# Patient Record
Sex: MALE | Race: WHITE | NOT HISPANIC OR LATINO | Employment: OTHER | URBAN - METROPOLITAN AREA
[De-identification: names, ages, dates, MRNs, and addresses within clinical notes are randomized per-mention and may not be internally consistent; named-entity substitution may affect disease eponyms.]

---

## 2017-01-30 ENCOUNTER — OFFICE VISIT (OUTPATIENT)
Dept: FAMILY MEDICINE CLINIC | Facility: CLINIC | Age: 72
End: 2017-01-30

## 2017-01-30 VITALS
HEIGHT: 72 IN | HEART RATE: 71 BPM | DIASTOLIC BLOOD PRESSURE: 84 MMHG | RESPIRATION RATE: 16 BRPM | WEIGHT: 255.8 LBS | OXYGEN SATURATION: 97 % | BODY MASS INDEX: 34.65 KG/M2 | SYSTOLIC BLOOD PRESSURE: 142 MMHG | TEMPERATURE: 98.2 F

## 2017-01-30 DIAGNOSIS — G89.29 CHRONIC PAIN OF BOTH KNEES: ICD-10-CM

## 2017-01-30 DIAGNOSIS — M25.562 CHRONIC PAIN OF BOTH KNEES: ICD-10-CM

## 2017-01-30 DIAGNOSIS — M25.561 CHRONIC PAIN OF BOTH KNEES: ICD-10-CM

## 2017-01-30 DIAGNOSIS — M15.9 GENERALIZED OSTEOARTHRITIS: Primary | ICD-10-CM

## 2017-01-30 PROCEDURE — 99213 OFFICE O/P EST LOW 20 MIN: CPT | Performed by: FAMILY MEDICINE

## 2017-01-30 RX ORDER — POTASSIUM CHLORIDE 750 MG/1
TABLET, FILM COATED, EXTENDED RELEASE ORAL
COMMUNITY
Start: 2017-01-02 | End: 2017-03-28

## 2017-01-30 RX ORDER — MULTIVITAMIN/IRON/FOLIC ACID 18MG-0.4MG
TABLET ORAL DAILY
COMMUNITY
Start: 2017-01-16 | End: 2017-10-24

## 2017-01-30 RX ORDER — TRAMADOL HYDROCHLORIDE 50 MG/1
100 TABLET ORAL EVERY 6 HOURS PRN
Qty: 100 TABLET | Refills: 1 | Status: SHIPPED | OUTPATIENT
Start: 2017-01-30 | End: 2017-08-23 | Stop reason: SDUPTHER

## 2017-01-30 RX ORDER — FUROSEMIDE 20 MG/1
20 TABLET ORAL DAILY
Qty: 30 TABLET | Refills: 2 | Status: SHIPPED | OUTPATIENT
Start: 2017-01-30 | End: 2017-04-23 | Stop reason: SDUPTHER

## 2017-01-30 RX ORDER — OMEPRAZOLE 20 MG/1
CAPSULE, DELAYED RELEASE ORAL
COMMUNITY
Start: 2017-01-23 | End: 2017-10-24

## 2017-01-30 RX ORDER — FUROSEMIDE 20 MG/1
TABLET ORAL
Qty: 30 TABLET | Refills: 0 | Status: CANCELLED | OUTPATIENT
Start: 2017-01-30

## 2017-01-30 NOTE — MR AVS SNAPSHOT
Jeremie Wynn   1/30/2017 11:00 AM   Office Visit    Provider:  Aaron Trejo MD   Department:  [facility]   Dept Phone:  [phone]                Your Full Care Plan              Today's Medication Changes          These changes are accurate as of: 1/30/17 11:41 AM.  If you have any questions, ask your nurse or doctor.               Medication(s)that have changed:     furosemide 20 MG tablet   Commonly known as:  LASIX   Take 1 tablet by mouth Daily.   What changed:  See the new instructions.   Changed by:  Aaron Trejo MD       KLOR-CON 10 MEQ CR tablet   Generic drug:  potassium chloride   TAKE ONE TABLET BY MOUTH DAILY   What changed:  Another medication with the same name was removed. Continue taking this medication, and follow the directions you see here.   Changed by:  Aaron Trejo MD       traMADol 50 MG tablet   Commonly known as:  ULTRAM   Take 2 tablets by mouth Every 6 (Six) Hours As Needed for moderate pain (4-6).   What changed:    - how much to take  - how to take this  - when to take this  - reasons to take this   Changed by:  Aaron Trejo MD         Stop taking medication(s)listed here:     azithromycin 250 MG tablet   Commonly known as:  ZITHROMAX   Stopped by:  Aaron Trejo MD           diclofenac 75 MG EC tablet   Commonly known as:  VOLTAREN   Stopped by:  Aaron Trejo MD           MethylPREDNISolone 4 MG tablet   Commonly known as:  MEDROL (GRZEGORZ)   Stopped by:  Aaron Trejo MD                Where to Get Your Medications      These medications were sent to [address] - [address] [facility] [address] [facility] - [phone]  - [phone] FX  [address] [facility] [address] [address]     Phone:  [phone]     furosemide 20 MG tablet         You can get these medications from any pharmacy     Bring a paper prescription for each of these medications     traMADol  50 MG tablet                  Your Updated Medication List          This list is accurate as of: 1/30/17 11:41 AM.  Always use your most recent med list.                albuterol 108 (90 BASE) MCG/ACT inhaler   Commonly known as:  PROVENTIL HFA;VENTOLIN HFA   Inhale 2 puffs every 4 (four) hours as needed for wheezing.       aspirin 81 MG tablet       atorvastatin 10 MG tablet   Commonly known as:  LIPITOR   TAKE ONE TABLET BY MOUTH DAILY FOR CHOLESTEROL       CENTROVITE tablet       furosemide 20 MG tablet   Commonly known as:  LASIX   Take 1 tablet by mouth Daily.       KLOR-CON 10 MEQ CR tablet   Generic drug:  potassium chloride   TAKE ONE TABLET BY MOUTH DAILY       LIALDA 1.2 G EC tablet   Generic drug:  mesalamine       omeprazole 20 MG capsule   Commonly known as:  priLOSEC       potassium chloride 10 MEQ CR tablet   Commonly known as:  K-DUR       tiZANidine 4 MG tablet   Commonly known as:  ZANAFLEX       tobramycin 0.3 % solution ophthalmic solution       traMADol 50 MG tablet   Commonly known as:  ULTRAM   Take 2 tablets by mouth Every 6 (Six) Hours As Needed for moderate pain (4-6).       valsartan 320 MG tablet   Commonly known as:  DIOVAN               We Performed the Following     Ambulatory Referral to Orthopedic Surgery       You Were Diagnosed With        Codes Comments    Generalized osteoarthritis    -  Primary ICD-10-CM: M15.9  ICD-9-CM: 715.00     Chronic pain of both knees     ICD-10-CM: M25.561, M25.562, G89.29  ICD-9-CM: 719.46, 338.29       Instructions     None    Patient Instructions History      MyChart Signup     Our records indicate that you have an active [employer] account.    You can view your After Visit Summary by going to [employer] and logging in with your [employer] username and password.  If you don't have a [employer] username and password but a parent or guardian has access to your record, the parent or guardian should login with their own [employer]  "username and password and access your record to view the After Visit Summary.    If you have questions, you can email [email or web address] or call [phone] to talk to our MyChart staff.  Remember, [employer] is NOT to be used for urgent needs.  For medical emergencies, dial 911.               Other Info from Your Visit           Your Appointments     Mar 20, 2017  9:45 AM EDT   Follow Up with Kishore Mc MD   [facility] CARDIOLOGY (--)    [address]  [facility] [id number] [phone]           Arrive 15 minutes prior to appointment.            Mar 28, 2017  9:15 AM EDT   Wellness with Aaron Trejo MD   [facility] FAMILY MEDICINE (--)    [address]  [facility] [address] [phone]              Allergies     Contrast Dye  Anaphylaxis    Morphine And Related      Penicillins      Sulfa Antibiotics        Reason for Visit     Knee Pain both knees      Vital Signs     Blood Pressure Pulse Temperature Respirations Height Weight    142/84 71 98.2 °F (36.8 °C) 16 71.5\" (181.6 cm) 255 lb 12.8 oz (116 kg)    Oxygen Saturation Body Mass Index Smoking Status             97% 35.18 kg/m2 Former Smoker         Problems and Diagnoses Noted     Generalized osteoarthritis    Chronic pain of both knees          "

## 2017-01-31 NOTE — PROGRESS NOTES
"Subjective   Jeremie Wynn is a 71 y.o. male.     Knee Pain    The incident occurred more than 1 week ago. There was no injury mechanism. The pain is present in the left knee and right knee. The pain is moderate. The pain has been constant since onset. Associated symptoms include a loss of motion. Pertinent negatives include no numbness or tingling. The symptoms are aggravated by weight bearing. He has tried acetaminophen for the symptoms. The treatment provided mild relief.      Jeremie had a recent EGD which showed superficial ulcerations in the stomach which required discontinuation of diclofenac. Since that time he has had tremendous problems with his knees with stiffness difficulty walking increased pain and swelling.  The following portions of the patient's history were reviewed and updated as appropriate: allergies, current medications, past social history and problem list.    Review of Systems   Constitutional: Negative for chills and fever.   HENT: Negative.    Respiratory: Negative for shortness of breath and wheezing.    Cardiovascular: Negative for chest pain and palpitations.   Musculoskeletal: Positive for arthralgias, joint swelling and myalgias. Negative for back pain.        Range of motion is normal  There is no joint effusion, redness or swelling   Neurological: Negative for tingling, tremors, weakness and numbness.       Objective   Visit Vitals   • /84   • Pulse 71   • Temp 98.2 °F (36.8 °C)   • Resp 16   • Ht 71.5\" (181.6 cm)   • Wt 255 lb 12.8 oz (116 kg)   • SpO2 97%   • BMI 35.18 kg/m2     Physical Exam    Assessment/Plan   Problem List Items Addressed This Visit        Musculoskeletal and Integument    Generalized osteoarthritis - Primary    Relevant Orders    Ambulatory Referral to Orthopedic Surgery      Other Visit Diagnoses     Chronic pain of both knees        Relevant Medications    traMADol (ULTRAM) 50 MG tablet          New Medications Ordered This Visit   Medications   • " omeprazole (priLOSEC) 20 MG capsule   • potassium chloride (K-DUR) 10 MEQ CR tablet   • Multiple Vitamins-Minerals (CENTROVITE) tablet     Sig: Take  by mouth Daily.   • traMADol (ULTRAM) 50 MG tablet     Sig: Take 2 tablets by mouth Every 6 (Six) Hours As Needed for moderate pain (4-6).     Dispense:  100 tablet     Refill:  1   • furosemide (LASIX) 20 MG tablet     Sig: Take 1 tablet by mouth Daily.     Dispense:  30 tablet     Refill:  2     I recommend orthopedic evaluation may need some injections in his knees to help until he can possibly go back on any NSAIDs such as Celebrex in the future with a PPI.

## 2017-02-07 RX ORDER — POTASSIUM CHLORIDE 750 MG/1
TABLET, FILM COATED, EXTENDED RELEASE ORAL
Qty: 30 TABLET | Refills: 3 | Status: SHIPPED | OUTPATIENT
Start: 2017-02-07 | End: 2017-05-28 | Stop reason: SDUPTHER

## 2017-02-17 RX ORDER — TIZANIDINE 4 MG/1
TABLET ORAL
Qty: 30 TABLET | Refills: 0 | Status: SHIPPED | OUTPATIENT
Start: 2017-02-17 | End: 2017-03-31 | Stop reason: SDUPTHER

## 2017-03-20 ENCOUNTER — TELEPHONE (OUTPATIENT)
Dept: FAMILY MEDICINE CLINIC | Facility: CLINIC | Age: 72
End: 2017-03-20

## 2017-03-20 RX ORDER — ATORVASTATIN CALCIUM 10 MG/1
TABLET, FILM COATED ORAL
Qty: 90 TABLET | Refills: 0 | Status: SHIPPED | OUTPATIENT
Start: 2017-03-20 | End: 2017-06-12 | Stop reason: SDUPTHER

## 2017-03-20 RX ORDER — VALSARTAN 320 MG/1
320 TABLET ORAL DAILY
Qty: 90 TABLET | Refills: 0 | Status: SHIPPED | OUTPATIENT
Start: 2017-03-20 | End: 2017-06-12 | Stop reason: SDUPTHER

## 2017-03-28 ENCOUNTER — OFFICE VISIT (OUTPATIENT)
Dept: FAMILY MEDICINE CLINIC | Facility: CLINIC | Age: 72
End: 2017-03-28

## 2017-03-28 VITALS
HEIGHT: 72 IN | HEART RATE: 64 BPM | DIASTOLIC BLOOD PRESSURE: 82 MMHG | RESPIRATION RATE: 16 BRPM | SYSTOLIC BLOOD PRESSURE: 128 MMHG | TEMPERATURE: 97.6 F | WEIGHT: 253 LBS | OXYGEN SATURATION: 97 % | BODY MASS INDEX: 34.27 KG/M2

## 2017-03-28 DIAGNOSIS — Z00.00 MEDICARE ANNUAL WELLNESS VISIT, SUBSEQUENT: Primary | ICD-10-CM

## 2017-03-28 DIAGNOSIS — I10 ESSENTIAL HYPERTENSION: ICD-10-CM

## 2017-03-28 DIAGNOSIS — E78.01 FAMILIAL HYPERCHOLESTEROLEMIA: ICD-10-CM

## 2017-03-28 DIAGNOSIS — M19.90 ARTHRITIS: ICD-10-CM

## 2017-03-28 DIAGNOSIS — E55.9 VITAMIN D DEFICIENCY: ICD-10-CM

## 2017-03-28 LAB
25(OH)D3+25(OH)D2 SERPL-MCNC: 13.4 NG/ML
ALBUMIN SERPL-MCNC: 4.2 G/DL (ref 3.2–4.8)
ALBUMIN/GLOB SERPL: 1.4 G/DL (ref 1.5–2.5)
ALP SERPL-CCNC: 60 U/L (ref 25–100)
ALT SERPL-CCNC: 22 U/L (ref 7–40)
AST SERPL-CCNC: 18 U/L (ref 0–33)
BASOPHILS # BLD AUTO: 0.03 10*3/MM3 (ref 0–0.2)
BASOPHILS NFR BLD AUTO: 0.4 % (ref 0–1)
BILIRUB BLD-MCNC: NEGATIVE MG/DL
BILIRUB SERPL-MCNC: 0.4 MG/DL (ref 0.3–1.2)
BUN SERPL-MCNC: 19 MG/DL (ref 9–23)
BUN/CREAT SERPL: 17.3 (ref 7–25)
CALCIUM SERPL-MCNC: 9.8 MG/DL (ref 8.7–10.4)
CHLORIDE SERPL-SCNC: 106 MMOL/L (ref 99–109)
CHOLEST SERPL-MCNC: 152 MG/DL (ref 0–200)
CLARITY, POC: CLEAR
CO2 SERPL-SCNC: 34 MMOL/L (ref 20–31)
COLOR UR: YELLOW
CREAT SERPL-MCNC: 1.1 MG/DL (ref 0.6–1.3)
EOSINOPHIL # BLD AUTO: 0.38 10*3/MM3 (ref 0.1–0.3)
EOSINOPHIL NFR BLD AUTO: 4.7 % (ref 0–3)
ERYTHROCYTE [DISTWIDTH] IN BLOOD BY AUTOMATED COUNT: 14.6 % (ref 11.3–14.5)
GLOBULIN SER CALC-MCNC: 3.1 GM/DL
GLUCOSE SERPL-MCNC: 111 MG/DL (ref 70–100)
GLUCOSE UR STRIP-MCNC: NEGATIVE MG/DL
HCT VFR BLD AUTO: 41.8 % (ref 38.9–50.9)
HDLC SERPL-MCNC: 48 MG/DL (ref 40–60)
HGB BLD-MCNC: 13.2 G/DL (ref 13.1–17.5)
IMM GRANULOCYTES # BLD: 0.03 10*3/MM3 (ref 0–0.03)
IMM GRANULOCYTES NFR BLD: 0.4 % (ref 0–0.6)
KETONES UR QL: NEGATIVE
LDLC SERPL CALC-MCNC: 72 MG/DL (ref 0–100)
LEUKOCYTE EST, POC: NEGATIVE
LYMPHOCYTES # BLD AUTO: 1.54 10*3/MM3 (ref 0.6–4.8)
LYMPHOCYTES NFR BLD AUTO: 19.1 % (ref 24–44)
MCH RBC QN AUTO: 29.4 PG (ref 27–31)
MCHC RBC AUTO-ENTMCNC: 31.6 G/DL (ref 32–36)
MCV RBC AUTO: 93.1 FL (ref 80–99)
MONOCYTES # BLD AUTO: 0.81 10*3/MM3 (ref 0–1)
MONOCYTES NFR BLD AUTO: 10 % (ref 0–12)
NEUTROPHILS # BLD AUTO: 5.27 10*3/MM3 (ref 1.5–8.3)
NEUTROPHILS NFR BLD AUTO: 65.4 % (ref 41–71)
NITRITE UR-MCNC: NEGATIVE MG/ML
PH UR: 5.5 [PH] (ref 5–8)
PLATELET # BLD AUTO: 199 10*3/MM3 (ref 150–450)
POTASSIUM SERPL-SCNC: 4.4 MMOL/L (ref 3.5–5.5)
PROT SERPL-MCNC: 7.3 G/DL (ref 5.7–8.2)
PROT UR STRIP-MCNC: NEGATIVE MG/DL
PSA SERPL-MCNC: 0.96 NG/ML (ref 0–4)
RBC # BLD AUTO: 4.49 10*6/MM3 (ref 4.2–5.76)
RBC # UR STRIP: NEGATIVE /UL
SODIUM SERPL-SCNC: 146 MMOL/L (ref 132–146)
SP GR UR: 1.02 (ref 1–1.03)
TRIGL SERPL-MCNC: 158 MG/DL (ref 0–150)
TSH SERPL DL<=0.005 MIU/L-ACNC: 1.46 MIU/ML (ref 0.35–5.35)
UROBILINOGEN UR QL: NORMAL
VLDLC SERPL CALC-MCNC: 31.6 MG/DL
WBC # BLD AUTO: 8.06 10*3/MM3 (ref 3.5–10.8)

## 2017-03-28 PROCEDURE — G0439 PPPS, SUBSEQ VISIT: HCPCS | Performed by: FAMILY MEDICINE

## 2017-03-28 PROCEDURE — 81003 URINALYSIS AUTO W/O SCOPE: CPT | Performed by: FAMILY MEDICINE

## 2017-03-28 PROCEDURE — 96160 PT-FOCUSED HLTH RISK ASSMT: CPT | Performed by: FAMILY MEDICINE

## 2017-03-28 PROCEDURE — 36415 COLL VENOUS BLD VENIPUNCTURE: CPT | Performed by: FAMILY MEDICINE

## 2017-03-28 PROCEDURE — 93000 ELECTROCARDIOGRAM COMPLETE: CPT | Performed by: FAMILY MEDICINE

## 2017-03-28 RX ORDER — POTASSIUM CHLORIDE 750 MG/1
10 TABLET, EXTENDED RELEASE ORAL DAILY
COMMUNITY
Start: 2017-03-26 | End: 2017-03-28

## 2017-03-28 NOTE — PROGRESS NOTES
QUICK REFERENCE INFORMATION:  The ABCs of the Annual Wellness Visit    Subsequent Medicare Wellness Visit    HEALTH RISK ASSESSMENT    1945    Recent Hospitalizations:  No recent hospitalization(s)..        Current Medical Providers:  Patient Care Team:  Aaron Trejo MD as PCP - General  Aaron Trejo MD as PCP - Family Medicine        Smoking Status:  History   Smoking Status   • Former Smoker   Smokeless Tobacco   • Not on file       Alcohol Consumption:  History   Alcohol Use No       Depression Screen:   No flowsheet data found.    Health Habits and Functional and Cognitive Screening:  No flowsheet data found.           Does the patient have evidence of cognitive impairment? No    Asprin use counseling:yes      Recent Lab Results:  CMP:  Lab Results   Component Value Date     (H) 01/13/2016    BUN 22 01/13/2016    CREATININE 1.29 (H) 01/13/2016    EGFRIFNONA 55 (L) 01/13/2016    EGFRIFAFRI 64 01/13/2016    BCR 17 01/13/2016     01/13/2016    K 4.5 01/13/2016    CO2 26 01/13/2016    CALCIUM 9.4 01/13/2016    PROTENTOTREF 7.0 01/13/2016    ALBUMIN 4.2 01/13/2016    LABGLOBREF 2.8 01/13/2016    LABIL2 1.5 01/13/2016    BILITOT 0.4 01/13/2016    ALKPHOS 47 01/13/2016    AST 23 08/02/2016    ALT 26 08/02/2016     Lipid Panel:  Lab Results   Component Value Date    CHLPL 187 01/13/2016    TRIG 167 (H) 01/13/2016    HDL 45 01/13/2016    VLDL 33 01/13/2016     (H) 01/13/2016     LDL:     HbA1c:     Urine Microalbumin:     Visual Acuity:  No exam data present    Age-appropriate Screening Schedule:  Refer to the list below for future screening recommendations based on patient's age, sex and/or medical conditions. Orders for these recommended tests are listed in the plan section. The patient has been provided with a written plan.    Health Maintenance   Topic Date Due   • TDAP/TD VACCINES (1 - Tdap) 11/01/1962   • PNEUMOCOCCAL VACCINES (65+ LOW/MEDIUM RISK) (1 of 2 - PCV13) 11/01/2008   •  ZOSTER VACCINE  05/03/2016   • INFLUENZA VACCINE  08/01/2016   • LIPID PANEL  01/13/2017   • COLONOSCOPY  01/23/2027        Subjective   History of Present Illness    Jeremie Wynn is a 71 y.o. male who presents for an Subsequent Wellness Visit.    The following portions of the patient's history were reviewed and updated as appropriate: allergies, current medications, past family history, past medical history, past social history, past surgical history and problem list.    Outpatient Medications Prior to Visit   Medication Sig Dispense Refill   • albuterol (PROVENTIL HFA;VENTOLIN HFA) 108 (90 BASE) MCG/ACT inhaler Inhale 2 puffs every 4 (four) hours as needed for wheezing. 1 inhaler 0   • aspirin 81 MG tablet Take  by mouth daily.     • atorvastatin (LIPITOR) 10 MG tablet TAKE ONE TABLET BY MOUTH DAILY FOR CHOLESTEROL 90 tablet 0   • furosemide (LASIX) 20 MG tablet Take 1 tablet by mouth Daily. 30 tablet 2   • LIALDA 1.2 G EC tablet      • Multiple Vitamins-Minerals (CENTROVITE) tablet Take  by mouth Daily.     • omeprazole (priLOSEC) 20 MG capsule      • potassium chloride (K-DUR) 10 MEQ CR tablet TAKE ONE TABLET BY MOUTH DAILY 30 tablet 3   • tiZANidine (ZANAFLEX) 4 MG tablet TAKE ONE TABLET BY MOUTH EVERY NIGHT AT BEDTIME AS NEEDED FOR LEG CRAMPS 30 tablet 0   • tobramycin 0.3 % solution ophthalmic solution      • traMADol (ULTRAM) 50 MG tablet Take 2 tablets by mouth Every 6 (Six) Hours As Needed for moderate pain (4-6). 100 tablet 1   • valsartan (DIOVAN) 320 MG tablet Take 1 tablet by mouth Daily. 90 tablet 0   • potassium chloride (K-DUR) 10 MEQ CR tablet        No facility-administered medications prior to visit.        Patient Active Problem List   Diagnosis   • Diverticulitis   • Anemia   • Atrial fibrillation   • Bradycardia   • Generalized osteoarthritis   • Hyperlipemia   • Hypertension   • PVC's (premature ventricular contractions)   • Arthralgia of hip   • Dyspnea on exertion   • Obesity        Advanced Care Planning:  has an advanced directive - a copy HAS NOT been provided    Identification of Risk Factors:  Risk factors include: weight , cardiovascular risk and chronic pain.    Review of Systems   Constitutional: Negative for chills, fatigue, fever and unexpected weight change.   Respiratory: Negative for cough, chest tightness and shortness of breath.    Cardiovascular: Negative for chest pain, palpitations and leg swelling.   Gastrointestinal: Negative for nausea.   Musculoskeletal: Positive for arthralgias, back pain, joint swelling and myalgias.        Severe knee pain off nsaids dur to GI issues   Skin: Negative for color change and rash.   Allergic/Immunologic: Positive for environmental allergies. Negative for food allergies.   Neurological: Negative for dizziness, tremors, syncope, weakness, numbness and headaches.   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       Compared to one year ago, the patient feels his physical health is worse.  Compared to one year ago, the patient feels his mental health is the same.    Objective     Physical Exam   Constitutional: He is oriented to person, place, and time. He appears well-developed and well-nourished. He is cooperative.   HENT:   Head: Normocephalic.   Right Ear: External ear normal.   Left Ear: External ear normal.   Nose: Nose normal.   Mouth/Throat: Oropharynx is clear and moist. No oropharyngeal exudate.   Eyes: Conjunctivae are normal. Pupils are equal, round, and reactive to light. No scleral icterus.   Neck: Neck supple. Carotid bruit is not present. No thyromegaly present.   Cardiovascular: Normal rate, regular rhythm and normal heart sounds.  Exam reveals no gallop and no friction rub.    No murmur heard.  Pulmonary/Chest: Effort normal and breath sounds normal.   Abdominal: Soft. He exhibits no mass. There is no hepatosplenomegaly. No hernia.   Genitourinary:   Genitourinary Comments:  "Her urology   Musculoskeletal: Normal range of motion.   Crepitation of both knees   Lymphadenopathy:     He has no cervical adenopathy.   Neurological: He is alert and oriented to person, place, and time.   No focal deficits no lateralizing signs   Skin: Skin is warm and dry. No rash noted.   Psychiatric: He has a normal mood and affect. Cognition and memory are normal.   Nursing note and vitals reviewed.      Vitals:    03/28/17 0919   BP: 128/82   Pulse: 64   Resp: 16   Temp: 97.6 °F (36.4 °C)   SpO2: 97%   Weight: 253 lb (115 kg)   Height: 72\" (182.9 cm)       Body mass index is 34.31 kg/(m^2).  Discussed the patient's BMI with him. The BMI is above average; no BMI management plan is appropriate..    Assessment/Plan   Patient Self-Management and Personalized Health Advice  The patient has been provided with information about: prevention of cardiac or vascular disease and fall prevention and preventive services including:   · Pneumococcal vaccine , Zostavax vaccine (Herpes Zoster).    Visit Diagnoses:    ICD-10-CM ICD-9-CM   1. Medicare annual wellness visit, subsequent Z00.00 V70.0   2. Essential hypertension I10 401.9   3. Familial hypercholesterolemia E78.01 272.0   4. Vitamin D deficiency E55.9 268.9   5. Arthritis M19.90 716.90       Orders Placed This Encounter   Procedures   • Comprehensive Metabolic Panel   • Lipid Panel   • TSH   • PSA   • Vitamin D 25 Hydroxy   • POC Urinalysis Dipstick, Automated   • ECG 12 Lead     Order Specific Question:   Reason for Exam:     Answer:          Outpatient Encounter Prescriptions as of 3/28/2017   Medication Sig Dispense Refill   • albuterol (PROVENTIL HFA;VENTOLIN HFA) 108 (90 BASE) MCG/ACT inhaler Inhale 2 puffs every 4 (four) hours as needed for wheezing. 1 inhaler 0   • aspirin 81 MG tablet Take  by mouth daily.     • atorvastatin (LIPITOR) 10 MG tablet TAKE ONE TABLET BY MOUTH DAILY FOR CHOLESTEROL 90 tablet 0   • furosemide (LASIX) 20 MG tablet Take 1 tablet " by mouth Daily. 30 tablet 2   • LIALDA 1.2 G EC tablet      • Multiple Vitamins-Minerals (CENTROVITE) tablet Take  by mouth Daily.     • omeprazole (priLOSEC) 20 MG capsule      • potassium chloride (K-DUR) 10 MEQ CR tablet TAKE ONE TABLET BY MOUTH DAILY 30 tablet 3   • tiZANidine (ZANAFLEX) 4 MG tablet TAKE ONE TABLET BY MOUTH EVERY NIGHT AT BEDTIME AS NEEDED FOR LEG CRAMPS 30 tablet 0   • tobramycin 0.3 % solution ophthalmic solution      • traMADol (ULTRAM) 50 MG tablet Take 2 tablets by mouth Every 6 (Six) Hours As Needed for moderate pain (4-6). 100 tablet 1   • valsartan (DIOVAN) 320 MG tablet Take 1 tablet by mouth Daily. 90 tablet 0   • [DISCONTINUED] KLOR-CON 10 MEQ CR tablet Take 10 mEq by mouth Daily.     • [DISCONTINUED] potassium chloride (K-DUR) 10 MEQ CR tablet        No facility-administered encounter medications on file as of 3/28/2017.        Reviewed use of high risk medication in the elderly: yes  Reviewed for potential of harmful drug interactions in the elderly: yes    Follow Up:  Return in about 3 months (around 6/28/2017).     An After Visit Summary and PPPS with all of these plans were given to the patient.     ECG 12 Lead  Date/Time: 3/28/2017 5:31 PM  Performed by: KALYAN MORE  Authorized by: KALYAN MORE   Comparison: compared with previous ECG   Similar to previous ECG  Rhythm: sinus rhythm and bundle branch block  Rate: normal  Conduction: left bundle branch block  ST Elevation: V1-V6  T Waves: T waves normal  QRS axis: left  Other: no other findings  Clinical impression: abnormal ECG  Comments: htn        see form

## 2017-03-31 RX ORDER — TIZANIDINE 4 MG/1
TABLET ORAL
Qty: 30 TABLET | Refills: 4 | Status: SHIPPED | OUTPATIENT
Start: 2017-03-31 | End: 2017-10-24

## 2017-04-03 RX ORDER — ERGOCALCIFEROL 1.25 MG/1
50000 CAPSULE ORAL WEEKLY
Qty: 12 CAPSULE | Refills: 0 | Status: SHIPPED | OUTPATIENT
Start: 2017-04-03 | End: 2017-07-02 | Stop reason: SDUPTHER

## 2017-04-08 ENCOUNTER — OFFICE VISIT (OUTPATIENT)
Dept: FAMILY MEDICINE CLINIC | Facility: CLINIC | Age: 72
End: 2017-04-08

## 2017-04-08 VITALS
DIASTOLIC BLOOD PRESSURE: 72 MMHG | RESPIRATION RATE: 18 BRPM | HEART RATE: 63 BPM | WEIGHT: 255 LBS | BODY MASS INDEX: 34.54 KG/M2 | HEIGHT: 72 IN | OXYGEN SATURATION: 96 % | SYSTOLIC BLOOD PRESSURE: 130 MMHG | TEMPERATURE: 97.9 F

## 2017-04-08 DIAGNOSIS — J40 BRONCHITIS: Primary | ICD-10-CM

## 2017-04-08 PROCEDURE — 99213 OFFICE O/P EST LOW 20 MIN: CPT | Performed by: FAMILY MEDICINE

## 2017-04-08 RX ORDER — LEVOFLOXACIN 500 MG/1
500 TABLET, FILM COATED ORAL DAILY
Qty: 10 TABLET | Refills: 0 | Status: SHIPPED | OUTPATIENT
Start: 2017-04-08 | End: 2017-04-18

## 2017-04-08 NOTE — PROGRESS NOTES
"Subjective   Jeremie Wynn is a 71 y.o. male.     History of Present Illness   The patient describes several days of runny nose and congestion.  It seems to be not improving with home care.  The patient reports associated sinus drainage and scratchy throat.  The patient is now experiencing an intermittent croupy cough.  There is no fever, chills or acute dyspnea.  He has been visiting his mother at [facility].  She has \"pneumonia.\"    Review of Systems   Constitutional: Negative for chills and fever.   HENT: Positive for congestion and postnasal drip. Negative for ear pain and facial swelling.    Eyes: Negative for discharge.   Respiratory: Positive for cough and wheezing.    Gastrointestinal: Negative for diarrhea, nausea and vomiting.   Musculoskeletal: Negative for myalgias.   Skin: Negative for rash.   Neurological: Negative for dizziness.     Objective   Physical Exam   Constitutional: He is oriented to person, place, and time. He appears well-developed and well-nourished.   HENT:   Head: Normocephalic and atraumatic.   Right Ear: Hearing, tympanic membrane, external ear and ear canal normal.   Left Ear: Hearing, tympanic membrane, external ear and ear canal normal.   Nose: Mucosal edema and rhinorrhea present.   Mouth/Throat: Uvula is midline. Posterior oropharyngeal erythema present.   Eyes: Conjunctivae are normal. Right eye exhibits no discharge. Left eye exhibits no discharge.   Neck: Neck supple.   Cardiovascular: Normal rate, regular rhythm and normal heart sounds.    Pulmonary/Chest: Effort normal. He has wheezes.   Croupy cough   Musculoskeletal: Normal range of motion.   Lymphadenopathy:     He has no cervical adenopathy.   Neurological: He is alert and oriented to person, place, and time.   Skin: Skin is warm. No rash noted.   Psychiatric: He has a normal mood and affect.   Vitals reviewed.    Assessment/Plan   Diagnoses and all orders for this visit:    Bronchitis  -     levoFLOXacin (LEVAQUIN) 500 MG " tablet; Take 1 tablet by mouth Daily for 10 days.  -     Chlorcyclizine-Pseudoephed 25-60 MG tablet; Take 1/2 to 1 tab po every 12 hours PRN congestion  - Breo 1 puff once daily  - Incruse 1 puff once daily  - Rest, fluids, avoid sick contacts and RTC if not improved.

## 2017-04-24 RX ORDER — FUROSEMIDE 20 MG/1
TABLET ORAL
Qty: 30 TABLET | Refills: 1 | Status: SHIPPED | OUTPATIENT
Start: 2017-04-24 | End: 2017-07-02 | Stop reason: SDUPTHER

## 2017-05-30 RX ORDER — POTASSIUM CHLORIDE 750 MG/1
TABLET, EXTENDED RELEASE ORAL
Qty: 30 TABLET | Refills: 3 | Status: SHIPPED | OUTPATIENT
Start: 2017-05-30 | End: 2017-09-09 | Stop reason: SDUPTHER

## 2017-06-12 RX ORDER — VALSARTAN 320 MG/1
TABLET ORAL
Qty: 90 TABLET | Refills: 0 | Status: SHIPPED | OUTPATIENT
Start: 2017-06-12 | End: 2017-09-04 | Stop reason: SDUPTHER

## 2017-06-12 RX ORDER — ATORVASTATIN CALCIUM 10 MG/1
TABLET, FILM COATED ORAL
Qty: 90 TABLET | Refills: 0 | Status: SHIPPED | OUTPATIENT
Start: 2017-06-12 | End: 2017-09-09 | Stop reason: SDUPTHER

## 2017-06-30 ENCOUNTER — OFFICE VISIT (OUTPATIENT)
Dept: FAMILY MEDICINE CLINIC | Facility: CLINIC | Age: 72
End: 2017-06-30

## 2017-06-30 VITALS
OXYGEN SATURATION: 97 % | WEIGHT: 251 LBS | HEIGHT: 72 IN | RESPIRATION RATE: 16 BRPM | HEART RATE: 54 BPM | SYSTOLIC BLOOD PRESSURE: 122 MMHG | BODY MASS INDEX: 34 KG/M2 | DIASTOLIC BLOOD PRESSURE: 78 MMHG

## 2017-06-30 DIAGNOSIS — I10 ESSENTIAL HYPERTENSION: Primary | ICD-10-CM

## 2017-06-30 DIAGNOSIS — E78.01 FAMILIAL HYPERCHOLESTEROLEMIA: ICD-10-CM

## 2017-06-30 DIAGNOSIS — E55.9 VITAMIN D DEFICIENCY: ICD-10-CM

## 2017-06-30 LAB — 25(OH)D3+25(OH)D2 SERPL-MCNC: 35.8 NG/ML

## 2017-06-30 PROCEDURE — 99213 OFFICE O/P EST LOW 20 MIN: CPT | Performed by: FAMILY MEDICINE

## 2017-06-30 PROCEDURE — 36415 COLL VENOUS BLD VENIPUNCTURE: CPT | Performed by: FAMILY MEDICINE

## 2017-06-30 RX ORDER — POTASSIUM CHLORIDE 750 MG/1
10 TABLET, FILM COATED, EXTENDED RELEASE ORAL DAILY
COMMUNITY
Start: 2017-05-30 | End: 2018-06-04 | Stop reason: SDUPTHER

## 2017-06-30 NOTE — PROGRESS NOTES
"Subjective   Jeremie Wynn is a 71 y.o. male.     Hypertension   This is a chronic problem. The problem is unchanged. The problem is controlled. Pertinent negatives include no chest pain, headaches, malaise/fatigue, palpitations, peripheral edema or shortness of breath. Risk factors for coronary artery disease include male gender and sedentary lifestyle. Past treatments include angiotensin blockers and lifestyle changes. The current treatment provides significant improvement. There is no history of angina, kidney disease or CAD/MI.   Hyperlipidemia   This is a chronic problem. The problem is controlled. Exacerbating diseases include obesity. He has no history of diabetes. There are no known factors aggravating his hyperlipidemia. Pertinent negatives include no chest pain or shortness of breath. Current antihyperlipidemic treatment includes statins and diet change. The current treatment provides significant improvement of lipids. There are no compliance problems.         The following portions of the patient's history were reviewed and updated as appropriate: allergies, current medications, past social history and problem list.    Review of Systems   Constitutional: Negative for fatigue, malaise/fatigue and unexpected weight change.   HENT: Negative for congestion, postnasal drip and sore throat.    Respiratory: Negative for cough, chest tightness and shortness of breath.    Cardiovascular: Negative for chest pain, palpitations and leg swelling.   Gastrointestinal: Negative for nausea.   Genitourinary: Negative for dysuria and hematuria.   Musculoskeletal: Positive for arthralgias, back pain and gait problem.   Skin: Negative for color change and rash.   Neurological: Negative for dizziness, syncope, weakness and headaches.       Objective   /78  Pulse 54  Resp 16  Ht 72\" (182.9 cm)  Wt 251 lb (114 kg)  SpO2 97%  BMI 34.04 kg/m2  Physical Exam   Constitutional: He is oriented to person, place, and time. " He appears well-developed and well-nourished. He is cooperative.   HENT:   Head: Normocephalic.   Right Ear: External ear normal.   Left Ear: External ear normal.   Nose: Nose normal.   Mouth/Throat: Oropharynx is clear and moist.   Eyes: Conjunctivae are normal. Pupils are equal, round, and reactive to light. No scleral icterus.   Neck: Neck supple. Carotid bruit is not present. No thyromegaly present.   Cardiovascular: Normal rate and regular rhythm.    Pulmonary/Chest: Effort normal and breath sounds normal.   Abdominal: There is no hepatosplenomegaly.   Musculoskeletal: Normal range of motion. He exhibits edema.   Trace edema at the ankle   Neurological: He is alert and oriented to person, place, and time.   No focal deficits no lateralizing signs   Skin: Skin is warm and dry. No rash noted.   Psychiatric: He has a normal mood and affect. Cognition and memory are normal.   Nursing note and vitals reviewed.      Assessment/Plan   Problem List Items Addressed This Visit        Cardiovascular and Mediastinum    Hyperlipemia    Hypertension - Primary      Other Visit Diagnoses     Vitamin D deficiency        Relevant Orders    Vitamin D 25 Hydroxy          New Medications Ordered This Visit   Medications   • potassium chloride (K-DUR) 10 MEQ CR tablet     Sig: Take 10 mEq by mouth Daily.

## 2017-07-03 RX ORDER — ERGOCALCIFEROL 1.25 MG/1
CAPSULE ORAL
Qty: 12 CAPSULE | Refills: 3 | Status: SHIPPED | OUTPATIENT
Start: 2017-07-03 | End: 2017-10-24

## 2017-07-03 RX ORDER — FUROSEMIDE 20 MG/1
TABLET ORAL
Qty: 30 TABLET | Refills: 3 | Status: SHIPPED | OUTPATIENT
Start: 2017-07-03 | End: 2017-09-09 | Stop reason: SDUPTHER

## 2017-08-23 DIAGNOSIS — M25.561 CHRONIC PAIN OF BOTH KNEES: ICD-10-CM

## 2017-08-23 DIAGNOSIS — M25.562 CHRONIC PAIN OF BOTH KNEES: ICD-10-CM

## 2017-08-23 DIAGNOSIS — G89.29 CHRONIC PAIN OF BOTH KNEES: ICD-10-CM

## 2017-08-23 RX ORDER — TRAMADOL HYDROCHLORIDE 50 MG/1
100 TABLET ORAL EVERY 6 HOURS PRN
Qty: 100 TABLET | Refills: 1 | Status: SHIPPED | OUTPATIENT
Start: 2017-08-23 | End: 2017-08-25 | Stop reason: SDUPTHER

## 2017-08-25 ENCOUNTER — DOCUMENTATION (OUTPATIENT)
Dept: FAMILY MEDICINE CLINIC | Facility: CLINIC | Age: 72
End: 2017-08-25

## 2017-08-25 DIAGNOSIS — G89.29 CHRONIC PAIN OF BOTH KNEES: ICD-10-CM

## 2017-08-25 DIAGNOSIS — M25.561 CHRONIC PAIN OF BOTH KNEES: ICD-10-CM

## 2017-08-25 DIAGNOSIS — M25.562 CHRONIC PAIN OF BOTH KNEES: ICD-10-CM

## 2017-08-25 RX ORDER — TRAMADOL HYDROCHLORIDE 50 MG/1
100 TABLET ORAL EVERY 6 HOURS PRN
Qty: 100 TABLET | Refills: 1 | Status: SHIPPED | OUTPATIENT
Start: 2017-08-25 | End: 2017-11-09 | Stop reason: HOSPADM

## 2017-09-05 RX ORDER — VALSARTAN 320 MG/1
TABLET ORAL
Qty: 90 TABLET | Refills: 0 | Status: SHIPPED | OUTPATIENT
Start: 2017-09-05 | End: 2018-06-04 | Stop reason: SDUPTHER

## 2017-09-11 RX ORDER — POTASSIUM CHLORIDE 750 MG/1
TABLET, FILM COATED, EXTENDED RELEASE ORAL
Qty: 30 TABLET | Refills: 2 | Status: SHIPPED | OUTPATIENT
Start: 2017-09-11 | End: 2017-10-24 | Stop reason: SDUPTHER

## 2017-09-11 RX ORDER — ATORVASTATIN CALCIUM 10 MG/1
TABLET, FILM COATED ORAL
Qty: 90 TABLET | Refills: 0 | Status: SHIPPED | OUTPATIENT
Start: 2017-09-11 | End: 2017-12-01 | Stop reason: SDUPTHER

## 2017-09-11 RX ORDER — FUROSEMIDE 20 MG/1
TABLET ORAL
Qty: 30 TABLET | Refills: 2 | Status: SHIPPED | OUTPATIENT
Start: 2017-09-11 | End: 2017-12-29 | Stop reason: SDUPTHER

## 2017-09-11 RX ORDER — VALSARTAN 320 MG/1
TABLET ORAL
Qty: 90 TABLET | Refills: 0 | Status: SHIPPED | OUTPATIENT
Start: 2017-09-11 | End: 2017-10-24 | Stop reason: SDUPTHER

## 2017-10-03 ENCOUNTER — OFFICE VISIT (OUTPATIENT)
Dept: FAMILY MEDICINE CLINIC | Facility: CLINIC | Age: 72
End: 2017-10-03

## 2017-10-03 VITALS
HEART RATE: 60 BPM | DIASTOLIC BLOOD PRESSURE: 74 MMHG | BODY MASS INDEX: 33.23 KG/M2 | TEMPERATURE: 98.8 F | SYSTOLIC BLOOD PRESSURE: 112 MMHG | WEIGHT: 245 LBS

## 2017-10-03 DIAGNOSIS — I10 ESSENTIAL HYPERTENSION: Primary | ICD-10-CM

## 2017-10-03 DIAGNOSIS — J01.00 ACUTE MAXILLARY SINUSITIS, RECURRENCE NOT SPECIFIED: ICD-10-CM

## 2017-10-03 PROCEDURE — 99213 OFFICE O/P EST LOW 20 MIN: CPT | Performed by: FAMILY MEDICINE

## 2017-10-03 RX ORDER — AZITHROMYCIN 250 MG/1
TABLET, FILM COATED ORAL
Qty: 6 TABLET | Refills: 0 | Status: SHIPPED | OUTPATIENT
Start: 2017-10-03 | End: 2017-10-24

## 2017-10-24 ENCOUNTER — APPOINTMENT (OUTPATIENT)
Dept: PREADMISSION TESTING | Facility: HOSPITAL | Age: 72
End: 2017-10-24

## 2017-10-24 ENCOUNTER — HOSPITAL ENCOUNTER (OUTPATIENT)
Dept: GENERAL RADIOLOGY | Facility: HOSPITAL | Age: 72
Discharge: HOME | End: 2017-10-24
Admitting: ORTHOPAEDIC SURGERY

## 2017-10-24 VITALS — WEIGHT: 248.2 LBS | BODY MASS INDEX: 35.53 KG/M2 | HEIGHT: 70 IN

## 2017-10-24 LAB
ABO GROUP BLD: NORMAL
ALBUMIN SERPL-MCNC: 4 G/DL (ref 3.2–4.8)
ALBUMIN/GLOB SERPL: 1.4 G/DL (ref 1.5–2.5)
ALP SERPL-CCNC: 63 U/L (ref 25–100)
ALT SERPL W P-5'-P-CCNC: 27 U/L (ref 7–40)
ANION GAP SERPL CALCULATED.3IONS-SCNC: 10 MMOL/L (ref 3–11)
AST SERPL-CCNC: 27 U/L (ref 0–33)
BASOPHILS # BLD AUTO: 0.03 10*3/MM3 (ref 0–0.2)
BASOPHILS NFR BLD AUTO: 0.4 % (ref 0–1)
BILIRUB SERPL-MCNC: 0.5 MG/DL (ref 0.3–1.2)
BLD GP AB SCN SERPL QL: NEGATIVE
BUN BLD-MCNC: 16 MG/DL (ref 9–23)
BUN/CREAT SERPL: 14.5 (ref 7–25)
CALCIUM SPEC-SCNC: 9.4 MG/DL (ref 8.7–10.4)
CHLORIDE SERPL-SCNC: 107 MMOL/L (ref 99–109)
CO2 SERPL-SCNC: 25 MMOL/L (ref 20–31)
CREAT BLD-MCNC: 1.1 MG/DL (ref 0.6–1.3)
DEPRECATED RDW RBC AUTO: 47.8 FL (ref 37–54)
EOSINOPHIL # BLD AUTO: 0.35 10*3/MM3 (ref 0–0.3)
EOSINOPHIL NFR BLD AUTO: 4.9 % (ref 0–3)
ERYTHROCYTE [DISTWIDTH] IN BLOOD BY AUTOMATED COUNT: 13.8 % (ref 11.3–14.5)
GFR SERPL CREATININE-BSD FRML MDRD: 66 ML/MIN/1.73
GLOBULIN UR ELPH-MCNC: 2.8 GM/DL
GLUCOSE BLD-MCNC: 121 MG/DL (ref 70–100)
HBA1C MFR BLD: 6 % (ref 4.8–5.6)
HCT VFR BLD AUTO: 40.8 % (ref 38.9–50.9)
HGB BLD-MCNC: 13.6 G/DL (ref 13.1–17.5)
IMM GRANULOCYTES # BLD: 0.02 10*3/MM3 (ref 0–0.03)
IMM GRANULOCYTES NFR BLD: 0.3 % (ref 0–0.6)
LYMPHOCYTES # BLD AUTO: 1.29 10*3/MM3 (ref 0.6–4.8)
LYMPHOCYTES NFR BLD AUTO: 18.1 % (ref 24–44)
MCH RBC QN AUTO: 31.3 PG (ref 27–31)
MCHC RBC AUTO-ENTMCNC: 33.3 G/DL (ref 32–36)
MCV RBC AUTO: 94 FL (ref 80–99)
MONOCYTES # BLD AUTO: 0.66 10*3/MM3 (ref 0–1)
MONOCYTES NFR BLD AUTO: 9.2 % (ref 0–12)
NEUTROPHILS # BLD AUTO: 4.79 10*3/MM3 (ref 1.5–8.3)
NEUTROPHILS NFR BLD AUTO: 67.1 % (ref 41–71)
PLATELET # BLD AUTO: 180 10*3/MM3 (ref 150–450)
PMV BLD AUTO: 11.3 FL (ref 6–12)
POTASSIUM BLD-SCNC: 4.2 MMOL/L (ref 3.5–5.5)
PROT SERPL-MCNC: 6.8 G/DL (ref 5.7–8.2)
RBC # BLD AUTO: 4.34 10*6/MM3 (ref 4.2–5.76)
RH BLD: POSITIVE
SODIUM BLD-SCNC: 142 MMOL/L (ref 132–146)
WBC NRBC COR # BLD: 7.14 10*3/MM3 (ref 3.5–10.8)

## 2017-10-24 PROCEDURE — 86900 BLOOD TYPING SEROLOGIC ABO: CPT | Performed by: ORTHOPAEDIC SURGERY

## 2017-10-24 PROCEDURE — 93005 ELECTROCARDIOGRAM TRACING: CPT

## 2017-10-24 PROCEDURE — 86901 BLOOD TYPING SEROLOGIC RH(D): CPT | Performed by: ORTHOPAEDIC SURGERY

## 2017-10-24 PROCEDURE — 71020 HC CHEST PA AND LATERAL: CPT

## 2017-10-24 PROCEDURE — 93010 ELECTROCARDIOGRAM REPORT: CPT | Performed by: INTERNAL MEDICINE

## 2017-10-24 PROCEDURE — 85025 COMPLETE CBC W/AUTO DIFF WBC: CPT | Performed by: ORTHOPAEDIC SURGERY

## 2017-10-24 PROCEDURE — 83036 HEMOGLOBIN GLYCOSYLATED A1C: CPT | Performed by: ORTHOPAEDIC SURGERY

## 2017-10-24 PROCEDURE — 86850 RBC ANTIBODY SCREEN: CPT | Performed by: ORTHOPAEDIC SURGERY

## 2017-10-24 PROCEDURE — 80053 COMPREHEN METABOLIC PANEL: CPT | Performed by: ORTHOPAEDIC SURGERY

## 2017-10-24 RX ORDER — MULTIVITAMIN/IRON/FOLIC ACID 18MG-0.4MG
1 TABLET ORAL DAILY
COMMUNITY
End: 2022-10-17

## 2017-10-24 RX ORDER — CETIRIZINE HYDROCHLORIDE 10 MG/1
10 TABLET ORAL DAILY
COMMUNITY

## 2017-10-24 ASSESSMENT — KOOS JR
KOOS JR SCORE: 22
KOOS JR SCORE: 31.307

## 2017-10-24 NOTE — PAT
MEASURED FOR TEDS/SCDS IN PAT    CALF MEASUREMENT 16     LENGTH MEASUREMENT 17      Patient has known history of LBBB. Previous EKG of 3/2017 confirms.  Patient last saw a cardiologist in 2016 but additional follow up needed.  Patient denies chest pain or SOA.

## 2017-10-28 ENCOUNTER — TELEPHONE (OUTPATIENT)
Dept: FAMILY MEDICINE CLINIC | Facility: CLINIC | Age: 72
End: 2017-10-28

## 2017-10-30 ENCOUNTER — TELEPHONE (OUTPATIENT)
Dept: FAMILY MEDICINE CLINIC | Facility: CLINIC | Age: 72
End: 2017-10-30

## 2017-10-30 RX ORDER — AZITHROMYCIN 250 MG/1
TABLET, FILM COATED ORAL
Qty: 6 TABLET | Refills: 0 | Status: SHIPPED | OUTPATIENT
Start: 2017-10-30 | End: 2017-10-30 | Stop reason: SDUPTHER

## 2017-10-30 RX ORDER — AZITHROMYCIN 250 MG/1
TABLET, FILM COATED ORAL
Qty: 6 TABLET | Refills: 0 | Status: ON HOLD | OUTPATIENT
Start: 2017-10-30 | End: 2017-11-06

## 2017-11-01 ENCOUNTER — TELEPHONE (OUTPATIENT)
Dept: FAMILY MEDICINE CLINIC | Facility: CLINIC | Age: 72
End: 2017-11-01

## 2017-11-02 ENCOUNTER — OFFICE VISIT (OUTPATIENT)
Dept: FAMILY MEDICINE CLINIC | Facility: CLINIC | Age: 72
End: 2017-11-02

## 2017-11-02 VITALS
SYSTOLIC BLOOD PRESSURE: 130 MMHG | OXYGEN SATURATION: 95 % | HEIGHT: 70 IN | DIASTOLIC BLOOD PRESSURE: 70 MMHG | TEMPERATURE: 98.2 F | BODY MASS INDEX: 35.36 KG/M2 | RESPIRATION RATE: 16 BRPM | HEART RATE: 79 BPM | WEIGHT: 247 LBS

## 2017-11-02 DIAGNOSIS — J40 BRONCHITIS: Primary | ICD-10-CM

## 2017-11-02 PROCEDURE — 99213 OFFICE O/P EST LOW 20 MIN: CPT | Performed by: NURSE PRACTITIONER

## 2017-11-02 RX ORDER — ALBUTEROL SULFATE 90 UG/1
2 AEROSOL, METERED RESPIRATORY (INHALATION) EVERY 4 HOURS PRN
Qty: 1 INHALER | Refills: 1 | Status: SHIPPED | OUTPATIENT
Start: 2017-11-02 | End: 2019-07-29

## 2017-11-02 RX ORDER — DOXYCYCLINE HYCLATE 100 MG/1
100 CAPSULE ORAL 2 TIMES DAILY
Qty: 20 CAPSULE | Refills: 0 | Status: SHIPPED | OUTPATIENT
Start: 2017-11-02 | End: 2017-11-09 | Stop reason: HOSPADM

## 2017-11-02 NOTE — TELEPHONE ENCOUNTER
No answer - left message for patient to call office tomorrow if he is still sick. He will need to be seen.     Patient has requested an additional z-yared.

## 2017-11-02 NOTE — PROGRESS NOTES
Subjective   Jeremie Wynn is a 72 y.o. male.     History of Present Illness 5 day history of nasal congestion and discharge, sore throat, and cough that is productive of green sputum. He is not sob or wheezing. No fever. He is feeling better but is scheduled for knee surg next week. He was given a z-pack and is still taking it. He has one pill left. Feels like he has not cleared and is worried about cancelling the surgery. His family has all made arrangements to be in town for this surgery. He denies sob and wheezing. Cough is his biggest complaint. Has already had pre-op labs and x-ray.    The following portions of the patient's history were reviewed and updated as appropriate: allergies, current medications, past family history, past medical history, past social history, past surgical history and problem list.    Review of Systems   Constitutional: Negative for fatigue, fever and unexpected weight change.   HENT: Positive for congestion, postnasal drip and rhinorrhea. Negative for hearing loss, nosebleeds, sore throat, trouble swallowing and voice change.    Eyes: Negative for pain, discharge, redness and visual disturbance.   Respiratory: Positive for cough and shortness of breath. Negative for chest tightness and wheezing.    Cardiovascular: Negative for chest pain, palpitations and leg swelling.   Gastrointestinal: Negative for abdominal distention, abdominal pain, anal bleeding, blood in stool, constipation, diarrhea, nausea and vomiting.   Endocrine: Negative for cold intolerance, heat intolerance, polydipsia, polyphagia and polyuria.   Genitourinary: Negative for dysuria, flank pain, frequency and hematuria.   Musculoskeletal: Negative for arthralgias, gait problem, joint swelling and myalgias.   Skin: Negative for color change and rash.   Neurological: Negative for dizziness, tremors, seizures, syncope, speech difficulty, weakness, numbness and headaches.   Hematological: Negative.     Psychiatric/Behavioral: Negative.        Objective   Physical Exam   Constitutional: He is oriented to person, place, and time. He appears well-developed and well-nourished. No distress.   HENT:   Head: Normocephalic and atraumatic.   Right Ear: External ear normal.   Left Ear: External ear normal.   Nose: Nose normal.   Mouth/Throat: Oropharynx is clear and moist. No oropharyngeal exudate.   Eyes: Conjunctivae are normal. Right eye exhibits no discharge. Left eye exhibits no discharge. No scleral icterus.   Neck: Normal range of motion. Neck supple. No thyromegaly present.   Cardiovascular: Normal rate, regular rhythm, normal heart sounds and intact distal pulses.  Exam reveals no gallop and no friction rub.    No murmur heard.  Pulmonary/Chest: Effort normal and breath sounds normal. No respiratory distress. He has no wheezes. He has no rales.   Abdominal: Soft. Bowel sounds are normal. He exhibits no distension and no mass. There is no tenderness. There is no rebound and no guarding.   Musculoskeletal: Normal range of motion. He exhibits no edema or deformity.   Lymphadenopathy:     He has no cervical adenopathy.   Neurological: He is alert and oriented to person, place, and time. He has normal reflexes. He displays normal reflexes. No cranial nerve deficit. Coordination normal.   Skin: Skin is warm and dry. No rash noted.   Psychiatric: He has a normal mood and affect. His behavior is normal. Judgment and thought content normal.   Vitals reviewed.      Assessment/Plan   Jeremie was seen today for cough.    Diagnoses and all orders for this visit:    Bronchitis  -     albuterol (PROVENTIL HFA;VENTOLIN HFA) 108 (90 Base) MCG/ACT inhaler; Inhale 2 puffs Every 4 (Four) Hours As Needed for Wheezing.  -     doxycycline (VIBRAMYCIN) 100 MG capsule; Take 1 capsule by mouth 2 (Two) Times a Day.        Patient to call surgeon's office in am to notify them of his diagnosis and treatment. They may want to do cbc or x-ray  prior to surg. Follow up symptoms persist or worsen.

## 2017-11-06 ENCOUNTER — APPOINTMENT (OUTPATIENT)
Dept: GENERAL RADIOLOGY | Facility: HOSPITAL | Age: 72
End: 2017-11-06

## 2017-11-06 ENCOUNTER — HOSPITAL ENCOUNTER (INPATIENT)
Facility: HOSPITAL | Age: 72
LOS: 3 days | Discharge: HOME HEALTH | End: 2017-11-09
Attending: ORTHOPAEDIC SURGERY | Admitting: ORTHOPAEDIC SURGERY

## 2017-11-06 ENCOUNTER — ANESTHESIA EVENT (OUTPATIENT)
Dept: PERIOP | Facility: HOSPITAL | Age: 72
End: 2017-11-06

## 2017-11-06 ENCOUNTER — ANESTHESIA (OUTPATIENT)
Dept: PERIOP | Facility: HOSPITAL | Age: 72
End: 2017-11-06

## 2017-11-06 DIAGNOSIS — Z78.9 IMPAIRED MOBILITY AND ADLS: Primary | ICD-10-CM

## 2017-11-06 DIAGNOSIS — Z74.09 IMPAIRED MOBILITY AND ADLS: Primary | ICD-10-CM

## 2017-11-06 DIAGNOSIS — Z96.652 S/P TOTAL KNEE ARTHROPLASTY, LEFT: ICD-10-CM

## 2017-11-06 DIAGNOSIS — M15.9 GENERALIZED OSTEOARTHRITIS: ICD-10-CM

## 2017-11-06 DIAGNOSIS — M17.12 ARTHRITIS OF LEFT KNEE: ICD-10-CM

## 2017-11-06 DIAGNOSIS — Z74.09 IMPAIRED FUNCTIONAL MOBILITY, BALANCE, GAIT, AND ENDURANCE: ICD-10-CM

## 2017-11-06 PROBLEM — E78.5 HLD (HYPERLIPIDEMIA): Status: ACTIVE | Noted: 2017-11-06

## 2017-11-06 PROBLEM — R73.03 PREDIABETES: Status: ACTIVE | Noted: 2017-11-06

## 2017-11-06 LAB
ABO GROUP BLD: NORMAL
BLD GP AB SCN SERPL QL: NEGATIVE
DEPRECATED RDW RBC AUTO: 46.7 FL (ref 37–54)
ERYTHROCYTE [DISTWIDTH] IN BLOOD BY AUTOMATED COUNT: 13.6 % (ref 11.3–14.5)
GLUCOSE BLDC GLUCOMTR-MCNC: 145 MG/DL (ref 70–130)
GLUCOSE BLDC GLUCOMTR-MCNC: 220 MG/DL (ref 70–130)
HCT VFR BLD AUTO: 38.7 % (ref 38.9–50.9)
HGB BLD-MCNC: 12.9 G/DL (ref 13.1–17.5)
MCH RBC QN AUTO: 31.2 PG (ref 27–31)
MCHC RBC AUTO-ENTMCNC: 33.3 G/DL (ref 32–36)
MCV RBC AUTO: 93.7 FL (ref 80–99)
PLATELET # BLD AUTO: 181 10*3/MM3 (ref 150–450)
PMV BLD AUTO: 11.1 FL (ref 6–12)
POTASSIUM BLDA-SCNC: 3.91 MMOL/L (ref 3.5–5.3)
RBC # BLD AUTO: 4.13 10*6/MM3 (ref 4.2–5.76)
RH BLD: POSITIVE
WBC NRBC COR # BLD: 7.64 10*3/MM3 (ref 3.5–10.8)

## 2017-11-06 PROCEDURE — 0SRD0J9 REPLACEMENT OF LEFT KNEE JOINT WITH SYNTHETIC SUBSTITUTE, CEMENTED, OPEN APPROACH: ICD-10-PCS | Performed by: ORTHOPAEDIC SURGERY

## 2017-11-06 PROCEDURE — C1755 CATHETER, INTRASPINAL: HCPCS | Performed by: ORTHOPAEDIC SURGERY

## 2017-11-06 PROCEDURE — 25010000002 ROPIVACAINE PER 1 MG: Performed by: NURSE ANESTHETIST, CERTIFIED REGISTERED

## 2017-11-06 PROCEDURE — 25010000002 ONDANSETRON PER 1 MG: Performed by: NURSE PRACTITIONER

## 2017-11-06 PROCEDURE — 25010000002 DEXAMETHASONE SODIUM PHOSPHATE 10 MG/ML SOLUTION 1 ML VIAL: Performed by: NURSE ANESTHETIST, CERTIFIED REGISTERED

## 2017-11-06 PROCEDURE — 94799 UNLISTED PULMONARY SVC/PX: CPT

## 2017-11-06 PROCEDURE — 84132 ASSAY OF SERUM POTASSIUM: CPT | Performed by: ANESTHESIOLOGY

## 2017-11-06 PROCEDURE — 25010000002 HYDROMORPHONE PER 4 MG: Performed by: ORTHOPAEDIC SURGERY

## 2017-11-06 PROCEDURE — 85027 COMPLETE CBC AUTOMATED: CPT | Performed by: NURSE PRACTITIONER

## 2017-11-06 PROCEDURE — C1713 ANCHOR/SCREW BN/BN,TIS/BN: HCPCS | Performed by: ORTHOPAEDIC SURGERY

## 2017-11-06 PROCEDURE — 86901 BLOOD TYPING SEROLOGIC RH(D): CPT | Performed by: ORTHOPAEDIC SURGERY

## 2017-11-06 PROCEDURE — 86850 RBC ANTIBODY SCREEN: CPT | Performed by: ORTHOPAEDIC SURGERY

## 2017-11-06 PROCEDURE — 86900 BLOOD TYPING SEROLOGIC ABO: CPT | Performed by: ORTHOPAEDIC SURGERY

## 2017-11-06 PROCEDURE — 71010 HC CHEST PA OR AP: CPT

## 2017-11-06 PROCEDURE — 82962 GLUCOSE BLOOD TEST: CPT

## 2017-11-06 PROCEDURE — 25010000002 BUPRENORPHINE PER 0.1 MG: Performed by: NURSE ANESTHETIST, CERTIFIED REGISTERED

## 2017-11-06 PROCEDURE — 63710000001 INSULIN LISPRO (HUMAN) PER 5 UNITS: Performed by: NURSE PRACTITIONER

## 2017-11-06 PROCEDURE — 73560 X-RAY EXAM OF KNEE 1 OR 2: CPT

## 2017-11-06 PROCEDURE — C1776 JOINT DEVICE (IMPLANTABLE): HCPCS | Performed by: ORTHOPAEDIC SURGERY

## 2017-11-06 PROCEDURE — 25010000002 VANCOMYCIN HCL IN NACL 1.75-0.9 GM/500ML-% SOLUTION: Performed by: ORTHOPAEDIC SURGERY

## 2017-11-06 PROCEDURE — 25010000002 PROPOFOL 1000 MG/ML EMULSION: Performed by: NURSE ANESTHETIST, CERTIFIED REGISTERED

## 2017-11-06 PROCEDURE — 86923 COMPATIBILITY TEST ELECTRIC: CPT

## 2017-11-06 PROCEDURE — 25010000002 VANCOMYCIN: Performed by: ORTHOPAEDIC SURGERY

## 2017-11-06 DEVICE — SMARTSET HIGH PERFORMANCE MV MEDIUM VISCOSITY BONE CEMENT 40G
Type: IMPLANTABLE DEVICE | Site: KNEE | Status: FUNCTIONAL
Brand: SMARTSET

## 2017-11-06 DEVICE — ATTUNE PATELLA MEDIALIZED ANATOMIC 38MM CEMENTED AOX
Type: IMPLANTABLE DEVICE | Site: KNEE | Status: FUNCTIONAL
Brand: ATTUNE

## 2017-11-06 DEVICE — ATTUNE KNEE SYSTEM TIBIAL BASE ROTATING PLATFORM SIZE 8 CEMENTED
Type: IMPLANTABLE DEVICE | Site: KNEE | Status: FUNCTIONAL
Brand: ATTUNE

## 2017-11-06 DEVICE — ATTUNE KNEE SYSTEM TIBIAL INSERT ROTATING PLATFORM CRUCIATE RETAINING SIZE 7 5MM AOX
Type: IMPLANTABLE DEVICE | Site: KNEE | Status: FUNCTIONAL
Brand: ATTUNE

## 2017-11-06 DEVICE — TOTL KN ATTUNE DEPUY 9527038: Type: IMPLANTABLE DEVICE | Site: KNEE | Status: FUNCTIONAL

## 2017-11-06 DEVICE — ATTUNE KNEE SYSTEM FEMORAL CRUCIATE RETAINING SIZE 7 LEFT CEMENTED
Type: IMPLANTABLE DEVICE | Site: KNEE | Status: FUNCTIONAL
Brand: ATTUNE

## 2017-11-06 RX ORDER — ACETAMINOPHEN 325 MG/1
650 TABLET ORAL EVERY 4 HOURS PRN
Status: DISCONTINUED | OUTPATIENT
Start: 2017-11-06 | End: 2017-11-09 | Stop reason: HOSPADM

## 2017-11-06 RX ORDER — MAGNESIUM HYDROXIDE 1200 MG/15ML
LIQUID ORAL AS NEEDED
Status: DISCONTINUED | OUTPATIENT
Start: 2017-11-06 | End: 2017-11-06 | Stop reason: HOSPADM

## 2017-11-06 RX ORDER — DOCUSATE SODIUM 100 MG/1
100 CAPSULE, LIQUID FILLED ORAL 2 TIMES DAILY
Status: DISCONTINUED | OUTPATIENT
Start: 2017-11-06 | End: 2017-11-09 | Stop reason: HOSPADM

## 2017-11-06 RX ORDER — ACETAMINOPHEN 160 MG/5ML
650 SOLUTION ORAL EVERY 4 HOURS PRN
Status: DISCONTINUED | OUTPATIENT
Start: 2017-11-06 | End: 2017-11-09 | Stop reason: HOSPADM

## 2017-11-06 RX ORDER — PROMETHAZINE HYDROCHLORIDE 25 MG/1
25 SUPPOSITORY RECTAL ONCE AS NEEDED
Status: DISCONTINUED | OUTPATIENT
Start: 2017-11-06 | End: 2017-11-06 | Stop reason: HOSPADM

## 2017-11-06 RX ORDER — FENTANYL CITRATE 50 UG/ML
50 INJECTION, SOLUTION INTRAMUSCULAR; INTRAVENOUS
Status: DISCONTINUED | OUTPATIENT
Start: 2017-11-06 | End: 2017-11-06 | Stop reason: HOSPADM

## 2017-11-06 RX ORDER — PROMETHAZINE HYDROCHLORIDE 25 MG/1
25 TABLET ORAL ONCE AS NEEDED
Status: DISCONTINUED | OUTPATIENT
Start: 2017-11-06 | End: 2017-11-06 | Stop reason: HOSPADM

## 2017-11-06 RX ORDER — NICOTINE POLACRILEX 4 MG
15 LOZENGE BUCCAL
Status: DISCONTINUED | OUTPATIENT
Start: 2017-11-06 | End: 2017-11-09 | Stop reason: HOSPADM

## 2017-11-06 RX ORDER — MEPERIDINE HYDROCHLORIDE 25 MG/ML
12.5 INJECTION INTRAMUSCULAR; INTRAVENOUS; SUBCUTANEOUS
Status: DISCONTINUED | OUTPATIENT
Start: 2017-11-06 | End: 2017-11-06 | Stop reason: HOSPADM

## 2017-11-06 RX ORDER — SODIUM CHLORIDE 0.9 % (FLUSH) 0.9 %
1-10 SYRINGE (ML) INJECTION AS NEEDED
Status: DISCONTINUED | OUTPATIENT
Start: 2017-11-06 | End: 2017-11-09 | Stop reason: HOSPADM

## 2017-11-06 RX ORDER — LABETALOL HYDROCHLORIDE 5 MG/ML
5 INJECTION, SOLUTION INTRAVENOUS
Status: DISCONTINUED | OUTPATIENT
Start: 2017-11-06 | End: 2017-11-06 | Stop reason: HOSPADM

## 2017-11-06 RX ORDER — NALOXONE HCL 0.4 MG/ML
0.1 VIAL (ML) INJECTION
Status: DISCONTINUED | OUTPATIENT
Start: 2017-11-06 | End: 2017-11-09 | Stop reason: HOSPADM

## 2017-11-06 RX ORDER — BUPIVACAINE HYDROCHLORIDE 2.5 MG/ML
INJECTION, SOLUTION EPIDURAL; INFILTRATION; INTRACAUDAL AS NEEDED
Status: DISCONTINUED | OUTPATIENT
Start: 2017-11-06 | End: 2017-11-06 | Stop reason: SURG

## 2017-11-06 RX ORDER — ACETAMINOPHEN 650 MG
TABLET, EXTENDED RELEASE ORAL AS NEEDED
Status: DISCONTINUED | OUTPATIENT
Start: 2017-11-06 | End: 2017-11-06 | Stop reason: HOSPADM

## 2017-11-06 RX ORDER — ONDANSETRON 2 MG/ML
4 INJECTION INTRAMUSCULAR; INTRAVENOUS EVERY 6 HOURS PRN
Status: DISCONTINUED | OUTPATIENT
Start: 2017-11-06 | End: 2017-11-09 | Stop reason: HOSPADM

## 2017-11-06 RX ORDER — OXYCODONE AND ACETAMINOPHEN 7.5; 325 MG/1; MG/1
1 TABLET ORAL ONCE AS NEEDED
Status: DISCONTINUED | OUTPATIENT
Start: 2017-11-06 | End: 2017-11-06 | Stop reason: HOSPADM

## 2017-11-06 RX ORDER — ONDANSETRON 2 MG/ML
4 INJECTION INTRAMUSCULAR; INTRAVENOUS ONCE AS NEEDED
Status: DISCONTINUED | OUTPATIENT
Start: 2017-11-06 | End: 2017-11-06 | Stop reason: HOSPADM

## 2017-11-06 RX ORDER — ROPIVACAINE HYDROCHLORIDE 2 MG/ML
12 INJECTION, SOLUTION EPIDURAL; INFILTRATION CONTINUOUS
Status: DISCONTINUED | OUTPATIENT
Start: 2017-11-06 | End: 2017-11-08

## 2017-11-06 RX ORDER — SODIUM CHLORIDE 0.9 % (FLUSH) 0.9 %
1-10 SYRINGE (ML) INJECTION AS NEEDED
Status: DISCONTINUED | OUTPATIENT
Start: 2017-11-06 | End: 2017-11-06 | Stop reason: HOSPADM

## 2017-11-06 RX ORDER — LIDOCAINE HYDROCHLORIDE 10 MG/ML
0.5 INJECTION, SOLUTION EPIDURAL; INFILTRATION; INTRACAUDAL; PERINEURAL ONCE AS NEEDED
Status: COMPLETED | OUTPATIENT
Start: 2017-11-06 | End: 2017-11-06

## 2017-11-06 RX ORDER — HYDRALAZINE HYDROCHLORIDE 20 MG/ML
10 INJECTION INTRAMUSCULAR; INTRAVENOUS EVERY 6 HOURS PRN
Status: DISCONTINUED | OUTPATIENT
Start: 2017-11-06 | End: 2017-11-09 | Stop reason: HOSPADM

## 2017-11-06 RX ORDER — HYDROCODONE BITARTRATE AND ACETAMINOPHEN 5; 325 MG/1; MG/1
1 TABLET ORAL EVERY 4 HOURS PRN
Status: DISCONTINUED | OUTPATIENT
Start: 2017-11-06 | End: 2017-11-09 | Stop reason: HOSPADM

## 2017-11-06 RX ORDER — ASPIRIN 325 MG
325 TABLET, DELAYED RELEASE (ENTERIC COATED) ORAL DAILY
Status: DISCONTINUED | OUTPATIENT
Start: 2017-11-07 | End: 2017-11-08

## 2017-11-06 RX ORDER — HYDROMORPHONE HYDROCHLORIDE 1 MG/ML
0.5 INJECTION, SOLUTION INTRAMUSCULAR; INTRAVENOUS; SUBCUTANEOUS
Status: DISCONTINUED | OUTPATIENT
Start: 2017-11-06 | End: 2017-11-06 | Stop reason: HOSPADM

## 2017-11-06 RX ORDER — KETOROLAC TROMETHAMINE 15 MG/ML
15 INJECTION, SOLUTION INTRAMUSCULAR; INTRAVENOUS EVERY 6 HOURS PRN
Status: ACTIVE | OUTPATIENT
Start: 2017-11-06 | End: 2017-11-07

## 2017-11-06 RX ORDER — PROMETHAZINE HYDROCHLORIDE 25 MG/ML
6.25 INJECTION, SOLUTION INTRAMUSCULAR; INTRAVENOUS ONCE AS NEEDED
Status: DISCONTINUED | OUTPATIENT
Start: 2017-11-06 | End: 2017-11-06 | Stop reason: HOSPADM

## 2017-11-06 RX ORDER — SODIUM CHLORIDE, SODIUM LACTATE, POTASSIUM CHLORIDE, CALCIUM CHLORIDE 600; 310; 30; 20 MG/100ML; MG/100ML; MG/100ML; MG/100ML
9 INJECTION, SOLUTION INTRAVENOUS CONTINUOUS
Status: DISCONTINUED | OUTPATIENT
Start: 2017-11-06 | End: 2017-11-07

## 2017-11-06 RX ORDER — ATORVASTATIN CALCIUM 10 MG/1
10 TABLET, FILM COATED ORAL NIGHTLY
Status: DISCONTINUED | OUTPATIENT
Start: 2017-11-06 | End: 2017-11-09 | Stop reason: HOSPADM

## 2017-11-06 RX ORDER — CETIRIZINE HYDROCHLORIDE 10 MG/1
10 TABLET ORAL DAILY
Status: DISCONTINUED | OUTPATIENT
Start: 2017-11-06 | End: 2017-11-09 | Stop reason: HOSPADM

## 2017-11-06 RX ORDER — SODIUM CHLORIDE 9 MG/ML
150 INJECTION, SOLUTION INTRAVENOUS CONTINUOUS
Status: DISCONTINUED | OUTPATIENT
Start: 2017-11-06 | End: 2017-11-07

## 2017-11-06 RX ORDER — MESALAMINE 1.2 G/1
2400 TABLET, DELAYED RELEASE ORAL DAILY
Status: DISCONTINUED | OUTPATIENT
Start: 2017-11-07 | End: 2017-11-09 | Stop reason: HOSPADM

## 2017-11-06 RX ORDER — HYDRALAZINE HYDROCHLORIDE 20 MG/ML
5 INJECTION INTRAMUSCULAR; INTRAVENOUS
Status: DISCONTINUED | OUTPATIENT
Start: 2017-11-06 | End: 2017-11-06 | Stop reason: HOSPADM

## 2017-11-06 RX ORDER — MESALAMINE 1.2 G/1
2400 TABLET, DELAYED RELEASE ORAL DAILY
Status: DISCONTINUED | OUTPATIENT
Start: 2017-11-06 | End: 2017-11-06

## 2017-11-06 RX ORDER — BUPIVACAINE HYDROCHLORIDE 5 MG/ML
INJECTION, SOLUTION EPIDURAL; INTRACAUDAL AS NEEDED
Status: DISCONTINUED | OUTPATIENT
Start: 2017-11-06 | End: 2017-11-06 | Stop reason: SURG

## 2017-11-06 RX ORDER — BISACODYL 10 MG
10 SUPPOSITORY, RECTAL RECTAL DAILY PRN
Status: DISCONTINUED | OUTPATIENT
Start: 2017-11-06 | End: 2017-11-09 | Stop reason: HOSPADM

## 2017-11-06 RX ORDER — VANCOMYCIN 1.75 GRAM/500 ML IN 0.9 % SODIUM CHLORIDE INTRAVENOUS
15 ONCE
Status: COMPLETED | OUTPATIENT
Start: 2017-11-06 | End: 2017-11-06

## 2017-11-06 RX ORDER — HYDROMORPHONE HYDROCHLORIDE 1 MG/ML
0.5 INJECTION, SOLUTION INTRAMUSCULAR; INTRAVENOUS; SUBCUTANEOUS
Status: DISCONTINUED | OUTPATIENT
Start: 2017-11-06 | End: 2017-11-09 | Stop reason: HOSPADM

## 2017-11-06 RX ORDER — FAMOTIDINE 10 MG/ML
20 INJECTION, SOLUTION INTRAVENOUS ONCE
Status: DISCONTINUED | OUTPATIENT
Start: 2017-11-06 | End: 2017-11-06

## 2017-11-06 RX ORDER — DEXTROSE MONOHYDRATE 25 G/50ML
25 INJECTION, SOLUTION INTRAVENOUS
Status: DISCONTINUED | OUTPATIENT
Start: 2017-11-06 | End: 2017-11-09 | Stop reason: HOSPADM

## 2017-11-06 RX ORDER — IPRATROPIUM BROMIDE AND ALBUTEROL SULFATE 2.5; .5 MG/3ML; MG/3ML
3 SOLUTION RESPIRATORY (INHALATION) ONCE AS NEEDED
Status: DISCONTINUED | OUTPATIENT
Start: 2017-11-06 | End: 2017-11-06 | Stop reason: HOSPADM

## 2017-11-06 RX ORDER — FAMOTIDINE 20 MG/1
20 TABLET, FILM COATED ORAL ONCE
Status: COMPLETED | OUTPATIENT
Start: 2017-11-06 | End: 2017-11-06

## 2017-11-06 RX ORDER — VALSARTAN 160 MG/1
320 TABLET ORAL
Status: DISCONTINUED | OUTPATIENT
Start: 2017-11-07 | End: 2017-11-09 | Stop reason: HOSPADM

## 2017-11-06 RX ORDER — NALOXONE HCL 0.4 MG/ML
0.4 VIAL (ML) INJECTION AS NEEDED
Status: DISCONTINUED | OUTPATIENT
Start: 2017-11-06 | End: 2017-11-06 | Stop reason: HOSPADM

## 2017-11-06 RX ORDER — OXYCODONE HYDROCHLORIDE AND ACETAMINOPHEN 5; 325 MG/1; MG/1
1 TABLET ORAL ONCE AS NEEDED
Status: DISCONTINUED | OUTPATIENT
Start: 2017-11-06 | End: 2017-11-06 | Stop reason: HOSPADM

## 2017-11-06 RX ORDER — BISACODYL 5 MG/1
10 TABLET, DELAYED RELEASE ORAL DAILY PRN
Status: DISCONTINUED | OUTPATIENT
Start: 2017-11-06 | End: 2017-11-09 | Stop reason: HOSPADM

## 2017-11-06 RX ADMIN — ROPIVACAINE HYDROCHLORIDE 12 ML/HR: 2 INJECTION, SOLUTION EPIDURAL; INFILTRATION at 12:57

## 2017-11-06 RX ADMIN — DOCUSATE SODIUM 100 MG: 100 CAPSULE, LIQUID FILLED ORAL at 17:20

## 2017-11-06 RX ADMIN — ONDANSETRON 4 MG: 2 INJECTION INTRAMUSCULAR; INTRAVENOUS at 15:13

## 2017-11-06 RX ADMIN — HYDROMORPHONE HYDROCHLORIDE 0.5 MG: 1 INJECTION, SOLUTION INTRAMUSCULAR; INTRAVENOUS; SUBCUTANEOUS at 21:28

## 2017-11-06 RX ADMIN — Medication 1750 MG: at 20:58

## 2017-11-06 RX ADMIN — LIDOCAINE HYDROCHLORIDE 0.2 ML: 10 INJECTION, SOLUTION EPIDURAL; INFILTRATION; INTRACAUDAL; PERINEURAL at 09:01

## 2017-11-06 RX ADMIN — ROPIVACAINE HYDROCHLORIDE 12 ML/HR: 2 INJECTION, SOLUTION EPIDURAL; INFILTRATION at 20:56

## 2017-11-06 RX ADMIN — VANCOMYCIN HYDROCHLORIDE 1750 MG: 1 INJECTION, POWDER, LYOPHILIZED, FOR SOLUTION INTRAVENOUS at 09:46

## 2017-11-06 RX ADMIN — CETIRIZINE HYDROCHLORIDE 10 MG: 10 TABLET, FILM COATED ORAL at 17:20

## 2017-11-06 RX ADMIN — TRANEXAMIC ACID 1000 MG: 100 INJECTION, SOLUTION INTRAVENOUS at 11:48

## 2017-11-06 RX ADMIN — SODIUM CHLORIDE 150 ML/HR: 9 INJECTION, SOLUTION INTRAVENOUS at 20:57

## 2017-11-06 RX ADMIN — DEXAMETHASONE SODIUM PHOSPHATE 30 ML: 10 INJECTION, SOLUTION INTRAMUSCULAR; INTRAVENOUS at 10:49

## 2017-11-06 RX ADMIN — FAMOTIDINE 20 MG: 20 TABLET, FILM COATED ORAL at 09:32

## 2017-11-06 RX ADMIN — SODIUM CHLORIDE, POTASSIUM CHLORIDE, SODIUM LACTATE AND CALCIUM CHLORIDE 9 ML/HR: 600; 310; 30; 20 INJECTION, SOLUTION INTRAVENOUS at 09:01

## 2017-11-06 RX ADMIN — BUPIVACAINE HYDROCHLORIDE 2.5 ML: 5 INJECTION, SOLUTION EPIDURAL; INTRACAUDAL; PERINEURAL at 10:47

## 2017-11-06 RX ADMIN — PROPOFOL 50 MCG/KG/MIN: 10 INJECTION, EMULSION INTRAVENOUS at 10:47

## 2017-11-06 RX ADMIN — SODIUM CHLORIDE 150 ML/HR: 9 INJECTION, SOLUTION INTRAVENOUS at 14:05

## 2017-11-06 RX ADMIN — ATORVASTATIN CALCIUM 10 MG: 10 TABLET, FILM COATED ORAL at 20:58

## 2017-11-06 RX ADMIN — TRANEXAMIC ACID 1000 MG: 100 INJECTION, SOLUTION INTRAVENOUS at 10:50

## 2017-11-06 RX ADMIN — BUPIVACAINE HYDROCHLORIDE 20 ML: 2.5 INJECTION, SOLUTION EPIDURAL; INFILTRATION; INTRACAUDAL; PERINEURAL at 12:44

## 2017-11-06 RX ADMIN — HYDROCODONE BITARTRATE AND ACETAMINOPHEN 1 TABLET: 5; 325 TABLET ORAL at 18:55

## 2017-11-06 NOTE — ANESTHESIA PROCEDURE NOTES
Spinal Block    Patient location during procedure: OR  Start Time: 11/6/2017 10:47 AM  Indication:at surgeon's request  Performed By  CRNA: RICKY LOW  Preanesthetic Checklist  Completed: patient identified, site marked, surgical consent, pre-op evaluation, timeout performed, IV checked, risks and benefits discussed and monitors and equipment checked  Spinal Block Prep:  Patient Position:sitting  Sterile Tech:cap, gloves, sterile barriers and mask  Prep:Chloraprep  Patient Monitoring:blood pressure monitoring, continuous pulse oximetry and EKG  Spinal Block Procedure  Approach:midline  Guidance:landmark technique and palpation technique  Location:L4-L5  Needle Type:Pencan (Introducer:   no)  Needle Gauge:25 G  Placement of Spinal needle event:cerebrospinal fluid aspirated    Fluid Appearance:clear  Post Assessment  Patient Tolerance:patient tolerated the procedure well with no apparent complications  Complications no  Additional Notes  Procedure:  Pt assisted to sitting position, with legs in position of comfort over side of bed.  Pt. instructed in optimal spine presentation, the spine was prepped/ Draped and the skin at insertion site was anesthetized with 1% Lidocaine 2 ml.  The spinal needle was then advanced until CSF flow was obtained and LA was injected:      Marcaine 0.5% PSF injected 12.5 Mg.

## 2017-11-06 NOTE — ANESTHESIA POSTPROCEDURE EVALUATION
Patient: Jeremie Wynn    Procedure Summary     Date Anesthesia Start Anesthesia Stop Room / Location    11/06/17 1035 1252 [facility] / [facility]       Procedure Diagnosis Surgeon Provider    TOTAL KNEE ARTHROPLASTY LEFT  (Left Knee) No diagnosis on file. MD Chris Sterling MD          Anesthesia Type: spinal  Last vitals  BP   98/61 (11/06/17 1230)   Temp   (P) 97.5 °F (36.4 °C) (11/06/17 1230)   Pulse   55 (11/06/17 1230)   Resp   (P) 16 (11/06/17 1230)     SpO2   97 % (11/06/17 1245)     Post Anesthesia Care and Evaluation    Patient location during evaluation: PACU  Patient participation: complete - patient participated  Level of consciousness: awake and alert  Pain score: 0  Pain management: adequate  Airway patency: patent  Anesthetic complications: No anesthetic complications  PONV Status: none  Cardiovascular status: hemodynamically stable and acceptable  Respiratory status: nonlabored ventilation, acceptable and nasal cannula  Hydration status: acceptable

## 2017-11-06 NOTE — ANESTHESIA PROCEDURE NOTES
Adductor Canal Block    Patient location during procedure: post-op  Start time: 11/6/2017 12:30 PM  Stop time: 11/6/2017 12:45 PM  Reason for block: at surgeon's request and post-op pain management  Performed by  CRNA: PRO LIMON  Assisted by: RICKY LOW  Preanesthetic Checklist  Completed: patient identified, site marked, surgical consent, pre-op evaluation, timeout performed, IV checked, risks and benefits discussed and monitors and equipment checked  Prep:  Sterile barriers:cap, gloves, mask and sterile barriers  Prep: ChloraPrep  Patient monitoring: blood pressure monitoring, continuous pulse oximetry and EKG  Procedure  Performed under: spinal  Guidance:ultrasound guided  Images:still images not obtained    Block Type:adductor canal block  Injection Technique:catheter  Needle Type:Tuohy and echogenic  Needle Gauge:18 G    Catheter Size:20 G (20g)  Cath Depth at skin: 13 cm  Medications  Local Injected:bupivacaine 0.25% Local Amount Injected:20 (ml)mL  Post Assessment  Injection Assessment: negative aspiration for heme, incremental injection and no paresthesia on injection  Patient Tolerance:comfortable throughout block  Complications:no  Additional Notes  Procedure:             The pt was placed in the Supine position.  The Insertion site was  prepped and Draped in sterile fashion.  The pt was anesthetized with  IV Sedation( see meds).  Skin and cutaneous tissue was infiltrated and anesthetized with 1% Lidocaine 3 mls via a 25g needle.  A BBraun 4 inch 18g echogenic needle was then  inserted approximately midline, mid-thigh and advanced In-plane with Ultrasound guidance.  Normal Saline PSF was utilized for hydrodissection of tissue.  The Vastus medialis and Sartorius muscle where visualized and the needle tip was placed in the adductor canal,  lateral to the femoral artery.  LA injection spread was visualized, injection was incremental 1-5ml, injection pressure was normal or little, no intraneural  injection, no vascular injection.  LA dose was injected thru the needle(see dose above).  A BBraun 20g wire stylet catheter was placed via the needle with ultrasound visualization and confirmation with NS fluid bolus. The labeled Catheter was then secured to skin at insertion site with skin afix and steristrips to curled catheter and CHG transparent dressing.  Thank you.

## 2017-11-06 NOTE — ANESTHESIA PREPROCEDURE EVALUATION
Anesthesia Evaluation     Patient summary reviewed and Nursing notes reviewed   NPO Solid Status: > 8 hours  NPO Liquid Status: > 8 hours     Airway   Mallampati: I  TM distance: >3 FB  Neck ROM: full  no difficulty expected  Dental    (+) upper dentures    Pulmonary     breath sounds clear to auscultation  Cardiovascular     Rhythm: regular  Rate: normal    (+) hypertension, dysrhythmias, hyperlipidemia      Neuro/Psych  GI/Hepatic/Renal/Endo      Musculoskeletal     Abdominal    Substance History      OB/GYN          Other                                      Anesthesia Plan    ASA 2     spinal   (IPack in OR/Adductor canal in PACU for postop pain control)  intravenous induction   Anesthetic plan and risks discussed with patient.    Plan discussed with CRNA and attending.
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
## 2017-11-06 NOTE — PLAN OF CARE
Problem: Patient Care Overview (Adult)  Goal: Plan of Care Review  Outcome: Ongoing (interventions implemented as appropriate)    11/06/17 1846   Coping/Psychosocial Response Interventions   Plan Of Care Reviewed With patient;family   Patient Care Overview   Progress progress toward functional goals as expected   Outcome Evaluation   Outcome Summary/Follow up Plan Left total knee done by Dr. Jaime today. Ropivicaine nerve cath running at 12ml/hr, no other complaints of pain from pt. C/o nausea upon arrival, PRN zofran given, along with gingerale, cool wash cloth, and O2 at 2L, releif of symptoms. Pt still hasnt voided, bladder scan shows 221ml, pt refuses catheter and says he'll try to void soon. Spinal anesthesia performed, pt SLOWLY getting sensation back in BLE. C/o Left elbow pain, probably positional from OR, no c/o chest pain etc. Pt and family very pleasant.         Problem: Knee Replacement, Total (Adult)  Goal: Signs and Symptoms of Listed Potential Problems Will be Absent or Manageable (Knee Replacement, Total)  Outcome: Ongoing (interventions implemented as appropriate)    11/06/17 1846   Knee Replacement, Total   Problems Assessed (Total Knee Replacement) all   Problems Present (Total Knee Replacement) decreased range of motion

## 2017-11-06 NOTE — OP NOTE
TOTAL KNEE ARTHROPLASTY  Progress Note    Jeremie Wynn  11/6/2017    Pre-op Diagnosis:   Left knee OA       Post-Op Diagnosis Codes:  Left knee OA    Procedure/CPT® Codes:  79106    Procedure(s):  TOTAL KNEE ARTHROPLASTY LEFT     Surgeon(s):  Han Jaime MD    Anesthesia: General    Staff:   Circulator: Irena Valenzuela RN; Jeremie Luke RN  Scrub Person: Hamlet Geller  Vendor Representative: Addison Torres  Nursing Assistant: Fei Cuellar  Assistant: Sophia Jacobs CSA    Estimated Blood Loss: 25 mL    Urine Voided: * No values recorded between 11/6/2017 10:35 AM and 11/6/2017 12:20 PM *    Specimens:                None      Drains:           Findings: Expected    Complications: None    Implants: Depuy Attune System   Cemented Femur Cruciate Retaining style size 7    Cemented Tibia size 8 with 5 mm rotating spacer   Cemented all polyethylene Patella size 38       Indications:  72-year-old man with endstage left knee osteoarthritic symptoms and corresponding x-ray findings with bilateral severe medial compartment changes. He has failed reasonable non-operative management. Risks benefits and indications and rationale for left total knee arthroplasty discussed at length with patient. He is aware of possible mechanical or infectious complications for TKA as well as possible perioperative medical complication. He signs his own consent after all questions are obtained.    Procedure:  While in the OR spinal anesthesia started with satisfactory level. Turned to the supine position and prepped and draped in standard fashion from mid-thigh to the ankle using the sliding leg dumas. Tourniquet inflated 300 mg Hg at time of cementation with total tourniquet time 64 minutes. Standard anterior incision made medial to the patella. Medial parapatellar arthrotomy performed. Patella was everted after adequate anterolateral debridement. High grade medial compartment changes with eburnation and mild matched grooving.  Proximal tibia exposed with circumferential meniscectomy. ACL was removed. PCL was preserved and later partially released for balancing in deep flexion. Proximal tibia cut made nearly perpendicular to the mechanical axis with later minor recut into minimal valgus. Standard distal femoral preparation with intramedullary guides. Femur sized to #7 and tibia to #8. Chamfer and sulcus cuts made after establishing good flexion and extension gaps. Tibia prepared in standard fashion. Patella cut with jig leaving 14 to 16 mm remnant. Patellar trial positioned and prepared accordingly. Trials at this point showed easy motion, patellar tracking and varus-valgus stability. Motion demonstrated 0/0/130 with optimal deep flexion stability with the 5 mm spacer after PCL partial release of medial band. Trials removed and bone surfaces thoroughly irrigated. Standard cement technique used for femoral and tibial  and patellar components with excess cement removed during the curing process. Final components assembled and reduced with stability and range of motion and patellar tracking similar to the trials. Wound was thoroughly irrigated and closed in layers without a drain. Standard Prineo dressing applied. Standard compression dressing applied. Final counts were correct. Patient stable to recovery having tolerated procedure well throughout.    Han Jamie MD       Date: 11/6/2017  Time: 12:26 PM
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
## 2017-11-06 NOTE — ANESTHESIA PROCEDURE NOTES
Ipack Block    Patient location during procedure: OR  Start time: 11/6/2017 10:49 AM  Reason for block: at surgeon's request and post-op pain management  Performed by  CRNA: RICKY LOW  Preanesthetic Checklist  Completed: patient identified, site marked, surgical consent, pre-op evaluation, timeout performed, IV checked, risks and benefits discussed and monitors and equipment checked  Prep:  Pt Position: supine  Sterile barriers:cap, gloves, sterile barriers and mask  Prep: ChloraPrep  Patient monitoring: blood pressure monitoring, continuous pulse oximetry and EKG  Procedure  Sedation:yes  Performed under: general  Guidance:ultrasound guided  Images:still images obtained    Laterality:Bilateral  Anesthesia block type: IPACK.  Injection Technique:single-shot  Needle Type:short-bevel and echogenic  Needle Gauge:20 G  Resistance on Injection: none  Medications  Comment:Block Injection:  LA dose divided between Right and Left block       Adjuncts:  Decadron 4mg PSF, Buprenex 0.3mg (Per total volume of LA)  Local Injected:bupivacaine 0.25% Local Amount Injected:30mL  Post Assessment  Injection Assessment: negative aspiration for heme, incremental injection and no paresthesia on injection  Patient Tolerance:comfortable throughout block  Complications:no  Additional Notes      Under Ultrasound guidance, a BBraun 4inch 360 degree needle was advanced with Normal Saline hydro dissection of tissue.  The Internal Oblique and Transversus Abdominus muscles where visualized.  At or before the aponeurosis of Internal Oblique, local anesthetic spread was visualized in the Transversus Abdominus Plane. Injection was made incrementally with aspiration every 5 mls.  There was no  intravascular injection,  injection pressure was normal, there was no neural injection, and the procedure was completed without difficulty.  Thank You.

## 2017-11-07 PROBLEM — D72.829 LEUKOCYTOSIS: Status: ACTIVE | Noted: 2017-11-07

## 2017-11-07 PROBLEM — D62 ACUTE BLOOD LOSS ANEMIA: Status: ACTIVE | Noted: 2017-11-07

## 2017-11-07 LAB
ANION GAP SERPL CALCULATED.3IONS-SCNC: 5 MMOL/L (ref 3–11)
BASOPHILS # BLD AUTO: 0.01 10*3/MM3 (ref 0–0.2)
BASOPHILS NFR BLD AUTO: 0.1 % (ref 0–1)
BUN BLD-MCNC: 16 MG/DL (ref 9–23)
BUN/CREAT SERPL: 17.8 (ref 7–25)
CALCIUM SPEC-SCNC: 8.4 MG/DL (ref 8.7–10.4)
CHLORIDE SERPL-SCNC: 107 MMOL/L (ref 99–109)
CO2 SERPL-SCNC: 24 MMOL/L (ref 20–31)
CREAT BLD-MCNC: 0.9 MG/DL (ref 0.6–1.3)
DEPRECATED RDW RBC AUTO: 47.5 FL (ref 37–54)
EOSINOPHIL # BLD AUTO: 0 10*3/MM3 (ref 0–0.3)
EOSINOPHIL NFR BLD AUTO: 0 % (ref 0–3)
ERYTHROCYTE [DISTWIDTH] IN BLOOD BY AUTOMATED COUNT: 13.6 % (ref 11.3–14.5)
GFR SERPL CREATININE-BSD FRML MDRD: 82 ML/MIN/1.73
GLUCOSE BLD-MCNC: 134 MG/DL (ref 70–100)
GLUCOSE BLDC GLUCOMTR-MCNC: 120 MG/DL (ref 70–130)
GLUCOSE BLDC GLUCOMTR-MCNC: 131 MG/DL (ref 70–130)
GLUCOSE BLDC GLUCOMTR-MCNC: 132 MG/DL (ref 70–130)
GLUCOSE BLDC GLUCOMTR-MCNC: 137 MG/DL (ref 70–130)
HCT VFR BLD AUTO: 33.8 % (ref 38.9–50.9)
HGB BLD-MCNC: 10.8 G/DL (ref 13.1–17.5)
IMM GRANULOCYTES # BLD: 0.04 10*3/MM3 (ref 0–0.03)
IMM GRANULOCYTES NFR BLD: 0.2 % (ref 0–0.6)
LYMPHOCYTES # BLD AUTO: 0.95 10*3/MM3 (ref 0.6–4.8)
LYMPHOCYTES NFR BLD AUTO: 5.8 % (ref 24–44)
MCH RBC QN AUTO: 30.3 PG (ref 27–31)
MCHC RBC AUTO-ENTMCNC: 32 G/DL (ref 32–36)
MCV RBC AUTO: 94.7 FL (ref 80–99)
MONOCYTES # BLD AUTO: 1.65 10*3/MM3 (ref 0–1)
MONOCYTES NFR BLD AUTO: 10.2 % (ref 0–12)
NEUTROPHILS # BLD AUTO: 13.59 10*3/MM3 (ref 1.5–8.3)
NEUTROPHILS NFR BLD AUTO: 83.7 % (ref 41–71)
PLATELET # BLD AUTO: 170 10*3/MM3 (ref 150–450)
PMV BLD AUTO: 11.7 FL (ref 6–12)
POTASSIUM BLD-SCNC: 4.2 MMOL/L (ref 3.5–5.5)
RBC # BLD AUTO: 3.57 10*6/MM3 (ref 4.2–5.76)
SODIUM BLD-SCNC: 136 MMOL/L (ref 132–146)
WBC NRBC COR # BLD: 16.24 10*3/MM3 (ref 3.5–10.8)

## 2017-11-07 PROCEDURE — 85025 COMPLETE CBC W/AUTO DIFF WBC: CPT | Performed by: ORTHOPAEDIC SURGERY

## 2017-11-07 PROCEDURE — 97110 THERAPEUTIC EXERCISES: CPT

## 2017-11-07 PROCEDURE — 82962 GLUCOSE BLOOD TEST: CPT

## 2017-11-07 PROCEDURE — 97530 THERAPEUTIC ACTIVITIES: CPT

## 2017-11-07 PROCEDURE — 97161 PT EVAL LOW COMPLEX 20 MIN: CPT

## 2017-11-07 PROCEDURE — 80048 BASIC METABOLIC PNL TOTAL CA: CPT | Performed by: NURSE PRACTITIONER

## 2017-11-07 PROCEDURE — 97165 OT EVAL LOW COMPLEX 30 MIN: CPT

## 2017-11-07 PROCEDURE — 97116 GAIT TRAINING THERAPY: CPT

## 2017-11-07 PROCEDURE — 25010000002 ROPIVACAINE PER 1 MG: Performed by: NURSE ANESTHETIST, CERTIFIED REGISTERED

## 2017-11-07 RX ADMIN — CETIRIZINE HYDROCHLORIDE 10 MG: 10 TABLET, FILM COATED ORAL at 09:34

## 2017-11-07 RX ADMIN — HYDROCODONE BITARTRATE AND ACETAMINOPHEN 1 TABLET: 5; 325 TABLET ORAL at 15:10

## 2017-11-07 RX ADMIN — ROPIVACAINE HYDROCHLORIDE 12 ML/HR: 2 INJECTION, SOLUTION EPIDURAL; INFILTRATION at 03:45

## 2017-11-07 RX ADMIN — ATORVASTATIN CALCIUM 10 MG: 10 TABLET, FILM COATED ORAL at 21:21

## 2017-11-07 RX ADMIN — DOCUSATE SODIUM 100 MG: 100 CAPSULE, LIQUID FILLED ORAL at 09:34

## 2017-11-07 RX ADMIN — SODIUM CHLORIDE 150 ML/HR: 9 INJECTION, SOLUTION INTRAVENOUS at 06:27

## 2017-11-07 RX ADMIN — ASPIRIN 325 MG: 325 TABLET, DELAYED RELEASE ORAL at 09:34

## 2017-11-07 RX ADMIN — HYDROCODONE BITARTRATE AND ACETAMINOPHEN 1 TABLET: 5; 325 TABLET ORAL at 06:27

## 2017-11-07 RX ADMIN — ROPIVACAINE HYDROCHLORIDE 12 ML/HR: 2 INJECTION, SOLUTION EPIDURAL; INFILTRATION at 21:18

## 2017-11-07 RX ADMIN — HYDROCODONE BITARTRATE AND ACETAMINOPHEN 1 TABLET: 5; 325 TABLET ORAL at 10:17

## 2017-11-07 RX ADMIN — DOCUSATE SODIUM 100 MG: 100 CAPSULE, LIQUID FILLED ORAL at 17:11

## 2017-11-07 RX ADMIN — MESALAMINE 2.4 G: 1.2 TABLET, DELAYED RELEASE ORAL at 10:11

## 2017-11-07 RX ADMIN — VALSARTAN 320 MG: 160 TABLET, FILM COATED ORAL at 09:34

## 2017-11-07 RX ADMIN — HYDROCODONE BITARTRATE AND ACETAMINOPHEN 1 TABLET: 5; 325 TABLET ORAL at 21:21

## 2017-11-07 RX ADMIN — HYDROCODONE BITARTRATE AND ACETAMINOPHEN 1 TABLET: 5; 325 TABLET ORAL at 02:18

## 2017-11-07 RX ADMIN — ROPIVACAINE HYDROCHLORIDE 12 ML/HR: 2 INJECTION, SOLUTION EPIDURAL; INFILTRATION at 11:43

## 2017-11-07 NOTE — CONSULTS
"Diabetes educator consult noted. Went to patient's room this morning, but patient states he has\" had prediabetes for 40 years\". Patient wanted educator to come back at another time. Will follow up.  "

## 2017-11-07 NOTE — PLAN OF CARE
Problem: Patient Care Overview (Adult)  Goal: Plan of Care Review  Outcome: Ongoing (interventions implemented as appropriate)    11/07/17 1024   Coping/Psychosocial Response Interventions   Plan Of Care Reviewed With patient;son   Outcome Evaluation   Outcome Summary/Follow up Plan OT initial eval completed. Pt demonstrates an increased need for assist 2/2 weakness and pain. Recommend pt to receive skilled OT to address ADLs, functional mobility, safety and education to return to PLOF. Recommend pt to go home with assist at time of DC.         Problem: Inpatient Occupational Therapy  Goal: Bed Mobility Goal LTG- OT  Outcome: Ongoing (interventions implemented as appropriate)    11/07/17 1024   Bed Mobility OT LTG   Bed Mobility OT LTG, Date Established 11/07/17   Bed Mobility OT LTG, Time to Achieve 2 wks   Bed Mobility OT LTG, Activity Type all bed mobility   Bed Mobility OT LTG, [place] Level contact guard assist   Bed Mobility OT LTG, Outcome goal ongoing       Goal: Transfer Training Goal 1 LTG- OT  Outcome: Ongoing (interventions implemented as appropriate)    11/07/17 1024   Transfer Training OT LTG   Transfer Training OT LTG, Date Established 11/07/17   Transfer Training OT LTG, Time to Achieve 2 wks   Transfer Training OT LTG, Activity Type sit to stand/stand to sit;toilet   Transfer Training OT LTG, [place] Level supervision required   Transfer Training OT LTG, Assist Device commode, bedside;walker, rolling   Transfer Training OT LTG, Outcome goal ongoing       Goal: Patient Education Goal LTG- OT  Outcome: Ongoing (interventions implemented as appropriate)    11/07/17 1024   Patient Education OT LTG   Patient Education OT LTG, Date Established 11/07/17   Patient Education OT LTG, Time to Achieve 2 wks   Patient Education OT LTG, Education Type precautions per surgeon;1 hand/jc technique;home safety;adaptive equipment mgmt   Patient Education OT LTG, Education Understanding verbalizes  understanding;demonstrates adequately   Patient Education OT LTG Outcome goal ongoing       Goal: LB Dressing Goal LTG- OT  Outcome: Ongoing (interventions implemented as appropriate)    11/07/17 1024   LB Dressing OT LTG   LB Dressing Goal OT LTG, Date Established 11/07/17   LB Dressing Goal OT LTG, Time to Achieve 2 wks   LB Dressing Goal OT LTG, [place] Level supervision required   LB Dressing Goal OT LTG, Adaptive Equipment reacher;sock-aid   LB Dressing Goal OT LTG, Outcome goal ongoing

## 2017-11-07 NOTE — PROGRESS NOTES
"IM progress note      Jeremie Marquez  [id number]  1945     LOS: 1 day     Attending: Han Jaime MD    Primary Care Provider: Aaron Trejo MD      Chief Complaint/Reason for visit:  Left knee pain    Subjective   Doing fairly well. Pain control is good. Denies f/c/n/v/sob/cp.    Objective     Vital Signs  Blood pressure 116/66, pulse 61, temperature 98.2 °F (36.8 °C), temperature source Oral, resp. rate 16, height 70\" (177.8 cm), weight 247 lb (112 kg), SpO2 96 %.  Temp (24hrs), Av.2 °F (36.2 °C), Min:95.1 °F (35.1 °C), Max:98.2 °F (36.8 °C)      Nutrition: PO    Physical Exam:     General Appearance:    Alert, cooperative, in no acute distress   Head:    Normocephalic, without obvious abnormality, atraumatic    Lungs:     Normal effort, symmetric chest rise, no crepitus, clear to      auscultation bilaterally             Heart:    Regular rhythm and normal rate, normal S1 and S2   Abdomen:     Normal bowel sounds, no masses, no organomegaly, soft        non-tender, non-distended, no guarding, no rebound                tenderness   Extremities:   No clubbing, cyanosis or edema.  No deformities. Left knee ACE wrap CDI. Nerve block present   Pulses:   Pulses palpable and equal bilaterally   Skin:   No bleeding, bruising or rash   Neurologic:   Moves all extremities with no obvious focal motor deficit.  Cranial nerves 2 - 12 grossly intact. Flexion and dorsiflexion intact bilateral feet.     Results Review:     I reviewed the patient's new clinical results.     Results from last 7 days  Lab Units 17  0512 17  0925   WBC 10*3/mm3 16.24* 7.64   HEMOGLOBIN g/dL 10.8* 12.9*   HEMATOCRIT % 33.8* 38.7*   PLATELETS 10*3/mm3 170 181       Results from last 7 days  Lab Units 17  0512   SODIUM mmol/L 136   POTASSIUM mmol/L 4.2   CHLORIDE mmol/L 107   CO2 mmol/L 24.0   BUN mg/dL 16   CREATININE mg/dL 0.90   CALCIUM mg/dL 8.4*   GLUCOSE mg/dL 134*     Results for JEREMIE MARQUZE (MRN [id number]) as " of 11/7/2017 09:20   Ref. Range 11/6/2017 16:29 11/6/2017 21:05 11/7/2017 05:12 11/7/2017 07:57   Glucose Latest Ref Range: 70 - 130 mg/dL 145 (H) 220 (H) 134 (H) 137 (H)     I reviewed the patient's new imaging including images and reports.    All medications reviewed.     aspirin 325 mg Oral Daily   atorvastatin 10 mg Oral Nightly   cetirizine 10 mg Oral Daily   docusate sodium 100 mg Oral BID   insulin lispro 0-7 Units Subcutaneous 4x Daily With Meals & Nightly   mesalamine 2,400 mg Oral Daily   valsartan 320 mg Oral Q24H       Assessment/Plan   Principal Problem:    S/P total knee arthroplasty, left  Active Problems:    Arthritis of left knee    Atrial fibrillation    Hypertension    HLD (hyperlipidemia)    Prediabetes    Acute blood loss anemia, mild, asymptomatic    Leukocytosis, likely reactive    Hx of ' blood clot'    Plan  1. PT/OT- WBAT LLE  2. Pain control-prns, AC nerve block   3. IS-encouraged  4. DVT proph- mechs/ASA( Vs Lovenox depending on what' blood clot' hx he has)  5. Bowel regimen  6. Monitor post-op labs  7. DC planning for home    HTN, HLD, afib  - Continue home diovan and statin( with holding parameters for antihypertensives)wy  - Hold lasix for now  - Monitor BP q4 hrs  - Holding parameters for BP meds  - PRN hydralazine for SBP >170     PreDM  - hgb A1c on 10/24/17 6.0  - Accuchecks ACHS with low dose SSI  - DM educator consult    Seen and examined by me. Agree with above. Discussed with patient.     TONI Torrez  11/07/17  9:20 AM

## 2017-11-07 NOTE — PLAN OF CARE
Problem: Patient Care Overview (Adult)  Goal: Plan of Care Review  Outcome: Ongoing (interventions implemented as appropriate)    11/07/17 0932   Coping/Psychosocial Response Interventions   Plan Of Care Reviewed With patient;son   Patient Care Overview   Progress progress towards functional goals is fair   Outcome Evaluation   Outcome Summary/Follow up Plan Patient ambulated 100 feet with RW, limited by pain and fatigue. Will measure ROM this PM. Plan is d/c home with HHPT. Recommend d/c on Thursday to allow for patient to receive 2 full days of physical therapy treatment/training prior to d/c home as his wife is unable to provide any physical assist and son will be availabe at all times over the weekend starting on Thursday afternoon. Will continue to progress mobility, strength, and ROM as able.          Problem: Knee Replacement, Total (Adult)  Goal: Signs and Symptoms of Listed Potential Problems Will be Absent or Manageable (Knee Replacement, Total)  Outcome: Ongoing (interventions implemented as appropriate)    11/07/17 0932   Knee Replacement, Total   Problems Assessed (Total Knee Replacement) pain;decreased range of motion;functional decline/self care deficit   Problems Present (Total Knee Replacement) pain;decreased range of motion;functional decline/self care deficit         Problem: Inpatient Physical Therapy  Goal: Bed Mobility Goal LTG- PT  Outcome: Ongoing (interventions implemented as appropriate)    11/07/17 0932   Bed Mobility PT LTG   Bed Mobility PT LTG, Date Established 11/07/17   Bed Mobility PT LTG, Time to Achieve 3 days   Bed Mobility PT LTG, Activity Type supine to sit/sit to supine   Bed Mobility PT LTG, [place] Level conditional independence   Bed Mobility PT LTG, Date Goal Reviewed 11/07/17   Bed Mobility PT LTG, Outcome goal ongoing       Goal: Transfer Training Goal 1 LTG- PT  Outcome: Ongoing (interventions implemented as appropriate)    11/07/17 0932   Transfer Training PT LTG    Transfer Training PT LTG, Date Established 11/07/17   Transfer Training PT LTG, Time to Achieve 3 days   Transfer Training PT LTG, Activity Type sit to stand/stand to sit   Transfer Training PT LTG, [place] Level conditional independence   Transfer Training PT LTG, Assist Device walker, rolling   Transfer Training PT LTG, Date Goal Reviewed 11/07/17   Transfer Training PT LTG, Outcome goal ongoing       Goal: Gait Training Goal LTG- PT  Outcome: Ongoing (interventions implemented as appropriate)    11/07/17 0932   Gait Training PT LTG   Gait Training Goal PT LTG, Date Established 11/07/17   Gait Training Goal PT LTG, Time to Achieve 3 days   Gait Training Goal PT LTG, [place] Level conditional independence   Gait Training Goal PT LTG, Assist Device walker, rolling   Gait Training Goal PT LTG, Distance to Achieve 500 feet   Gait Training Goal PT LTG, Date Goal Reviewed 11/07/17   Gait Training Goal PT LTG, Outcome goal ongoing       Goal: Stair Training Goal LTG- PT  Outcome: Ongoing (interventions implemented as appropriate)    11/07/17 0932   Stair Training PT LTG   Stair Training Goal PT LTG, Date Established 11/07/17   Stair Training Goal PT LTG, Time to Achieve 3 days   Stair Training Goal PT LTG, Number of Steps 5   Stair Training Goal PT LTG, [place] Level contact guard assist   Stair Training Goal PT LTG, Assist Device 2 handrails   Stair Training Goal PT LTG, Date Goal Reviewed 11/07/17   Stair Training Goal PT LTG, Outcome goal ongoing       Goal: Range of Motion Goal LTG- PT  Outcome: Ongoing (interventions implemented as appropriate)    11/07/17 0932   Range of Motion PT LTG   Range of Motion Goal PT LTG, Date Established 11/07/17   Range of Motion Goal PT LTG, Time to Achieve 3 days   Range fo Motion Goal PT LTG, Joint L knee   Range of Motion Goal PT LTG, AAROM Measure 0-90 degrees   Range of Motion Goal PT LTG, Date Goal Reviewed 11/07/17   Range of Motion Goal PT LTG, Outcome goal  ongoing

## 2017-11-07 NOTE — THERAPY TREATMENT NOTE
Acute Care - Physical Therapy Treatment Note  [facility]     Patient Name: Jeremie Wynn  : 1945  MRN: [id number]  Today's Date: 2017  Onset of Illness/Injury or Date of Surgery Date: 17  Date of Referral to PT: 17  Referring Physician: MD Addison    Admit Date: 2017    Visit Dx:    ICD-10-CM ICD-9-CM   1. Impaired mobility and ADLs Z74.09 799.89   2. Impaired functional mobility, balance, gait, and endurance Z74.09 V49.89   3. Arthritis of left knee M17.12 716.96   4. S/P total knee arthroplasty, left Z96.652 V43.65   5. Generalized osteoarthritis M15.9 715.00     Patient Active Problem List   Diagnosis   • Diverticulitis   • Anemia   • Atrial fibrillation   • Bradycardia   • Generalized osteoarthritis   • Hyperlipemia   • Hypertension   • PVC's (premature ventricular contractions)   • Arthralgia of hip   • Dyspnea on exertion   • Obesity   • Arthritis of left knee   • S/P total knee arthroplasty, left   • HLD (hyperlipidemia)   • Prediabetes   • Acute blood loss anemia, mild, asymptomatic   • Leukocytosis, likely reactive               Adult Rehabilitation Note       17 1432          Rehab Assessment/Intervention    Discipline physical therapist  -LR      Document Type therapy note (daily note)  -LR      Subjective Information agree to therapy;complains of;pain  -LR      Patient Effort, Rehab Treatment good  -LR      Symptoms Noted During/After Treatment increased pain;fatigue;shortness of breath  -LR      Symptoms Noted Comment Notified RN.   -LR      Precautions/Limitations fall precautions;other (see comments)   L adductor canal nerve catheter  -LR      Recorded by [LR] Estefania Hong, PT      Pain Assessment    Pain Assessment 0-10  -LR      Pain Score 7  -LR      Post Pain Score 8  -LR      Pain Type Acute pain  -LR      Pain Location Knee  -LR      Pain Orientation Left;Anterior;Posterior  -LR      Pain Intervention(s) Repositioned;Ambulation/increased activity  -LR       Recorded by [LR] Estefania Hong, PT      Cognitive Assessment/Intervention    Current Cognitive/Communication Assessment functional  -LR      Orientation Status oriented x 4;required verbal cueing (specifiy in comments)  -LR      Follows Commands/Answers Questions 100% of the time;able to follow single-step instructions;needs cueing;needs repetition  -LR      Personal Safety WNL/WFL  -LR      Recorded by [LR] Estefania Hong, PT      General LE Assessment    ROM Detail 11-67 degrees; lacking 11 degrees of extension AROM, 67 degrees flexion AAROM in sitting.   -LR      Recorded by [LR] Estefania Hong, PT      Mobility Assessment/Training    Extremity Weight-Bearing Status left lower extremity  -LR      Left Lower Extremity Weight-Bearing weight-bearing as tolerated  -LR      Recorded by [LR] Estefania Hong, PT      Bed Mobility, Assessment/Treatment    Bed Mobility, Assistive Device head of bed elevated;bed rails  -LR      Bed Mob, Supine to Sit, [place] verbal cues required;minimum assist (75% patient effort)  -LR      Bed Mob, Sit to Supine, [place] verbal cues required;minimum assist (75% patient effort)  -LR      Bed Mobility, Safety Issues decreased use of legs for bridging/pushing  -LR      Bed Mobility, Impairments ROM decreased;strength decreased;pain  -LR      Bed Mobility, Comment Verbal cues for correct sequencing of t/f in and out of bed. Min assist required with L LE.   -LR      Recorded by [LR] Estefania Hong, PT      Transfer Assessment/Treatment    Transfers, Sit-Stand [place] verbal cues required;contact guard assist  -LR      Transfers, Stand-Sit [place] verbal cues required;contact guard assist  -LR      Transfers, Sit-Stand-Sit, Assist Device rolling walker  -LR      Transfer, Safety Issues sequencing ability decreased;step length decreased;weight-shifting ability decreased  -LR      Transfer, Impairments ROM decreased;strength  decreased;pain  -LR      Transfer, Comment Verbal cues for correct hand placement with t/f and to step L LE out before t/f for comfort. Assisted to and from bathroom to stand and void. Required increased time to stand from EOB.   -LR      Recorded by [LR] Estefania Hong, PT      Gait Assessment/Treatment    Gait, [place] Level verbal cues required;contact guard assist  -LR      Gait, Assistive Device rolling walker  -LR      Gait, Distance (Feet) 150  -LR      Gait, Gait Pattern Analysis swing-through gait  -LR      Gait, Gait Deviations left:;antalgic;forward flexed posture;step length decreased;toe-to-floor clearance decreased;weight-shifting ability decreased  -LR      Gait, Safety Issues sequencing ability decreased;step length decreased;weight-shifting ability decreased  -LR      Gait, Impairments ROM decreased;strength decreased;pain  -LR      Gait, Comment Patient ambulated with step through gait pattern at slow pace and forward flexed posture. Cues for upright posture, shoulders back, decreased UE weight bearing, and increased L LE weight bearing. Patient with increased antalgia this PM compared to this AM. Required a few brief standing rest breaks. Patient continues to maintain L knee slightly flexed throughout gait cycle. Cues for increased L knee extension during stance phase. Gait limited by pain and fatigue.   -LR      Recorded by [LR] Estefania Hong, PT      Therapy Exercises    Exercise Protocols total knee  -LR      Total Knee Exercises left:;15 reps;completed protocol;with assist;ankle pumps/circles;quad set;glut set;heel slide stretch;SLR;SAQ;hip abduction/adduction;LAQ   cues for technique;min assist SLR,SAQ,LAQ, hip abd  -LR      Recorded by [LR] Estefania Hong, PT      Positioning and Restraints    Pre-Treatment Position in bed  -LR      Post Treatment Position bed  -LR      In Bed notified nsg;supine;call light within reach;encouraged to call for assist;exit alarm  on;with family/caregiver;side rails up x2  -LR      Recorded by [LR] Estefania Hong, PT        User Key  (r) = Recorded By, (t) = Taken By, (c) = Cosigned By    Initials Name Effective Dates    LR Estefania Hong, PT 06/19/15 -                 IP PT Goals       11/07/17 1432 11/07/17 0932       Bed Mobility PT LTG    Bed Mobility PT LTG, Date Established 11/07/17  -LR 11/07/17  -LR     Bed Mobility PT LTG, Time to Achieve 3 days  -LR 3 days  -LR     Bed Mobility PT LTG, Activity Type supine to sit/sit to supine  -LR supine to sit/sit to supine  -LR     Bed Mobility PT LTG, [place] Level conditional independence  -LR conditional independence  -LR     Bed Mobility PT LTG, Date Goal Reviewed 11/07/17  -LR 11/07/17  -LR     Bed Mobility PT LTG, Outcome goal ongoing  -LR goal ongoing  -LR     Transfer Training PT LTG    Transfer Training PT LTG, Date Established 11/07/17  -LR 11/07/17  -LR     Transfer Training PT LTG, Time to Achieve 3 days  -LR 3 days  -LR     Transfer Training PT LTG, Activity Type sit to stand/stand to sit  -LR sit to stand/stand to sit  -LR     Transfer Training PT LTG, [place] Level conditional independence  -LR conditional independence  -LR     Transfer Training PT LTG, Assist Device walker, rolling  -LR walker, rolling  -LR     Transfer Training PT  LTG, Date Goal Reviewed 11/07/17  -LR 11/07/17  -LR     Transfer Training PT LTG, Outcome goal ongoing  -LR goal ongoing  -LR     Gait Training PT LTG    Gait Training Goal PT LTG, Date Established 11/07/17  -LR 11/07/17  -LR     Gait Training Goal PT LTG, Time to Achieve 3 days  -LR 3 days  -LR     Gait Training Goal PT LTG, [place] Level conditional independence  -LR conditional independence  -LR     Gait Training Goal PT LTG, Assist Device walker, rolling  -LR walker, rolling  -LR     Gait Training Goal PT LTG, Distance to Achieve 500 feet  - feet  -LR     Gait Training Goal PT LTG, Date Goal Reviewed 11/07/17  -LR  11/07/17  -LR     Gait Training Goal PT LTG, Outcome goal ongoing  -LR goal ongoing  -LR     Stair Training PT LTG    Stair Training Goal PT LTG, Date Established 11/07/17  -LR 11/07/17  -LR     Stair Training Goal PT LTG, Time to Achieve 3 days  -LR 3 days  -LR     Stair Training Goal PT LTG, Number of Steps 5  -LR 5  -LR     Stair Training Goal PT LTG, [place] Level contact guard assist  -LR contact guard assist  -LR     Stair Training Goal PT LTG, Assist Device 2 handrails  -LR 2 handrails  -LR     Stair Training Goal PT LTG, Date Goal Reviewed 11/07/17  -LR 11/07/17  -LR     Stair Training Goal PT LTG, Outcome goal ongoing  -LR goal ongoing  -LR     Range of Motion PT LTG    Range of Motion Goal PT LTG, Date Established 11/07/17  -LR 11/07/17  -LR     Range of Motion Goal PT LTG, Time to Achieve 3 days  -LR 3 days  -LR     Range fo Motion Goal PT LTG, Joint L knee  -LR L knee  -LR     Range of Motion Goal PT LTG, AAROM Measure 0-90 degrees  -LR 0-90 degrees  -LR     Range of Motion Goal PT LTG, Date Goal Reviewed 11/07/17  -LR 11/07/17  -LR     Range of Motion Goal PT LTG, Outcome goal ongoing  -LR goal ongoing  -LR       User Key  (r) = Recorded By, (t) = Taken By, (c) = Cosigned By    Initials Name Provider Type    LR Estefania Hong, PT Physical Therapist          Physical Therapy Education     Title: PT OT SLP Therapies (Active)     Topic: Physical Therapy (Done)     Point: Mobility training (Done)    Learning Progress Summary    Learner Readiness Method Response Comment Documented by Status   Patient Acceptance E,D NR,VU Educated on HEP, correct gait mechanics. LR 11/07/17 1815 Done    Acceptance E,D VU,NR Educated on weight bearing status, precautions, HEP, and correct gait mechanics. LR 11/07/17 1020 Done   Family Acceptance E,D NR,VU Educated on HEP, correct gait mechanics. LR 11/07/17 1815 Done    Acceptance E,D VU,NR Educated on weight bearing status, precautions, HEP, and correct gait  mechanics. LR 11/07/17 1020 Done   Significant Other Acceptance E,D NR,VU Educated on HEP, correct gait mechanics. LR 11/07/17 1815 Done               Point: Home exercise program (Done)    Learning Progress Summary    Learner Readiness Method Response Comment Documented by Status   Patient Acceptance E,D NR,VU Educated on HEP, correct gait mechanics. LR 11/07/17 1815 Done    Acceptance E,D VU,NR Educated on weight bearing status, precautions, HEP, and correct gait mechanics. LR 11/07/17 1020 Done   Family Acceptance E,D NR,VU Educated on HEP, correct gait mechanics. LR 11/07/17 1815 Done    Acceptance E,D VU,NR Educated on weight bearing status, precautions, HEP, and correct gait mechanics. LR 11/07/17 1020 Done   Significant Other Acceptance E,D NR,VU Educated on HEP, correct gait mechanics. LR 11/07/17 1815 Done               Point: Body mechanics (Done)    Learning Progress Summary    Learner Readiness Method Response Comment Documented by Status   Patient Acceptance E,D NR,VU Educated on HEP, correct gait mechanics. LR 11/07/17 1815 Done    Acceptance E,D VU,NR Educated on weight bearing status, precautions, HEP, and correct gait mechanics. LR 11/07/17 1020 Done   Family Acceptance E,D NR,VU Educated on HEP, correct gait mechanics. LR 11/07/17 1815 Done    Acceptance E,D VU,NR Educated on weight bearing status, precautions, HEP, and correct gait mechanics. LR 11/07/17 1020 Done   Significant Other Acceptance E,D NR,VU Educated on HEP, correct gait mechanics. LR 11/07/17 1815 Done               Point: Precautions (Done)    Learning Progress Summary    Learner Readiness Method Response Comment Documented by Status   Patient Acceptance E,D NR,VU Educated on HEP, correct gait mechanics. LR 11/07/17 1815 Done    Acceptance E,D VU,NR Educated on weight bearing status, precautions, HEP, and correct gait mechanics. LR 11/07/17 1020 Done   Family Acceptance E,D NR,VU Educated on HEP, correct gait mechanics. LR 11/07/17  1815 Done    Acceptance E,D VU,NR Educated on weight bearing status, precautions, HEP, and correct gait mechanics. LR 11/07/17 1020 Done   Significant Other Acceptance E,D NR,REYNALDO Educated on HEP, correct gait mechanics. LR 11/07/17 1815 Done                      User Key     Initials Effective Dates Name Provider Type Discipline    LR 06/19/15 -  Estefania Hong, PT Physical Therapist PT                    PT Recommendation and Plan  Anticipated Equipment Needs At Discharge:  (none)  Anticipated Discharge Disposition: home with assist, home with home health  Planned Therapy Interventions: balance training, bed mobility training, gait training, home exercise program, patient/family education, ROM (Range of Motion), stair training, strengthening, transfer training  PT Frequency: 2 times/day  Plan of Care Review  Plan Of Care Reviewed With: patient, spouse, family  Progress: progress towards functional goals is fair  Outcome Summary/Follow up Plan: Patient increased gait distance to 150 feet, limited by increased pain compared to this AM. Increased independence with t/f. Did not progress to stairs this PM d/t patient's pain level. Initiate stair training in AM. Recommend patient stay tomorrow for 2 sessions of PT and d/c on Thursday to allow for an additional session that morning to allow for further mobility training and stair training prior to d/c home. ROM limited by pain, 11-67 degrees. Will continue to progress as able.           Outcome Measures       11/07/17 1432 11/07/17 0939 11/07/17 0932    How much help from another person do you currently need...    Turning from your back to your side while in flat bed without using bedrails? 3  -LR  3  -LR    Moving from lying on back to sitting on the side of a flat bed without bedrails? 3  -LR  3  -LR    Moving to and from a bed to a chair (including a wheelchair)? 3  -LR  3  -LR    Standing up from a chair using your arms (e.g., wheelchair, bedside chair)? 3  -LR   3  -LR    Climbing 3-5 steps with a railing? 2  -LR  2  -LR    To walk in hospital room? 3  -LR  3  -LR    AM-PAC 6 Clicks Score 17  -LR  17  -LR    How much help from another is currently needed...    Putting on and taking off regular lower body clothing?  3  -MC     Bathing (including washing, rinsing, and drying)  3  -MC     Toileting (which includes using toilet bed pan or urinal)  3  -MC     Putting on and taking off regular upper body clothing  3  -MC     Taking care of personal grooming (such as brushing teeth)  4  -MC     Eating meals  4  -MC     Score  20  -MC     Functional Assessment    Outcome Measure Options AM-PAC 6 Clicks Basic Mobility (PT)  -LR AM-PAC 6 Clicks Daily Activity (OT)  -MC AM-PAC 6 Clicks Basic Mobility (PT)  -LR      User Key  (r) = Recorded By, (t) = Taken By, (c) = Cosigned By    Initials Name Provider Type    LR Estefania Hong, PT Physical Therapist    NATALIA Park, OT Occupational Therapist           Time Calculation:         PT Charges       11/07/17 1819 11/07/17 1024       Time Calculation    Start Time 1432  -LR 0932  -LR     PT Received On 11/07/17  -LR 11/07/17  -LR     PT Goal Re-Cert Due Date 11/17/17  -LR 11/17/17  -LR     Time Calculation- PT    Total Timed Code Minutes- PT 39 minute(s)  -LR 25 minute(s)  -LR       User Key  (r) = Recorded By, (t) = Taken By, (c) = Cosigned By    Initials Name Provider Type    LR Estefania Hong, PT Physical Therapist          Therapy Charges for Today     Code Description Service Date Service Provider Modifiers Qty    86879602656 HC GAIT TRAINING EA 15 MIN 11/7/2017 Estefania Hong, PT GP 1    24909205555 HC PT THER PROC EA 15 MIN 11/7/2017 Estefania Hong, PT GP 1    10660040168 HC PT EVAL LOW COMPLEXITY 2 11/7/2017 Estefania Hong, PT GP 1    85123996957 HC GAIT TRAINING EA 15 MIN 11/7/2017 Estefania Hong, PT GP 2    25812515370 HC PT THER PROC EA 15 MIN 11/7/2017 Estefania Hong, PT GP 1           PT G-Codes  Outcome Measure Options: AM-PAC 6 Clicks Basic Mobility (PT)    Estefania Hong, PT  11/7/2017

## 2017-11-07 NOTE — PROGRESS NOTES
Derek    Acute pain service Inpatient Progress Note    Patient Name: Jeremie Wynn  :  1945  MRN:  [id number]        Acute Pain  Service Inpatient Progress Note:    Analgesia:Good  Pain Score:2/10  LOC: alert and awake  Resp Status: room air  Cardiac: VS stable  Side Effects:None  Catheter Site:clean  Cath type: peripheral nerve cath(MOOG pump)  Infusion rate: 12ml/hr  Catheter Plan:Catheter to remain Insitu and Continue catheter infusion rate unchanged  Comments: Anterior = 2  [place] = 7
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
## 2017-11-07 NOTE — PLAN OF CARE
Problem: Knee Replacement, Total (Adult)  Goal: Signs and Symptoms of Listed Potential Problems Will be Absent or Manageable (Knee Replacement, Total)    11/07/17 1432   Knee Replacement, Total   Problems Assessed (Total Knee Replacement) pain;decreased range of motion;functional decline/self care deficit   Problems Present (Total Knee Replacement) pain;decreased range of motion;functional decline/self care deficit         11/07/17 1432   Knee Replacement, Total   Problems Assessed (Total Knee Replacement) pain;decreased range of motion;functional decline/self care deficit   Problems Present (Total Knee Replacement) pain;decreased range of motion;functional decline/self care deficit

## 2017-11-07 NOTE — PLAN OF CARE
Problem: Patient Care Overview (Adult)  Goal: Plan of Care Review  Outcome: Ongoing (interventions implemented as appropriate)    11/07/17 1432   Coping/Psychosocial Response Interventions   Plan Of Care Reviewed With patient;spouse;family   Patient Care Overview   Progress progress towards functional goals is fair   Outcome Evaluation   Outcome Summary/Follow up Plan Patient increased gait distance to 150 feet, limited by increased pain compared to this AM. Increased independence with t/f. Did not progress to stairs this PM d/t patient's pain level. Initiate stair training in AM. Recommend patient stay tomorrow for 2 sessions of PT and d/c on Thursday to allow for an additional session that morning to allow for further mobility training and stair training prior to d/c home. ROM limited by pain, 11-67 degrees. Will continue to progress as able.          Problem: Knee Replacement, Total (Adult)  Goal: Signs and Symptoms of Listed Potential Problems Will be Absent or Manageable (Knee Replacement, Total)  Outcome: Ongoing (interventions implemented as appropriate)    11/07/17 1432   Knee Replacement, Total   Problems Assessed (Total Knee Replacement) pain;decreased range of motion;functional decline/self care deficit   Problems Present (Total Knee Replacement) pain;decreased range of motion;functional decline/self care deficit         Problem: Inpatient Physical Therapy  Goal: Bed Mobility Goal LTG- PT  Outcome: Ongoing (interventions implemented as appropriate)    11/07/17 1432   Bed Mobility PT LTG   Bed Mobility PT LTG, Date Established 11/07/17   Bed Mobility PT LTG, Time to Achieve 3 days   Bed Mobility PT LTG, Activity Type supine to sit/sit to supine   Bed Mobility PT LTG, [place] Level conditional independence   Bed Mobility PT LTG, Date Goal Reviewed 11/07/17   Bed Mobility PT LTG, Outcome goal ongoing       Goal: Transfer Training Goal 1 LTG- PT  Outcome: Ongoing (interventions implemented as  appropriate)    11/07/17 1432   Transfer Training PT LTG   Transfer Training PT LTG, Date Established 11/07/17   Transfer Training PT LTG, Time to Achieve 3 days   Transfer Training PT LTG, Activity Type sit to stand/stand to sit   Transfer Training PT LTG, [place] Level conditional independence   Transfer Training PT LTG, Assist Device walker, rolling   Transfer Training PT LTG, Date Goal Reviewed 11/07/17   Transfer Training PT LTG, Outcome goal ongoing       Goal: Gait Training Goal LTG- PT  Outcome: Ongoing (interventions implemented as appropriate)    11/07/17 1432   Gait Training PT LTG   Gait Training Goal PT LTG, Date Established 11/07/17   Gait Training Goal PT LTG, Time to Achieve 3 days   Gait Training Goal PT LTG, [place] Level conditional independence   Gait Training Goal PT LTG, Assist Device walker, rolling   Gait Training Goal PT LTG, Distance to Achieve 500 feet   Gait Training Goal PT LTG, Date Goal Reviewed 11/07/17   Gait Training Goal PT LTG, Outcome goal ongoing       Goal: Stair Training Goal LTG- PT  Outcome: Ongoing (interventions implemented as appropriate)    11/07/17 1432   Stair Training PT LTG   Stair Training Goal PT LTG, Date Established 11/07/17   Stair Training Goal PT LTG, Time to Achieve 3 days   Stair Training Goal PT LTG, Number of Steps 5   Stair Training Goal PT LTG, [place] Level contact guard assist   Stair Training Goal PT LTG, Assist Device 2 handrails   Stair Training Goal PT LTG, Date Goal Reviewed 11/07/17   Stair Training Goal PT LTG, Outcome goal ongoing       Goal: Range of Motion Goal LTG- PT  Outcome: Ongoing (interventions implemented as appropriate)    11/07/17 1432   Range of Motion PT LTG   Range of Motion Goal PT LTG, Date Established 11/07/17   Range of Motion Goal PT LTG, Time to Achieve 3 days   Range fo Motion Goal PT LTG, Joint L knee   Range of Motion Goal PT LTG, AAROM Measure 0-90 degrees   Range of Motion Goal PT LTG, Date Goal Reviewed  11/07/17   Range of Motion Goal PT LTG, Outcome goal ongoing
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
## 2017-11-07 NOTE — PROGRESS NOTES
"Jeremie Wynn  [id number]  1945    /69 (BP Location: Right arm, Patient Position: Sitting)  Pulse 75  Temp 98.2 °F (36.8 °C) (Oral)   Resp 16  Ht 70\" (177.8 cm)  Wt 247 lb (112 kg)  SpO2 95%  BMI 35.44 kg/m2    Lab Results (last 24 hours)     Procedure Component Value Units Date/Time    POC Glucose Fingerstick [[id number]]  (Abnormal) Collected:  11/06/17 2105    Specimen:  Blood Updated:  11/06/17 2107     Glucose 220 (H) mg/dL     Narrative:       Verify with Lab Meter: [id number] : [id number] Burton [place] Momit    Basic Metabolic Panel [[id number]]  (Abnormal) Collected:  11/07/17 0512    Specimen:  Blood Updated:  11/07/17 0627     Glucose 134 (H) mg/dL      BUN 16 mg/dL      Creatinine 0.90 mg/dL      Sodium 136 mmol/L      Potassium 4.2 mmol/L      Chloride 107 mmol/L      CO2 24.0 mmol/L      Calcium 8.4 (L) mg/dL      eGFR Non African Amer 82 mL/min/1.73      BUN/Creatinine Ratio 17.8     Anion Gap 5.0 mmol/L     Narrative:       [employer] Guidelines    Stage     Description        GFR  1         Normal or High     90+  2         Mild decrease      60-89  3         Moderate decrease  30-59  4         Severe decrease    15-29  5         Kidney failure     <15    CBC & Differential [[id number]] Collected:  11/07/17 0512    Specimen:  Blood Updated:  11/07/17 0631    Narrative:       The following orders were created for panel order CBC & Differential.  Procedure                               Abnormality         Status                     ---------                               -----------         ------                     CBC Auto Differential[[id number]]        Abnormal            Final result                 Please view results for these tests on the individual orders.    CBC Auto Differential [[id number]]  (Abnormal) Collected:  11/07/17 0512    Specimen:  Blood Updated:  11/07/17 0631     WBC 16.24 (H) 10*3/mm3      RBC 3.57 (L) 10*6/mm3      Hemoglobin 10.8 (L) g/dL      " Hematocrit 33.8 (L) %      MCV 94.7 fL      MCH 30.3 pg      MCHC 32.0 g/dL      RDW 13.6 %      RDW-SD 47.5 fl      MPV 11.7 fL      Platelets 170 10*3/mm3      Neutrophil % 83.7 (H) %      Lymphocyte % 5.8 (L) %      Monocyte % 10.2 %      Eosinophil % 0.0 %      Basophil % 0.1 %      Immature Grans % 0.2 %      Neutrophils, Absolute 13.59 (H) 10*3/mm3      Lymphocytes, Absolute 0.95 10*3/mm3      Monocytes, Absolute 1.65 (H) 10*3/mm3      Eosinophils, Absolute 0.00 10*3/mm3      Basophils, Absolute 0.01 10*3/mm3      Immature Grans, Absolute 0.04 (H) 10*3/mm3     POC Glucose Fingerstick [[id number]]  (Abnormal) Collected:  11/07/17 0757    Specimen:  Blood Updated:  11/07/17 0800     Glucose 137 (H) mg/dL     Narrative:       Meter: [id number] : [id number] Lila Browning    POC Glucose Fingerstick [[id number]]  (Abnormal) Collected:  11/07/17 1139    Specimen:  Blood Updated:  11/07/17 1141     Glucose 132 (H) mg/dL     Narrative:       Meter: [id number] : [id number] Lila Browning          Patient Care Team:  Aaron Trejo MD as PCP - General  Aaron Trejo MD as PCP - Family Medicine    SUBJECTIVE  Pain well controlled.  Good start in physical therapy.    PHYSICAL EXAM  Incision benign  Minimal thigh swelling  Neurovascularly intact to knees and toes     Principal Problem:    S/P total knee arthroplasty, left  Active Problems:    Atrial fibrillation    Hypertension    Arthritis of left knee    HLD (hyperlipidemia)    Prediabetes    Acute blood loss anemia, mild, asymptomatic    Leukocytosis, likely reactive      PLAN / DISPOSITION:  Discharge home more likely tomorrow.    Han Jaime MD  11/07/17  4:33 PM

## 2017-11-07 NOTE — PROGRESS NOTES
Discharge Planning Assessment  [facility]     Patient Name: Jeremie Wynn  MRN: [id number]  Today's Date: 11/7/2017    Admit Date: 11/6/2017          Discharge Needs Assessment       11/07/17 1226    Living Environment    Lives With spouse    Living Arrangements house    Home Accessibility bed and bath on same level;stairs to enter home    Number of Stairs to Enter Home 5    Transportation Available car;family or friend will provide    Living Environment    Quality Of Family Relationships supportive;helpful;involved    Able to Return to Prior Living Arrangements yes    Living Arrangement Comments Mr. Wynn lives with his wife in a one story home in [place] [facility].  His wife can assist but is physically limited.  Mr. Wynn's son will be available to assist for the next 4 days.    Discharge Needs Assessment    Readmission Within The Last 30 Days no previous admission in last 30 days    Outpatient/Agency/Support Group Needs --   [facility]    Equipment Currently Used at Home walker, rolling;cane, straight;cane, quad;shower chair;commode    Equipment Needed After Discharge none    Discharge Disposition home healthcare service    Discharge Contact Information if Applicable Wife Rosie,  [phone]            Discharge Plan       11/07/17 1230    Case Management/Social Work Plan    Plan Home with [facility]    Patient/Family In Agreement With Plan yes    Additional Comments Met with Mr. Wynn and his son at the bedside to discuss discharge planning.  Mr. Wynn will have sufficient assistance at home initially from his son.  Mrs. Wynn will be limited physically.  He has adequated DME in the home.  Discussed physical therapy and Mr. Wynn requested [facility] with [facility].  Orders entered for outpatient PT.  Mr. Wynn's son will be transporting him home by personal vehicle at discharge.  CM will continue to follow.        Discharge Placement     No information found        Expected  Discharge Date and Time     Expected Discharge Date Expected Discharge Time    Nov 8, 2017               Demographic Summary       11/07/17 1221    Referral Information    Admission Type inpatient    Arrived From home or self-care    Reason For Consult discharge planning    Record Reviewed clinical discipline documentation;history and physical;medical record    Contact Information    Permission Granted to Share Information With     Primary Care Physician Information    Name Philip Trejo MD            Functional Status       11/07/17 1223    Functional Status Prior    Ambulation 1-->assistive equipment    Transferring 0-->independent    Toileting 0-->independent    Bathing 0-->independent    Dressing 0-->independent    Eating 0-->independent    Communication 0-->understands/communicates without difficulty    Swallowing 0-->swallows foods/liquids without difficulty    IADL    Medications independent    Meal Preparation independent    Housekeeping independent    Laundry independent    Shopping assistive equipment    Oral Care independent    Activity Tolerance    Current Activity Limitations --   See PT notes    Usual Activity Tolerance good    Cognitive/Perceptual/Developmental    Current Mental Status/Cognitive Functioning no deficits noted    Employment/Financial    Employment/Finance Comments Mr. Wynn has prescription drug coverage with [employer].  Verified insurance with patient.  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            Psychosocial     None            Abuse/Neglect     None            Legal       11/07/17 1225    Legal    Legal Comments Copy of advance directives in the chart.            Substance Abuse       11/07/17 1225    Substance Use, Patient    Substance Use past tobacco use;current alcohol use    Tobacco Type cigarettes, tobacco            Patient Forms     None          Althea Coppola

## 2017-11-08 LAB
GLUCOSE BLDC GLUCOMTR-MCNC: 103 MG/DL (ref 70–130)
GLUCOSE BLDC GLUCOMTR-MCNC: 104 MG/DL (ref 70–130)
GLUCOSE BLDC GLUCOMTR-MCNC: 105 MG/DL (ref 70–130)
GLUCOSE BLDC GLUCOMTR-MCNC: 124 MG/DL (ref 70–130)

## 2017-11-08 PROCEDURE — 25010000002 ROPIVACAINE PER 1 MG: Performed by: NURSE ANESTHETIST, CERTIFIED REGISTERED

## 2017-11-08 PROCEDURE — 82962 GLUCOSE BLOOD TEST: CPT

## 2017-11-08 PROCEDURE — G0108 DIAB MANAGE TRN  PER INDIV: HCPCS | Performed by: REGISTERED NURSE

## 2017-11-08 PROCEDURE — 97116 GAIT TRAINING THERAPY: CPT

## 2017-11-08 PROCEDURE — 25010000002 ENOXAPARIN PER 10 MG: Performed by: NURSE PRACTITIONER

## 2017-11-08 PROCEDURE — 97110 THERAPEUTIC EXERCISES: CPT

## 2017-11-08 PROCEDURE — 25010000002 HYDROMORPHONE PER 4 MG: Performed by: ORTHOPAEDIC SURGERY

## 2017-11-08 RX ORDER — PSEUDOEPHEDRINE HCL 30 MG
1 TABLET ORAL 2 TIMES DAILY
Qty: 60 CAPSULE | Refills: 0 | Status: SHIPPED | OUTPATIENT
Start: 2017-11-08 | End: 2018-03-09 | Stop reason: HOSPADM

## 2017-11-08 RX ORDER — ROPIVACAINE IN 0.9% SOD CHL/PF 0.2% 545ML
12 ELASTOMERIC PUMP, HI VARIABLE RATE INJECTION CONTINUOUS
Status: DISCONTINUED | OUTPATIENT
Start: 2017-11-08 | End: 2017-11-09 | Stop reason: HOSPADM

## 2017-11-08 RX ORDER — HYDROCODONE BITARTRATE AND ACETAMINOPHEN 5; 325 MG/1; MG/1
1 TABLET ORAL EVERY 4 HOURS PRN
Qty: 40 TABLET | Refills: 0 | Status: SHIPPED | OUTPATIENT
Start: 2017-11-08 | End: 2017-11-16

## 2017-11-08 RX ORDER — ROPIVACAINE HYDROCHLORIDE 2 MG/ML
12 INJECTION, SOLUTION EPIDURAL; INFILTRATION CONTINUOUS
Status: DISCONTINUED | OUTPATIENT
Start: 2017-11-08 | End: 2017-11-09 | Stop reason: HOSPADM

## 2017-11-08 RX ADMIN — ROPIVACAINE HYDROCHLORIDE 12 ML/HR: 2 INJECTION, SOLUTION EPIDURAL; INFILTRATION at 16:46

## 2017-11-08 RX ADMIN — HYDROCODONE BITARTRATE AND ACETAMINOPHEN 1 TABLET: 5; 325 TABLET ORAL at 00:44

## 2017-11-08 RX ADMIN — CETIRIZINE HYDROCHLORIDE 10 MG: 10 TABLET, FILM COATED ORAL at 08:58

## 2017-11-08 RX ADMIN — ENOXAPARIN SODIUM 40 MG: 40 INJECTION SUBCUTANEOUS at 16:28

## 2017-11-08 RX ADMIN — HYDROCODONE BITARTRATE AND ACETAMINOPHEN 1 TABLET: 5; 325 TABLET ORAL at 20:07

## 2017-11-08 RX ADMIN — ATORVASTATIN CALCIUM 10 MG: 10 TABLET, FILM COATED ORAL at 20:07

## 2017-11-08 RX ADMIN — HYDROCODONE BITARTRATE AND ACETAMINOPHEN 1 TABLET: 5; 325 TABLET ORAL at 14:23

## 2017-11-08 RX ADMIN — DOCUSATE SODIUM 100 MG: 100 CAPSULE, LIQUID FILLED ORAL at 08:58

## 2017-11-08 RX ADMIN — ASPIRIN 325 MG: 325 TABLET, DELAYED RELEASE ORAL at 08:58

## 2017-11-08 RX ADMIN — MESALAMINE 2.4 G: 1.2 TABLET, DELAYED RELEASE ORAL at 08:59

## 2017-11-08 RX ADMIN — HYDROMORPHONE HYDROCHLORIDE 0.5 MG: 1 INJECTION, SOLUTION INTRAMUSCULAR; INTRAVENOUS; SUBCUTANEOUS at 11:22

## 2017-11-08 RX ADMIN — DOCUSATE SODIUM 100 MG: 100 CAPSULE, LIQUID FILLED ORAL at 18:25

## 2017-11-08 RX ADMIN — VALSARTAN 320 MG: 160 TABLET, FILM COATED ORAL at 08:58

## 2017-11-08 RX ADMIN — HYDROMORPHONE HYDROCHLORIDE 0.5 MG: 1 INJECTION, SOLUTION INTRAMUSCULAR; INTRAVENOUS; SUBCUTANEOUS at 16:47

## 2017-11-08 RX ADMIN — ROPIVACAINE HYDROCHLORIDE 12 ML/HR: 2 INJECTION, SOLUTION EPIDURAL; INFILTRATION at 06:44

## 2017-11-08 RX ADMIN — HYDROCODONE BITARTRATE AND ACETAMINOPHEN 1 TABLET: 5; 325 TABLET ORAL at 08:58

## 2017-11-08 NOTE — PROGRESS NOTES
" progress note      Jeremie Wynn  [id number]  1945     LOS: 2 days     Attending: Anibal Dominguez MD    Primary Care Provider: Aaron Trejo MD      Chief Complaint/Reason for visit:  Left knee pain    Subjective   Feels good. Pain is well controlled. Denies f/c/n/v/sob/cp.    Objective     Vital Signs  Blood pressure 134/71, pulse 73, temperature 99.3 °F (37.4 °C), temperature source Oral, resp. rate 18, height 70\" (177.8 cm), weight 247 lb (112 kg), SpO2 93 %.  Temp (24hrs), Av.4 °F (37.4 °C), Min:99.3 °F (37.4 °C), Max:99.5 °F (37.5 °C)      Nutrition: PO    Physical Therapy: Pt increased ambulation to 120 feet with CGA and RW, limited by fatigue and pain. Stair training continued x5 steps. Pt anticipating d/c home with assist and outpatient in the home tomorrow.     Physical Exam:     General Appearance:    Alert, cooperative, in no acute distress   Head:    Normocephalic, without obvious abnormality, atraumatic    Lungs:     Normal effort, symmetric chest rise, no crepitus, clear to      auscultation bilaterally             Heart:    Regular rhythm and normal rate, normal S1 and S2   Abdomen:     Normal bowel sounds, no masses, no organomegaly, soft        non-tender, non-distended, no guarding, no rebound                tenderness   Extremities:   No clubbing, cyanosis or edema.  No deformities. Left knee ACE wrap CDI. Nerve block present   Pulses:   Pulses palpable and equal bilaterally   Skin:   No bleeding, bruising or rash   Neurologic:   Moves all extremities with no obvious focal motor deficit.  Cranial nerves 2 - 12 grossly intact. Flexion and dorsiflexion intact bilateral feet.     Results Review:     I reviewed the patient's new clinical results.     Results from last 7 days  Lab Units 17  0517  0925   WBC 10*3/mm3 16.24* 7.64   HEMOGLOBIN g/dL 10.8* 12.9*   HEMATOCRIT % 33.8* 38.7*   PLATELETS 10*3/mm3 170 181       Results from last 7 days  Lab Units 17  0512 "   SODIUM mmol/L 136   POTASSIUM mmol/L 4.2   CHLORIDE mmol/L 107   CO2 mmol/L 24.0   BUN mg/dL 16   CREATININE mg/dL 0.90   CALCIUM mg/dL 8.4*   GLUCOSE mg/dL 134*     Results for ALIYA MARQUEZ (MRN [id number]) as of 11/8/2017 15:41   Ref. Range 11/7/2017 17:08 11/7/2017 21:11 11/8/2017 06:38 11/8/2017 11:09   Glucose Latest Ref Range: 70 - 130 mg/dL 120 131 (H) 103 104     I reviewed the patient's new imaging including images and reports.    All medications reviewed.     atorvastatin 10 mg Oral Nightly   cetirizine 10 mg Oral Daily   docusate sodium 100 mg Oral BID   enoxaparin 40 mg Subcutaneous Q24H   insulin lispro 0-7 Units Subcutaneous 4x Daily With Meals & Nightly   mesalamine 2,400 mg Oral Daily   valsartan 320 mg Oral Q24H       Assessment/Plan   Principal Problem:    S/P total knee arthroplasty, left  Active Problems:    Atrial fibrillation    Hypertension    Arthritis of left knee    HLD (hyperlipidemia)    Prediabetes    Acute blood loss anemia, mild, asymptomatic    Leukocytosis, likely reactive    Hx of ' blood clot'    Plan  1. PT/OT- WBAT LLE  2. Pain control-prns, AC nerve block   3. IS-encouraged  4. DVT proph- mechs/Lovenox  5. Bowel regimen  6. Monitor post-op labs  7. DC planning for home, likely tomorrow    HTN, HLD, afib  - Continue home diovan and statin  - Hold lasix for now  - Monitor BP q4 hrs  - Holding parameters for BP meds  - PRN hydralazine for SBP >170     PreDM  - hgb A1c on 10/24/17 6.0  - Accuchecks ACHS with low dose SSI  - Seen by DM educator 11/8/17    Seen and examined by me. Agree with above. Discussed with patient and with physical therapist.    Anibal Dominguez MD  11/08/17  4:33 PM

## 2017-11-08 NOTE — PROGRESS NOTES
Derek    Acute pain service Inpatient Progress Note    Patient Name: Jeremie Wynn  :  1945  MRN:  [id number]        Acute Pain  Service Inpatient Progress Note:    Analgesia:Good  Pain Score:4/10  LOC: alert and awake  Resp Status: room air  Cardiac: VS stable  Side Effects:None  Catheter Site:clean  Cath type: peripheral nerve cath(MOOG pump)  Infusion rate: 12ml/hr  Catheter Plan:Catheter to remain Insitu and Continue catheter infusion rate unchanged  Comments: Patient doing well

## 2017-11-08 NOTE — PROGRESS NOTES
"Jeremie Wynn  [id number]  1945    /71  Pulse 73  Temp 99.3 °F (37.4 °C) (Oral)   Resp 18  Ht 70\" (177.8 cm)  Wt 247 lb (112 kg)  SpO2 93%  BMI 35.44 kg/m2    Lab Results (last 24 hours)     Procedure Component Value Units Date/Time    POC Glucose Fingerstick [[id number]]  (Normal) Collected:  11/07/17 1708    Specimen:  Blood Updated:  11/07/17 1709     Glucose 120 mg/dL     Narrative:       Meter: [id number] : [id number] Lila Browning    POC Glucose Fingerstick [[id number]]  (Abnormal) Collected:  11/07/17 2111    Specimen:  Blood Updated:  11/07/17 2115     Glucose 131 (H) mg/dL     Narrative:       Meter: [id number] : [id number] Chuck Jalen    POC Glucose Fingerstick [[id number]]  (Normal) Collected:  11/08/17 0638    Specimen:  Blood Updated:  11/08/17 0639     Glucose 103 mg/dL     Narrative:       Meter: [id number] : [id number] Tavon Hart    POC Glucose Fingerstick [[id number]]  (Normal) Collected:  11/08/17 1109    Specimen:  Blood Updated:  11/08/17 1110     Glucose 104 mg/dL     Narrative:       Meter: [id number] : [id number] Tavon Hart          Patient Care Team:  Aaron Trejo MD as PCP - General  Aaron Trejo MD as PCP - Family Medicine    SUBJECTIVE  Adequate pain control    PHYSICAL EXAM  Dressing clean and dry  Neurovasc unchanged to feet     Principal Problem:    S/P total knee arthroplasty, left  Active Problems:    Atrial fibrillation    Hypertension    Arthritis of left knee    HLD (hyperlipidemia)    Prediabetes    Acute blood loss anemia, mild, asymptomatic    Leukocytosis, likely reactive      PLAN / DISPOSITION:  Home today if cleared by PT    Han Jaime MD  11/08/17  12:17 PM  "

## 2017-11-08 NOTE — PLAN OF CARE
Problem: Patient Care Overview (Adult)  Goal: Plan of Care Review  Outcome: Ongoing (interventions implemented as appropriate)    11/08/17 1200   Coping/Psychosocial Response Interventions   Plan Of Care Reviewed With patient   Patient Care Overview   Progress progress toward functional goals as expected   Outcome Evaluation   Outcome Summary/Follow up Plan Pt ambulated 100 feet with CGA and RW, limited by increased fatigue and pain during session. Stair training initiated x5 steps. L knee ROM 9-68 degrees. Pt requesting PM PT session, anticipating d/c home with assist and outpatient PT in the home today.          Problem: Inpatient Physical Therapy  Goal: Bed Mobility Goal LTG- PT  Outcome: Ongoing (interventions implemented as appropriate)    11/07/17 1432 11/08/17 1200   Bed Mobility PT LTG   Bed Mobility PT LTG, Date Established 11/07/17 --    Bed Mobility PT LTG, Time to Achieve 3 days --    Bed Mobility PT LTG, Activity Type supine to sit/sit to supine --    Bed Mobility PT LTG, [place] Level conditional independence --    Bed Mobility PT LTG, Date Goal Reviewed --  11/08/17   Bed Mobility PT LTG, Outcome --  goal ongoing       Goal: Transfer Training Goal 1 LTG- PT  Outcome: Ongoing (interventions implemented as appropriate)    11/07/17 1432 11/08/17 1200   Transfer Training PT LTG   Transfer Training PT LTG, Date Established 11/07/17 --    Transfer Training PT LTG, Time to Achieve 3 days --    Transfer Training PT LTG, Activity Type sit to stand/stand to sit --    Transfer Training PT LTG, [place] Level conditional independence --    Transfer Training PT LTG, Assist Device walker, rolling --    Transfer Training PT LTG, Date Goal Reviewed --  11/08/17   Transfer Training PT LTG, Outcome --  goal ongoing       Goal: Gait Training Goal LTG- PT  Outcome: Ongoing (interventions implemented as appropriate)    11/07/17 1432 11/08/17 1200   Gait Training PT LTG   Gait Training Goal PT LTG, Date  Established 11/07/17 --    Gait Training Goal PT LTG, Time to Achieve 3 days --    Gait Training Goal PT LTG, [place] Level conditional independence --    Gait Training Goal PT LTG, Assist Device walker, rolling --    Gait Training Goal PT LTG, Distance to Achieve 500 feet --    Gait Training Goal PT LTG, Date Goal Reviewed --  11/08/17   Gait Training Goal PT LTG, Outcome --  goal ongoing       Goal: Stair Training Goal LTG- PT  Outcome: Ongoing (interventions implemented as appropriate)    11/07/17 1432 11/08/17 1200   Stair Training PT LTG   Stair Training Goal PT LTG, Date Established 11/07/17 --    Stair Training Goal PT LTG, Time to Achieve 3 days --    Stair Training Goal PT LTG, Number of Steps 5 --    Stair Training Goal PT LTG, [place] Level contact guard assist --    Stair Training Goal PT LTG, Assist Device 2 handrails --    Stair Training Goal PT LTG, Date Goal Reviewed --  11/08/17   Stair Training Goal PT LTG, Outcome --  goal ongoing       Goal: Range of Motion Goal LTG- PT  Outcome: Ongoing (interventions implemented as appropriate)    11/07/17 1432 11/08/17 1200   Range of Motion PT LTG   Range of Motion Goal PT LTG, Date Established 11/07/17 --    Range of Motion Goal PT LTG, Time to Achieve 3 days --    Range fo Motion Goal PT LTG, Joint L knee --    Range of Motion Goal PT LTG, AAROM Measure 0-90 degrees --    Range of Motion Goal PT LTG, Date Goal Reviewed --  11/08/17   Range of Motion Goal PT LTG, Outcome --  goal ongoing

## 2017-11-08 NOTE — THERAPY TREATMENT NOTE
Acute Care - Physical Therapy Treatment Note  [facility]     Patient Name: Jeremie Wynn  : 1945  MRN: [id number]  Today's Date: 2017  Onset of Illness/Injury or Date of Surgery Date: 17  Date of Referral to PT: 17  Referring Physician: MD Addison    Admit Date: 2017    Visit Dx:    ICD-10-CM ICD-9-CM   1. Impaired mobility and ADLs Z74.09 799.89   2. Impaired functional mobility, balance, gait, and endurance Z74.09 V49.89   3. Arthritis of left knee M17.12 716.96   4. S/P total knee arthroplasty, left Z96.652 V43.65   5. Generalized osteoarthritis M15.9 715.00     Patient Active Problem List   Diagnosis   • Diverticulitis   • Anemia   • Atrial fibrillation   • Bradycardia   • Generalized osteoarthritis   • Hyperlipemia   • Hypertension   • PVC's (premature ventricular contractions)   • Arthralgia of hip   • Dyspnea on exertion   • Obesity   • Arthritis of left knee   • S/P total knee arthroplasty, left   • HLD (hyperlipidemia)   • Prediabetes   • Acute blood loss anemia, mild, asymptomatic   • Leukocytosis, likely reactive               Adult Rehabilitation Note       17 1354 17 1030 17 1432    Rehab Assessment/Intervention    Discipline physical therapist  -LM physical therapist  -LM physical therapist  -LR    Document Type therapy note (daily note)  -LM therapy note (daily note)  -LM therapy note (daily note)  -LR    Subjective Information agree to therapy;complains of;pain  -LM agree to therapy;complains of;pain  -LM agree to therapy;complains of;pain  -LR    Patient Effort, Rehab Treatment good  -LM good  -LM good  -LR    Symptoms Noted During/After Treatment fatigue;increased pain;shortness of breath  -LM fatigue;increased pain;shortness of breath  -LM increased pain;fatigue;shortness of breath  -LR    Symptoms Noted Comment  RN aware  -LM Notified RN.   -LR    Precautions/Limitations other (see comments)   L adductor nerve canal cath  -LM other (see comments)    L adductor nerve canal cath  -LM fall precautions;other (see comments)   L adductor canal nerve catheter  -LR    Recorded by [LM] Christina Larsen, PT [LM] Christina Larsen, PT [LR] Estefania Hong, PT    Pain Assessment    Pain Assessment 0-10  -LM 0-10  -LM 0-10  -LR    Pain Score 7  -LM 7  -LM 7  -LR    Post Pain Score 10  -LM 10   reported 12/10  -LM 8  -LR    Pain Type Acute pain  -LM Acute pain  -LM Acute pain  -LR    Pain Location Knee  -LM Knee  -LM Knee  -LR    Pain Orientation Left;Anterior;Posterior  -LM Left;Anterior;Posterior  -LM Left;Anterior;Posterior  -LR    Pain Intervention(s) Repositioned;Ambulation/increased activity  -LM Repositioned;Ambulation/increased activity  -LM Repositioned;Ambulation/increased activity  -LR    Recorded by [LM] Christina Larsen, PT [LM] Christina Larsen, PT [LR] Estefania Hong, PT    Cognitive Assessment/Intervention    Current Cognitive/Communication Assessment functional  -LM functional  -LM functional  -LR    Orientation Status oriented x 4  -LM oriented x 4  -LM oriented x 4;required verbal cueing (specifiy in comments)  -LR    Follows Commands/Answers Questions 100% of the time;able to follow single-step instructions;needs cueing;needs repetition  -% of the time;able to follow single-step instructions;needs cueing;needs repetition  -% of the time;able to follow single-step instructions;needs cueing;needs repetition  -LR    Personal Safety WNL/WFL  -LM WNL/WFL  -LM WNL/WFL  -LR    Personal Safety Interventions gait belt;nonskid shoes/slippers when out of bed;supervised activity  -LM gait belt;nonskid shoes/slippers when out of bed;supervised activity  -LM     Recorded by [LM] Christina Larsen, PT [LM] Christina Larsen, PT [LR] Estefania Hong, PT    General LE Assessment    ROM Detail  L knee ROM 9-68 degrees; pt lacking 9 degrees from full extension; AAROM flexion measured in sitting  -LM 11-67 degrees; lacking 11 degrees of extension  AROM, 67 degrees flexion AAROM in sitting.   -LR    Recorded by  [LM] Christina Larsen, PT [LR] Estefania Hong, PT    Mobility Assessment/Training    Extremity Weight-Bearing Status left lower extremity  -LM left lower extremity  -LM left lower extremity  -LR    Left Lower Extremity Weight-Bearing weight-bearing as tolerated  -LM weight-bearing as tolerated  -LM weight-bearing as tolerated  -LR    Recorded by [LM] Christina Larsen, PT [LM] Christina Larsen, PT [LR] Estefania Hong, PT    Bed Mobility, Assessment/Treatment    Bed Mobility, Assistive Device  bed rails;head of bed elevated;leg   -LM head of bed elevated;bed rails  -LR    Bed Mob, Supine to Sit, [place] not tested   Pt received standing in bathroom  -LM contact guard assist;verbal cues required  -LM verbal cues required;minimum assist (75% patient effort)  -LR    Bed Mob, Sit to Supine, [place] not tested   Pt left UIC  -LM not tested   Pt left UIC  -LM verbal cues required;minimum assist (75% patient effort)  -LR    Bed Mobility, Safety Issues  decreased use of legs for bridging/pushing  -LM decreased use of legs for bridging/pushing  -LR    Bed Mobility, Impairments  ROM decreased;strength decreased;pain  -LM ROM decreased;strength decreased;pain  -LR    Bed Mobility, Comment  Pt utilized leg  at LLE to move off EOB. No physical assist required. Cues for sequencing.  -LM Verbal cues for correct sequencing of t/f in and out of bed. Min assist required with L LE.   -LR    Recorded by [LM] Christina Larsen PT [LM] Christina Larsen PT [LR] Estefania Hong, PT    Transfer Assessment/Treatment    Transfers, Sit-Stand [place] contact guard assist;verbal cues required  -LM contact guard assist;verbal cues required  -LM verbal cues required;contact guard assist  -LR    Transfers, Stand-Sit [place] contact guard assist;verbal cues required  -LM contact guard assist;verbal cues required  -LM verbal cues  required;contact guard assist  -LR    Transfers, Sit-Stand-Sit, Assist Device rolling walker  -LM rolling walker;elevated surface  -LM rolling walker  -LR    Toilet Transfer, [place]  supervision required;verbal cues required  -LM     Toilet Transfer, Assistive Device  rolling walker  -LM     Transfer, Safety Issues sequencing ability decreased;step length decreased;weight-shifting ability decreased  -LM sequencing ability decreased;step length decreased;weight-shifting ability decreased  -LM sequencing ability decreased;step length decreased;weight-shifting ability decreased  -LR    Transfer, Impairments ROM decreased;strength decreased;pain  -LM ROM decreased;strength decreased;pain  -LM ROM decreased;strength decreased;pain  -LR    Transfer, Comment Verbal cues for correct hand placement, cues to step out LLE prior to t/f.  -LM Verbal cues for correct hand placement and step out with LLE prior to t/f. Assisted pt to bathroom; pt independent with standing balance.  -LM Verbal cues for correct hand placement with t/f and to step L LE out before t/f for comfort. Assisted to and from bathroom to stand and void. Required increased time to stand from EOB.   -LR    Recorded by [LM] Christina Larsen PT [LM] Christina Larsen, PT [LR] Estefania Hong, PT    Gait Assessment/Treatment    Gait, [place] Level contact guard assist;verbal cues required  -LM contact guard assist;verbal cues required  -LM verbal cues required;contact guard assist  -LR    Gait, Assistive Device rolling walker  -LM rolling walker  -LM rolling walker  -LR    Gait, Distance (Feet) 120  -  -  -LR    Gait, Gait Pattern Analysis swing-through gait  -LM swing-through gait  -LM swing-through gait  -LR    Gait, Gait Deviations left:;antalgic;[place] decreased;decreased heel strike;forward flexed posture;step length decreased;weight-shifting ability decreased  -LM left:;antalgic;[place] decreased;decreased heel strike;forward  flexed posture;step length decreased;weight-shifting ability decreased  -LM left:;antalgic;forward flexed posture;step length decreased;toe-to-floor clearance decreased;weight-shifting ability decreased  -LR    Gait, Safety Issues sequencing ability decreased;step length decreased;weight-shifting ability decreased  -LM sequencing ability decreased;step length decreased;weight-shifting ability decreased  -LM sequencing ability decreased;step length decreased;weight-shifting ability decreased  -LR    Gait, Impairments ROM decreased;strength decreased;pain  -LM ROM decreased;strength decreased;pain  -LM ROM decreased;strength decreased;pain  -LR    Gait, Comment Pt ambulated with step-through pattern at slow pace. Verbal cues for continuous rolling of RW, upright posture, increasing LLE weight-bearing. Pt required x3 standing rest breaks due to SOA. Gait distance and mechanics limited due to fatigue.  -LM Pt ambulated with step-through pattern at slow pace. Verbal cues for upright posture, and increase LLE weight bearing. Pt required x1 standing break in which pt leaned down onto RW with elbows despite cues for safety. Pt fatigued quickly and bedside chair brought behind patient.   -LM Patient ambulated with step through gait pattern at slow pace and forward flexed posture. Cues for upright posture, shoulders back, decreased UE weight bearing, and increased L LE weight bearing. Patient with increased antalgia this PM compared to this AM. Required a few brief standing rest breaks. Patient continues to maintain L knee slightly flexed throughout gait cycle. Cues for increased L knee extension during stance phase. Gait limited by pain and fatigue.   -LR    Recorded by [LM] Christina Larsen PT [LM] Christina Larsen PT [LR] Estefania Hong PT    Stairs Assessment/Treatment    Number of Stairs 5  -LM 5  -LM     Stairs, Handrail Location both sides  -LM both sides  -LM     Stairs, [place] Level contact guard  assist;verbal cues required  -LM contact guard assist;verbal cues required  -LM     Stairs, Technique Used step to step (descending);step to step (ascending)  -LM step to step (ascending);step to step (descending)  -LM     Stairs, Safety Issues sequencing ability decreased;weight-shifting ability decreased  -LM sequencing ability decreased;weight-shifting ability decreased  -LM     Stairs, Impairments ROM decreased;strength decreased;pain  -LM ROM decreased;strength decreased;pain  -LM     Stairs, Comment Cues for stair sequencing. Cues to not rest elbow on handrail when ascended stairs.   -LM Verbal cues for stair sequencing; pt ascended leading with RLE, descended leading with LLE.  -LM     Recorded by [LM] Christina Larsen, PT [LM] Christina Larsen PT     Therapy Exercises    Exercise Protocols total knee  -LM total knee  -LM total knee  -LR    Total Knee Exercises left:;15 reps;completed protocol;with assist;ankle pumps/circles;quad set;glut set;heel slide stretch;SLR;SAQ;LAQ   cues for technique; Jorge SLR, LAQ, SAQ  -LM left:;15 reps;completed protocol;with assist;ankle pumps/circles;quad set;glut set;heel slide stretch;SLR;SAQ;LAQ   Jorge SLR, LAQ, SAQ  -LM left:;15 reps;completed protocol;with assist;ankle pumps/circles;quad set;glut set;heel slide stretch;SLR;SAQ;hip abduction/adduction;LAQ   cues for technique;min assist SLR,SAQ,LAQ, hip abd  -LR    Recorded by [LM] Christina Larsen, PT [LM] Christina Larsen, PT [LR] Estefania Hong, PT    Positioning and Restraints    Pre-Treatment Position bathroom  -LM in bed  -LM in bed  -LR    Post Treatment Position chair  -LM chair  -LM bed  -LR    In Bed   notified nsg;supine;call light within reach;encouraged to call for assist;exit alarm on;with family/caregiver;side rails up x2  -LR    In Chair notified nsg;reclined;sitting;call light within reach;encouraged to call for assist;with family/caregiver;legs elevated  -LM notified nsg;reclined;sitting;call light  within reach;encouraged to call for assist;legs elevated  -LM     Recorded by [LM] Christina Larsen, PT [LM] Christina Larsen, PT [LR] Estefania Hong, PT      User Key  (r) = Recorded By, (t) = Taken By, (c) = Cosigned By    Initials Name Effective Dates    LR Estefania Hong, PT 06/19/15 -     LM Christina Larsen, PT 06/09/17 -                 IP PT Goals       11/08/17 1501 11/08/17 1200 11/07/17 1432    Bed Mobility PT LTG    Bed Mobility PT LTG, Date Established   11/07/17  -LR    Bed Mobility PT LTG, Time to Achieve   3 days  -LR    Bed Mobility PT LTG, Activity Type   supine to sit/sit to supine  -LR    Bed Mobility PT LTG, [place] Level   conditional independence  -LR    Bed Mobility PT LTG, Date Goal Reviewed 11/08/17  -LM 11/08/17  -LM 11/07/17  -LR    Bed Mobility PT LTG, Outcome goal ongoing  -LM goal ongoing  -LM goal ongoing  -LR    Transfer Training PT LTG    Transfer Training PT LTG, Date Established   11/07/17  -LR    Transfer Training PT LTG, Time to Achieve   3 days  -LR    Transfer Training PT LTG, Activity Type   sit to stand/stand to sit  -LR    Transfer Training PT LTG, [place] Level   conditional independence  -LR    Transfer Training PT LTG, Assist Device   walker, rolling  -LR    Transfer Training PT  LTG, Date Goal Reviewed 11/08/17  -LM 11/08/17  -LM 11/07/17  -LR    Transfer Training PT LTG, Outcome goal ongoing  -LM goal ongoing  -LM goal ongoing  -LR    Gait Training PT LTG    Gait Training Goal PT LTG, Date Established   11/07/17  -LR    Gait Training Goal PT LTG, Time to Achieve   3 days  -LR    Gait Training Goal PT LTG, [place] Level   conditional independence  -LR    Gait Training Goal PT LTG, Assist Device   walker, rolling  -LR    Gait Training Goal PT LTG, Distance to Achieve   500 feet  -LR    Gait Training Goal PT LTG, Date Goal Reviewed 11/08/17  -LM 11/08/17  -LM 11/07/17  -LR    Gait Training Goal PT LTG, Outcome goal ongoing  -LM goal ongoing  -LM  goal ongoing  -LR    Stair Training PT LTG    Stair Training Goal PT LTG, Date Established   11/07/17  -LR    Stair Training Goal PT LTG, Time to Achieve   3 days  -LR    Stair Training Goal PT LTG, Number of Steps   5  -LR    Stair Training Goal PT LTG, [place] Level   contact guard assist  -LR    Stair Training Goal PT LTG, Assist Device   2 handrails  -LR    Stair Training Goal PT LTG, Date Goal Reviewed 11/08/17  -LM 11/08/17  -LM 11/07/17  -LR    Stair Training Goal PT LTG, Outcome goal met  -LM goal ongoing  -LM goal ongoing  -LR    Range of Motion PT LTG    Range of Motion Goal PT LTG, Date Established   11/07/17  -LR    Range of Motion Goal PT LTG, Time to Achieve   3 days  -LR    Range fo Motion Goal PT LTG, Joint   L knee  -LR    Range of Motion Goal PT LTG, AAROM Measure   0-90 degrees  -LR    Range of Motion Goal PT LTG, Date Goal Reviewed 11/08/17  -LM 11/08/17  -LM 11/07/17  -LR    Range of Motion Goal PT LTG, Outcome goal ongoing  -LM goal ongoing  -LM goal ongoing  -LR      11/07/17 0932          Bed Mobility PT LTG    Bed Mobility PT LTG, Date Established 11/07/17  -LR      Bed Mobility PT LTG, Time to Achieve 3 days  -LR      Bed Mobility PT LTG, Activity Type supine to sit/sit to supine  -LR      Bed Mobility PT LTG, [place] Level conditional independence  -LR      Bed Mobility PT LTG, Date Goal Reviewed 11/07/17  -LR      Bed Mobility PT LTG, Outcome goal ongoing  -LR      Transfer Training PT LTG    Transfer Training PT LTG, Date Established 11/07/17  -LR      Transfer Training PT LTG, Time to Achieve 3 days  -LR      Transfer Training PT LTG, Activity Type sit to stand/stand to sit  -LR      Transfer Training PT LTG, [place] Level conditional independence  -LR      Transfer Training PT LTG, Assist Device walker, rolling  -LR      Transfer Training PT  LTG, Date Goal Reviewed 11/07/17  -LR      Transfer Training PT LTG, Outcome goal ongoing  -LR      Gait Training PT LTG    Gait  Training Goal PT LTG, Date Established 11/07/17  -LR      Gait Training Goal PT LTG, Time to Achieve 3 days  -LR      Gait Training Goal PT LTG, [place] Level conditional independence  -LR      Gait Training Goal PT LTG, Assist Device walker, rolling  -LR      Gait Training Goal PT LTG, Distance to Achieve 500 feet  -LR      Gait Training Goal PT LTG, Date Goal Reviewed 11/07/17  -LR      Gait Training Goal PT LTG, Outcome goal ongoing  -LR      Stair Training PT LTG    Stair Training Goal PT LTG, Date Established 11/07/17  -LR      Stair Training Goal PT LTG, Time to Achieve 3 days  -LR      Stair Training Goal PT LTG, Number of Steps 5  -LR      Stair Training Goal PT LTG, [place] Level contact guard assist  -LR      Stair Training Goal PT LTG, Assist Device 2 handrails  -LR      Stair Training Goal PT LTG, Date Goal Reviewed 11/07/17  -LR      Stair Training Goal PT LTG, Outcome goal ongoing  -LR      Range of Motion PT LTG    Range of Motion Goal PT LTG, Date Established 11/07/17  -LR      Range of Motion Goal PT LTG, Time to Achieve 3 days  -LR      Range fo Motion Goal PT LTG, Joint L knee  -LR      Range of Motion Goal PT LTG, AAROM Measure 0-90 degrees  -LR      Range of Motion Goal PT LTG, Date Goal Reviewed 11/07/17  -LR      Range of Motion Goal PT LTG, Outcome goal ongoing  -LR        User Key  (r) = Recorded By, (t) = Taken By, (c) = Cosigned By    Initials Name Provider Type    LR Estefania Hong, PT Physical Therapist    GEORGIE Larsen, PT Physical Therapist          Physical Therapy Education     Title: PT OT SLP Therapies (Active)     Topic: Physical Therapy (Active)     Point: Mobility training (Active)    Learning Progress Summary    Learner Readiness Method Response Comment Documented by Status   Patient Acceptance E,D NR Reviewed HEP, benefits of activity, correct gait mechanics, stair sequencing.  11/08/17 1500 Active    Acceptance E,D NR Reviewed HEP, correct gait  mechanics, stair sequencing.  11/08/17 1200 Active    Acceptance E,D NR,VU Educated on HEP, correct gait mechanics. LR 11/07/17 1815 Done    Acceptance E,D VU,NR Educated on weight bearing status, precautions, HEP, and correct gait mechanics. LR 11/07/17 1020 Done   Family Acceptance E,D NR,VU Educated on HEP, correct gait mechanics. LR 11/07/17 1815 Done    Acceptance E,D VU,NR Educated on weight bearing status, precautions, HEP, and correct gait mechanics. LR 11/07/17 1020 Done   Significant Other Acceptance E,D NR Reviewed HEP, benefits of activity, correct gait mechanics, stair sequencing.  11/08/17 1500 Active    Acceptance E,D NR,VU Educated on HEP, correct gait mechanics. LR 11/07/17 1815 Done               Point: Home exercise program (Active)    Learning Progress Summary    Learner Readiness Method Response Comment Documented by Status   Patient Acceptance E,D NR Reviewed HEP, benefits of activity, correct gait mechanics, stair sequencing.  11/08/17 1500 Active    Acceptance E,D NR Reviewed HEP, correct gait mechanics, stair sequencing.  11/08/17 1200 Active    Acceptance E,D NR,VU Educated on HEP, correct gait mechanics. LR 11/07/17 1815 Done    Acceptance E,D VU,NR Educated on weight bearing status, precautions, HEP, and correct gait mechanics. LR 11/07/17 1020 Done   Family Acceptance E,D NR,VU Educated on HEP, correct gait mechanics. LR 11/07/17 1815 Done    Acceptance E,D VU,NR Educated on weight bearing status, precautions, HEP, and correct gait mechanics. LR 11/07/17 1020 Done   Significant Other Acceptance E,D NR Reviewed HEP, benefits of activity, correct gait mechanics, stair sequencing.  11/08/17 1500 Active    Acceptance E,D NR,VU Educated on HEP, correct gait mechanics. LR 11/07/17 1815 Done               Point: Body mechanics (Active)    Learning Progress Summary    Learner Readiness Method Response Comment Documented by Status   Patient Acceptance E,D NR Reviewed HEP, benefits of  activity, correct gait mechanics, stair sequencing.  11/08/17 1500 Active    Acceptance E,D NR Reviewed HEP, correct gait mechanics, stair sequencing.  11/08/17 1200 Active    Acceptance E,D NR,VU Educated on HEP, correct gait mechanics. LR 11/07/17 1815 Done    Acceptance E,D VU,NR Educated on weight bearing status, precautions, HEP, and correct gait mechanics. LR 11/07/17 1020 Done   Family Acceptance E,D NR,VU Educated on HEP, correct gait mechanics. LR 11/07/17 1815 Done    Acceptance E,D VU,NR Educated on weight bearing status, precautions, HEP, and correct gait mechanics. LR 11/07/17 1020 Done   Significant Other Acceptance E,D NR Reviewed HEP, benefits of activity, correct gait mechanics, stair sequencing.  11/08/17 1500 Active    Acceptance E,D NR,VU Educated on HEP, correct gait mechanics. LR 11/07/17 1815 Done               Point: Precautions (Active)    Learning Progress Summary    Learner Readiness Method Response Comment Documented by Status   Patient Acceptance E,D NR Reviewed HEP, benefits of activity, correct gait mechanics, stair sequencing.  11/08/17 1500 Active    Acceptance E,D NR Reviewed HEP, correct gait mechanics, stair sequencing.  11/08/17 1200 Active    Acceptance E,D NR,VU Educated on HEP, correct gait mechanics. LR 11/07/17 1815 Done    Acceptance E,D VU,NR Educated on weight bearing status, precautions, HEP, and correct gait mechanics. LR 11/07/17 1020 Done   Family Acceptance E,D NR,VU Educated on HEP, correct gait mechanics. LR 11/07/17 1815 Done    Acceptance E,D VU,NR Educated on weight bearing status, precautions, HEP, and correct gait mechanics. LR 11/07/17 1020 Done   Significant Other Acceptance E,D NR Reviewed HEP, benefits of activity, correct gait mechanics, stair sequencing.  11/08/17 1500 Active    Acceptance E,D NR,VU Educated on HEP, correct gait mechanics. LR 11/07/17 1815 Done                      User Key     Initials Effective Dates Name Provider Type  Discipline    LR 06/19/15 -  Estefania Hong, PT Physical Therapist PT    LM 06/09/17 -  Christina Larsen PT Physical Therapist PT                    PT Recommendation and Plan  Anticipated Equipment Needs At Discharge:  (none)  Anticipated Discharge Disposition: home with assist, home with home health  Planned Therapy Interventions: balance training, bed mobility training, gait training, home exercise program, patient/family education, ROM (Range of Motion), stair training, strengthening, transfer training  PT Frequency: 2 times/day  Plan of Care Review  Plan Of Care Reviewed With: patient, spouse  Progress: progress toward functional goals as expected  Outcome Summary/Follow up Plan: Pt increased ambulation to 120 feet with CGA and RW, limited by fatigue and pain. Stair training continued x5 steps. Pt anticipating d/c home with assist and outpatient in the home tomorrow.           Outcome Measures       11/08/17 1354 11/08/17 1030 11/07/17 1432    How much help from another person do you currently need...    Turning from your back to your side while in flat bed without using bedrails? 3  -LM 3  -LM 3  -LR    Moving from lying on back to sitting on the side of a flat bed without bedrails? 3  -LM 3  -LM 3  -LR    Moving to and from a bed to a chair (including a wheelchair)? 3  -LM 3  -LM 3  -LR    Standing up from a chair using your arms (e.g., wheelchair, bedside chair)? 3  -LM 3  -LM 3  -LR    Climbing 3-5 steps with a railing? 3  -LM 3  -LM 2  -LR    To walk in hospital room? 3  -LM 3  -LM 3  -LR    AM-PAC 6 Clicks Score 18  -LM 18  -LM 17  -LR    Functional Assessment    Outcome Measure Options AM-PAC 6 Clicks Basic Mobility (PT)  -LM AM-PAC 6 Clicks Basic Mobility (PT)  -LM AM-PAC 6 Clicks Basic Mobility (PT)  -LR      11/07/17 0939 11/07/17 0932       How much help from another person do you currently need...    Turning from your back to your side while in flat bed without using bedrails?  3  -LR      Moving from lying on back to sitting on the side of a flat bed without bedrails?  3  -LR     Moving to and from a bed to a chair (including a wheelchair)?  3  -LR     Standing up from a chair using your arms (e.g., wheelchair, bedside chair)?  3  -LR     Climbing 3-5 steps with a railing?  2  -LR     To walk in hospital room?  3  -LR     AM-PAC 6 Clicks Score  17  -LR     How much help from another is currently needed...    Putting on and taking off regular lower body clothing? 3  -MC      Bathing (including washing, rinsing, and drying) 3  -MC      Toileting (which includes using toilet bed pan or urinal) 3  -MC      Putting on and taking off regular upper body clothing 3  -MC      Taking care of personal grooming (such as brushing teeth) 4  -MC      Eating meals 4  -MC      Score 20  -MC      Functional Assessment    Outcome Measure Options AM-PAC 6 Clicks Daily Activity (OT)  -MC AM-PAC 6 Clicks Basic Mobility (PT)  -LR       User Key  (r) = Recorded By, (t) = Taken By, (c) = Cosigned By    Initials Name Provider Type    LR Estefania Hong, PT Physical Therapist    MC Rosie Park, OT Occupational Therapist    GEORGIE Larsen PT Physical Therapist           Time Calculation:         PT Charges       11/08/17 1505 11/08/17 1203       Time Calculation    Start Time 1354  -LM 1030  -LM     PT Received On 11/08/17  -LM 11/08/17  -LM     PT Goal Re-Cert Due Date 11/17/17  -LM 11/17/17  -LM     Time Calculation- PT    Total Timed Code Minutes- PT 38 minute(s)  -LM 49 minute(s)  -LM       User Key  (r) = Recorded By, (t) = Taken By, (c) = Cosigned By    Initials Name Provider Type    GEORGIE Larsen PT Physical Therapist          Therapy Charges for Today     Code Description Service Date Service Provider Modifiers Qty    46680603460 HC GAIT TRAINING EA 15 MIN 11/8/2017 Christina Larsen PT GP 1    47569945931 HC PT THER PROC EA 15 MIN 11/8/2017 Christina Larsen PT GP 2    53372954923 HC PT THER SUPP EA 15  MIN 11/8/2017 Christina Larsen, PT GP 2    79262138247 HC GAIT TRAINING EA 15 MIN 11/8/2017 Christina Larsen, PT GP 1    23588470898 HC PT THER PROC EA 15 MIN 11/8/2017 Christina Larsen, PT GP 2    32597231868 HC PT THER SUPP EA 15 MIN 11/8/2017 Christina Larsen, PT GP 2          PT G-Codes  Outcome Measure Options: AM-PAC 6 Clicks Basic Mobility (PT)    Christina Larsen PT  11/8/2017

## 2017-11-08 NOTE — THERAPY TREATMENT NOTE
Acute Care - Physical Therapy Treatment Note  [facility]     Patient Name: Jeremie Wynn  : 1945  MRN: [id number]  Today's Date: 2017  Onset of Illness/Injury or Date of Surgery Date: 17  Date of Referral to PT: 17  Referring Physician: MD Addison    Admit Date: 2017    Visit Dx:    ICD-10-CM ICD-9-CM   1. Impaired mobility and ADLs Z74.09 799.89   2. Impaired functional mobility, balance, gait, and endurance Z74.09 V49.89   3. Arthritis of left knee M17.12 716.96   4. S/P total knee arthroplasty, left Z96.652 V43.65   5. Generalized osteoarthritis M15.9 715.00     Patient Active Problem List   Diagnosis   • Diverticulitis   • Anemia   • Atrial fibrillation   • Bradycardia   • Generalized osteoarthritis   • Hyperlipemia   • Hypertension   • PVC's (premature ventricular contractions)   • Arthralgia of hip   • Dyspnea on exertion   • Obesity   • Arthritis of left knee   • S/P total knee arthroplasty, left   • HLD (hyperlipidemia)   • Prediabetes   • Acute blood loss anemia, mild, asymptomatic   • Leukocytosis, likely reactive               Adult Rehabilitation Note       17 1030 17 1432       Rehab Assessment/Intervention    Discipline physical therapist  -LM physical therapist  -LR     Document Type therapy note (daily note)  -LM therapy note (daily note)  -LR     Subjective Information agree to therapy;complains of;pain  -LM agree to therapy;complains of;pain  -LR     Patient Effort, Rehab Treatment good  -LM good  -LR     Symptoms Noted During/After Treatment fatigue;increased pain;shortness of breath  -LM increased pain;fatigue;shortness of breath  -LR     Symptoms Noted Comment RN aware  -LM Notified RN.   -LR     Precautions/Limitations other (see comments)   L adductor nerve canal cath  -LM fall precautions;other (see comments)   L adductor canal nerve catheter  -LR     Recorded by [LM] Christina Larsen, PT [LR] Estefania Hong, PT     Pain Assessment    Pain  Assessment 0-10  -LM 0-10  -LR     Pain Score 7  -LM 7  -LR     Post Pain Score 10   reported 12/10  -LM 8  -LR     Pain Type Acute pain  -LM Acute pain  -LR     Pain Location Knee  -LM Knee  -LR     Pain Orientation Left;Anterior;Posterior  -LM Left;Anterior;Posterior  -LR     Pain Intervention(s) Repositioned;Ambulation/increased activity  -LM Repositioned;Ambulation/increased activity  -LR     Recorded by [LM] Christina Larsen, PT [LR] Estefania Hong, PT     Cognitive Assessment/Intervention    Current Cognitive/Communication Assessment functional  -LM functional  -LR     Orientation Status oriented x 4  -LM oriented x 4;required verbal cueing (specifiy in comments)  -LR     Follows Commands/Answers Questions 100% of the time;able to follow single-step instructions;needs cueing;needs repetition  -% of the time;able to follow single-step instructions;needs cueing;needs repetition  -LR     Personal Safety WNL/WFL  -LM WNL/WFL  -LR     Personal Safety Interventions gait belt;nonskid shoes/slippers when out of bed;supervised activity  -LM      Recorded by [LM] Christina Larsen, PT [LR] Estefania Hong, PT     General LE Assessment    ROM Detail L knee ROM 9-68 degrees; pt lacking 9 degrees from full extension; AAROM flexion measured in sitting  -LM 11-67 degrees; lacking 11 degrees of extension AROM, 67 degrees flexion AAROM in sitting.   -LR     Recorded by [LM] Christina Larsen, PT [LR] Estefania Hong, PT     Mobility Assessment/Training    Extremity Weight-Bearing Status left lower extremity  -LM left lower extremity  -LR     Left Lower Extremity Weight-Bearing weight-bearing as tolerated  -LM weight-bearing as tolerated  -LR     Recorded by [LM] Christina Larsen, PT [LR] Estefania Hong, PT     Bed Mobility, Assessment/Treatment    Bed Mobility, Assistive Device bed rails;head of bed elevated;leg   -LM head of bed elevated;bed rails  -LR     Bed Mob, Supine to Sit, Reading  contact guard assist;verbal cues required  -LM verbal cues required;minimum assist (75% patient effort)  -LR     Bed Mob, Sit to Supine, [place] not tested   Pt left UIC  -LM verbal cues required;minimum assist (75% patient effort)  -LR     Bed Mobility, Safety Issues decreased use of legs for bridging/pushing  -LM decreased use of legs for bridging/pushing  -LR     Bed Mobility, Impairments ROM decreased;strength decreased;pain  -LM ROM decreased;strength decreased;pain  -LR     Bed Mobility, Comment Pt utilized leg  at LLE to move off EOB. No physical assist required. Cues for sequencing.  -LM Verbal cues for correct sequencing of t/f in and out of bed. Min assist required with L LE.   -LR     Recorded by [LM] Christina Larsen, PT [LR] Estefania Hong, ISMAEL     Transfer Assessment/Treatment    Transfers, Sit-Stand [place] contact guard assist;verbal cues required  -LM verbal cues required;contact guard assist  -LR     Transfers, Stand-Sit [place] contact guard assist;verbal cues required  -LM verbal cues required;contact guard assist  -LR     Transfers, Sit-Stand-Sit, Assist Device rolling walker;elevated surface  -LM rolling walker  -LR     Toilet Transfer, [place] supervision required;verbal cues required  -LM      Toilet Transfer, Assistive Device rolling walker  -LM      Transfer, Safety Issues sequencing ability decreased;step length decreased;weight-shifting ability decreased  -LM sequencing ability decreased;step length decreased;weight-shifting ability decreased  -LR     Transfer, Impairments ROM decreased;strength decreased;pain  -LM ROM decreased;strength decreased;pain  -LR     Transfer, Comment Verbal cues for correct hand placement and step out with LLE prior to t/f. Assisted pt to bathroom; pt independent with standing balance.  -LM Verbal cues for correct hand placement with t/f and to step L LE out before t/f for comfort. Assisted to and from bathroom to stand and void.  Required increased time to stand from EOB.   -LR     Recorded by [LM] Christina Larsen, PT [LR] Estefania Hong, PT     Gait Assessment/Treatment    Gait, [place] Level contact guard assist;verbal cues required  -LM verbal cues required;contact guard assist  -LR     Gait, Assistive Device rolling walker  -LM rolling walker  -LR     Gait, Distance (Feet) 100  -  -LR     Gait, Gait Pattern Analysis swing-through gait  -LM swing-through gait  -LR     Gait, Gait Deviations left:;antalgic;marysol decreased;decreased heel strike;forward flexed posture;step length decreased;weight-shifting ability decreased  -LM left:;antalgic;forward flexed posture;step length decreased;toe-to-floor clearance decreased;weight-shifting ability decreased  -LR     Gait, Safety Issues sequencing ability decreased;step length decreased;weight-shifting ability decreased  -LM sequencing ability decreased;step length decreased;weight-shifting ability decreased  -LR     Gait, Impairments ROM decreased;strength decreased;pain  -LM ROM decreased;strength decreased;pain  -LR     Gait, Comment Pt ambulated with step-through pattern at slow pace. Verbal cues for upright posture, and increase LLE weight bearing. Pt required x1 standing break in which pt leaned down onto RW with elbows despite cues for safety. Pt fatigued quickly and bedside chair brought behind patient.   -LM Patient ambulated with step through gait pattern at slow pace and forward flexed posture. Cues for upright posture, shoulders back, decreased UE weight bearing, and increased L LE weight bearing. Patient with increased antalgia this PM compared to this AM. Required a few brief standing rest breaks. Patient continues to maintain L knee slightly flexed throughout gait cycle. Cues for increased L knee extension during stance phase. Gait limited by pain and fatigue.   -LR     Recorded by [LM] Christina Larsen, PT [LR] Estefania Hong, PT     Stairs  Assessment/Treatment    Number of Stairs 5  -LM      Stairs, Handrail Location both sides  -LM      Stairs, [place] Level contact guard assist;verbal cues required  -LM      Stairs, Technique Used step to step (ascending);step to step (descending)  -LM      Stairs, Safety Issues sequencing ability decreased;weight-shifting ability decreased  -LM      Stairs, Impairments ROM decreased;strength decreased;pain  -LM      Stairs, Comment Verbal cues for stair sequencing; pt ascended leading with RLE, descended leading with LLE.  -LM      Recorded by [LM] Christina Larsen, PT      Therapy Exercises    Exercise Protocols total knee  -LM total knee  -LR     Total Knee Exercises left:;15 reps;completed protocol;with assist;ankle pumps/circles;quad set;glut set;heel slide stretch;SLR;SAQ;LAQ   Jorge SLR, LAQ, SAQ  -LM left:;15 reps;completed protocol;with assist;ankle pumps/circles;quad set;glut set;heel slide stretch;SLR;SAQ;hip abduction/adduction;LAQ   cues for technique;min assist SLR,SAQ,LAQ, hip abd  -LR     Recorded by [LM] Christina Larsen, PT [LR] Estefnaia Hong, PT     Positioning and Restraints    Pre-Treatment Position in bed  -LM in bed  -LR     Post Treatment Position chair  -LM bed  -LR     In Bed  notified nsg;supine;call light within reach;encouraged to call for assist;exit alarm on;with family/caregiver;side rails up x2  -LR     In Chair notified nsg;reclined;sitting;call light within reach;encouraged to call for assist;legs elevated  -LM      Recorded by [LM] Christina Larsen, PT [LR] Estefania Hong, PT       User Key  (r) = Recorded By, (t) = Taken By, (c) = Cosigned By    Initials Name Effective Dates    LR Estefania Hong, PT 06/19/15 -     LM Christina Larsen, PT 06/09/17 -                 IP PT Goals       11/08/17 1200 11/07/17 1432 11/07/17 0932    Bed Mobility PT LTG    Bed Mobility PT LTG, Date Established  11/07/17  -LR 11/07/17  -LR    Bed Mobility PT LTG, Time to Achieve  3  days  -LR 3 days  -LR    Bed Mobility PT LTG, Activity Type  supine to sit/sit to supine  -LR supine to sit/sit to supine  -LR    Bed Mobility PT LTG, [place] Level  conditional independence  -LR conditional independence  -LR    Bed Mobility PT LTG, Date Goal Reviewed 11/08/17  -LM 11/07/17  -LR 11/07/17  -LR    Bed Mobility PT LTG, Outcome goal ongoing  -LM goal ongoing  -LR goal ongoing  -LR    Transfer Training PT LTG    Transfer Training PT LTG, Date Established  11/07/17  -LR 11/07/17  -LR    Transfer Training PT LTG, Time to Achieve  3 days  -LR 3 days  -LR    Transfer Training PT LTG, Activity Type  sit to stand/stand to sit  -LR sit to stand/stand to sit  -LR    Transfer Training PT LTG, [place] Level  conditional independence  -LR conditional independence  -LR    Transfer Training PT LTG, Assist Device  walker, rolling  -LR walker, rolling  -LR    Transfer Training PT  LTG, Date Goal Reviewed 11/08/17  -LM 11/07/17  -LR 11/07/17  -LR    Transfer Training PT LTG, Outcome goal ongoing  -LM goal ongoing  -LR goal ongoing  -LR    Gait Training PT LTG    Gait Training Goal PT LTG, Date Established  11/07/17  -LR 11/07/17  -LR    Gait Training Goal PT LTG, Time to Achieve  3 days  -LR 3 days  -LR    Gait Training Goal PT LTG, [place] Level  conditional independence  -LR conditional independence  -LR    Gait Training Goal PT LTG, Assist Device  walker, rolling  -LR walker, rolling  -LR    Gait Training Goal PT LTG, Distance to Achieve  500 feet  - feet  -LR    Gait Training Goal PT LTG, Date Goal Reviewed 11/08/17  -LM 11/07/17  -LR 11/07/17  -LR    Gait Training Goal PT LTG, Outcome goal ongoing  -LM goal ongoing  -LR goal ongoing  -LR    Stair Training PT LTG    Stair Training Goal PT LTG, Date Established  11/07/17  -LR 11/07/17  -LR    Stair Training Goal PT LTG, Time to Achieve  3 days  -LR 3 days  -LR    Stair Training Goal PT LTG, Number of Steps  5  -LR 5  -LR    Stair Training Goal PT  LTG, [place] Level  contact guard assist  -LR contact guard assist  -LR    Stair Training Goal PT LTG, Assist Device  2 handrails  -LR 2 handrails  -LR    Stair Training Goal PT LTG, Date Goal Reviewed 11/08/17  -LM 11/07/17  -LR 11/07/17  -LR    Stair Training Goal PT LTG, Outcome goal ongoing  -LM goal ongoing  -LR goal ongoing  -LR    Range of Motion PT LTG    Range of Motion Goal PT LTG, Date Established  11/07/17  -LR 11/07/17  -LR    Range of Motion Goal PT LTG, Time to Achieve  3 days  -LR 3 days  -LR    Range fo Motion Goal PT LTG, Joint  L knee  -LR L knee  -LR    Range of Motion Goal PT LTG, AAROM Measure  0-90 degrees  -LR 0-90 degrees  -LR    Range of Motion Goal PT LTG, Date Goal Reviewed 11/08/17  -LM 11/07/17  -LR 11/07/17  -LR    Range of Motion Goal PT LTG, Outcome goal ongoing  -LM goal ongoing  -LR goal ongoing  -LR      User Key  (r) = Recorded By, (t) = Taken By, (c) = Cosigned By    Initials Name Provider Type    LR Estefania Hong, PT Physical Therapist    LM Christina Larsen, PT Physical Therapist          Physical Therapy Education     Title: PT OT SLP Therapies (Active)     Topic: Physical Therapy (Active)     Point: Mobility training (Active)    Learning Progress Summary    Learner Readiness Method Response Comment Documented by Status   Patient Acceptance E,D NR Reviewed HEP, correct gait mechanics, stair sequencing.  11/08/17 1200 Active    Acceptance E,D NR,VU Educated on HEP, correct gait mechanics. LR 11/07/17 1815 Done    Acceptance E,D VU,NR Educated on weight bearing status, precautions, HEP, and correct gait mechanics. LR 11/07/17 1020 Done   Family Acceptance E,D NR,VU Educated on HEP, correct gait mechanics. LR 11/07/17 1815 Done    Acceptance E,D VU,NR Educated on weight bearing status, precautions, HEP, and correct gait mechanics. LR 11/07/17 1020 Done   Significant Other Acceptance E,D NR,VU Educated on HEP, correct gait mechanics. LR 11/07/17 1815 Done                Point: Home exercise program (Active)    Learning Progress Summary    Learner Readiness Method Response Comment Documented by Status   Patient Acceptance E,D NR Reviewed HEP, correct gait mechanics, stair sequencing. LM 11/08/17 1200 Active    Acceptance E,D NR,VU Educated on HEP, correct gait mechanics. LR 11/07/17 1815 Done    Acceptance E,D VU,NR Educated on weight bearing status, precautions, HEP, and correct gait mechanics. LR 11/07/17 1020 Done   Family Acceptance E,D NR,VU Educated on HEP, correct gait mechanics. LR 11/07/17 1815 Done    Acceptance E,D VU,NR Educated on weight bearing status, precautions, HEP, and correct gait mechanics. LR 11/07/17 1020 Done   Significant Other Acceptance E,D NR,VU Educated on HEP, correct gait mechanics. LR 11/07/17 1815 Done               Point: Body mechanics (Active)    Learning Progress Summary    Learner Readiness Method Response Comment Documented by Status   Patient Acceptance E,D NR Reviewed HEP, correct gait mechanics, stair sequencing.  11/08/17 1200 Active    Acceptance E,D NR,VU Educated on HEP, correct gait mechanics. LR 11/07/17 1815 Done    Acceptance E,D VU,NR Educated on weight bearing status, precautions, HEP, and correct gait mechanics. LR 11/07/17 1020 Done   Family Acceptance E,D NR,VU Educated on HEP, correct gait mechanics. LR 11/07/17 1815 Done    Acceptance E,D VU,NR Educated on weight bearing status, precautions, HEP, and correct gait mechanics. LR 11/07/17 1020 Done   Significant Other Acceptance E,D NR,VU Educated on HEP, correct gait mechanics. LR 11/07/17 1815 Done               Point: Precautions (Active)    Learning Progress Summary    Learner Readiness Method Response Comment Documented by Status   Patient Acceptance E,D NR Reviewed HEP, correct gait mechanics, stair sequencing. LM 11/08/17 1200 Active    Acceptance E,D NR,VU Educated on HEP, correct gait mechanics. LR 11/07/17 1815 Done    Acceptance E,D VU,NR Educated on weight  bearing status, precautions, HEP, and correct gait mechanics. LR 11/07/17 1020 Done   Family Acceptance E,D NR,VU Educated on HEP, correct gait mechanics. LR 11/07/17 1815 Done    Acceptance E,D VU,NR Educated on weight bearing status, precautions, HEP, and correct gait mechanics. LR 11/07/17 1020 Done   Significant Other Acceptance E,D NR,VU Educated on HEP, correct gait mechanics. LR 11/07/17 1815 Done                      User Key     Initials Effective Dates Name Provider Type Discipline    LR 06/19/15 -  Estefania Hong, PT Physical Therapist PT     06/09/17 -  Christina Larsen, PT Physical Therapist PT                    PT Recommendation and Plan  Anticipated Equipment Needs At Discharge:  (none)  Anticipated Discharge Disposition: home with assist, home with home health  Planned Therapy Interventions: balance training, bed mobility training, gait training, home exercise program, patient/family education, ROM (Range of Motion), stair training, strengthening, transfer training  PT Frequency: 2 times/day  Plan of Care Review  Plan Of Care Reviewed With: patient  Progress: progress toward functional goals as expected  Outcome Summary/Follow up Plan: Pt ambulated 100 feet with CGA and RW, limited by increased fatigue and pain during session. Stair training initiated x5 steps. L knee ROM 9-68 degrees. Pt requesting PM PT session, anticipating d/c home with assist and outpatient PT in the home today.           Outcome Measures       11/08/17 1030 11/07/17 1432 11/07/17 0939    How much help from another person do you currently need...    Turning from your back to your side while in flat bed without using bedrails? 3  -LM 3  -LR     Moving from lying on back to sitting on the side of a flat bed without bedrails? 3  -LM 3  -LR     Moving to and from a bed to a chair (including a wheelchair)? 3  -LM 3  -LR     Standing up from a chair using your arms (e.g., wheelchair, bedside chair)? 3  -LM 3  -LR     Climbing  3-5 steps with a railing? 3  -LM 2  -LR     To walk in hospital room? 3  -LM 3  -LR     AM-PAC 6 Clicks Score 18  -LM 17  -LR     How much help from another is currently needed...    Putting on and taking off regular lower body clothing?   3  -MC    Bathing (including washing, rinsing, and drying)   3  -MC    Toileting (which includes using toilet bed pan or urinal)   3  -MC    Putting on and taking off regular upper body clothing   3  -MC    Taking care of personal grooming (such as brushing teeth)   4  -MC    Eating meals   4  -MC    Score   20  -MC    Functional Assessment    Outcome Measure Options AM-PAC 6 Clicks Basic Mobility (PT)  -LM AM-PAC 6 Clicks Basic Mobility (PT)  -LR AM-PAC 6 Clicks Daily Activity (OT)  -      11/07/17 0932          How much help from another person do you currently need...    Turning from your back to your side while in flat bed without using bedrails? 3  -LR      Moving from lying on back to sitting on the side of a flat bed without bedrails? 3  -LR      Moving to and from a bed to a chair (including a wheelchair)? 3  -LR      Standing up from a chair using your arms (e.g., wheelchair, bedside chair)? 3  -LR      Climbing 3-5 steps with a railing? 2  -LR      To walk in hospital room? 3  -LR      AM-PAC 6 Clicks Score 17  -LR      Functional Assessment    Outcome Measure Options AM-PAC 6 Clicks Basic Mobility (PT)  -LR        User Key  (r) = Recorded By, (t) = Taken By, (c) = Cosigned By    Initials Name Provider Type    LR Estefania Hong, PT Physical Therapist    NATALIA Park, OT Occupational Therapist    GEORGIE Larsen, PT Physical Therapist           Time Calculation:         PT Charges       11/08/17 1203          Time Calculation    Start Time 1030  -LM      PT Received On 11/08/17  -LM      PT Goal Re-Cert Due Date 11/17/17  -LM      Time Calculation- PT    Total Timed Code Minutes- PT 49 minute(s)  -LM        User Key  (r) = Recorded By, (t) = Taken By, (c) =  Cosigned By    Initials Name Provider Type    LM Christina aLrsen, PT Physical Therapist          Therapy Charges for Today     Code Description Service Date Service Provider Modifiers Qty    70316563084 HC GAIT TRAINING EA 15 MIN 11/8/2017 Christina Larsen, PT GP 1    89127596795 HC PT THER PROC EA 15 MIN 11/8/2017 Christina Larsen, PT GP 2    56706096855 HC PT THER SUPP EA 15 MIN 11/8/2017 Christina Larsen, PT GP 2          PT G-Codes  Outcome Measure Options: AM-PAC 6 Clicks Basic Mobility (PT)    Christina Larsen PT  11/8/2017

## 2017-11-08 NOTE — NURSING NOTE
Acute Pain Service:  Patient waiting to work with physical therapy one more time today to determine discharge. On-Q teaching completed with patient and spouse, Rosie Ordoñez.  Video demonstration, handout and bracelet provided with [facility] on call central phone number.  Instructed to call with any questions or concerns.  Patient verbalized understanding.  Service will continue to follow until catheter DC'd.  Please contact patient at H: [phone] , C:[phone] or Rosie Ordoñez C: [phone] if needed.

## 2017-11-08 NOTE — DISCHARGE SUMMARY
Patient Name: Jeremie Wynn  MRN: [id number]  : 1945  DOS: 2017    Attending: No att. providers found    Primary Care Provider: Aaron Trejo MD    Date of Admission:.2017  7:28 AM    Date of Discharge:  2017    Discharge Diagnosis: Principal Problem:    S/P total knee arthroplasty, left  Active Problems:    Atrial fibrillation    Hypertension    Arthritis of left knee    HLD (hyperlipidemia)    Prediabetes    Acute blood loss anemia, mild, asymptomatic    Leukocytosis, likely reactive    Hx of ' blood clot' , remote, data deficit    Hospital Course  Patient is a 72 y.o. male presented for left total knee arthroplasty by Dr. Jaime under spinal anesthesia. He tolerated surgery well and was admitted for further medical management. His knee has been painful for many years. He reports he has had seven scopes in the past. He occasionally uses a cane for ambulation.      He reports a history of blood clot a long time ago. He does not recall treatment.  His cardiac history includes HTN, HLD, PVCs, and bradycardia. He is followed by cardiology, Dr. Mc.      Patient was provided pain medications as needed for pain control, along with adductor canal nerve block infusion of Ropivacaine.      He was seen by PT and OT and has progressed well over his stay.  He used an IS for atelectasis prophylaxis and aspirin along with mechanicals for DVT prophylaxis. He will be discharged with lovenox for 2 weeks.    Home medications were resumed as appropriate, and labs were monitored and remained fairly stable.   His Hgb A1C was elevated on him pre-op labs. A diabetes educator provided him with diabetic education and a glucometer.    With the progress he has made, he is ready for DC home today.    A prineo dressing is in place and is to remain for 2 weeks.  He will have an On Q pump ( instructed on it during this admit)  Discussed with patient regarding plan and he shows understanding and agreement.  "   Patient will have HHPT following discharge.      Procedures Performed  11/6/2017     Pre-op Diagnosis:   Left knee OA      Post-Op Diagnosis Codes:  Left knee OA     Procedure/CPT® Codes:  44074     Procedure(s):  TOTAL KNEE ARTHROPLASTY LEFT      Surgeon(s):  Han Jaime MD       Pertinent Test Results:    I reviewed the patient's new clinical results.     Results from last 7 days  Lab Units 11/07/17  0512 11/06/17  0925   WBC 10*3/mm3 16.24* 7.64   HEMOGLOBIN g/dL 10.8* 12.9*   HEMATOCRIT % 33.8* 38.7*   PLATELETS 10*3/mm3 170 181       Results from last 7 days  Lab Units 11/07/17  0512   SODIUM mmol/L 136   POTASSIUM mmol/L 4.2   CHLORIDE mmol/L 107   CO2 mmol/L 24.0   BUN mg/dL 16   CREATININE mg/dL 0.90   CALCIUM mg/dL 8.4*   GLUCOSE mg/dL 134*     Results for ALIYA MARQUEZ (MRN [id number]) as of 11/9/2017 09:45   Ref. Range 11/8/2017 06:38 11/8/2017 11:09 11/8/2017 16:22 11/8/2017 20:10 11/9/2017 07:47   Glucose Latest Ref Range: 70 - 130 mg/dL 103 104 105 124 130     I reviewed the patient's new imaging including images and reports.      Physical therapy: Pt increased ambulation distance to 180 feet with CGA and RW, limited by pain and fatigue. L knee ROM 10-70 degrees. Pt anticipating home with outpatient PT in the home today.     Discharge Assessment:    Vital Signs  /65  Pulse 73  Temp 99.1 °F (37.3 °C) (Oral)   Resp 16  Ht 70\" (177.8 cm)  Wt 247 lb (112 kg)  SpO2 92%  BMI 35.44 kg/m2  No data recorded.      General Appearance:    Alert, cooperative, in no acute distress   Lungs:     Clear to auscultation,respirations regular, even and                   unlabored    Heart:    Regular rhythm and normal rate, normal S1 and S2   Abdomen:     Normal bowel sounds, no masses, no organomegaly, soft        non-tender, non-distended, no guarding, no rebound                 tenderness   Extremities:   Moves all extremities well, no edema, no cyanosis, no              Redness. Left knee ACE wrap " CDI. Nerve block present   Pulses:   Pulses palpable and equal bilaterally   Skin:   No bleeding, bruising or rash   Neurologic:   Cranial nerves 2 - 12 grossly intact, sensation intact. Flexion and dorsiflexion intact bilateral feet.       Discharge Disposition: Home    Discharge Medications   Jeremie Wynn   Home Medication Instructions BEAU:[id number]    Printed on:11/10/17 1244   Medication Information                      albuterol (PROVENTIL HFA;VENTOLIN HFA) 108 (90 Base) MCG/ACT inhaler  Inhale 2 puffs Every 4 (Four) Hours As Needed for Wheezing.             aspirin 81 MG tablet  Take 1 tablet by mouth Daily. Resume in 1 month             atorvastatin (LIPITOR) 10 MG tablet  TAKE ONE TABLET BY MOUTH DAILY FOR CHOLESTEROL             cetirizine (zyrTEC) 10 MG tablet  Take 10 mg by mouth Daily.             docusate sodium 100 MG capsule  Take 1 capsule by mouth 2 (Two) Times a Day.             enoxaparin (LOVENOX) 40 MG/0.4ML solution syringe  Inject 0.4 mL under the skin Daily. For 2 weeks             furosemide (LASIX) 20 MG tablet  TAKE ONE TABLET BY MOUTH DAILY             HYDROcodone-acetaminophen (NORCO) 5-325 MG per tablet  Take 1 tablet by mouth Every 4 (Four) Hours As Needed for Moderate Pain  for up to 8 days.             LIALDA 1.2 G EC tablet  Take 2,400 mg by mouth Daily.             Multiple Vitamins-Minerals (CENTROVITE) tablet  Take 1 tablet by mouth Daily.             potassium chloride (K-DUR) 10 MEQ CR tablet  Take 10 mEq by mouth Daily.             Ropivacaine HCl-NaCl (NAROPIN) 0.2-0.9 %  24 mg/hr by Peripheral Nerve route Continuous.             valsartan (DIOVAN) 320 MG tablet  TAKE ONE TABLET BY MOUTH DAILY                 Discharge Diet: Consistent carb diet    Activity at Discharge: WBAT LLE    Follow-up Appointments  Dr. Jaime per his orders    Discharge took over 30 min.  Seen and examined by me. Agree with above. Discussed with patient.     Anibal Dominguez MD  11/10/17  1:08  PM

## 2017-11-08 NOTE — PLAN OF CARE
Problem: Patient Care Overview (Adult)  Goal: Plan of Care Review  Outcome: Ongoing (interventions implemented as appropriate)    11/08/17 1501   Coping/Psychosocial Response Interventions   Plan Of Care Reviewed With patient;spouse   Patient Care Overview   Progress progress toward functional goals as expected   Outcome Evaluation   Outcome Summary/Follow up Plan Pt increased ambulation to 120 feet with CGA and RW, limited by fatigue and pain. Stair training continued x5 steps. Pt anticipating d/c home with assist and outpatient in the home tomorrow.          Problem: Inpatient Physical Therapy  Goal: Bed Mobility Goal LTG- PT  Outcome: Ongoing (interventions implemented as appropriate)    11/07/17 1432 11/08/17 1501   Bed Mobility PT LTG   Bed Mobility PT LTG, Date Established 11/07/17 --    Bed Mobility PT LTG, Time to Achieve 3 days --    Bed Mobility PT LTG, Activity Type supine to sit/sit to supine --    Bed Mobility PT LTG, [place] Level conditional independence --    Bed Mobility PT LTG, Date Goal Reviewed --  11/08/17   Bed Mobility PT LTG, Outcome --  goal ongoing       Goal: Transfer Training Goal 1 LTG- PT  Outcome: Ongoing (interventions implemented as appropriate)    11/07/17 1432 11/08/17 1501   Transfer Training PT LTG   Transfer Training PT LTG, Date Established 11/07/17 --    Transfer Training PT LTG, Time to Achieve 3 days --    Transfer Training PT LTG, Activity Type sit to stand/stand to sit --    Transfer Training PT LTG, [place] Level conditional independence --    Transfer Training PT LTG, Assist Device walker, rolling --    Transfer Training PT LTG, Date Goal Reviewed --  11/08/17   Transfer Training PT LTG, Outcome --  goal ongoing       Goal: Gait Training Goal LTG- PT  Outcome: Ongoing (interventions implemented as appropriate)    11/07/17 1432 11/08/17 1501   Gait Training PT LTG   Gait Training Goal PT LTG, Date Established 11/07/17 --    Gait Training Goal PT LTG, Time to  Achieve 3 days --    Gait Training Goal PT LTG, [place] Level conditional independence --    Gait Training Goal PT LTG, Assist Device walker, rolling --    Gait Training Goal PT LTG, Distance to Achieve 500 feet --    Gait Training Goal PT LTG, Date Goal Reviewed --  11/08/17   Gait Training Goal PT LTG, Outcome --  goal ongoing       Goal: Stair Training Goal LTG- PT  Outcome: Outcome(s) achieved Date Met:  11/08/17 11/07/17 1432 11/08/17 1501   Stair Training PT LTG   Stair Training Goal PT LTG, Date Established 11/07/17 --    Stair Training Goal PT LTG, Time to Achieve 3 days --    Stair Training Goal PT LTG, Number of Steps 5 --    Stair Training Goal PT LTG, [place] Level contact guard assist --    Stair Training Goal PT LTG, Assist Device 2 handrails --    Stair Training Goal PT LTG, Date Goal Reviewed --  11/08/17   Stair Training Goal PT LTG, Outcome --  goal met       Goal: Range of Motion Goal LTG- PT  Outcome: Ongoing (interventions implemented as appropriate)    11/07/17 1432 11/08/17 1501   Range of Motion PT LTG   Range of Motion Goal PT LTG, Date Established 11/07/17 --    Range of Motion Goal PT LTG, Time to Achieve 3 days --    Range fo Motion Goal PT LTG, Joint L knee --    Range of Motion Goal PT LTG, AAROM Measure 0-90 degrees --    Range of Motion Goal PT LTG, Date Goal Reviewed --  11/08/17   Range of Motion Goal PT LTG, Outcome --  goal ongoing

## 2017-11-08 NOTE — CONSULTS
"Diabetes Education  Assessment/Teaching    Patient Name:  Jeremie Wynn  YOB: 1945  MRN: [id number]  Admit Date:  11/6/2017      Assessment Date:  11/8/2017       Most Recent Value    General Information      Referral From:  A1c, Blood glucose, MD order [6.0%]    Height  5' 10\" (1.778 m)    Height Method  Stated    Weight  247 lb (112 kg)    Weight Method  Stated    Pregnancy Assessment     Diabetes History     What type of diabetes do you have?  Pre-diabetes    Length of Diabetes Diagnosis  10 + years    Current DM knowledge  fair    Have you had diabetes education/teaching in the past?  no    Do you test your blood sugar at home?  no    Education Preferences     Nutrition Information     When was the last time you saw a dietician?  never    Assessment Topics     Healthy Eating - Assessment  Needs education    Being Active - Assessment  Needs education    Taking Medication - Assessment  N/A-unable to assess    Problem Solving - Assessment  Needs education    Reducing Risk - Assessment  Needs education    Healthy Coping - Assessment  Needs education    Monitoring - Assessment  Needs education    DM Goals                Most Recent Value    DM Education Needs     Meter  Other (comment) [stated he wants to think about whether he wants to have a glucose meter. I told him I would be back in several hours to see what he decides.]    Blood Glucose Target Range  -- [provided and discussed ADA diagnostic criteria]    Reducing Risks  A1C testing    Physical Activity Frequency  Discussed exercise importance    Discharge Plan  Home    Motivation  Moderate    Teaching Method  Explanation, Discussion, Handouts, Teach back    Patient Response  Verbalized understanding            Other Comments:  Patient educated on Pre-diabetes, diabetes and the disease process, types of DM, diagnosis/A1C, monitoring, signs and symptoms, activity and exercise and how to prevent disease from progressing. Changing behavior and goal " setting relative to diet, exercise and healthy lifestyle was emphasized. Patient offered a new donated glucose meter but he said he wants to think about it for awhile and decide if he wants one. I said I will give him a few hours and check back with him about a meter.  Pt advised to consult with PCP for further instructions, discuss A1C result, and POC. Education handouts provided, questions answered and pt. advised to call with any other questions or concerns.        Electronically signed by:  Deborah Holden RN  11/08/17 1:03 PM

## 2017-11-09 VITALS
DIASTOLIC BLOOD PRESSURE: 65 MMHG | RESPIRATION RATE: 16 BRPM | SYSTOLIC BLOOD PRESSURE: 136 MMHG | WEIGHT: 247 LBS | OXYGEN SATURATION: 92 % | TEMPERATURE: 99.1 F | HEART RATE: 73 BPM | HEIGHT: 70 IN | BODY MASS INDEX: 35.36 KG/M2

## 2017-11-09 LAB — GLUCOSE BLDC GLUCOMTR-MCNC: 130 MG/DL (ref 70–130)

## 2017-11-09 PROCEDURE — 25010000002 ROPIVACAINE HCL-NACL 0.2-0.9 % SOLUTION: Performed by: NURSE ANESTHETIST, CERTIFIED REGISTERED

## 2017-11-09 PROCEDURE — 97116 GAIT TRAINING THERAPY: CPT

## 2017-11-09 PROCEDURE — 97530 THERAPEUTIC ACTIVITIES: CPT

## 2017-11-09 PROCEDURE — 82962 GLUCOSE BLOOD TEST: CPT

## 2017-11-09 PROCEDURE — 97110 THERAPEUTIC EXERCISES: CPT

## 2017-11-09 PROCEDURE — 25010000002 ROPIVACAINE PER 1 MG: Performed by: NURSE ANESTHETIST, CERTIFIED REGISTERED

## 2017-11-09 PROCEDURE — G0108 DIAB MANAGE TRN  PER INDIV: HCPCS

## 2017-11-09 RX ORDER — ROPIVACAINE IN 0.9% SOD CHL/PF 0.2% 545ML
12 ELASTOMERIC PUMP, HI VARIABLE RATE INJECTION CONTINUOUS
Start: 2017-11-09 | End: 2018-03-09 | Stop reason: HOSPADM

## 2017-11-09 RX ADMIN — CETIRIZINE HYDROCHLORIDE 10 MG: 10 TABLET, FILM COATED ORAL at 09:18

## 2017-11-09 RX ADMIN — Medication 12 ML/HR: at 11:19

## 2017-11-09 RX ADMIN — HYDROCODONE BITARTRATE AND ACETAMINOPHEN 1 TABLET: 5; 325 TABLET ORAL at 02:38

## 2017-11-09 RX ADMIN — VALSARTAN 320 MG: 160 TABLET, FILM COATED ORAL at 09:18

## 2017-11-09 RX ADMIN — DOCUSATE SODIUM 100 MG: 100 CAPSULE, LIQUID FILLED ORAL at 09:18

## 2017-11-09 RX ADMIN — HYDROCODONE BITARTRATE AND ACETAMINOPHEN 1 TABLET: 5; 325 TABLET ORAL at 06:29

## 2017-11-09 RX ADMIN — HYDROCODONE BITARTRATE AND ACETAMINOPHEN 1 TABLET: 5; 325 TABLET ORAL at 13:15

## 2017-11-09 RX ADMIN — MESALAMINE 2.4 G: 1.2 TABLET, DELAYED RELEASE ORAL at 09:18

## 2017-11-09 RX ADMIN — ROPIVACAINE HYDROCHLORIDE 16 ML/HR: 2 INJECTION, SOLUTION EPIDURAL; INFILTRATION at 02:38

## 2017-11-09 NOTE — THERAPY TREATMENT NOTE
Acute Care - Physical Therapy Treatment Note  [facility]     Patient Name: Jeremie Wynn  : 1945  MRN: [id number]  Today's Date: 2017  Onset of Illness/Injury or Date of Surgery Date: 17  Date of Referral to PT: 17  Referring Physician: MD Addiosn    Admit Date: 2017    Visit Dx:    ICD-10-CM ICD-9-CM   1. Impaired mobility and ADLs Z74.09 799.89   2. Impaired functional mobility, balance, gait, and endurance Z74.09 V49.89   3. Arthritis of left knee M17.12 716.96   4. S/P total knee arthroplasty, left Z96.652 V43.65   5. Generalized osteoarthritis M15.9 715.00     Patient Active Problem List   Diagnosis   • Diverticulitis   • Anemia   • Atrial fibrillation   • Bradycardia   • Generalized osteoarthritis   • Hyperlipemia   • Hypertension   • PVC's (premature ventricular contractions)   • Arthralgia of hip   • Dyspnea on exertion   • Obesity   • Arthritis of left knee   • S/P total knee arthroplasty, left   • HLD (hyperlipidemia)   • Prediabetes   • Acute blood loss anemia, mild, asymptomatic   • Leukocytosis, likely reactive               Adult Rehabilitation Note       17 0928 17 1354 17 1030    Rehab Assessment/Intervention    Discipline physical therapist  -LM physical therapist  -LM physical therapist  -LM    Document Type therapy note (daily note)  -LM therapy note (daily note)  -LM therapy note (daily note)  -LM    Subjective Information agree to therapy;complains of;pain;fatigue  -LM agree to therapy;complains of;pain  -LM agree to therapy;complains of;pain  -LM    Patient Effort, Rehab Treatment good  -LM good  -LM good  -LM    Symptoms Noted During/After Treatment fatigue;increased pain  -LM fatigue;increased pain;shortness of breath  -LM fatigue;increased pain;shortness of breath  -LM    Symptoms Noted Comment   RN aware  -LM    Precautions/Limitations other (see comments)   L adductor nerve canal cath  -LM other (see comments)   L adductor nerve canal cath   -LM other (see comments)   L adductor nerve canal cath  -LM    Recorded by [LM] Christina Larsen, PT [LM] Christina Larsen, PT [LM] Christina Larsen PT    Pain Assessment    Pain Assessment 0-10  -LM 0-10  -LM 0-10  -LM    Pain Score 7  -LM 7  -LM 7  -LM    Post Pain Score 9  -LM 10  -LM 10   reported 12/10  -LM    Pain Type Acute pain  -LM Acute pain  -LM Acute pain  -LM    Pain Location Knee  -LM Knee  -LM Knee  -LM    Pain Orientation Left  -LM Left;Anterior;Posterior  -LM Left;Anterior;Posterior  -LM    Pain Intervention(s) Repositioned;Ambulation/increased activity  -LM Repositioned;Ambulation/increased activity  -LM Repositioned;Ambulation/increased activity  -LM    Recorded by [LM] Christina Larsen, PT [LM] Christina Larsen, PT [LM] Christina Larsen PT    Cognitive Assessment/Intervention    Current Cognitive/Communication Assessment functional  -LM functional  -LM functional  -LM    Orientation Status oriented x 4  -LM oriented x 4  -LM oriented x 4  -LM    Follows Commands/Answers Questions 100% of the time;able to follow single-step instructions;needs cueing  -% of the time;able to follow single-step instructions;needs cueing;needs repetition  -% of the time;able to follow single-step instructions;needs cueing;needs repetition  -LM    Personal Safety WNL/WFL  -LM WNL/WFL  -LM WNL/WFL  -LM    Personal Safety Interventions gait belt;nonskid shoes/slippers when out of bed;supervised activity  -LM gait belt;nonskid shoes/slippers when out of bed;supervised activity  -LM gait belt;nonskid shoes/slippers when out of bed;supervised activity  -LM    Recorded by [LM] Christina Larsen, PT [LM] Christina Larsen, PT [LM] Christina Larsen PT    General LE Assessment    ROM Detail L knee ROM 10-70 degrees; pt lacking 10 degrees from full extension; AAROM flexion measured in sitting  -LM  L knee ROM 9-68 degrees; pt lacking 9 degrees from full extension; AAROM flexion measured in sitting  -LM    Recorded by [LM]  Christina Larsen, PT  [LM] Christina Larsen PT    Mobility Assessment/Training    Extremity Weight-Bearing Status left lower extremity  -LM left lower extremity  -LM left lower extremity  -LM    Left Lower Extremity Weight-Bearing weight-bearing as tolerated  -LM weight-bearing as tolerated  -LM weight-bearing as tolerated  -LM    Recorded by [LM] Christina Larsen PT [LM] Christina Larsen PT [LM] Christina Larsen PT    Bed Mobility, Assessment/Treatment    Bed Mobility, Assistive Device bed rails;head of bed elevated  -LM  bed rails;head of bed elevated;leg   -LM    Bed Mob, Supine to Sit, [place] contact guard assist;verbal cues required  -LM not tested   Pt received standing in bathroom  -LM contact guard assist;verbal cues required  -LM    Bed Mob, Sit to Supine, [place] not tested   Pt left UIC  -LM not tested   Pt left UIC  -LM not tested   Pt left UIC  -LM    Bed Mobility, Safety Issues decreased use of legs for bridging/pushing  -LM  decreased use of legs for bridging/pushing  -LM    Bed Mobility, Impairments ROM decreased;strength decreased;pain  -LM  ROM decreased;strength decreased;pain  -LM    Bed Mobility, Comment Pt utilized leg  to move LLE off EOB. No physical assist required.  -LM  Pt utilized leg  at LLE to move off EOB. No physical assist required. Cues for sequencing.  -LM    Recorded by [LM] Christina Larsen PT [LM] Christina Larsen PT [LM] Christina Larsen PT    Transfer Assessment/Treatment    Transfers, Sit-Stand [place] contact guard assist;verbal cues required  -LM contact guard assist;verbal cues required  -LM contact guard assist;verbal cues required  -LM    Transfers, Stand-Sit [place] contact guard assist;verbal cues required  -LM contact guard assist;verbal cues required  -LM contact guard assist;verbal cues required  -LM    Transfers, Sit-Stand-Sit, Assist Device rolling walker  -LM rolling walker  -LM rolling walker;elevated surface  -LM    Toilet  Transfer, [place]   supervision required;verbal cues required  -LM    Toilet Transfer, Assistive Device   rolling walker  -LM    Transfer, Safety Issues sequencing ability decreased;weight-shifting ability decreased;step length decreased  -LM sequencing ability decreased;step length decreased;weight-shifting ability decreased  -LM sequencing ability decreased;step length decreased;weight-shifting ability decreased  -LM    Transfer, Impairments ROM decreased;strength decreased;pain  -LM ROM decreased;strength decreased;pain  -LM ROM decreased;strength decreased;pain  -LM    Transfer, Comment Verbal cues for correct hand placement. Pt experienced posterior LOB when attempting to stand, returned to sitting and self-adjusted LEs for correct placement. Cues to step out LLE prior to t/f.  -LM Verbal cues for correct hand placement, cues to step out LLE prior to t/f.  -LM Verbal cues for correct hand placement and step out with LLE prior to t/f. Assisted pt to bathroom; pt independent with standing balance.  -LM    Recorded by [LM] Christina Larsen, PT [LM] Christina Larsen, PT [LM] Christina Larsen PT    Gait Assessment/Treatment    Gait, [place] Level contact guard assist;verbal cues required  -LM contact guard assist;verbal cues required  -LM contact guard assist;verbal cues required  -LM    Gait, Assistive Device rolling walker  -LM rolling walker  -LM rolling walker  -LM    Gait, Distance (Feet) 180  -  -  -LM    Gait, Gait Pattern Analysis swing-through gait  -LM swing-through gait  -LM swing-through gait  -LM    Gait, Gait Deviations left:;antalgic;[place] decreased;forward flexed posture;step length decreased;weight-shifting ability decreased  -LM left:;antalgic;[place] decreased;decreased heel strike;forward flexed posture;step length decreased;weight-shifting ability decreased  -LM left:;antalgic;[place] decreased;decreased heel strike;forward flexed posture;step length  decreased;weight-shifting ability decreased  -LM    Gait, Safety Issues sequencing ability decreased;step length decreased;weight-shifting ability decreased  -LM sequencing ability decreased;step length decreased;weight-shifting ability decreased  -LM sequencing ability decreased;step length decreased;weight-shifting ability decreased  -LM    Gait, Impairments ROM decreased;strength decreased;pain  -LM ROM decreased;strength decreased;pain  -LM ROM decreased;strength decreased;pain  -LM    Gait, Comment Pt ambulated with step-through pattern at slow but improved pace. Pt demo continuous movement of RW and smoother gait pattern. Pt required x3 standing rest breaks due to SOA. Gait distance limited by fatigue and pain.  -LM Pt ambulated with step-through pattern at slow pace. Verbal cues for continuous rolling of RW, upright posture, increasing LLE weight-bearing. Pt required x3 standing rest breaks due to SOA. Gait distance and mechanics limited due to fatigue.  -LM Pt ambulated with step-through pattern at slow pace. Verbal cues for upright posture, and increase LLE weight bearing. Pt required x1 standing break in which pt leaned down onto RW with elbows despite cues for safety. Pt fatigued quickly and bedside chair brought behind patient.   -LM    Recorded by [LM] Christina Larsen, PT [LM] Christina Larsen, PT [LM] Christina Larsen, PT    Stairs Assessment/Treatment    Number of Stairs 5  -LM 5  -LM 5  -LM    Stairs, Handrail Location both sides  -LM both sides  -LM both sides  -LM    Stairs, [place] Level contact guard assist;verbal cues required  -LM contact guard assist;verbal cues required  -LM contact guard assist;verbal cues required  -LM    Stairs, Technique Used step to step (descending);step to step (ascending)  -LM step to step (descending);step to step (ascending)  -LM step to step (ascending);step to step (descending)  -LM    Stairs, Safety Issues balance decreased during turns;sequencing ability  decreased;weight-shifting ability decreased  -LM sequencing ability decreased;weight-shifting ability decreased  -LM sequencing ability decreased;weight-shifting ability decreased  -LM    Stairs, Impairments ROM decreased;strength decreased;pain  -LM ROM decreased;strength decreased;pain  -LM ROM decreased;strength decreased;pain  -LM    Stairs, Comment Pt verbalized understanding of stair sequencing. Demo proper technique. Verbal cues to not place elbows on rail for WBing.   -LM Cues for stair sequencing. Cues to not rest elbow on handrail when ascended stairs.   -LM Verbal cues for stair sequencing; pt ascended leading with RLE, descended leading with LLE.  -LM    Recorded by [LM] Christina Larsen PT [LM] Christina Larsen PT [LM] Christina Larsen PT    Therapy Exercises    Exercise Protocols total knee  -LM total knee  -LM total knee  -LM    Total Knee Exercises left:;15 reps;completed protocol;with assist;ankle pumps/circles;quad set;glut set;heel slide stretch;SLR;SAQ;LAQ   Jorge SLR  -LM left:;15 reps;completed protocol;with assist;ankle pumps/circles;quad set;glut set;heel slide stretch;SLR;SAQ;LAQ   cues for technique; Jorge SLR, LAQ, SAQ  -LM left:;15 reps;completed protocol;with assist;ankle pumps/circles;quad set;glut set;heel slide stretch;SLR;SAQ;LAQ   Jorge SLR, LAQ, SAQ  -LM    Recorded by [LM] Christina Larsen PT [LM] Christina Larsen, PT [LM] Christina Larsen PT    Positioning and Restraints    Pre-Treatment Position in bed  -LM bathroom  -LM in bed  -LM    Post Treatment Position chair  -LM chair  -LM chair  -LM    In Chair notified nsg;reclined;sitting;call light within reach;encouraged to call for assist;legs elevated  -LM notified nsg;reclined;sitting;call light within reach;encouraged to call for assist;with family/caregiver;legs elevated  -LM notified nsg;reclined;sitting;call light within reach;encouraged to call for assist;legs elevated  -LM    Recorded by [LM] Christina Larsen, PT [LM] Christina ELDRIDGE  Chava, PT [LM] Christina Larsen, PT      11/07/17 0902          Rehab Assessment/Intervention    Discipline physical therapist  -LR      Document Type therapy note (daily note)  -LR      Subjective Information agree to therapy;complains of;pain  -LR      Patient Effort, Rehab Treatment good  -LR      Symptoms Noted During/After Treatment increased pain;fatigue;shortness of breath  -LR      Symptoms Noted Comment Notified RN.   -LR      Precautions/Limitations fall precautions;other (see comments)   L adductor canal nerve catheter  -LR      Recorded by [LR] Estefania Hong, PT      Pain Assessment    Pain Assessment 0-10  -LR      Pain Score 7  -LR      Post Pain Score 8  -LR      Pain Type Acute pain  -LR      Pain Location Knee  -LR      Pain Orientation Left;Anterior;Posterior  -LR      Pain Intervention(s) Repositioned;Ambulation/increased activity  -LR      Recorded by [LR] Estefania Hong, PT      Cognitive Assessment/Intervention    Current Cognitive/Communication Assessment functional  -LR      Orientation Status oriented x 4;required verbal cueing (specifiy in comments)  -LR      Follows Commands/Answers Questions 100% of the time;able to follow single-step instructions;needs cueing;needs repetition  -LR      Personal Safety WNL/WFL  -LR      Recorded by [LR] Estefania Hong, PT      General LE Assessment    ROM Detail 11-67 degrees; lacking 11 degrees of extension AROM, 67 degrees flexion AAROM in sitting.   -LR      Recorded by [LR] Estefania Hong, PT      Mobility Assessment/Training    Extremity Weight-Bearing Status left lower extremity  -LR      Left Lower Extremity Weight-Bearing weight-bearing as tolerated  -LR      Recorded by [LR] Estefania Hong, PT      Bed Mobility, Assessment/Treatment    Bed Mobility, Assistive Device head of bed elevated;bed rails  -LR      Bed Mob, Supine to Sit, [place] verbal cues required;minimum assist (75% patient effort)  -LR       Bed Mob, Sit to Supine, [place] verbal cues required;minimum assist (75% patient effort)  -LR      Bed Mobility, Safety Issues decreased use of legs for bridging/pushing  -LR      Bed Mobility, Impairments ROM decreased;strength decreased;pain  -LR      Bed Mobility, Comment Verbal cues for correct sequencing of t/f in and out of bed. Min assist required with L LE.   -LR      Recorded by [LR] Estefania Hong, PT      Transfer Assessment/Treatment    Transfers, Sit-Stand [place] verbal cues required;contact guard assist  -LR      Transfers, Stand-Sit [place] verbal cues required;contact guard assist  -LR      Transfers, Sit-Stand-Sit, Assist Device rolling walker  -LR      Transfer, Safety Issues sequencing ability decreased;step length decreased;weight-shifting ability decreased  -LR      Transfer, Impairments ROM decreased;strength decreased;pain  -LR      Transfer, Comment Verbal cues for correct hand placement with t/f and to step L LE out before t/f for comfort. Assisted to and from bathroom to stand and void. Required increased time to stand from EOB.   -LR      Recorded by [LR] Estefania Hong, PT      Gait Assessment/Treatment    Gait, [place] Level verbal cues required;contact guard assist  -LR      Gait, Assistive Device rolling walker  -LR      Gait, Distance (Feet) 150  -LR      Gait, Gait Pattern Analysis swing-through gait  -LR      Gait, Gait Deviations left:;antalgic;forward flexed posture;step length decreased;toe-to-floor clearance decreased;weight-shifting ability decreased  -LR      Gait, Safety Issues sequencing ability decreased;step length decreased;weight-shifting ability decreased  -LR      Gait, Impairments ROM decreased;strength decreased;pain  -LR      Gait, Comment Patient ambulated with step through gait pattern at slow pace and forward flexed posture. Cues for upright posture, shoulders back, decreased UE weight bearing, and increased L LE weight bearing.  Patient with increased antalgia this PM compared to this AM. Required a few brief standing rest breaks. Patient continues to maintain L knee slightly flexed throughout gait cycle. Cues for increased L knee extension during stance phase. Gait limited by pain and fatigue.   -LR      Recorded by [LR] Estefania Hong, PT      Therapy Exercises    Exercise Protocols total knee  -LR      Total Knee Exercises left:;15 reps;completed protocol;with assist;ankle pumps/circles;quad set;glut set;heel slide stretch;SLR;SAQ;hip abduction/adduction;LAQ   cues for technique;min assist SLR,SAQ,LAQ, hip abd  -LR      Recorded by [LR] Estefania Hong, PT      Positioning and Restraints    Pre-Treatment Position in bed  -LR      Post Treatment Position bed  -LR      In Bed notified nsg;supine;call light within reach;encouraged to call for assist;exit alarm on;with family/caregiver;side rails up x2  -LR      Recorded by [LR] Estefania Hong, PT        User Key  (r) = Recorded By, (t) = Taken By, (c) = Cosigned By    Initials Name Effective Dates    LR Estefania Hong, PT 06/19/15 -     LM Christina Larsen, PT 06/09/17 -                 IP PT Goals       11/09/17 1004 11/08/17 1501 11/08/17 1200    Bed Mobility PT LTG    Bed Mobility PT LTG, Date Goal Reviewed 11/09/17  -LM 11/08/17  -LM 11/08/17  -LM    Bed Mobility PT LTG, Outcome goal ongoing  -LM goal ongoing  -LM goal ongoing  -LM    Transfer Training PT LTG    Transfer Training PT  LTG, Date Goal Reviewed 11/09/17  -LM 11/08/17  -LM 11/08/17  -LM    Transfer Training PT LTG, Outcome goal ongoing  -LM goal ongoing  -LM goal ongoing  -LM    Gait Training PT LTG    Gait Training Goal PT LTG, Date Goal Reviewed 11/09/17  -LM 11/08/17  -LM 11/08/17  -LM    Gait Training Goal PT LTG, Outcome goal ongoing  -LM goal ongoing  -LM goal ongoing  -LM    Stair Training PT LTG    Stair Training Goal PT LTG, Date Goal Reviewed  11/08/17  -LM 11/08/17  -LM    Stair Training  Goal PT LTG, Outcome  goal met  -LM goal ongoing  -LM    Range of Motion PT LTG    Range of Motion Goal PT LTG, Date Goal Reviewed 11/09/17  -LM 11/08/17  -LM 11/08/17  -LM    Range of Motion Goal PT LTG, Outcome goal ongoing  -LM goal ongoing  -LM goal ongoing  -LM      11/07/17 1432 11/07/17 0932       Bed Mobility PT LTG    Bed Mobility PT LTG, Date Established 11/07/17  -LR 11/07/17  -LR     Bed Mobility PT LTG, Time to Achieve 3 days  -LR 3 days  -LR     Bed Mobility PT LTG, Activity Type supine to sit/sit to supine  -LR supine to sit/sit to supine  -LR     Bed Mobility PT LTG, [place] Level conditional independence  -LR conditional independence  -LR     Bed Mobility PT LTG, Date Goal Reviewed 11/07/17  -LR 11/07/17  -LR     Bed Mobility PT LTG, Outcome goal ongoing  -LR goal ongoing  -LR     Transfer Training PT LTG    Transfer Training PT LTG, Date Established 11/07/17  -LR 11/07/17  -LR     Transfer Training PT LTG, Time to Achieve 3 days  -LR 3 days  -LR     Transfer Training PT LTG, Activity Type sit to stand/stand to sit  -LR sit to stand/stand to sit  -LR     Transfer Training PT LTG, [place] Level conditional independence  -LR conditional independence  -LR     Transfer Training PT LTG, Assist Device walker, rolling  -LR walker, rolling  -LR     Transfer Training PT  LTG, Date Goal Reviewed 11/07/17  -LR 11/07/17  -LR     Transfer Training PT LTG, Outcome goal ongoing  -LR goal ongoing  -LR     Gait Training PT LTG    Gait Training Goal PT LTG, Date Established 11/07/17  -LR 11/07/17  -LR     Gait Training Goal PT LTG, Time to Achieve 3 days  -LR 3 days  -LR     Gait Training Goal PT LTG, [place] Level conditional independence  -LR conditional independence  -LR     Gait Training Goal PT LTG, Assist Device walker, rolling  -LR walker, rolling  -LR     Gait Training Goal PT LTG, Distance to Achieve 500 feet  - feet  -LR     Gait Training Goal PT LTG, Date Goal Reviewed 11/07/17  -LR  11/07/17  -LR     Gait Training Goal PT LTG, Outcome goal ongoing  -LR goal ongoing  -LR     Stair Training PT LTG    Stair Training Goal PT LTG, Date Established 11/07/17  -LR 11/07/17  -LR     Stair Training Goal PT LTG, Time to Achieve 3 days  -LR 3 days  -LR     Stair Training Goal PT LTG, Number of Steps 5  -LR 5  -LR     Stair Training Goal PT LTG, [place] Level contact guard assist  -LR contact guard assist  -LR     Stair Training Goal PT LTG, Assist Device 2 handrails  -LR 2 handrails  -LR     Stair Training Goal PT LTG, Date Goal Reviewed 11/07/17  -LR 11/07/17  -LR     Stair Training Goal PT LTG, Outcome goal ongoing  -LR goal ongoing  -LR     Range of Motion PT LTG    Range of Motion Goal PT LTG, Date Established 11/07/17  -LR 11/07/17  -LR     Range of Motion Goal PT LTG, Time to Achieve 3 days  -LR 3 days  -LR     Range fo Motion Goal PT LTG, Joint L knee  -LR L knee  -LR     Range of Motion Goal PT LTG, AAROM Measure 0-90 degrees  -LR 0-90 degrees  -LR     Range of Motion Goal PT LTG, Date Goal Reviewed 11/07/17  -LR 11/07/17  -LR     Range of Motion Goal PT LTG, Outcome goal ongoing  -LR goal ongoing  -LR       User Key  (r) = Recorded By, (t) = Taken By, (c) = Cosigned By    Initials Name Provider Type    LR Estefania Hong, PT Physical Therapist    GEORGIE Larsen, PT Physical Therapist          Physical Therapy Education     Title: PT OT SLP Therapies (Active)     Topic: Physical Therapy (Active)     Point: Mobility training (Active)    Learning Progress Summary    Learner Readiness Method Response Comment Documented by Status   Patient Acceptance E,D NR,VU Reviewed HEP, stair sequencing, correct gait mechanics.  11/09/17 1004 Done    Acceptance E,D NR Reviewed HEP, benefits of activity, correct gait mechanics, stair sequencing.  11/08/17 1500 Active    Acceptance E,D NR Reviewed HEP, correct gait mechanics, stair sequencing.  11/08/17 1200 Active    Acceptance E,D NR,VU  Educated on HEP, correct gait mechanics. LR 11/07/17 1815 Done    Acceptance E,D VU,NR Educated on weight bearing status, precautions, HEP, and correct gait mechanics. LR 11/07/17 1020 Done   Family Acceptance E,D NR,VU Educated on HEP, correct gait mechanics. LR 11/07/17 1815 Done    Acceptance E,D VU,NR Educated on weight bearing status, precautions, HEP, and correct gait mechanics. LR 11/07/17 1020 Done   Significant Other Acceptance E,D NR Reviewed HEP, benefits of activity, correct gait mechanics, stair sequencing.  11/08/17 1500 Active    Acceptance E,D NR,VU Educated on HEP, correct gait mechanics. LR 11/07/17 1815 Done               Point: Home exercise program (Active)    Learning Progress Summary    Learner Readiness Method Response Comment Documented by Status   Patient Acceptance E,D NR,VU Reviewed HEP, stair sequencing, correct gait mechanics.  11/09/17 1004 Done    Acceptance E,D NR Reviewed HEP, benefits of activity, correct gait mechanics, stair sequencing.  11/08/17 1500 Active    Acceptance E,D NR Reviewed HEP, correct gait mechanics, stair sequencing.  11/08/17 1200 Active    Acceptance E,D NR,VU Educated on HEP, correct gait mechanics. LR 11/07/17 1815 Done    Acceptance E,D VU,NR Educated on weight bearing status, precautions, HEP, and correct gait mechanics. LR 11/07/17 1020 Done   Family Acceptance E,D NR,VU Educated on HEP, correct gait mechanics. LR 11/07/17 1815 Done    Acceptance E,D VU,NR Educated on weight bearing status, precautions, HEP, and correct gait mechanics. LR 11/07/17 1020 Done   Significant Other Acceptance E,D NR Reviewed HEP, benefits of activity, correct gait mechanics, stair sequencing.  11/08/17 1500 Active    Acceptance E,D NR,VU Educated on HEP, correct gait mechanics. LR 11/07/17 1815 Done               Point: Body mechanics (Active)    Learning Progress Summary    Learner Readiness Method Response Comment Documented by Status   Patient Acceptance E,D NR,VU  Reviewed HEP, stair sequencing, correct gait mechanics.  11/09/17 1004 Done    Acceptance E,D NR Reviewed HEP, benefits of activity, correct gait mechanics, stair sequencing.  11/08/17 1500 Active    Acceptance E,D NR Reviewed HEP, correct gait mechanics, stair sequencing.  11/08/17 1200 Active    Acceptance E,D NR,VU Educated on HEP, correct gait mechanics. LR 11/07/17 1815 Done    Acceptance E,D VU,NR Educated on weight bearing status, precautions, HEP, and correct gait mechanics. LR 11/07/17 1020 Done   Family Acceptance E,D NR,VU Educated on HEP, correct gait mechanics. LR 11/07/17 1815 Done    Acceptance E,D VU,NR Educated on weight bearing status, precautions, HEP, and correct gait mechanics. LR 11/07/17 1020 Done   Significant Other Acceptance E,D NR Reviewed HEP, benefits of activity, correct gait mechanics, stair sequencing.  11/08/17 1500 Active    Acceptance E,D NR,VU Educated on HEP, correct gait mechanics. LR 11/07/17 1815 Done               Point: Precautions (Active)    Learning Progress Summary    Learner Readiness Method Response Comment Documented by Status   Patient Acceptance E,D NR,VU Reviewed HEP, stair sequencing, correct gait mechanics.  11/09/17 1004 Done    Acceptance E,D NR Reviewed HEP, benefits of activity, correct gait mechanics, stair sequencing.  11/08/17 1500 Active    Acceptance E,D NR Reviewed HEP, correct gait mechanics, stair sequencing.  11/08/17 1200 Active    Acceptance E,D NR,VU Educated on HEP, correct gait mechanics. LR 11/07/17 1815 Done    Acceptance E,D VU,NR Educated on weight bearing status, precautions, HEP, and correct gait mechanics. LR 11/07/17 1020 Done   Family Acceptance E,D NR,VU Educated on HEP, correct gait mechanics. LR 11/07/17 1815 Done    Acceptance E,D VU,NR Educated on weight bearing status, precautions, HEP, and correct gait mechanics. LR 11/07/17 1020 Done   Significant Other Acceptance E,D NR Reviewed HEP, benefits of activity, correct  gait mechanics, stair sequencing. LM 11/08/17 1500 Active    Acceptance E,D NR,VU Educated on HEP, correct gait mechanics. LR 11/07/17 1815 Done                      User Key     Initials Effective Dates Name Provider Type Discipline    LR 06/19/15 -  Estefania Hong, PT Physical Therapist PT    LM 06/09/17 -  Christina Larsen, PT Physical Therapist PT                    PT Recommendation and Plan  Anticipated Equipment Needs At Discharge:  (none)  Anticipated Discharge Disposition: home with assist, home with home health  Planned Therapy Interventions: balance training, bed mobility training, gait training, home exercise program, patient/family education, ROM (Range of Motion), stair training, strengthening, transfer training  PT Frequency: 2 times/day  Plan of Care Review  Plan Of Care Reviewed With: patient  Progress: progress toward functional goals as expected  Outcome Summary/Follow up Plan: Pt increased ambulation distance to 180 feet with CGA and RW, limited by pain and fatigue. L knee ROM 10-70 degrees. Pt anticipating home with outpatient PT in the home today.           Outcome Measures       11/09/17 0928 11/08/17 1354 11/08/17 1030    How much help from another person do you currently need...    Turning from your back to your side while in flat bed without using bedrails? 3  -LM 3  -LM 3  -LM    Moving from lying on back to sitting on the side of a flat bed without bedrails? 3  -LM 3  -LM 3  -LM    Moving to and from a bed to a chair (including a wheelchair)? 3  -LM 3  -LM 3  -LM    Standing up from a chair using your arms (e.g., wheelchair, bedside chair)? 3  -LM 3  -LM 3  -LM    Climbing 3-5 steps with a railing? 3  -LM 3  -LM 3  -LM    To walk in hospital room? 3  -LM 3  -LM 3  -LM    AM-PAC 6 Clicks Score 18  -LM 18  -LM 18  -LM    Functional Assessment    Outcome Measure Options AM-PAC 6 Clicks Basic Mobility (PT)  -LM AM-PAC 6 Clicks Basic Mobility (PT)  -LM AM-PAC 6 Clicks Basic Mobility (PT)   -LM      11/07/17 1432 11/07/17 0939 11/07/17 0932    How much help from another person do you currently need...    Turning from your back to your side while in flat bed without using bedrails? 3  -LR  3  -LR    Moving from lying on back to sitting on the side of a flat bed without bedrails? 3  -LR  3  -LR    Moving to and from a bed to a chair (including a wheelchair)? 3  -LR  3  -LR    Standing up from a chair using your arms (e.g., wheelchair, bedside chair)? 3  -LR  3  -LR    Climbing 3-5 steps with a railing? 2  -LR  2  -LR    To walk in hospital room? 3  -LR  3  -LR    AM-PAC 6 Clicks Score 17  -LR  17  -LR    How much help from another is currently needed...    Putting on and taking off regular lower body clothing?  3  -MC     Bathing (including washing, rinsing, and drying)  3  -MC     Toileting (which includes using toilet bed pan or urinal)  3  -MC     Putting on and taking off regular upper body clothing  3  -MC     Taking care of personal grooming (such as brushing teeth)  4  -MC     Eating meals  4  -MC     Score  20  -MC     Functional Assessment    Outcome Measure Options AM-PAC 6 Clicks Basic Mobility (PT)  -LR AM-PAC 6 Clicks Daily Activity (OT)  -MC AM-PAC 6 Clicks Basic Mobility (PT)  -LR      User Key  (r) = Recorded By, (t) = Taken By, (c) = Cosigned By    Initials Name Provider Type    LR Estefania Hong, PT Physical Therapist    NATALIA Park, OT Occupational Therapist    GEORGIE Larsen, ISMAEL Physical Therapist           Time Calculation:         PT Charges       11/09/17 1006          Time Calculation    Start Time 0928  -LM      PT Received On 11/09/17  -LM      PT Goal Re-Cert Due Date 11/17/17  -LM      Time Calculation- PT    Total Timed Code Minutes- PT 28 minute(s)  -LM        User Key  (r) = Recorded By, (t) = Taken By, (c) = Cosigned By    Initials Name Provider Type    GEORGIE Larsen, ISMAEL Physical Therapist          Therapy Charges for Today     Code Description Service  Date Service Provider Modifiers Qty    75167371772 HC GAIT TRAINING EA 15 MIN 11/8/2017 Christina Larsen, PT GP 1    72015174481 HC PT THER PROC EA 15 MIN 11/8/2017 Christina Larsen, PT GP 2    06341227899 HC PT THER SUPP EA 15 MIN 11/8/2017 Christina Larsen, PT GP 2    62392298971 HC GAIT TRAINING EA 15 MIN 11/8/2017 Christina Larsen, PT GP 1    60299260806 HC PT THER PROC EA 15 MIN 11/8/2017 Christina Larsen, PT GP 2    47653297586 HC PT THER SUPP EA 15 MIN 11/8/2017 Christina Larsen, PT GP 2    24170102884 HC GAIT TRAINING EA 15 MIN 11/9/2017 Christina Larsen, PT GP 1    17989980815 HC PT THER PROC EA 15 MIN 11/9/2017 Christina Larsen, PT GP 1    38645104338 HC PT THER SUPP EA 15 MIN 11/9/2017 Christina Larsen, PT GP 2          PT G-Codes  Outcome Measure Options: AM-PAC 6 Clicks Basic Mobility (PT)    Christina Larsen, PT  11/9/2017

## 2017-11-09 NOTE — DISCHARGE INSTRUCTIONS
You have a prineo dressing in place. This dressing is waterproof and you may shower with it in place. It will remain in place for 2 weeks.     You have an On-Q ball to assist with pain control. If you have any questions, problems, or concerns please call the phone number provided on your blue bracelet.     [facility] program will provide you with physical therapy.

## 2017-11-09 NOTE — CONSULTS
F/U completed with patient and a Contour next meter provided. Meter education. Instructed to check expiration date and safe storage of glucose test strips, how to do a control test, prepare lancing device, obtain a sample, and perform test, safe disposal of lancets.  Testing frequency per MD instructions, provided general guidelines on target blood glucose, advised patient to discuss personal targets for glucose at next visit.  Record keeping discussed. Pt was able to return demonstration using teach back method. Discussed and demonstrated how to log test result.  Urged to call [phone] number on back of meter if have any difficulties, complete the warranty card and mail.  Urged patient to obtain prescriptions for test strips and lancets from provider.

## 2017-11-09 NOTE — PLAN OF CARE
Problem: Patient Care Overview (Adult)  Goal: Plan of Care Review  Outcome: Ongoing (interventions implemented as appropriate)    11/09/17 0413   Coping/Psychosocial Response Interventions   Plan Of Care Reviewed With patient   Patient Care Overview   Progress improving   Outcome Evaluation   Outcome Summary/Follow up Plan Pt advised to take pain meds to be ahead of the pain instead of letting it get too bad that it's harder to control

## 2017-11-09 NOTE — PLAN OF CARE
Problem: Patient Care Overview (Adult)  Goal: Plan of Care Review  Outcome: Ongoing (interventions implemented as appropriate)    11/09/17 1004   Coping/Psychosocial Response Interventions   Plan Of Care Reviewed With patient   Patient Care Overview   Progress progress toward functional goals as expected   Outcome Evaluation   Outcome Summary/Follow up Plan Pt increased ambulation distance to 180 feet with CGA and RW, limited by pain and fatigue. L knee ROM 10-70 degrees. Pt anticipating home with outpatient PT in the home today.          Problem: Inpatient Physical Therapy  Goal: Bed Mobility Goal LTG- PT  Outcome: Ongoing (interventions implemented as appropriate)    11/07/17 1432 11/09/17 1004   Bed Mobility PT LTG   Bed Mobility PT LTG, Date Established 11/07/17 --    Bed Mobility PT LTG, Time to Achieve 3 days --    Bed Mobility PT LTG, Activity Type supine to sit/sit to supine --    Bed Mobility PT LTG, [place] Level conditional independence --    Bed Mobility PT LTG, Date Goal Reviewed --  11/09/17   Bed Mobility PT LTG, Outcome --  goal ongoing       Goal: Transfer Training Goal 1 LTG- PT  Outcome: Ongoing (interventions implemented as appropriate)    11/07/17 1432 11/09/17 1004   Transfer Training PT LTG   Transfer Training PT LTG, Date Established 11/07/17 --    Transfer Training PT LTG, Time to Achieve 3 days --    Transfer Training PT LTG, Activity Type sit to stand/stand to sit --    Transfer Training PT LTG, [place] Level conditional independence --    Transfer Training PT LTG, Assist Device walker, rolling --    Transfer Training PT LTG, Date Goal Reviewed --  11/09/17   Transfer Training PT LTG, Outcome --  goal ongoing       Goal: Gait Training Goal LTG- PT  Outcome: Ongoing (interventions implemented as appropriate)    11/07/17 1432 11/09/17 1004   Gait Training PT LTG   Gait Training Goal PT LTG, Date Established 11/07/17 --    Gait Training Goal PT LTG, Time to Achieve 3 days --    Gait  Training Goal PT LTG, [place] Level conditional independence --    Gait Training Goal PT LTG, Assist Device walker, rolling --    Gait Training Goal PT LTG, Distance to Achieve 500 feet --    Gait Training Goal PT LTG, Date Goal Reviewed --  11/09/17   Gait Training Goal PT LTG, Outcome --  goal ongoing       Goal: Range of Motion Goal LTG- PT  Outcome: Ongoing (interventions implemented as appropriate)    11/07/17 1432 11/09/17 1004   Range of Motion PT LTG   Range of Motion Goal PT LTG, Date Established 11/07/17 --    Range of Motion Goal PT LTG, Time to Achieve 3 days --    Range fo Motion Goal PT LTG, Joint L knee --    Range of Motion Goal PT LTG, AAROM Measure 0-90 degrees --    Range of Motion Goal PT LTG, Date Goal Reviewed --  11/09/17   Range of Motion Goal PT LTG, Outcome --  goal ongoing

## 2017-11-09 NOTE — THERAPY DISCHARGE NOTE
Acute Care - Occupational Therapy Treatment Note/Discharge   [place]     Patient Name: Jeremie Wynn  : 1945  MRN: [id number]  Today's Date: 2017  Onset of Illness/Injury or Date of Surgery Date: 17  Date of Referral to OT: 17  Referring Physician: MD Addison      Admit Date: 2017    Visit Dx:     ICD-10-CM ICD-9-CM   1. Impaired mobility and ADLs Z74.09 799.89   2. Impaired functional mobility, balance, gait, and endurance Z74.09 V49.89   3. Arthritis of left knee M17.12 716.96   4. S/P total knee arthroplasty, left Z96.652 V43.65   5. Generalized osteoarthritis M15.9 715.00     Patient Active Problem List   Diagnosis   • Diverticulitis   • Anemia   • Atrial fibrillation   • Bradycardia   • Generalized osteoarthritis   • Hyperlipemia   • Hypertension   • PVC's (premature ventricular contractions)   • Arthralgia of hip   • Dyspnea on exertion   • Obesity   • Arthritis of left knee   • S/P total knee arthroplasty, left   • HLD (hyperlipidemia)   • Prediabetes   • Acute blood loss anemia, mild, asymptomatic   • Leukocytosis, likely reactive             Adult Rehabilitation Note       17 1400 17 0928 17 1354    Rehab Assessment/Intervention    Discipline occupational therapist   actual start time was 11:17am  -SUBHA physical therapist  -LM physical therapist  -LM    Document Type discharge summary;therapy note (daily note)  -SUBHA therapy note (daily note)  -LM therapy note (daily note)  -LM    Subjective Information agree to therapy;no complaints  -SUBHA agree to therapy;complains of;pain;fatigue  -LM agree to therapy;complains of;pain  -LM    Patient Effort, Rehab Treatment good  -SUBHA good  -LM good  -LM    Symptoms Noted During/After Treatment increased pain  -SUBHA fatigue;increased pain  -LM fatigue;increased pain;shortness of breath  -LM    Precautions/Limitations  other (see comments)   L adductor nerve canal cath  -LM other (see comments)   L adductor nerve canal cath  -LM     Specific Treatment Considerations L adductor canal nerve cath  -SUBHA      Recorded by [SUBHA] iRta Brewer, OT [LM] Christina Larsen, PT [LM] Christina Larsen PT    Pain Assessment    Pain Assessment 0-10  -SUBHA 0-10  -LM 0-10  -LM    Pain Score 4  -SUBHA 7  -LM 7  -LM    Post Pain Score 4  -SUBHA 9  -LM 10  -LM    Pain Type Acute pain  -SUBHA Acute pain  -LM Acute pain  -LM    Pain Location Knee  -SUBHA Knee  -LM Knee  -LM    Pain Orientation Left  -SUBHA Left  -LM Left;Anterior;Posterior  -LM    Pain Intervention(s) Repositioned   nerve cath/On cue  -SUBHA Repositioned;Ambulation/increased activity  -LM Repositioned;Ambulation/increased activity  -LM    Recorded by [SUBHA] Rita Brewer, OT [LM] Christina Larsen, PT [LM] Christina Larsen PT    Vision Assessment/Intervention    Visual Impairment WFL  -SUBHA      Recorded by [SUBHA] Rita Brewer OT      Cognitive Assessment/Intervention    Current Cognitive/Communication Assessment functional  -SUBHA functional  -LM functional  -LM    Orientation Status oriented x 4  -SUBHA oriented x 4  -LM oriented x 4  -LM    Follows Commands/Answers Questions 100% of the time;able to follow single-step instructions  -SUBHA 100% of the time;able to follow single-step instructions;needs cueing  -% of the time;able to follow single-step instructions;needs cueing;needs repetition  -LM    Personal Safety WNL/WFL  -SUBHA WNL/WFL  -LM WNL/WFL  -LM    Personal Safety Interventions gait belt;nonskid shoes/slippers when out of bed;supervised activity  -SUBHA gait belt;nonskid shoes/slippers when out of bed;supervised activity  -LM gait belt;nonskid shoes/slippers when out of bed;supervised activity  -LM    Recorded by [SUBHA] Rita Brewer, OT [LM] Christina Larsen, PT [LM] Christina Larsen PT    General LE Assessment    ROM Detail  L knee ROM 10-70 degrees; pt lacking 10 degrees from full extension; AAROM flexion measured in sitting  -LM     Recorded by  [LM] Christina Larsen PT     Mobility Assessment/Training    Extremity  Weight-Bearing Status  left lower extremity  -LM left lower extremity  -LM    Left Lower Extremity Weight-Bearing  weight-bearing as tolerated  -LM weight-bearing as tolerated  -LM    Recorded by  [LM] Christina Larsen, PT [LM] Christina Larsen, PT    Bed Mobility, Assessment/Treatment    Bed Mobility, Assistive Device  bed rails;head of bed elevated  -LM     Bed Mob, Supine to Sit, [place]  contact guard assist;verbal cues required  -LM not tested   Pt received standing in bathroom  -LM    Bed Mob, Sit to Supine, [place]  not tested   Pt left UIC  -LM not tested   Pt left UIC  -LM    Bed Mobility, Safety Issues  decreased use of legs for bridging/pushing  -LM     Bed Mobility, Impairments  ROM decreased;strength decreased;pain  -LM     Bed Mobility, Comment Pt. UIC on arrival.  Pt. reports he has been indep getting self in and out of bed with leg .  -SUBHA Pt utilized leg  to move LLE off EOB. No physical assist required.  -LM     Recorded by [SUBHA] Rita Brewer, OT [LM] Christina Larsen, PT [LM] Christina Larsen, PT    Transfer Assessment/Treatment    Transfers, Sit-Stand [place] supervision required  -SUBHA contact guard assist;verbal cues required  -LM contact guard assist;verbal cues required  -LM    Transfers, Stand-Sit [place] supervision required  -SUBHA contact guard assist;verbal cues required  -LM contact guard assist;verbal cues required  -LM    Transfers, Sit-Stand-Sit, Assist Device rolling walker  -SUBHA rolling walker  -LM rolling walker  -LM    Toilet Transfer, [place] supervision required   pt. stood at toilet and urinated.  -SUBHA      Toilet Transfer, Assistive Device rolling walker  -SUBHA      Transfer, Safety Issues  sequencing ability decreased;weight-shifting ability decreased;step length decreased  -LM sequencing ability decreased;step length decreased;weight-shifting ability decreased  -LM    Transfer, Impairments  ROM decreased;strength decreased;pain  -LM ROM  decreased;strength decreased;pain  -LM    Transfer, Comment good technique with no cues needed.  -SUBHA Verbal cues for correct hand placement. Pt experienced posterior LOB when attempting to stand, returned to sitting and self-adjusted LEs for correct placement. Cues to step out LLE prior to t/f.  -LM Verbal cues for correct hand placement, cues to step out LLE prior to t/f.  -LM    Recorded by [SUBHA] Rita Brewer OT [LM] Christina Larsen, PT [LM] Christina Larsen, PT    Gait Assessment/Treatment    Gait, [place] Level  contact guard assist;verbal cues required  -LM contact guard assist;verbal cues required  -LM    Gait, Assistive Device  rolling walker  -LM rolling walker  -LM    Gait, Distance (Feet)  180  -  -LM    Gait, Gait Pattern Analysis  swing-through gait  -LM swing-through gait  -LM    Gait, Gait Deviations  left:;antalgic;marysol decreased;forward flexed posture;step length decreased;weight-shifting ability decreased  -LM left:;antalgic;marysol decreased;decreased heel strike;forward flexed posture;step length decreased;weight-shifting ability decreased  -LM    Gait, Safety Issues  sequencing ability decreased;step length decreased;weight-shifting ability decreased  -LM sequencing ability decreased;step length decreased;weight-shifting ability decreased  -LM    Gait, Impairments  ROM decreased;strength decreased;pain  -LM ROM decreased;strength decreased;pain  -LM    Gait, Comment  Pt ambulated with step-through pattern at slow but improved pace. Pt demo continuous movement of RW and smoother gait pattern. Pt required x3 standing rest breaks due to SOA. Gait distance limited by fatigue and pain.  -LM Pt ambulated with step-through pattern at slow pace. Verbal cues for continuous rolling of RW, upright posture, increasing LLE weight-bearing. Pt required x3 standing rest breaks due to SOA. Gait distance and mechanics limited due to fatigue.  -LM    Recorded by  [LM] Christina Larsen, PT [LM] Christina ELDRIDGE  Chava PT    Stairs Assessment/Treatment    Number of Stairs  5  -LM 5  -LM    Stairs, Handrail Location  both sides  -LM both sides  -LM    Stairs, [place] Level  contact guard assist;verbal cues required  -LM contact guard assist;verbal cues required  -LM    Stairs, Technique Used  step to step (descending);step to step (ascending)  -LM step to step (descending);step to step (ascending)  -LM    Stairs, Safety Issues  balance decreased during turns;sequencing ability decreased;weight-shifting ability decreased  -LM sequencing ability decreased;weight-shifting ability decreased  -LM    Stairs, Impairments  ROM decreased;strength decreased;pain  -LM ROM decreased;strength decreased;pain  -LM    Stairs, Comment  Pt verbalized understanding of stair sequencing. Demo proper technique. Verbal cues to not place elbows on rail for WBing.   -LM Cues for stair sequencing. Cues to not rest elbow on handrail when ascended stairs.   -LM    Recorded by  [LM] Christina Larsen PT [LM] Christina Larsen PT    Functional Mobility    Functional Mobility- Ind. Level supervision required  -SUBHA      Functional Mobility- Device rolling walker  -SUBHA      Functional Mobility-Distance (Feet) 20  -SUBHA      Functional Mobility- Safety Issues step length decreased  -SUBHA      Functional Mobility- Comment to bathroom and back to chair  -SUBHA      Recorded by [SUBHA] Rita Brewer OT      Lower Body Dressing Assessment/Training    LB Dressing Assess/Train, Clothing Type doffing:;donning:;shoes  -SUBHA      LB Dressing Assess/Train, Assist Device long-handled shoe horn  -SUBHA      LB Dressing Assess/Train, Position supported sitting  -SUBHA      LB Dressing Assess/Train, [place] moderate assist (50% patient effort)  -SUBHA      LB Dressing Assess/Train, Impairments ROM decreased  -SUBHA      LB Dressing Assess/Train, Comment Pt. educated on how to use reacher and shoe horn to yadira shoes.  Pt. with velcro shoes.  Had to be totally undone for pt.'s foot to  fit in shoe.  Pt. unable to reach for straps.  Educ how to sew on loop to end and use tip of reacher to fasten.    -SUBHA      Recorded by [SUBHA] Rita Brewer OT      Toileting Assessment/Training    Toileting Assess/Train, Position standing  -SUBHA      Toileting Assess/Train, Indepen Level supervision required  -SUBHA      Toileting Assess/Train, Comment stood and urinated at toilet with indep clothing management and balance.  -SUBHA      Recorded by [SUBHA] Rita Brewer OT      Balance Skills Training    Sitting-Level of Assistance Independent  -SUBHA      Sitting-Balance Support Feet supported  -SUBHA      Standing-Level of Assistance Distant supervision  -SUBHA      Static Standing Balance Support assistive device  -SUBHA      Gait Balance-Level of Assistance Close supervision  -SUBHA      Gait Balance Support assistive device  -SUBHA      Recorded by [SUBHA] Rita Brewer OT      Therapy Exercises    Exercise Protocols  total knee  -LM total knee  -LM    Total Knee Exercises  left:;15 reps;completed protocol;with assist;ankle pumps/circles;quad set;glut set;heel slide stretch;SLR;SAQ;LAQ   Jorge SLR  -LM left:;15 reps;completed protocol;with assist;ankle pumps/circles;quad set;glut set;heel slide stretch;SLR;SAQ;LAQ   cues for technique; Jorge SLR, LAQ, SAQ  -LM    Recorded by  [LM] Christina Larsen PT [LM] Christina Larsen, PT    Positioning and Restraints    Pre-Treatment Position sitting in chair/recliner  -SUBHA in bed  -LM bathroom  -LM    Post Treatment Position chair  -SUBHA chair  -LM chair  -LM    In Chair reclined;call light within reach;encouraged to call for assist;legs elevated  -SUBHA notified nsg;reclined;sitting;call light within reach;encouraged to call for assist;legs elevated  -LM notified nsg;reclined;sitting;call light within reach;encouraged to call for assist;with family/caregiver;legs elevated  -LM    Recorded by [SUBHA] Rita Brewer OT [LM] Christina Larsen PT [LM] Christina Larsen PT      11/08/17 1030 11/07/17 9782        Rehab Assessment/Intervention    Discipline physical therapist  -LM physical therapist  -LR     Document Type therapy note (daily note)  -LM therapy note (daily note)  -LR     Subjective Information agree to therapy;complains of;pain  -LM agree to therapy;complains of;pain  -LR     Patient Effort, Rehab Treatment good  -LM good  -LR     Symptoms Noted During/After Treatment fatigue;increased pain;shortness of breath  -LM increased pain;fatigue;shortness of breath  -LR     Symptoms Noted Comment RN aware  -LM Notified RN.   -LR     Precautions/Limitations other (see comments)   L adductor nerve canal cath  -LM fall precautions;other (see comments)   L adductor canal nerve catheter  -LR     Recorded by [LM] Christina Larsen, PT [LR] Estefania Hong, PT     Pain Assessment    Pain Assessment 0-10  -LM 0-10  -LR     Pain Score 7  -LM 7  -LR     Post Pain Score 10   reported 12/10  -LM 8  -LR     Pain Type Acute pain  -LM Acute pain  -LR     Pain Location Knee  -LM Knee  -LR     Pain Orientation Left;Anterior;Posterior  -LM Left;Anterior;Posterior  -LR     Pain Intervention(s) Repositioned;Ambulation/increased activity  -LM Repositioned;Ambulation/increased activity  -LR     Recorded by [LM] Christina Larsen, PT [LR] Estefania Hong, PT     Cognitive Assessment/Intervention    Current Cognitive/Communication Assessment functional  -LM functional  -LR     Orientation Status oriented x 4  -LM oriented x 4;required verbal cueing (specifiy in comments)  -LR     Follows Commands/Answers Questions 100% of the time;able to follow single-step instructions;needs cueing;needs repetition  -% of the time;able to follow single-step instructions;needs cueing;needs repetition  -LR     Personal Safety WNL/WFL  -LM WNL/WFL  -LR     Personal Safety Interventions gait belt;nonskid shoes/slippers when out of bed;supervised activity  -LM      Recorded by [LM] Christina Larsen, PT [LR] Estefania Hong, PT     General LE  Assessment    ROM Detail L knee ROM 9-68 degrees; pt lacking 9 degrees from full extension; AAROM flexion measured in sitting  -LM 11-67 degrees; lacking 11 degrees of extension AROM, 67 degrees flexion AAROM in sitting.   -LR     Recorded by [LM] Christina Larsen PT [LR] Estefania Hong, PT     Mobility Assessment/Training    Extremity Weight-Bearing Status left lower extremity  -LM left lower extremity  -LR     Left Lower Extremity Weight-Bearing weight-bearing as tolerated  -LM weight-bearing as tolerated  -LR     Recorded by [LM] Christina Larsen, PT [LR] Estefania Hong, PT     Bed Mobility, Assessment/Treatment    Bed Mobility, Assistive Device bed rails;head of bed elevated;leg   -LM head of bed elevated;bed rails  -LR     Bed Mob, Supine to Sit, [place] contact guard assist;verbal cues required  -LM verbal cues required;minimum assist (75% patient effort)  -LR     Bed Mob, Sit to Supine, [place] not tested   Pt left UIC  -LM verbal cues required;minimum assist (75% patient effort)  -LR     Bed Mobility, Safety Issues decreased use of legs for bridging/pushing  -LM decreased use of legs for bridging/pushing  -LR     Bed Mobility, Impairments ROM decreased;strength decreased;pain  -LM ROM decreased;strength decreased;pain  -LR     Bed Mobility, Comment Pt utilized leg  at LLE to move off EOB. No physical assist required. Cues for sequencing.  -LM Verbal cues for correct sequencing of t/f in and out of bed. Min assist required with L LE.   -LR     Recorded by [LM] Christina Larsen, PT [LR] Estefania Hong, PT     Transfer Assessment/Treatment    Transfers, Sit-Stand [place] contact guard assist;verbal cues required  -LM verbal cues required;contact guard assist  -LR     Transfers, Stand-Sit [place] contact guard assist;verbal cues required  -LM verbal cues required;contact guard assist  -LR     Transfers, Sit-Stand-Sit, Assist Device rolling walker;elevated  surface  -LM rolling walker  -LR     Toilet Transfer, [place] supervision required;verbal cues required  -LM      Toilet Transfer, Assistive Device rolling walker  -LM      Transfer, Safety Issues sequencing ability decreased;step length decreased;weight-shifting ability decreased  -LM sequencing ability decreased;step length decreased;weight-shifting ability decreased  -LR     Transfer, Impairments ROM decreased;strength decreased;pain  -LM ROM decreased;strength decreased;pain  -LR     Transfer, Comment Verbal cues for correct hand placement and step out with LLE prior to t/f. Assisted pt to bathroom; pt independent with standing balance.  -LM Verbal cues for correct hand placement with t/f and to step L LE out before t/f for comfort. Assisted to and from bathroom to stand and void. Required increased time to stand from EOB.   -LR     Recorded by [LM] Christina Larsen, PT [LR] Estefania Hong, PT     Gait Assessment/Treatment    Gait, [place] Level contact guard assist;verbal cues required  -LM verbal cues required;contact guard assist  -LR     Gait, Assistive Device rolling walker  -LM rolling walker  -LR     Gait, Distance (Feet) 100  -  -LR     Gait, Gait Pattern Analysis swing-through gait  -LM swing-through gait  -LR     Gait, Gait Deviations left:;antalgic;marysol decreased;decreased heel strike;forward flexed posture;step length decreased;weight-shifting ability decreased  -LM left:;antalgic;forward flexed posture;step length decreased;toe-to-floor clearance decreased;weight-shifting ability decreased  -LR     Gait, Safety Issues sequencing ability decreased;step length decreased;weight-shifting ability decreased  -LM sequencing ability decreased;step length decreased;weight-shifting ability decreased  -LR     Gait, Impairments ROM decreased;strength decreased;pain  -LM ROM decreased;strength decreased;pain  -LR     Gait, Comment Pt ambulated with step-through pattern at slow pace.  Verbal cues for upright posture, and increase LLE weight bearing. Pt required x1 standing break in which pt leaned down onto RW with elbows despite cues for safety. Pt fatigued quickly and bedside chair brought behind patient.   -LM Patient ambulated with step through gait pattern at slow pace and forward flexed posture. Cues for upright posture, shoulders back, decreased UE weight bearing, and increased L LE weight bearing. Patient with increased antalgia this PM compared to this AM. Required a few brief standing rest breaks. Patient continues to maintain L knee slightly flexed throughout gait cycle. Cues for increased L knee extension during stance phase. Gait limited by pain and fatigue.   -LR     Recorded by [LM] Christina Larsen PT [LR] Estefania Hong, PT     Stairs Assessment/Treatment    Number of Stairs 5  -LM      Stairs, Handrail Location both sides  -      Stairs, [place] Level contact guard assist;verbal cues required  -      Stairs, Technique Used step to step (ascending);step to step (descending)  -      Stairs, Safety Issues sequencing ability decreased;weight-shifting ability decreased  -      Stairs, Impairments ROM decreased;strength decreased;pain  -      Stairs, Comment Verbal cues for stair sequencing; pt ascended leading with RLE, descended leading with LLE.  -LM      Recorded by [LM] Christina Larsen, ISMAEL      Therapy Exercises    Exercise Protocols total knee  -LM total knee  -LR     Total Knee Exercises left:;15 reps;completed protocol;with assist;ankle pumps/circles;quad set;glut set;heel slide stretch;SLR;SAQ;LAQ   Jorge SLR, LAQ, SAQ  -LM left:;15 reps;completed protocol;with assist;ankle pumps/circles;quad set;glut set;heel slide stretch;SLR;SAQ;hip abduction/adduction;LAQ   cues for technique;min assist SLR,SAQ,LAQ, hip abd  -LR     Recorded by [LM] Christina Larsen, PT [LR] Estefania Hong, PT     Positioning and Restraints    Pre-Treatment Position in bed  -  in bed  -LR     Post Treatment Position chair  -LM bed  -LR     In Bed  notified nsg;supine;call light within reach;encouraged to call for assist;exit alarm on;with family/caregiver;side rails up x2  -LR     In Chair notified nsg;reclined;sitting;call light within reach;encouraged to call for assist;legs elevated  -LM      Recorded by [LM] Christina Larsen, PT [LR] Estefania Hong, PT       User Key  (r) = Recorded By, (t) = Taken By, (c) = Cosigned By    Initials Name Effective Dates    SUBHA Rita Medina Osman, OT 06/22/15 -     LR Estefania Hong, PT 06/19/15 -     LM Christina Larsen, PT 06/09/17 -                 OT Goals       11/09/17 1419 11/07/17 1024       Bed Mobility OT LTG    Bed Mobility OT LTG, Date Established  11/07/17  -     Bed Mobility OT LTG, Time to Achieve  2 wks  -     Bed Mobility OT LTG, Activity Type  all bed mobility  -     Bed Mobility OT LTG, [place] Level  contact guard assist  -     Bed Mobility OT LTG, Outcome goal met   met per pt. report, not observed this date.  -SUBHA goal ongoing  -     Transfer Training OT LTG    Transfer Training OT LTG, Date Established  11/07/17  -     Transfer Training OT LTG, Time to Achieve  2 wks  -     Transfer Training OT LTG, Activity Type  sit to stand/stand to sit;toilet  -     Transfer Training OT LTG, [place] Level  supervision required  -     Transfer Training OT LTG, Assist Device  commode, bedside;walker, rolling  -     Transfer Training OT LTG, Outcome goal met  -SUBHA goal ongoing  -     Patient Education OT LTG    Patient Education OT LTG, Date Established  11/07/17  -     Patient Education OT LTG, Time to Achieve  2 wks  -     Patient Education OT LTG, Education Type  precautions per surgeon;1 hand/jc technique;home safety;adaptive equipment mgmt  -     Patient Education OT LTG, Education Understanding  verbalizes understanding;demonstrates adequately  -     Patient Education OT LTG Outcome goal met   -SUBHA goal ongoing  -     LB Dressing OT LTG    LB Dressing Goal OT LTG, Date Established  11/07/17  -     LB Dressing Goal OT LTG, Time to Achieve  2 wks  -     LB Dressing Goal OT LTG, [place] Level  supervision required  -     LB Dressing Goal OT LTG, Adaptive Equipment  reacher;sock-aid  -     LB Dressing Goal OT LTG, Outcome goal partially met   met socks and pants only, shoes no approp. needs adapted.  -SUBHA goal ongoing  -     LB Dressing Goal OT LTG, Reason Goal Not Met discharged from facility  -        User Key  (r) = Recorded By, (t) = Taken By, (c) = Cosigned By    Initials Name Provider Type    SUBHA Rita Brewer, OT Occupational Therapist     Rosie [place], OT Occupational Therapist          Occupational Therapy Education     Title: PT OT SLP Therapies (Active)     Topic: Occupational Therapy (Active)     Point: ADL training (Done)    Description: Instruct learner(s) on proper safety adaptation and remediation techniques during self care or transfers.   Instruct in proper use of assistive devices.    Learning Progress Summary    Learner Readiness Method Response Comment Documented by Status   Patient Acceptance EHOLLI Pt. educated on AE for ADL tasks specifically dressing, safety with wx in shower transfer, getting in and out of high bed and recliner.  To have HH therapist assist in and out of shower and very high bed first attempt. (Pt. plans to sleep in recliner).  11/09/17 1417 Done    Acceptance E,TB,D,H VU,NR   11/07/17 1024 Done   Family Acceptance E,TB,D,H VU,NR   11/07/17 1024 Done               Point: Precautions (Done)    Description: Instruct learner(s) on prescribed precautions during self-care and functional transfers.    Learning Progress Summary    Learner Readiness Method Response Comment Documented by Status   Patient Acceptance E,D VU Pt. educated on AE for ADL tasks specifically dressing, safety with wx in shower transfer, getting in and out of high bed and  recliner.  To have HH therapist assist in and out of shower and very high bed first attempt. (Pt. plans to sleep in recliner).  11/09/17 1417 Done    Acceptance E,D [facility] 11/09/17 0413 Done               Point: Body mechanics (Done)    Description: Instruct learner(s) on proper positioning and spine alignment during self-care, functional mobility activities and/or exercises.    Learning Progress Summary    Learner Readiness Method Response Comment Documented by Status   Patient Acceptance E,D VU   11/09/17 0413 Done    Acceptance E,TB,D,H VU,NR   11/07/17 1024 Done   Family Acceptance E,TB,D,H VU,NR   11/07/17 1024 Done                      User Key     Initials Effective Dates Name Provider Type Discipline     06/22/15 -  Rita Brewer, OT Occupational Therapist OT     03/14/16 -  Rosie Park, OT Occupational Therapist OT     09/16/16 -  Key Barton, RN Registered Nurse Nurse                OT Recommendation and Plan  Anticipated Equipment Needs At Discharge:  (TBD)  Anticipated Discharge Disposition: home with assist, home with home health, home with outpatient services  Planned Therapy Interventions: adaptive equipment training, ADL retraining, balance training, bed mobility training, strengthening, transfer training  Therapy Frequency: daily  Plan of Care Review  Plan Of Care Reviewed With: patient  Progress: improving  Outcome Summary/Follow up Plan: Pt. increased indep with transfers and safety.  Pt. educated on home safety techniques and how to use AE and adapt shoes to be able to yadira and doff.  Educ on bathroom safety and techniques to get into tall bed.  Plans home with OP vs HH PT today.          Outcome Measures       11/09/17 0928 11/09/17 0907 11/08/17 1354    How much help from another person do you currently need...    Turning from your back to your side while in flat bed without using bedrails? 3  -LM  3  -LM    Moving from lying on back to sitting on the side of a flat bed without  bedrails? 3  -LM  3  -LM    Moving to and from a bed to a chair (including a wheelchair)? 3  -LM  3  -LM    Standing up from a chair using your arms (e.g., wheelchair, bedside chair)? 3  -LM  3  -LM    Climbing 3-5 steps with a railing? 3  -LM  3  -LM    To walk in hospital room? 3  -LM  3  -LM    AM-PAC 6 Clicks Score 18  -LM  18  -LM    How much help from another is currently needed...    Putting on and taking off regular lower body clothing?  3  -SUBHA     Bathing (including washing, rinsing, and drying)  3  -SUBHA     Toileting (which includes using toilet bed pan or urinal)  4  -SUBHA     Putting on and taking off regular upper body clothing  4  -SUBHA     Taking care of personal grooming (such as brushing teeth)  4  -SUBHA     Eating meals  4  -SUBHA     Score  22  -SUBHA     Functional Assessment    Outcome Measure Options AM-PAC 6 Clicks Basic Mobility (PT)  -LM AM-PAC 6 Clicks Daily Activity (OT)  -SUBHA AM-PAC 6 Clicks Basic Mobility (PT)  -LM      11/08/17 1030 11/07/17 1432 11/07/17 0939    How much help from another person do you currently need...    Turning from your back to your side while in flat bed without using bedrails? 3  -LM 3  -LR     Moving from lying on back to sitting on the side of a flat bed without bedrails? 3  -LM 3  -LR     Moving to and from a bed to a chair (including a wheelchair)? 3  -LM 3  -LR     Standing up from a chair using your arms (e.g., wheelchair, bedside chair)? 3  -LM 3  -LR     Climbing 3-5 steps with a railing? 3  -LM 2  -LR     To walk in hospital room? 3  -LM 3  -LR     AM-PAC 6 Clicks Score 18  -LM 17  -LR     How much help from another is currently needed...    Putting on and taking off regular lower body clothing?   3  -MC    Bathing (including washing, rinsing, and drying)   3  -MC    Toileting (which includes using toilet bed pan or urinal)   3  -MC    Putting on and taking off regular upper body clothing   3  -MC    Taking care of personal grooming (such as brushing teeth)   4  -MC     Eating meals   4  -    Score   20  -MC    Functional Assessment    Outcome Measure Options AM-PAC 6 Clicks Basic Mobility (PT)  -LM AM-PAC 6 Clicks Basic Mobility (PT)  -LR AM-PAC 6 Clicks Daily Activity (OT)  -      11/07/17 0932          How much help from another person do you currently need...    Turning from your back to your side while in flat bed without using bedrails? 3  -LR      Moving from lying on back to sitting on the side of a flat bed without bedrails? 3  -LR      Moving to and from a bed to a chair (including a wheelchair)? 3  -LR      Standing up from a chair using your arms (e.g., wheelchair, bedside chair)? 3  -LR      Climbing 3-5 steps with a railing? 2  -LR      To walk in hospital room? 3  -LR      AM-PAC 6 Clicks Score 17  -LR      Functional Assessment    Outcome Measure Options AM-PAC 6 Clicks Basic Mobility (PT)  -LR        User Key  (r) = Recorded By, (t) = Taken By, (c) = Cosigned By    Initials Name Provider Type    SUBHA Brewer OT Occupational Therapist    LR Estefania Hong, PT Physical Therapist     Rosie Park, OT Occupational Therapist    LM Christina Larsen, PT Physical Therapist           Time Calculation:          Time Calculation- OT       11/09/17 1422          Time Calculation- OT    OT Start Time 1117  -SUBHA      Total Timed Code Minutes- OT 27 minute(s)  -SUBHA      OT Received On 11/09/17  -SUBHA      OT Goal Re-Cert Due Date 11/17/17  -SUBHA        User Key  (r) = Recorded By, (t) = Taken By, (c) = Cosigned By    Initials Name Provider Type    SUBHA Brewer OT Occupational Therapist          Therapy Charges for Today     Code Description Service Date Service Provider Modifiers Qty    [id number]  OT THERAPEUTIC ACT EA 15 MIN 11/9/2017 Rita Brewer OT GO 2               OT Discharge Summary  Anticipated Discharge Disposition: home with assist, home with home health, home with outpatient services  Reason for Discharge: Discharge from facility  Outcomes  Achieved: Patient able to partially acheive established goals  Discharge Destination: Home with assist, Home with home health, Home with outpatient services    Rita Brewer OT  11/9/2017

## 2017-11-09 NOTE — PROGRESS NOTES
"Jeremie Wynn  [id number]  1945    /65  Pulse 73  Temp 99.1 °F (37.3 °C) (Oral)   Resp 16  Ht 70\" (177.8 cm)  Wt 247 lb (112 kg)  SpO2 92%  BMI 35.44 kg/m2    Lab Results (last 24 hours)     Procedure Component Value Units Date/Time    POC Glucose Fingerstick [[id number]]  (Normal) Collected:  11/08/17 1622    Specimen:  Blood Updated:  11/08/17 1623     Glucose 105 mg/dL     Narrative:       Meter: [id number] : [id number] Tavon Hart    POC Glucose Fingerstick [[id number]]  (Normal) Collected:  11/08/17 2010    Specimen:  Blood Updated:  11/08/17 2017     Glucose 124 mg/dL     Narrative:       Meter: [id number] : [id number] Oralia Mackey    POC Glucose Fingerstick [[id number]]  (Normal) Collected:  11/09/17 0747    Specimen:  Blood Updated:  11/09/17 0748     Glucose 130 mg/dL     Narrative:       Verify with Lab Meter: [id number] : [id number] Chuyadelita Maciel          Patient Care Team:  Aaron Trejo MD as PCP - General  Aaron Trejo MD as PCP - Family Medicine    SUBJECTIVE  Pain well controlled.  Good start in physical therapy.    PHYSICAL EXAM  Incision benign  Minimal thigh swelling  Neurovascularly intact to knees and toes     Principal Problem:    S/P total knee arthroplasty, left  Active Problems:    Atrial fibrillation    Hypertension    Arthritis of left knee    HLD (hyperlipidemia)    Prediabetes    Acute blood loss anemia, mild, asymptomatic    Leukocytosis, likely reactive      PLAN / DISPOSITION:  Discharge home today    Han Jaime MD  11/09/17  12:07 PM  "

## 2017-11-09 NOTE — PROGRESS NOTES
Derek    Acute pain service Inpatient Progress Note    Patient Name: Jeremie Wynn  :  1945  MRN:  [id number]        Acute Pain  Service Inpatient Progress Note:    Analgesia:Good  Pain Score:4/10  LOC: alert and awake  Resp Status: room air  Cardiac: VS stable  Side Effects:None  Catheter Site:clean  Cath type: peripheral nerve cath(MOOG pump)  Infusion rate: 12ml/hr  Catheter Plan:Catheter to remain Insitu and Continue catheter infusion rate unchanged  Comments: Anterior = 4  Posterior = 6

## 2017-11-09 NOTE — PLAN OF CARE
Problem: Patient Care Overview (Adult)  Goal: Plan of Care Review  Outcome: Outcome(s) achieved Date Met:  11/09/17 11/09/17 1419   Coping/Psychosocial Response Interventions   Plan Of Care Reviewed With patient   Patient Care Overview   Progress improving   Outcome Evaluation   Outcome Summary/Follow up Plan Pt. increased indep with transfers and safety. Pt. educated on home safety techniques and how to use AE and adapt shoes to be able to yadira and doff. Educ on bathroom safety and techniques to get into tall bed. Plans home with OP vs HH PT today.         Problem: Inpatient Occupational Therapy  Goal: Bed Mobility Goal LTG- OT  Outcome: Outcome(s) achieved Date Met:  11/09/17 11/07/17 1024 11/09/17 1419   Bed Mobility OT LTG   Bed Mobility OT LTG, Date Established 11/07/17 --    Bed Mobility OT LTG, Time to Achieve 2 wks --    Bed Mobility OT LTG, Activity Type all bed mobility --    Bed Mobility OT LTG, [place] Level contact guard assist --    Bed Mobility OT LTG, Outcome --  goal met  (met per pt. report, not observed this date.)       Goal: Transfer Training Goal 1 LTG- OT  Outcome: Outcome(s) achieved Date Met:  11/09/17 11/07/17 1024 11/09/17 1419   Transfer Training OT LTG   Transfer Training OT LTG, Date Established 11/07/17 --    Transfer Training OT LTG, Time to Achieve 2 wks --    Transfer Training OT LTG, Activity Type sit to stand/stand to sit;toilet --    Transfer Training OT LTG, [place] Level supervision required --    Transfer Training OT LTG, Assist Device commode, bedside;walker, rolling --    Transfer Training OT LTG, Outcome --  goal met       Goal: Patient Education Goal LTG- OT  Outcome: Outcome(s) achieved Date Met:  11/09/17 11/07/17 1024 11/09/17 1419   Patient Education OT LTG   Patient Education OT LTG, Date Established 11/07/17 --    Patient Education OT LTG, Time to Achieve 2 wks --    Patient Education OT LTG, Education Type precautions per surgeon;1 hand/jc  technique;home safety;adaptive equipment mgmt --    Patient Education OT LTG, Education Understanding verbalizes understanding;demonstrates adequately --    Patient Education OT LTG Outcome --  goal met       Goal: LB Dressing Goal LTG- OT  Outcome: Unable to achieve outcome(s) by discharge Date Met:  11/09/17 11/07/17 1024 11/09/17 1419   LB Dressing OT LTG   LB Dressing Goal OT LTG, Date Established 11/07/17 --    LB Dressing Goal OT LTG, Time to Achieve 2 wks --    LB Dressing Goal OT LTG, [place] Level supervision required --    LB Dressing Goal OT LTG, Adaptive Equipment reacher;sock-aid --    LB Dressing Goal OT LTG, Outcome --  goal partially met  (met socks and pants only, shoes no a       11/07/17 1024 11/09/17 1419   LB Dressing OT LTG   LB Dressing Goal OT LTG, Date Established 11/07/17 --    LB Dressing Goal OT LTG, Time to Achieve 2 wks --    LB Dressing Goal OT LTG, [place] Level supervision required --    LB Dressing Goal OT LTG, Adaptive Equipment reacher;sock-aid --    LB Dressing Goal OT LTG, Outcome --  goal partially met  (met socks and pants only, shoes no approp. needs adapted.)   LB Dressing Goal OT LTG, Reason Goal Not Met --  discharged from facility   pprop. needs adapted.)

## 2017-11-10 ENCOUNTER — TRANSITIONAL CARE MANAGEMENT TELEPHONE ENCOUNTER (OUTPATIENT)
Dept: FAMILY MEDICINE CLINIC | Facility: CLINIC | Age: 72
End: 2017-11-10

## 2017-11-10 LAB
ABO + RH BLD: NORMAL
ABO + RH BLD: NORMAL
BH BB BLOOD EXPIRATION DATE: NORMAL
BH BB BLOOD EXPIRATION DATE: NORMAL
BH BB BLOOD TYPE BARCODE: 7300
BH BB BLOOD TYPE BARCODE: 7300
BH BB DISPENSE STATUS: NORMAL
BH BB DISPENSE STATUS: NORMAL
BH BB PRODUCT CODE: NORMAL
BH BB PRODUCT CODE: NORMAL
BH BB UNIT NUMBER: NORMAL
BH BB UNIT NUMBER: NORMAL
UNIT  ABO: NORMAL
UNIT  ABO: NORMAL
UNIT  RH: NORMAL
UNIT  RH: NORMAL

## 2017-11-10 NOTE — PROGRESS NOTES
CHUYITA Reed    Nerve Cath Post Op Call    Patient Name: Jeremie Wynn  :  1945  MRN:  [id number]  Date of Discharge: 2017    Nerve Cath Post Op Call:    Analgesia:Excellent  Pain Score:1/10  Side Effects:None  Catheter Site:clean  Patient Controlled ON Q pump infusion rate: 12ml/hr  Catheter Plan:Will continue with plan at home without changes and The patient was instructed to call ON CALL Anesthesia provider for any questions or problems

## 2017-11-11 NOTE — PROGRESS NOTES
CHUYITA Reed    Nerve Cath Post Op Call    Patient Name: Jeremie Wynn  :  1945  MRN:  [id number]  Date of Discharge: 2017    Nerve Cath Post Op Call:    Analgesia:Excellent  Pain Score:10  Side Effects:None  Catheter Site:clean  Catheter Plan:Patient/Family member report nerve catheter previously discontinued, tip intact  PT removed catheter today and patient has converted to oral medicines. Very happy with On Q use.

## 2017-12-01 RX ORDER — ERGOCALCIFEROL 1.25 MG/1
CAPSULE ORAL
Qty: 10 CAPSULE | Refills: 2 | OUTPATIENT
Start: 2017-12-01

## 2017-12-01 RX ORDER — ATORVASTATIN CALCIUM 10 MG/1
TABLET, FILM COATED ORAL
Qty: 90 TABLET | Refills: 0 | Status: SHIPPED | OUTPATIENT
Start: 2017-12-01 | End: 2017-12-29 | Stop reason: SDUPTHER

## 2017-12-15 NOTE — TELEPHONE ENCOUNTER
----- Message from Jeremie Wynn sent at 3/19/2017  8:45 AM EDT -----  Regarding: Prescription Question  Contact: [phone]  Jeremie's prescription for VALSARTAN 320 mg tab filled by [facility] has .  Could Dr. Trejo please send a new prescription for this to [facility] ([facility] - [phone]).  He has been getting a 90-day supply.    Thank you,  
normal...

## 2017-12-29 RX ORDER — FUROSEMIDE 20 MG/1
TABLET ORAL
Qty: 30 TABLET | Refills: 1 | Status: SHIPPED | OUTPATIENT
Start: 2017-12-29 | End: 2018-03-09 | Stop reason: HOSPADM

## 2017-12-29 RX ORDER — VALSARTAN 320 MG/1
TABLET ORAL
Qty: 90 TABLET | Refills: 0 | Status: SHIPPED | OUTPATIENT
Start: 2017-12-29 | End: 2018-03-09 | Stop reason: HOSPADM

## 2017-12-29 RX ORDER — ATORVASTATIN CALCIUM 10 MG/1
TABLET, FILM COATED ORAL
Qty: 90 TABLET | Refills: 0 | Status: SHIPPED | OUTPATIENT
Start: 2017-12-29 | End: 2018-05-06 | Stop reason: SDUPTHER

## 2017-12-29 RX ORDER — FUROSEMIDE 20 MG/1
TABLET ORAL
Qty: 30 TABLET | Refills: 1 | Status: SHIPPED | OUTPATIENT
Start: 2017-12-29 | End: 2018-03-19 | Stop reason: SDUPTHER

## 2017-12-29 RX ORDER — ATORVASTATIN CALCIUM 10 MG/1
TABLET, FILM COATED ORAL
Qty: 90 TABLET | Refills: 0 | Status: SHIPPED | OUTPATIENT
Start: 2017-12-29 | End: 2018-03-09 | Stop reason: HOSPADM

## 2017-12-29 RX ORDER — POTASSIUM CHLORIDE 750 MG/1
TABLET, EXTENDED RELEASE ORAL
Qty: 30 TABLET | Refills: 1 | Status: SHIPPED | OUTPATIENT
Start: 2017-12-29 | End: 2018-03-09 | Stop reason: HOSPADM

## 2017-12-29 RX ORDER — POTASSIUM CHLORIDE 750 MG/1
TABLET, EXTENDED RELEASE ORAL
Qty: 30 TABLET | Refills: 1 | Status: SHIPPED | OUTPATIENT
Start: 2017-12-29 | End: 2018-02-18 | Stop reason: SDUPTHER

## 2017-12-31 RX ORDER — FUROSEMIDE 20 MG/1
TABLET ORAL
Qty: 30 TABLET | Refills: 1 | Status: SHIPPED | OUTPATIENT
Start: 2017-12-31 | End: 2018-03-09 | Stop reason: HOSPADM

## 2017-12-31 RX ORDER — POTASSIUM CHLORIDE 750 MG/1
TABLET, EXTENDED RELEASE ORAL
Qty: 30 TABLET | Refills: 1 | Status: SHIPPED | OUTPATIENT
Start: 2017-12-31 | End: 2018-03-09 | Stop reason: HOSPADM

## 2017-12-31 RX ORDER — TIZANIDINE 4 MG/1
TABLET ORAL
Qty: 30 TABLET | Refills: 3 | Status: SHIPPED | OUTPATIENT
Start: 2017-12-31 | End: 2019-07-29

## 2018-01-14 RX ORDER — TIZANIDINE 4 MG/1
TABLET ORAL
Qty: 30 TABLET | Refills: 3 | Status: SHIPPED | OUTPATIENT
Start: 2018-01-14 | End: 2018-03-09 | Stop reason: HOSPADM

## 2018-02-19 RX ORDER — POTASSIUM CHLORIDE 750 MG/1
TABLET, EXTENDED RELEASE ORAL
Qty: 30 TABLET | Refills: 1 | Status: SHIPPED | OUTPATIENT
Start: 2018-02-19 | End: 2018-03-09 | Stop reason: HOSPADM

## 2018-03-05 ENCOUNTER — APPOINTMENT (OUTPATIENT)
Dept: GENERAL RADIOLOGY | Facility: HOSPITAL | Age: 73
End: 2018-03-05

## 2018-03-05 ENCOUNTER — HOSPITAL ENCOUNTER (INPATIENT)
Facility: HOSPITAL | Age: 73
LOS: 4 days | Discharge: HOME | End: 2018-03-09
Attending: EMERGENCY MEDICINE | Admitting: HOSPITALIST

## 2018-03-05 DIAGNOSIS — R06.09 DYSPNEA ON EXERTION: ICD-10-CM

## 2018-03-05 DIAGNOSIS — J18.9 PNEUMONIA OF BOTH LUNGS DUE TO INFECTIOUS ORGANISM, UNSPECIFIED PART OF LUNG: Primary | ICD-10-CM

## 2018-03-05 DIAGNOSIS — A41.9 SEPSIS, DUE TO UNSPECIFIED ORGANISM: ICD-10-CM

## 2018-03-05 PROBLEM — R77.8 ELEVATED TROPONIN: Status: ACTIVE | Noted: 2018-03-05

## 2018-03-05 PROBLEM — E86.0 DEHYDRATION: Status: ACTIVE | Noted: 2018-03-05

## 2018-03-05 PROBLEM — R73.9 HYPERGLYCEMIA: Status: ACTIVE | Noted: 2018-03-05

## 2018-03-05 PROBLEM — I48.0 PAF (PAROXYSMAL ATRIAL FIBRILLATION): Status: ACTIVE | Noted: 2018-03-05

## 2018-03-05 PROBLEM — R79.89 ELEVATED TROPONIN: Status: ACTIVE | Noted: 2018-03-05

## 2018-03-05 LAB
ALBUMIN SERPL-MCNC: 4.3 G/DL (ref 3.2–4.8)
ALBUMIN/GLOB SERPL: 1.3 G/DL (ref 1.5–2.5)
ALP SERPL-CCNC: 73 U/L (ref 25–100)
ALT SERPL W P-5'-P-CCNC: 14 U/L (ref 7–40)
ANION GAP SERPL CALCULATED.3IONS-SCNC: 10 MMOL/L (ref 3–11)
ANION GAP SERPL CALCULATED.3IONS-SCNC: 8 MMOL/L (ref 3–11)
AST SERPL-CCNC: 19 U/L (ref 0–33)
BACTERIA UR QL AUTO: ABNORMAL /HPF
BASOPHILS # BLD AUTO: 0.02 10*3/MM3 (ref 0–0.2)
BASOPHILS # BLD AUTO: 0.03 10*3/MM3 (ref 0–0.2)
BASOPHILS NFR BLD AUTO: 0.1 % (ref 0–1)
BASOPHILS NFR BLD AUTO: 0.2 % (ref 0–1)
BILIRUB SERPL-MCNC: 0.6 MG/DL (ref 0.3–1.2)
BILIRUB UR QL STRIP: NEGATIVE
BNP SERPL-MCNC: 45 PG/ML (ref 0–100)
BUN BLD-MCNC: 21 MG/DL (ref 9–23)
BUN BLD-MCNC: 27 MG/DL (ref 9–23)
BUN/CREAT SERPL: 19.1 (ref 7–25)
BUN/CREAT SERPL: 20.8 (ref 7–25)
CALCIUM SPEC-SCNC: 8.6 MG/DL (ref 8.7–10.4)
CALCIUM SPEC-SCNC: 9.6 MG/DL (ref 8.7–10.4)
CHLORIDE SERPL-SCNC: 107 MMOL/L (ref 99–109)
CHLORIDE SERPL-SCNC: 108 MMOL/L (ref 99–109)
CLARITY UR: ABNORMAL
CO2 SERPL-SCNC: 20 MMOL/L (ref 20–31)
CO2 SERPL-SCNC: 23 MMOL/L (ref 20–31)
COLOR UR: YELLOW
CREAT BLD-MCNC: 1.1 MG/DL (ref 0.6–1.3)
CREAT BLD-MCNC: 1.3 MG/DL (ref 0.6–1.3)
D-LACTATE SERPL-SCNC: 1.8 MMOL/L (ref 0.5–2)
DEPRECATED RDW RBC AUTO: 48.3 FL (ref 37–54)
DEPRECATED RDW RBC AUTO: 48.8 FL (ref 37–54)
DEPRECATED RDW RBC AUTO: 50.6 FL (ref 37–54)
EOSINOPHIL # BLD AUTO: 0 10*3/MM3 (ref 0–0.3)
EOSINOPHIL # BLD AUTO: 0.03 10*3/MM3 (ref 0–0.3)
EOSINOPHIL NFR BLD AUTO: 0 % (ref 0–3)
EOSINOPHIL NFR BLD AUTO: 0.2 % (ref 0–3)
ERYTHROCYTE [DISTWIDTH] IN BLOOD BY AUTOMATED COUNT: 14.4 % (ref 11.3–14.5)
ERYTHROCYTE [DISTWIDTH] IN BLOOD BY AUTOMATED COUNT: 14.5 % (ref 11.3–14.5)
ERYTHROCYTE [DISTWIDTH] IN BLOOD BY AUTOMATED COUNT: 15 % (ref 11.3–14.5)
FLUAV AG NPH QL: NEGATIVE
FLUBV AG NPH QL IA: NEGATIVE
GFR SERPL CREATININE-BSD FRML MDRD: 54 ML/MIN/1.73
GFR SERPL CREATININE-BSD FRML MDRD: 65 ML/MIN/1.73
GLOBULIN UR ELPH-MCNC: 3.2 GM/DL
GLUCOSE BLD-MCNC: 160 MG/DL (ref 70–100)
GLUCOSE BLD-MCNC: 213 MG/DL (ref 70–100)
GLUCOSE UR STRIP-MCNC: NEGATIVE MG/DL
HBA1C MFR BLD: 6.3 % (ref 4.8–5.6)
HCT VFR BLD AUTO: 34.4 % (ref 38.9–50.9)
HCT VFR BLD AUTO: 35 % (ref 38.9–50.9)
HCT VFR BLD AUTO: 40.8 % (ref 38.9–50.9)
HGB BLD-MCNC: 11.2 G/DL (ref 13.1–17.5)
HGB BLD-MCNC: 11.3 G/DL (ref 13.1–17.5)
HGB BLD-MCNC: 13.2 G/DL (ref 13.1–17.5)
HGB UR QL STRIP.AUTO: NEGATIVE
HOLD SPECIMEN: NORMAL
HOLD SPECIMEN: NORMAL
HYALINE CASTS UR QL AUTO: ABNORMAL /LPF
IMM GRANULOCYTES # BLD: 0.06 10*3/MM3 (ref 0–0.03)
IMM GRANULOCYTES # BLD: 0.09 10*3/MM3 (ref 0–0.03)
IMM GRANULOCYTES NFR BLD: 0.3 % (ref 0–0.6)
IMM GRANULOCYTES NFR BLD: 0.4 % (ref 0–0.6)
KETONES UR QL STRIP: ABNORMAL
LEUKOCYTE ESTERASE UR QL STRIP.AUTO: NEGATIVE
LYMPHOCYTES # BLD AUTO: 0.63 10*3/MM3 (ref 0.6–4.8)
LYMPHOCYTES # BLD AUTO: 1.03 10*3/MM3 (ref 0.6–4.8)
LYMPHOCYTES NFR BLD AUTO: 3.3 % (ref 24–44)
LYMPHOCYTES NFR BLD AUTO: 4.5 % (ref 24–44)
MCH RBC QN AUTO: 29.6 PG (ref 27–31)
MCH RBC QN AUTO: 29.7 PG (ref 27–31)
MCH RBC QN AUTO: 29.7 PG (ref 27–31)
MCHC RBC AUTO-ENTMCNC: 32.3 G/DL (ref 32–36)
MCHC RBC AUTO-ENTMCNC: 32.4 G/DL (ref 32–36)
MCHC RBC AUTO-ENTMCNC: 32.6 G/DL (ref 32–36)
MCV RBC AUTO: 91 FL (ref 80–99)
MCV RBC AUTO: 91.7 FL (ref 80–99)
MCV RBC AUTO: 91.9 FL (ref 80–99)
MONOCYTES # BLD AUTO: 0.87 10*3/MM3 (ref 0–1)
MONOCYTES # BLD AUTO: 1.06 10*3/MM3 (ref 0–1)
MONOCYTES NFR BLD AUTO: 4.6 % (ref 0–12)
MONOCYTES NFR BLD AUTO: 4.6 % (ref 0–12)
NEUTROPHILS # BLD AUTO: 17.46 10*3/MM3 (ref 1.5–8.3)
NEUTROPHILS # BLD AUTO: 20.89 10*3/MM3 (ref 1.5–8.3)
NEUTROPHILS NFR BLD AUTO: 90.4 % (ref 41–71)
NEUTROPHILS NFR BLD AUTO: 91.4 % (ref 41–71)
NITRITE UR QL STRIP: NEGATIVE
PH UR STRIP.AUTO: <=5 [PH] (ref 5–8)
PLATELET # BLD AUTO: 196 10*3/MM3 (ref 150–450)
PLATELET # BLD AUTO: 209 10*3/MM3 (ref 150–450)
PLATELET # BLD AUTO: 246 10*3/MM3 (ref 150–450)
PMV BLD AUTO: 10.7 FL (ref 6–12)
PMV BLD AUTO: 11.5 FL (ref 6–12)
PMV BLD AUTO: 11.5 FL (ref 6–12)
POTASSIUM BLD-SCNC: 3.8 MMOL/L (ref 3.5–5.5)
POTASSIUM BLD-SCNC: 3.9 MMOL/L (ref 3.5–5.5)
PROCALCITONIN SERPL-MCNC: 0.47 NG/ML
PROT SERPL-MCNC: 7.5 G/DL (ref 5.7–8.2)
PROT UR QL STRIP: NEGATIVE
RBC # BLD AUTO: 3.78 10*6/MM3 (ref 4.2–5.76)
RBC # BLD AUTO: 3.81 10*6/MM3 (ref 4.2–5.76)
RBC # BLD AUTO: 4.45 10*6/MM3 (ref 4.2–5.76)
RBC # UR: ABNORMAL /HPF
REF LAB TEST METHOD: ABNORMAL
SODIUM BLD-SCNC: 138 MMOL/L (ref 132–146)
SODIUM BLD-SCNC: 138 MMOL/L (ref 132–146)
SP GR UR STRIP: 1.02 (ref 1–1.03)
SQUAMOUS #/AREA URNS HPF: ABNORMAL /HPF
TROPONIN I SERPL-MCNC: 0.29 NG/ML
TROPONIN I SERPL-MCNC: 0.31 NG/ML
UROBILINOGEN UR QL STRIP: ABNORMAL
WBC NRBC COR # BLD: 19.08 10*3/MM3 (ref 3.5–10.8)
WBC NRBC COR # BLD: 19.5 10*3/MM3 (ref 3.5–10.8)
WBC NRBC COR # BLD: 23.09 10*3/MM3 (ref 3.5–10.8)
WBC UR QL AUTO: ABNORMAL /HPF
WHOLE BLOOD HOLD SPECIMEN: NORMAL
WHOLE BLOOD HOLD SPECIMEN: NORMAL

## 2018-03-05 PROCEDURE — 25010000002 ENOXAPARIN PER 10 MG: Performed by: HOSPITALIST

## 2018-03-05 PROCEDURE — 71045 X-RAY EXAM CHEST 1 VIEW: CPT

## 2018-03-05 PROCEDURE — 87899 AGENT NOS ASSAY W/OPTIC: CPT | Performed by: HOSPITALIST

## 2018-03-05 PROCEDURE — 85025 COMPLETE CBC W/AUTO DIFF WBC: CPT | Performed by: HOSPITALIST

## 2018-03-05 PROCEDURE — 83880 ASSAY OF NATRIURETIC PEPTIDE: CPT | Performed by: PHYSICIAN ASSISTANT

## 2018-03-05 PROCEDURE — 87070 CULTURE OTHR SPECIMN AEROBIC: CPT | Performed by: HOSPITALIST

## 2018-03-05 PROCEDURE — 84145 PROCALCITONIN (PCT): CPT | Performed by: PHYSICIAN ASSISTANT

## 2018-03-05 PROCEDURE — 84484 ASSAY OF TROPONIN QUANT: CPT | Performed by: HOSPITALIST

## 2018-03-05 PROCEDURE — 85027 COMPLETE CBC AUTOMATED: CPT | Performed by: INTERNAL MEDICINE

## 2018-03-05 PROCEDURE — 80053 COMPREHEN METABOLIC PANEL: CPT | Performed by: EMERGENCY MEDICINE

## 2018-03-05 PROCEDURE — 87205 SMEAR GRAM STAIN: CPT | Performed by: HOSPITALIST

## 2018-03-05 PROCEDURE — 87804 INFLUENZA ASSAY W/OPTIC: CPT | Performed by: EMERGENCY MEDICINE

## 2018-03-05 PROCEDURE — 94799 UNLISTED PULMONARY SVC/PX: CPT

## 2018-03-05 PROCEDURE — 93005 ELECTROCARDIOGRAM TRACING: CPT | Performed by: EMERGENCY MEDICINE

## 2018-03-05 PROCEDURE — 87449 NOS EACH ORGANISM AG IA: CPT | Performed by: HOSPITALIST

## 2018-03-05 PROCEDURE — 94640 AIRWAY INHALATION TREATMENT: CPT

## 2018-03-05 PROCEDURE — 83036 HEMOGLOBIN GLYCOSYLATED A1C: CPT | Performed by: PHYSICIAN ASSISTANT

## 2018-03-05 PROCEDURE — 99223 1ST HOSP IP/OBS HIGH 75: CPT | Performed by: HOSPITALIST

## 2018-03-05 PROCEDURE — 25010000002 VANCOMYCIN HCL IN NACL 2-0.9 GM/500ML-% SOLUTION: Performed by: EMERGENCY MEDICINE

## 2018-03-05 PROCEDURE — 87040 BLOOD CULTURE FOR BACTERIA: CPT | Performed by: EMERGENCY MEDICINE

## 2018-03-05 PROCEDURE — 94760 N-INVAS EAR/PLS OXIMETRY 1: CPT

## 2018-03-05 PROCEDURE — 83605 ASSAY OF LACTIC ACID: CPT | Performed by: EMERGENCY MEDICINE

## 2018-03-05 PROCEDURE — 99285 EMERGENCY DEPT VISIT HI MDM: CPT

## 2018-03-05 PROCEDURE — 85025 COMPLETE CBC W/AUTO DIFF WBC: CPT | Performed by: EMERGENCY MEDICINE

## 2018-03-05 PROCEDURE — 81001 URINALYSIS AUTO W/SCOPE: CPT | Performed by: EMERGENCY MEDICINE

## 2018-03-05 PROCEDURE — 84484 ASSAY OF TROPONIN QUANT: CPT | Performed by: EMERGENCY MEDICINE

## 2018-03-05 RX ORDER — MULTIPLE VITAMINS W/ MINERALS TAB 9MG-400MCG
1 TAB ORAL DAILY
Status: DISCONTINUED | OUTPATIENT
Start: 2018-03-05 | End: 2018-03-09 | Stop reason: HOSPADM

## 2018-03-05 RX ORDER — MESALAMINE 1.2 G/1
2400 TABLET, DELAYED RELEASE ORAL DAILY
Status: DISCONTINUED | OUTPATIENT
Start: 2018-03-05 | End: 2018-03-09 | Stop reason: HOSPADM

## 2018-03-05 RX ORDER — FUROSEMIDE 20 MG/1
20 TABLET ORAL DAILY
Status: DISCONTINUED | OUTPATIENT
Start: 2018-03-05 | End: 2018-03-06

## 2018-03-05 RX ORDER — ACETAMINOPHEN 325 MG/1
650 TABLET ORAL ONCE
Status: COMPLETED | OUTPATIENT
Start: 2018-03-05 | End: 2018-03-05

## 2018-03-05 RX ORDER — VANCOMYCIN/0.9 % SOD CHLORIDE 1.5G/250ML
15 PLASTIC BAG, INJECTION (ML) INTRAVENOUS EVERY 24 HOURS
Status: DISCONTINUED | OUTPATIENT
Start: 2018-03-06 | End: 2018-03-07

## 2018-03-05 RX ORDER — ATORVASTATIN CALCIUM 10 MG/1
10 TABLET, FILM COATED ORAL DAILY
Status: DISCONTINUED | OUTPATIENT
Start: 2018-03-05 | End: 2018-03-05 | Stop reason: SDUPTHER

## 2018-03-05 RX ORDER — ASPIRIN 81 MG/1
81 TABLET, CHEWABLE ORAL DAILY
Status: DISCONTINUED | OUTPATIENT
Start: 2018-03-05 | End: 2018-03-09 | Stop reason: HOSPADM

## 2018-03-05 RX ORDER — TRAMADOL HYDROCHLORIDE 50 MG/1
50 TABLET ORAL EVERY 6 HOURS PRN
Status: DISCONTINUED | OUTPATIENT
Start: 2018-03-05 | End: 2018-03-09 | Stop reason: HOSPADM

## 2018-03-05 RX ORDER — SODIUM CHLORIDE 9 MG/ML
75 INJECTION, SOLUTION INTRAVENOUS CONTINUOUS
Status: DISCONTINUED | OUTPATIENT
Start: 2018-03-05 | End: 2018-03-07

## 2018-03-05 RX ORDER — VALSARTAN 160 MG/1
320 TABLET ORAL
Status: DISCONTINUED | OUTPATIENT
Start: 2018-03-05 | End: 2018-03-06

## 2018-03-05 RX ORDER — ATORVASTATIN CALCIUM 10 MG/1
10 TABLET, FILM COATED ORAL DAILY
Status: DISCONTINUED | OUTPATIENT
Start: 2018-03-05 | End: 2018-03-09 | Stop reason: HOSPADM

## 2018-03-05 RX ORDER — ALBUTEROL SULFATE 2.5 MG/3ML
2.5 SOLUTION RESPIRATORY (INHALATION)
Status: DISCONTINUED | OUTPATIENT
Start: 2018-03-05 | End: 2018-03-08

## 2018-03-05 RX ORDER — ACETAMINOPHEN 325 MG/1
650 TABLET ORAL EVERY 4 HOURS PRN
Status: DISCONTINUED | OUTPATIENT
Start: 2018-03-05 | End: 2018-03-09 | Stop reason: HOSPADM

## 2018-03-05 RX ORDER — VANCOMYCIN 2 GRAM/500 ML IN 0.9 % SODIUM CHLORIDE INTRAVENOUS
20 ONCE
Status: COMPLETED | OUTPATIENT
Start: 2018-03-05 | End: 2018-03-05

## 2018-03-05 RX ORDER — POTASSIUM CHLORIDE 750 MG/1
10 CAPSULE, EXTENDED RELEASE ORAL DAILY
Status: DISCONTINUED | OUTPATIENT
Start: 2018-03-05 | End: 2018-03-09 | Stop reason: HOSPADM

## 2018-03-05 RX ORDER — DOXYCYCLINE 100 MG/1
100 CAPSULE ORAL EVERY 12 HOURS SCHEDULED
Status: DISCONTINUED | OUTPATIENT
Start: 2018-03-05 | End: 2018-03-09 | Stop reason: HOSPADM

## 2018-03-05 RX ORDER — BENZONATATE 100 MG/1
100 CAPSULE ORAL 3 TIMES DAILY PRN
Status: DISCONTINUED | OUTPATIENT
Start: 2018-03-06 | End: 2018-03-09 | Stop reason: HOSPADM

## 2018-03-05 RX ORDER — SODIUM CHLORIDE 9 MG/ML
50 INJECTION, SOLUTION INTRAVENOUS ONCE
Status: COMPLETED | OUTPATIENT
Start: 2018-03-05 | End: 2018-03-05

## 2018-03-05 RX ORDER — VANCOMYCIN 2 GRAM/500 ML IN 0.9 % SODIUM CHLORIDE INTRAVENOUS
20 ONCE
Status: DISCONTINUED | OUTPATIENT
Start: 2018-03-05 | End: 2018-03-05

## 2018-03-05 RX ORDER — CETIRIZINE HYDROCHLORIDE 10 MG/1
10 TABLET ORAL DAILY
Status: DISCONTINUED | OUTPATIENT
Start: 2018-03-05 | End: 2018-03-09 | Stop reason: HOSPADM

## 2018-03-05 RX ORDER — SODIUM CHLORIDE 0.9 % (FLUSH) 0.9 %
10 SYRINGE (ML) INJECTION AS NEEDED
Status: DISCONTINUED | OUTPATIENT
Start: 2018-03-05 | End: 2018-03-09 | Stop reason: HOSPADM

## 2018-03-05 RX ORDER — TRAMADOL HYDROCHLORIDE 50 MG/1
50 TABLET ORAL EVERY 6 HOURS PRN
COMMUNITY
End: 2021-08-25

## 2018-03-05 RX ADMIN — DOXYCYCLINE 100 MG: 100 CAPSULE ORAL at 08:17

## 2018-03-05 RX ADMIN — SODIUM CHLORIDE 1000 ML: 9 INJECTION, SOLUTION INTRAVENOUS at 02:57

## 2018-03-05 RX ADMIN — ALBUTEROL SULFATE 2.5 MG: 2.5 SOLUTION RESPIRATORY (INHALATION) at 19:56

## 2018-03-05 RX ADMIN — FUROSEMIDE 20 MG: 20 TABLET ORAL at 08:16

## 2018-03-05 RX ADMIN — CETIRIZINE HYDROCHLORIDE 10 MG: 10 TABLET, FILM COATED ORAL at 08:16

## 2018-03-05 RX ADMIN — VALSARTAN 320 MG: 160 TABLET, FILM COATED ORAL at 08:16

## 2018-03-05 RX ADMIN — MULTIPLE VITAMINS W/ MINERALS TAB 1 TABLET: TAB at 14:10

## 2018-03-05 RX ADMIN — WATER 2 G: 1 INJECTION INTRAMUSCULAR; INTRAVENOUS; SUBCUTANEOUS at 03:21

## 2018-03-05 RX ADMIN — BENZONATATE 100 MG: 100 CAPSULE, LIQUID FILLED ORAL at 23:49

## 2018-03-05 RX ADMIN — ALBUTEROL SULFATE 2.5 MG: 2.5 SOLUTION RESPIRATORY (INHALATION) at 16:14

## 2018-03-05 RX ADMIN — ATORVASTATIN CALCIUM 10 MG: 10 TABLET, FILM COATED ORAL at 08:17

## 2018-03-05 RX ADMIN — DOXYCYCLINE 100 MG: 100 CAPSULE ORAL at 20:45

## 2018-03-05 RX ADMIN — Medication 2000 MG: at 03:29

## 2018-03-05 RX ADMIN — POTASSIUM CHLORIDE 10 MEQ: 750 CAPSULE, EXTENDED RELEASE ORAL at 08:16

## 2018-03-05 RX ADMIN — SODIUM CHLORIDE 50 ML/HR: 9 INJECTION, SOLUTION INTRAVENOUS at 06:57

## 2018-03-05 RX ADMIN — ALBUTEROL SULFATE 2.5 MG: 2.5 SOLUTION RESPIRATORY (INHALATION) at 07:56

## 2018-03-05 RX ADMIN — ENOXAPARIN SODIUM 40 MG: 40 INJECTION SUBCUTANEOUS at 08:15

## 2018-03-05 RX ADMIN — WATER 2 G: 1 INJECTION INTRAMUSCULAR; INTRAVENOUS; SUBCUTANEOUS at 20:45

## 2018-03-05 RX ADMIN — ALBUTEROL SULFATE 2.5 MG: 2.5 SOLUTION RESPIRATORY (INHALATION) at 23:28

## 2018-03-05 RX ADMIN — ACETAMINOPHEN 650 MG: 325 TABLET, FILM COATED ORAL at 04:12

## 2018-03-05 RX ADMIN — WATER 2 G: 1 INJECTION INTRAMUSCULAR; INTRAVENOUS; SUBCUTANEOUS at 14:10

## 2018-03-05 RX ADMIN — ALBUTEROL SULFATE 2.5 MG: 2.5 SOLUTION RESPIRATORY (INHALATION) at 12:18

## 2018-03-05 RX ADMIN — MESALAMINE 2.4 G: 1.2 TABLET, DELAYED RELEASE ORAL at 08:18

## 2018-03-05 RX ADMIN — ASPIRIN 81 MG 81 MG: 81 TABLET ORAL at 08:16

## 2018-03-05 NOTE — PLAN OF CARE
Problem: Patient Care Overview (Adult)  Goal: Plan of Care Review  Outcome: Ongoing (interventions implemented as appropriate)   03/05/18 0611 03/05/18 0640   Patient Care Overview   Progress --  no change   Outcome Evaluation   Outcome Summary/Follow up Plan --  Patient arrived to floor from [facility] ED at approximately 0611.    Coping/Psychosocial Response Interventions   Plan Of Care Reviewed With patient;spouse --

## 2018-03-05 NOTE — PROGRESS NOTES
[facility] Medicine Services  PROGRESS NOTE    Patient Name: Jeremie Wynn  : 1945  MRN: [id number]    Date of Admission: 3/5/2018  Length of Stay: 0  Primary Care Physician: Aaron Trejo MD    Subjective   Subjective     CC:  10 days of illness, new high fever    HPI:  Was dyspneic before oxygen started.  OK now.  Coughing just a little. No GI distress or rash.  Dry mouth. No signif myalgias or HA.     Review of Systems   Knee surg in November - no new pain/swelling with knee.     Otherwise ROS is negative except as mentioned in the HPI.    Objective   Objective     Vital Signs:   Temp:  [98.4 °F (36.9 °C)-103.1 °F (39.5 °C)] 98.4 °F (36.9 °C)  Heart Rate:  [] 86  Resp:  [16-36] 20  BP: (110-149)/(62-87) 132/70        Physical Exam:  Gen:  WD/WN man, looks tired but cheeful, NAD, in bed  Neuro: alert and oriented, clear speech, follows commands, grossly nonfocal  HEENT:  NC/AT PERRL, OP benign  Neck:  Supple, no LAD  Heart RRR no murmur, rub, or gallop  Lungs enitrely nonfocal, no w r r , nonlabored  Abd:  Soft, nontender, no rebound or guarding, pos BS  Extrem:  No c/c/e.  Left knee well healed, bends well, nontender, no erythema    Results Reviewed:  I have personally reviewed current lab, radiology, and data and agree.      Results from last 7 days  Lab Units 18  0824 18  0246   WBC 10*3/mm3 23.09* 19.08*   HEMOGLOBIN g/dL 11.2* 13.2   HEMATOCRIT % 34.4* 40.8   PLATELETS 10*3/mm3 196 246       Results from last 7 days  Lab Units 18  0824 18  0246   SODIUM mmol/L 138 138   POTASSIUM mmol/L 3.8 3.9   CHLORIDE mmol/L 108 107   CO2 mmol/L 20.0 23.0   BUN mg/dL 21 27*   CREATININE mg/dL 1.10 1.30   GLUCOSE mg/dL 213* 160*   CALCIUM mg/dL 8.6* 9.6   ALT (SGPT) U/L  --  14   AST (SGOT) U/L  --  19   TROPONIN I ng/mL 0.293* 0.309*     Estimated Creatinine Clearance: 72.1 mL/min (by C-G formula based on Cr of 1.1).  BNP   Date Value Ref Range Status    03/05/2018 45.0 0.0 - 100.0 pg/mL Final     Comment:     Results may be falsely decreased if patient taking Biotin.     No results found for: PHART    Microbiology Results Abnormal     Procedure Component Value - Date/Time    Influenza Antigen, Rapid - Swab, Nasopharynx [[id number]]  (Normal) Collected:  03/05/18 0246    Lab Status:  Final result Specimen:  Swab from Nasopharynx Updated:  03/05/18 0335     Influenza A Ag, EIA Negative     Influenza B Ag, EIA Negative          Imaging Results (last 24 hours)     Procedure Component Value Units Date/Time    XR Chest 1 View [[id number]] Collected:  03/05/18 0228     Updated:  03/05/18 0413    Narrative:       EXAM:    XR Chest, 1 View    EXAM DATE/TIME:    3/5/2018 2:28 AM    CLINICAL HISTORY:    72 years old, male; Signs and symptoms; Shortness of breath; Additional info:   Simple sepsis triage protocol    TECHNIQUE:    Single upright AP portable chest at 3:24 AM.    COMPARISON:    CR - XR CHEST 1 VW 2017-11-06 09:26    FINDINGS:    Incomplete inspiratory effort when compared to prior study.    Hazy densities in the perihilar regions bilaterally.  Mild asymmetric density in the RIGHT lung base.  No pneumothorax.  No large pleural effusion.  Elevated RIGHT hemidiaphragm.  Heart appears top normal in size, stable.  Mediastinum unremarkable.  Moderate to large hiatal hernia.  Osteopenia/osteoporosis.  Old LEFT rib fractures.  No acute bone abnormality.      Impression:         Hypoventilatory changes versus early pulmonary edema or peribronchial   thickening perihilar regions.  Correlate clinically for possibility of   congestive heart failure and/or bronchitis.    Mild asymmetric density RIGHT lung base, possibly atelectasis from poor   inspiratory effort and elevated RIGHT hemidiaphragm.  However, early pneumonia   not excluded.    If clinical symptoms warrant, suggest followup imaging with full inspiratory   effort when clinically feasible.    Additional nonacute  findings as noted.    THIS DOCUMENT HAS BEEN ELECTRONICALLY SIGNED BY NASH DAVENPORT MD        Results for orders placed during the hospital encounter of 08/17/15   CONVERTED (HISTORICAL) ECHO    Narrative Patient:      ALIYA MARQUEZ    Med Rec#:     [id number]               :          1945            Date:         2015            Age:          69y                   Height:       177.8 cm / 70.0 in  Weight:       116.12 kg / 255.9 lbs  Sex:          M                     BSA:          2.32  Room#:        [facility]                      Sonographer:  Nilesh Shoemaker,CS  Referring:    YOLI  Reading:      Beni Ramírez MD  Primary:      Aaron Trejo  ______________________________________________________________________    Transthoracic Echocardiogram    Indication:  PVC's, SANTORO  BP:           147/78    Conclusions  1. The estimated ejection fraction is 55-60%.   2. Abnormal left ventricular diastolic filling is observed, consistent  with impaired relaxation.   3. There is mild to moderate aortic regurgitation.   4. There is mild mitral regurgitation.   5. There is moderate tricuspid regurgitation.  6. The right ventricular systolic pressure is calculated at 43 mmHg.     Findings       Left Ventricle:  The left ventricular chamber size is normal. Posterior wall hypertrophy  is observed. Global left ventricular wall motion and contractility are  within normal limits. There is normal left ventricular systolic  function. The estimated ejection fraction is 55-60%.  Abnormal left  ventricular diastolic filling is observed, consistent with impaired  relaxation.      Left Atrium:  The left atrial chamber size is normal.     Right Ventricle:  The right ventricle is moderately dilated.  The right ventricular global  systolic function is normal.     Right Atrium:  The right atrial cavity size is normal. No atrial septal defect is  visualized.     Aortic Valve:  The aortic valve is trileaflet. There is mild  to moderate aortic  regurgitation.  There is no evidence of aortic stenosis.Pressure  gradients were noted following PVC's throughout the exam, gradients in  the mild range. The calculated aortic regurgitation pressure half-time  is 755 msec.    Mitral Valve:  The mitral valve leaflets appear normal. There is mitral annular  calcification. Mitral valve posterior leaflet calcification is  visualized. There is no evidence of mitral valve prolapse. There is mild  mitral regurgitation.  There is no evidence of mitral stenosis.     Tricuspid Valve:  The tricuspid valve leaflets are normal.  There is moderate tricuspid  regurgitation. The right ventricular systolic pressure is calculated at  43 mmHg.      Pulmonic Valve:  The pulmonic valve appears normal. There is a trace pulmonic  regurgitation.      Pericardium:  There is no evidence of pericardial effusion.     Aorta:  There is no dilatation of the aortic root.     Pulmonary Artery:  The main pulmonary artery appears normal.     Venous:  The venous system appears normal.     Measurements   Chambers  Name                    Value           RVIDd (AP) 2D           3.12 cm         IVSd (2D)               1.01 cm         LVIDd (2D)              5.94 cm         LVIDs (2D)              3.71 cm         LVPWd (2D)              1.42 cm         EF (2D)                 66.8 %          Ao root diameter (2D)   3.3 cm          LA dimension (AP) 2D    4.2 cm          LA:Ao ratio (2D)        1.27 ratio        Volumes  Name                    Value           LV EDV SP 4CH (MOD)     154 ml          LV ESV SP 4CH (MOD)     67 ml           EF SP 4CH (MOD)         56 %              Diastolic/Systolic Function  Name                    Value           MV E-wave Vmax          0.9 m/sec       MV A-wave Vmax          1 m/sec         MV E:A ratio            0.9 ratio       LV lateral e' Vmax      0.1 m/sec       LV E:e' lateral ratio   9.4 ratio         Aortic Valve  Name                     Value           AV Vmax                 2.09 m/sec      AV VTI                  45.3 cm         AV peak gradient        17 mmHg         AV mean gradient        7 mmHg          LVOT diameter           2.2 cm          LVOT Vmax               1.58 m/sec      LVOT VTI                32.5 cm         LVOT peak gradient      10 mmHg         LVOT mean gradient      3 mmHg          SV LVOT                 124 ml          CHRIS (continuity Vmax)   2.87 cm2        CHRIS (continuity VTI)    2.73 cm2        AR PHT                  755 msec        AR peak gradient        63 mmHg           Tricuspid Valve  Name                    Value           TR Vmax                 2.77 m/sec      TR peak gradient        33 mmHg         RAP                     10 mmHg         RVSP                    43 mmHg           Electronically signed by: Beni Ramírez MD on 08/17/2015 19:16:03       I have reviewed the medications.    Assessment/Plan   Assessment / Plan     Hospital Problem List     * (Principal)Sepsis due to pneumonia    Hyperlipemia    Hypertension    Hyperglycemia    Elevated troponin    Dehydration             Brief Hospital Course to date:  Jeremie Wynn is a 72 y.o. male from home with no lung disease, who came in with temp of 105 and CXR showing likely bilateral pna.       Assessment & Plan:    Sepsis due to pneumonia:  -  vanc and azactam and doxy day 1  - additional pulmonary toilet as needed  -- note procal is reassuring  - saline 100cc/hr  - await flu PCR  -- consider chest CT juliocesar if fever/WBC stay up       2. Elevated troponin:   -- suspect due to demand ischemia  -- trending down      Hypertension:  - stable for now. Hold diovan and lasix per sepsis protcol and resume as tolerated      4. Hyperglycemia:  - history of pre-diabetes. Check A1c       DVT Prophylaxis:      CODE STATUS: Full Code    Disposition: I expect the patient to be discharged home in 2 days.      Electronically signed by Carleen Sesay MD, 03/05/18, 1:09  PM.

## 2018-03-05 NOTE — PROGRESS NOTES
Pharmacokinetic Consult - Vancomycin Dosing    Jeremie Wynn is a 72 y.o. male who has been consulted for vancomycin dosing for pneumonia .    Relevant clinical data and objective history reviewed:  Lab Results   Component Value Date/Time    CREATININE 1.30 03/05/2018 02:46 AM    CREATININE 0.90 11/07/2017 05:12 AM    CREATININE 1.10 10/24/2017 10:23 AM    BUN 27 (H) 03/05/2018 02:46 AM    BUN 16 11/07/2017 05:12 AM    BUN 16 10/24/2017 10:23 AM     Estimated Creatinine Clearance: 61 mL/min (by C-G formula based on Cr of 1.3).  I/O last 3 completed shifts:  In: 2000 [IV Piggyback:2000]  Out: -   Lab Results   Component Value Date/Time    WBC 19.08 (H) 03/05/2018 02:46 AM    HGB 13.2 03/05/2018 02:46 AM    HCT 40.8 03/05/2018 02:46 AM    MCV 91.7 03/05/2018 02:46 AM     03/05/2018 02:46 AM     Temp Readings from Last 3 Encounters:   03/05/18 98.9 °F (37.2 °C) (Oral)   11/09/17 99.1 °F (37.3 °C) (Oral)   11/02/17 98.2 °F (36.8 °C)     Weight: 99.8 kg (220 lb)  Baseline culture/source/susceptibility: Pending    Assessment/Plan  The patient will be started on vancomycin utilizing scheduled dosing.  Vancomycin 2000 mg IV x 1 (given), then Vancomycin 1500 mg IV Q24H (15 mg/kg/dose).  Plan for trough as patient approaches steady state, prior to the 3rd dose (06:00 dose on Wednesday 3/7/18).  Due to infection severity, will target a trough of 15-20 ug/mL.  Pharmacy will continue to follow the patient’s culture results and clinical progress daily.    Kishore Payne [facility]
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
## 2018-03-06 ENCOUNTER — APPOINTMENT (OUTPATIENT)
Dept: CT IMAGING | Facility: HOSPITAL | Age: 73
End: 2018-03-06

## 2018-03-06 LAB
ANION GAP SERPL CALCULATED.3IONS-SCNC: 5 MMOL/L (ref 3–11)
BUN BLD-MCNC: 20 MG/DL (ref 9–23)
BUN/CREAT SERPL: 20 (ref 7–25)
CALCIUM SPEC-SCNC: 8.1 MG/DL (ref 8.7–10.4)
CHLORIDE SERPL-SCNC: 110 MMOL/L (ref 99–109)
CO2 SERPL-SCNC: 24 MMOL/L (ref 20–31)
CREAT BLD-MCNC: 1 MG/DL (ref 0.6–1.3)
FLUAV SUBTYP SPEC NAA+PROBE: NOT DETECTED
FLUBV RNA ISLT QL NAA+PROBE: NOT DETECTED
GFR SERPL CREATININE-BSD FRML MDRD: 73 ML/MIN/1.73
GLUCOSE BLD-MCNC: 122 MG/DL (ref 70–100)
POTASSIUM BLD-SCNC: 3.9 MMOL/L (ref 3.5–5.5)
SODIUM BLD-SCNC: 139 MMOL/L (ref 132–146)

## 2018-03-06 PROCEDURE — 80048 BASIC METABOLIC PNL TOTAL CA: CPT | Performed by: INTERNAL MEDICINE

## 2018-03-06 PROCEDURE — 94799 UNLISTED PULMONARY SVC/PX: CPT

## 2018-03-06 PROCEDURE — 87502 INFLUENZA DNA AMP PROBE: CPT | Performed by: INTERNAL MEDICINE

## 2018-03-06 PROCEDURE — 25010000002 VANCOMYCIN HCL IN NACL 1.5-0.9 GM/500ML-% SOLUTION

## 2018-03-06 PROCEDURE — 71250 CT THORAX DX C-: CPT

## 2018-03-06 PROCEDURE — 94760 N-INVAS EAR/PLS OXIMETRY 1: CPT

## 2018-03-06 PROCEDURE — 94640 AIRWAY INHALATION TREATMENT: CPT

## 2018-03-06 PROCEDURE — 25010000002 ENOXAPARIN PER 10 MG: Performed by: HOSPITALIST

## 2018-03-06 PROCEDURE — 99233 SBSQ HOSP IP/OBS HIGH 50: CPT | Performed by: INTERNAL MEDICINE

## 2018-03-06 RX ORDER — DIPHENHYDRAMINE HYDROCHLORIDE 50 MG/ML
50 INJECTION INTRAMUSCULAR; INTRAVENOUS ONCE
Status: DISCONTINUED | OUTPATIENT
Start: 2018-03-06 | End: 2018-03-06

## 2018-03-06 RX ORDER — METHYLPREDNISOLONE SODIUM SUCCINATE 40 MG/ML
40 INJECTION, POWDER, LYOPHILIZED, FOR SOLUTION INTRAMUSCULAR; INTRAVENOUS EVERY 4 HOURS
Status: DISCONTINUED | OUTPATIENT
Start: 2018-03-06 | End: 2018-03-06

## 2018-03-06 RX ADMIN — DOXYCYCLINE 100 MG: 100 CAPSULE ORAL at 21:00

## 2018-03-06 RX ADMIN — ALBUTEROL SULFATE 2.5 MG: 2.5 SOLUTION RESPIRATORY (INHALATION) at 07:28

## 2018-03-06 RX ADMIN — ALBUTEROL SULFATE 2.5 MG: 2.5 SOLUTION RESPIRATORY (INHALATION) at 18:49

## 2018-03-06 RX ADMIN — ALBUTEROL SULFATE 2.5 MG: 2.5 SOLUTION RESPIRATORY (INHALATION) at 03:01

## 2018-03-06 RX ADMIN — POTASSIUM CHLORIDE 10 MEQ: 750 CAPSULE, EXTENDED RELEASE ORAL at 08:47

## 2018-03-06 RX ADMIN — BENZONATATE 100 MG: 100 CAPSULE, LIQUID FILLED ORAL at 10:43

## 2018-03-06 RX ADMIN — ENOXAPARIN SODIUM 40 MG: 40 INJECTION SUBCUTANEOUS at 08:47

## 2018-03-06 RX ADMIN — ASPIRIN 81 MG 81 MG: 81 TABLET ORAL at 08:47

## 2018-03-06 RX ADMIN — BENZONATATE 100 MG: 100 CAPSULE, LIQUID FILLED ORAL at 21:00

## 2018-03-06 RX ADMIN — ALBUTEROL SULFATE 2.5 MG: 2.5 SOLUTION RESPIRATORY (INHALATION) at 13:11

## 2018-03-06 RX ADMIN — MESALAMINE 2.4 G: 1.2 TABLET, DELAYED RELEASE ORAL at 08:46

## 2018-03-06 RX ADMIN — CETIRIZINE HYDROCHLORIDE 10 MG: 10 TABLET, FILM COATED ORAL at 08:46

## 2018-03-06 RX ADMIN — VALSARTAN 320 MG: 160 TABLET, FILM COATED ORAL at 08:47

## 2018-03-06 RX ADMIN — ATORVASTATIN CALCIUM 10 MG: 10 TABLET, FILM COATED ORAL at 08:46

## 2018-03-06 RX ADMIN — WATER 2 G: 1 INJECTION INTRAMUSCULAR; INTRAVENOUS; SUBCUTANEOUS at 23:44

## 2018-03-06 RX ADMIN — WATER 2 G: 1 INJECTION INTRAMUSCULAR; INTRAVENOUS; SUBCUTANEOUS at 12:02

## 2018-03-06 RX ADMIN — MULTIPLE VITAMINS W/ MINERALS TAB 1 TABLET: TAB at 08:46

## 2018-03-06 RX ADMIN — WATER 2 G: 1 INJECTION INTRAMUSCULAR; INTRAVENOUS; SUBCUTANEOUS at 04:16

## 2018-03-06 RX ADMIN — VANCOMYCIN HCL-SODIUM CHLORIDE IV SOLN 1.5 GM/250ML-0.9% 1500 MG: 1.5-0.9/25 SOLUTION at 05:18

## 2018-03-06 RX ADMIN — FUROSEMIDE 20 MG: 20 TABLET ORAL at 08:47

## 2018-03-06 RX ADMIN — DOXYCYCLINE 100 MG: 100 CAPSULE ORAL at 08:46

## 2018-03-06 RX ADMIN — ALBUTEROL SULFATE 2.5 MG: 2.5 SOLUTION RESPIRATORY (INHALATION) at 23:06

## 2018-03-06 RX ADMIN — ALBUTEROL SULFATE 2.5 MG: 2.5 SOLUTION RESPIRATORY (INHALATION) at 16:12

## 2018-03-06 NOTE — PROGRESS NOTES
Psychiatric Medicine Services  PROGRESS NOTE    Patient Name: Jeremie Wynn  : 1945  MRN: [id number]    Date of Admission: 3/5/2018  Length of Stay: 1  Primary Care Physician: Aaron Trejo MD    Subjective   Subjective     CC:  10 days of illness, new high fever    HPI:  Doesn't feel any better.  Still coughing - tessalon helps.  Nebs help.    Dyspneic.    Generalized chest pain.     Review of Systems   Knee surg in November - no new pain/swelling with knee.     Otherwise ROS is negative except as mentioned in the HPI.    Objective   Objective     Vital Signs:   Temp:  [98 °F (36.7 °C)-99.8 °F (37.7 °C)] 98.1 °F (36.7 °C)  Heart Rate:  [64-86] 68  Resp:  [16-20] 16  BP: ()/(59-66) 129/62        Physical Exam:  Gen:  WD/WN man, still cheerful, NAD, in bed  Neuro: alert and oriented, clear speech, follows commands, grossly nonfocal  HEENT:  NC/AT PERRL, OP benign  Neck:  Supple, no LAD  Heart RRR no murmur, rub, or gallop  Lungs enitrely nonfocal but breath sounds distant throughout, no w r r , nonlabored on NC.  Without oxygen satting 88  Abd:  Soft, nontender, no rebound or guarding, pos BS  Extrem:  No c/c/e.  Left knee well healed, bends well, nontender, no erythema    Results Reviewed:  I have personally reviewed current lab, radiology, and data and agree.      Results from last 7 days  Lab Units 18  1346 18  0824 18  0246   WBC 10*3/mm3 19.50* 23.09* 19.08*   HEMOGLOBIN g/dL 11.3* 11.2* 13.2   HEMATOCRIT % 35.0* 34.4* 40.8   PLATELETS 10*3/mm3 209 196 246       Results from last 7 days  Lab Units 18  0323 18  0824 18  0246   SODIUM mmol/L 139 138 138   POTASSIUM mmol/L 3.9 3.8 3.9   CHLORIDE mmol/L 110* 108 107   CO2 mmol/L 24.0 20.0 23.0   BUN mg/dL 20 21 27*   CREATININE mg/dL 1.00 1.10 1.30   GLUCOSE mg/dL 122* 213* 160*   CALCIUM mg/dL 8.1* 8.6* 9.6   ALT (SGPT) U/L  --   --  14   AST (SGOT) U/L  --   --  19   TROPONIN I ng/mL   --  0.293* 0.309*     Estimated Creatinine Clearance: 79.3 mL/min (by C-G formula based on Cr of 1).  BNP   Date Value Ref Range Status   2018 45.0 0.0 - 100.0 pg/mL Final     Comment:     Results may be falsely decreased if patient taking Biotin.     No results found for: PHART    Microbiology Results Abnormal     Procedure Component Value - Date/Time    Respiratory Culture - Sputum, Cough [[id number]] Collected:  18 1213    Lab Status:  Preliminary result Specimen:  Sputum from Cough Updated:  18 0846     Respiratory Culture --      Moderate growth (3+) Normal Respiratory Abbi     Gram Stain Result Moderate (3+) WBCs per low power field      Occasional Epithelial cells per low power field      Few (2+) Gram positive cocci in pairs    Narrative:       mo    Blood Culture - Blood, [[id number]]  (Normal) Collected:  18 0240    Lab Status:  Preliminary result Specimen:  Blood from Arm, Right Updated:  18 0516     Blood Culture No growth at 24 hours    Blood Culture - Blood, [[id number]]  (Normal) Collected:  18 0250    Lab Status:  Preliminary result Specimen:  Blood from Hand, Right Updated:  18 0516     Blood Culture No growth at 24 hours    Influenza Antigen, Rapid - Swab, Nasopharynx [[id number]]  (Normal) Collected:  18 0246    Lab Status:  Final result Specimen:  Swab from Nasopharynx Updated:  18 0335     Influenza A Ag, EIA Negative     Influenza B Ag, EIA Negative          Imaging Results (last 24 hours)     ** No results found for the last 24 hours. **        Results for orders placed during the hospital encounter of 08/17/15   CONVERTED (HISTORICAL) ECHO    Narrative Patient:      ALIYA MARQUEZ    Med Rec#:     [id number]               :          1945            Date:         2015            Age:          69y                   Height:       177.8 cm / 70.0 in  Weight:       116.12 kg / 255.9 lbs  Sex:          M                     BSA:           2.32  Room#:        [facility]                      Sonographer:  Nilesh Shoemaker,[facility]  Referring:    YOLI  Reading:      Beni Ramírez MD  Primary:      Aaron Gonzalezbert  ______________________________________________________________________    Transthoracic Echocardiogram    Indication:  PVC's, SANTORO  BP:           147/78    Conclusions  1. The estimated ejection fraction is 55-60%.   2. Abnormal left ventricular diastolic filling is observed, consistent  with impaired relaxation.   3. There is mild to moderate aortic regurgitation.   4. There is mild mitral regurgitation.   5. There is moderate tricuspid regurgitation.  6. The right ventricular systolic pressure is calculated at 43 mmHg.     Findings       Left Ventricle:  The left ventricular chamber size is normal. Posterior wall hypertrophy  is observed. Global left ventricular wall motion and contractility are  within normal limits. There is normal left ventricular systolic  function. The estimated ejection fraction is 55-60%.  Abnormal left  ventricular diastolic filling is observed, consistent with impaired  relaxation.      Left Atrium:  The left atrial chamber size is normal.     Right Ventricle:  The right ventricle is moderately dilated.  The right ventricular global  systolic function is normal.     Right Atrium:  The right atrial cavity size is normal. No atrial septal defect is  visualized.     Aortic Valve:  The aortic valve is trileaflet. There is mild to moderate aortic  regurgitation.  There is no evidence of aortic stenosis.Pressure  gradients were noted following PVC's throughout the exam, gradients in  the mild range. The calculated aortic regurgitation pressure half-time  is 755 msec.    Mitral Valve:  The mitral valve leaflets appear normal. There is mitral annular  calcification. Mitral valve posterior leaflet calcification is  visualized. There is no evidence of mitral valve prolapse. There is mild  mitral regurgitation.  There is no  evidence of mitral stenosis.     Tricuspid Valve:  The tricuspid valve leaflets are normal.  There is moderate tricuspid  regurgitation. The right ventricular systolic pressure is calculated at  43 mmHg.      Pulmonic Valve:  The pulmonic valve appears normal. There is a trace pulmonic  regurgitation.      Pericardium:  There is no evidence of pericardial effusion.     Aorta:  There is no dilatation of the aortic root.     Pulmonary Artery:  The main pulmonary artery appears normal.     Venous:  The venous system appears normal.     Measurements   Chambers  Name                    Value           RVIDd (AP) 2D           3.12 cm         IVSd (2D)               1.01 cm         LVIDd (2D)              5.94 cm         LVIDs (2D)              3.71 cm         LVPWd (2D)              1.42 cm         EF (2D)                 66.8 %          Ao root diameter (2D)   3.3 cm          LA dimension (AP) 2D    4.2 cm          LA:Ao ratio (2D)        1.27 ratio        Volumes  Name                    Value           LV EDV SP 4CH (MOD)     154 ml          LV ESV SP 4CH (MOD)     67 ml           EF SP 4CH (MOD)         56 %              Diastolic/Systolic Function  Name                    Value           MV E-wave Vmax          0.9 m/sec       MV A-wave Vmax          1 m/sec         MV E:A ratio            0.9 ratio       LV lateral e' Vmax      0.1 m/sec       LV E:e' lateral ratio   9.4 ratio         Aortic Valve  Name                    Value           AV Vmax                 2.09 m/sec      AV VTI                  45.3 cm         AV peak gradient        17 mmHg         AV mean gradient        7 mmHg          LVOT diameter           2.2 cm          LVOT Vmax               1.58 m/sec      LVOT VTI                32.5 cm         LVOT peak gradient      10 mmHg         LVOT mean gradient      3 mmHg          SV LVOT                 124 ml          CHRIS (continuity Vmax)   2.87 cm2        CHRIS (continuity VTI)    2.73 cm2        AR PHT                   755 msec        AR peak gradient        63 mmHg           Tricuspid Valve  Name                    Value           TR Vmax                 2.77 m/sec      TR peak gradient        33 mmHg         RAP                     10 mmHg         RVSP                    43 mmHg           Electronically signed by: Beni Ramírez MD on 08/17/2015 19:16:03       I have reviewed the medications.    Assessment/Plan   Assessment / Plan     Hospital Problem List     * (Principal)Sepsis due to pneumonia    Hyperlipemia    Hypertension    Hyperglycemia    Elevated troponin    Dehydration             Brief Hospital Course to date:  Jeremie Wnyn is a 72 y.o. male from home with no lung disease, who came in with temp of 105 and CXR showing likely bilateral pna.       Assessment & Plan:    Sepsis due to pneumonia:  -  vanc and azactam and doxy day 2  -- note procal is reassuring  - saline 100cc/hr  - await flu PCR - still not run, will re-order  --  chest CT today, pretreat w steroids for allergy       2. Elevated troponin:   -- suspect due to demand ischemia  -- trending down      Hypertension:  - stable for now. Hold diovan and lasix per sepsis protcol       4. Hyperglycemia:  - history of pre-diabetes. Check A1c       DVT Prophylaxis:      CODE STATUS: Full Code    Disposition: I expect the patient to be discharged home in 2 days.      Electronically signed by Carleen Sesay MD, 03/06/18, 11:34 AM.

## 2018-03-06 NOTE — PLAN OF CARE
Problem: Patient Care Overview (Adult)  Goal: Plan of Care Review  Outcome: Ongoing (interventions implemented as appropriate)   03/05/18 0817 03/06/18 0234   Patient Care Overview   Progress --  no change   Outcome Evaluation   Outcome Summary/Follow up Plan --  Patient rested well this shift. No Significant changes this shift, will continue to monitor.   Coping/Psychosocial Response Interventions   Plan Of Care Reviewed With patient;spouse --

## 2018-03-07 LAB
ANION GAP SERPL CALCULATED.3IONS-SCNC: 5 MMOL/L (ref 3–11)
BACTERIA FLD CULT: NORMAL
BACTERIA SPEC RESP CULT: NORMAL
BACTERIA SPEC RESP CULT: NORMAL
BASOPHILS # BLD AUTO: 0.03 10*3/MM3 (ref 0–0.2)
BASOPHILS NFR BLD AUTO: 0.4 % (ref 0–1)
BUN BLD-MCNC: 17 MG/DL (ref 9–23)
BUN/CREAT SERPL: 18.9 (ref 7–25)
CALCIUM SPEC-SCNC: 8.2 MG/DL (ref 8.7–10.4)
CHLORIDE SERPL-SCNC: 112 MMOL/L (ref 99–109)
CO2 SERPL-SCNC: 23 MMOL/L (ref 20–31)
CREAT BLD-MCNC: 0.9 MG/DL (ref 0.6–1.3)
DEPRECATED RDW RBC AUTO: 50.8 FL (ref 37–54)
EOSINOPHIL # BLD AUTO: 0.38 10*3/MM3 (ref 0–0.3)
EOSINOPHIL NFR BLD AUTO: 4.7 % (ref 0–3)
ERYTHROCYTE [DISTWIDTH] IN BLOOD BY AUTOMATED COUNT: 15 % (ref 11.3–14.5)
GFR SERPL CREATININE-BSD FRML MDRD: 82 ML/MIN/1.73
GLUCOSE BLD-MCNC: 117 MG/DL (ref 70–100)
GRAM STN SPEC: NORMAL
HCT VFR BLD AUTO: 32.2 % (ref 38.9–50.9)
HGB BLD-MCNC: 10.2 G/DL (ref 13.1–17.5)
IMM GRANULOCYTES # BLD: 0.02 10*3/MM3 (ref 0–0.03)
IMM GRANULOCYTES NFR BLD: 0.2 % (ref 0–0.6)
L PNEUMO1 AG UR QL IA: NEGATIVE
LYMPHOCYTES # BLD AUTO: 1.05 10*3/MM3 (ref 0.6–4.8)
LYMPHOCYTES NFR BLD AUTO: 12.9 % (ref 24–44)
Lab: NORMAL
MCH RBC QN AUTO: 29.3 PG (ref 27–31)
MCHC RBC AUTO-ENTMCNC: 31.7 G/DL (ref 32–36)
MCV RBC AUTO: 92.5 FL (ref 80–99)
MONOCYTES # BLD AUTO: 1.11 10*3/MM3 (ref 0–1)
MONOCYTES NFR BLD AUTO: 13.6 % (ref 0–12)
NEUTROPHILS # BLD AUTO: 5.56 10*3/MM3 (ref 1.5–8.3)
NEUTROPHILS NFR BLD AUTO: 68.2 % (ref 41–71)
ORGANISM ID: NORMAL
PLATELET # BLD AUTO: 193 10*3/MM3 (ref 150–450)
PMV BLD AUTO: 11.1 FL (ref 6–12)
POTASSIUM BLD-SCNC: 3.6 MMOL/L (ref 3.5–5.5)
RBC # BLD AUTO: 3.48 10*6/MM3 (ref 4.2–5.76)
S PNEUM AG SPEC QL LA: NEGATIVE
SODIUM BLD-SCNC: 140 MMOL/L (ref 132–146)
SPECIMEN SOURCE: NORMAL
VANCOMYCIN TROUGH SERPL-MCNC: 10.2 MCG/ML (ref 10–20)
WBC NRBC COR # BLD: 8.15 10*3/MM3 (ref 3.5–10.8)

## 2018-03-07 PROCEDURE — 25010000002 VANCOMYCIN HCL IN NACL 1.75-0.9 GM/500ML-% SOLUTION

## 2018-03-07 PROCEDURE — 80202 ASSAY OF VANCOMYCIN: CPT

## 2018-03-07 PROCEDURE — 80048 BASIC METABOLIC PNL TOTAL CA: CPT | Performed by: INTERNAL MEDICINE

## 2018-03-07 PROCEDURE — 94640 AIRWAY INHALATION TREATMENT: CPT

## 2018-03-07 PROCEDURE — 94760 N-INVAS EAR/PLS OXIMETRY 1: CPT

## 2018-03-07 PROCEDURE — 99232 SBSQ HOSP IP/OBS MODERATE 35: CPT | Performed by: INTERNAL MEDICINE

## 2018-03-07 PROCEDURE — 94799 UNLISTED PULMONARY SVC/PX: CPT

## 2018-03-07 PROCEDURE — 85025 COMPLETE CBC W/AUTO DIFF WBC: CPT | Performed by: INTERNAL MEDICINE

## 2018-03-07 PROCEDURE — 25010000002 ENOXAPARIN PER 10 MG: Performed by: HOSPITALIST

## 2018-03-07 RX ORDER — VANCOMYCIN 1.75 GRAM/500 ML IN 0.9 % SODIUM CHLORIDE INTRAVENOUS
1750 EVERY 24 HOURS
Status: DISCONTINUED | OUTPATIENT
Start: 2018-03-07 | End: 2018-03-07

## 2018-03-07 RX ADMIN — DOXYCYCLINE 100 MG: 100 CAPSULE ORAL at 21:24

## 2018-03-07 RX ADMIN — WATER 2 G: 1 INJECTION INTRAMUSCULAR; INTRAVENOUS; SUBCUTANEOUS at 04:00

## 2018-03-07 RX ADMIN — BENZONATATE 100 MG: 100 CAPSULE, LIQUID FILLED ORAL at 13:17

## 2018-03-07 RX ADMIN — Medication 1750 MG: at 09:37

## 2018-03-07 RX ADMIN — CETIRIZINE HYDROCHLORIDE 10 MG: 10 TABLET, FILM COATED ORAL at 09:38

## 2018-03-07 RX ADMIN — DOXYCYCLINE 100 MG: 100 CAPSULE ORAL at 09:37

## 2018-03-07 RX ADMIN — BENZONATATE 100 MG: 100 CAPSULE, LIQUID FILLED ORAL at 04:32

## 2018-03-07 RX ADMIN — MULTIPLE VITAMINS W/ MINERALS TAB 1 TABLET: TAB at 10:47

## 2018-03-07 RX ADMIN — ALBUTEROL SULFATE 2.5 MG: 2.5 SOLUTION RESPIRATORY (INHALATION) at 07:15

## 2018-03-07 RX ADMIN — BENZONATATE 100 MG: 100 CAPSULE, LIQUID FILLED ORAL at 21:24

## 2018-03-07 RX ADMIN — POTASSIUM CHLORIDE 10 MEQ: 750 CAPSULE, EXTENDED RELEASE ORAL at 09:37

## 2018-03-07 RX ADMIN — ATORVASTATIN CALCIUM 10 MG: 10 TABLET, FILM COATED ORAL at 09:38

## 2018-03-07 RX ADMIN — SODIUM CHLORIDE 75 ML/HR: 9 INJECTION, SOLUTION INTRAVENOUS at 04:32

## 2018-03-07 RX ADMIN — ALBUTEROL SULFATE 2.5 MG: 2.5 SOLUTION RESPIRATORY (INHALATION) at 19:02

## 2018-03-07 RX ADMIN — WATER 2 G: 1 INJECTION INTRAMUSCULAR; INTRAVENOUS; SUBCUTANEOUS at 13:05

## 2018-03-07 RX ADMIN — ALBUTEROL SULFATE 2.5 MG: 2.5 SOLUTION RESPIRATORY (INHALATION) at 11:04

## 2018-03-07 RX ADMIN — ASPIRIN 81 MG 81 MG: 81 TABLET ORAL at 09:38

## 2018-03-07 RX ADMIN — ENOXAPARIN SODIUM 40 MG: 40 INJECTION SUBCUTANEOUS at 09:37

## 2018-03-07 RX ADMIN — ALBUTEROL SULFATE 2.5 MG: 2.5 SOLUTION RESPIRATORY (INHALATION) at 03:10

## 2018-03-07 RX ADMIN — MESALAMINE 2.4 G: 1.2 TABLET, DELAYED RELEASE ORAL at 09:37

## 2018-03-07 RX ADMIN — ALBUTEROL SULFATE 2.5 MG: 2.5 SOLUTION RESPIRATORY (INHALATION) at 15:37

## 2018-03-07 RX ADMIN — WATER 2 G: 1 INJECTION INTRAMUSCULAR; INTRAVENOUS; SUBCUTANEOUS at 21:24

## 2018-03-07 NOTE — PLAN OF CARE
Problem: Pneumonia (Adult)  Goal: Signs and Symptoms of Listed Potential Problems Will be Absent or Manageable (Pneumonia)  Outcome: Ongoing (interventions implemented as appropriate)   03/07/18 0511   Pneumonia   Problems Assessed (Pneumonia) all   Problems Present (Pneumonia) fluid/electrolyte imbalance   Frequent dry cough

## 2018-03-07 NOTE — PLAN OF CARE
Problem: Fall Risk (Adult)  Goal: Absence of Falls  Outcome: Ongoing (interventions implemented as appropriate)   03/07/18 0513   Fall Risk (Adult)   Absence of Falls making progress toward outcome

## 2018-03-07 NOTE — PROGRESS NOTES
[facility] Medicine Services  PROGRESS NOTE    Patient Name: Jeremie Wynn  : 1945  MRN: [id number]    Date of Admission: 3/5/2018  Length of Stay: 2  Primary Care Physician: Aaron Trejo MD    Subjective   Subjective     CC:  10 days of illness, new high fever    HPI:  A bit better.  Coughing kept him up all night  Breathing better.  Still tired    Review of Systems   No chest pain    Knee surg in November - no new pain/swelling with knee.     Otherwise ROS is negative except as mentioned in the HPI.    Objective   Objective     Vital Signs:   Temp:  [97.9 °F (36.6 °C)-99.6 °F (37.6 °C)] 98.4 °F (36.9 °C)  Heart Rate:  [61-90] 61  Resp:  [16-18] 16  BP: (117-144)/(65-80) 117/65        Physical Exam:  Gen:  WD/WN man,very pleasant.  I took o2 off and sats stayed 92 but he got a bit dyspneci while talking  Neuro: alert and oriented, clear speech, follows commands, grossly nonfocal  HEENT:  NC/AT PERRL, OP benign  Neck:  Supple, no LAD  Heart RRR no murmur, rub, or gallop  Lungs breath sounds distant throughout, oc wheeze, , nonlabored on NC.  Without oxygen satting 92 improved.  Abd:  Soft, nontender, no rebound or guarding, pos BS  Extrem:  No c/c/e.  Left knee well healed, bends well, nontender, no erythema    Results Reviewed:  I have personally reviewed current lab, radiology, and data and agree.      Results from last 7 days  Lab Units 18  0525 18  1346 18  0824   WBC 10*3/mm3 8.15 19.50* 23.09*   HEMOGLOBIN g/dL 10.2* 11.3* 11.2*   HEMATOCRIT % 32.2* 35.0* 34.4*   PLATELETS 10*3/mm3 193 209 196       Results from last 7 days  Lab Units 18  0525 18  0323 18  0824 18  0246   SODIUM mmol/L 140 139 138 138   POTASSIUM mmol/L 3.6 3.9 3.8 3.9   CHLORIDE mmol/L 112* 110* 108 107   CO2 mmol/L 23.0 24.0 20.0 23.0   BUN mg/dL 17 20 21 27*   CREATININE mg/dL 0.90 1.00 1.10 1.30   GLUCOSE mg/dL 117* 122* 213* 160*   CALCIUM mg/dL 8.2* 8.1*  8.6* 9.6   ALT (SGPT) U/L  --   --   --  14   AST (SGOT) U/L  --   --   --  19   TROPONIN I ng/mL  --   --  0.293* 0.309*     Estimated Creatinine Clearance: 88.1 mL/min (by C-G formula based on Cr of 0.9).  BNP   Date Value Ref Range Status   03/05/2018 45.0 0.0 - 100.0 pg/mL Final     Comment:     Results may be falsely decreased if patient taking Biotin.     No results found for: PHART    Microbiology Results Abnormal     Procedure Component Value - Date/Time    Respiratory Culture - Sputum, Cough [[id number]] Collected:  03/05/18 1213    Lab Status:  Final result Specimen:  Sputum from Cough Updated:  03/07/18 0801     Respiratory Culture --      Moderate growth (3+) Normal Respiratory Abbi     Gram Stain Result Moderate (3+) WBCs per low power field      Occasional Epithelial cells per low power field      Few (2+) Gram positive cocci in pairs    Narrative:       mo    Blood Culture - Blood, [[id number]]  (Normal) Collected:  03/05/18 0240    Lab Status:  Preliminary result Specimen:  Blood from Arm, Right Updated:  03/07/18 0516     Blood Culture No growth at 2 days    Blood Culture - Blood, [[id number]]  (Normal) Collected:  03/05/18 0250    Lab Status:  Preliminary result Specimen:  Blood from Hand, Right Updated:  03/07/18 0516     Blood Culture No growth at 2 days    Influenza A & B, RT PCR - Swab, Nasopharynx [[id number]]  (Normal) Collected:  03/06/18 1215    Lab Status:  Final result Specimen:  Swab from Nasopharynx Updated:  03/06/18 1344     Influenza A PCR Not Detected     Influenza B PCR Not Detected    Influenza Antigen, Rapid - Swab, Nasopharynx [[id number]]  (Normal) Collected:  03/05/18 0246    Lab Status:  Final result Specimen:  Swab from Nasopharynx Updated:  03/05/18 0335     Influenza A Ag, EIA Negative     Influenza B Ag, EIA Negative          Imaging Results (last 24 hours)     Procedure Component Value Units Date/Time    CT Chest Without Contrast [[id number]] Collected:  03/06/18 1610      Updated:  18 1633    Narrative:       EXAMINATION: CT CHEST WO CONTRAST-2018:      INDICATION: Sepsis; J18.9-Pneumonia, unspecified organism; A41.9-Sepsis,  unspecified organism.         TECHNIQUE: CT scan of the chest was performed without contrast.     The radiation dose reduction device was turned on for each scan per the  ALARA (As Low as Reasonably Achievable) protocol.     COMPARISON: Chest x-ray dated 2018.     FINDINGS: There is no axillary lymphadenopathy. There is no mediastinal  or hilar lymphadenopathy. There is a very large hiatal hernia. Limited  images of the upper abdomen are normal. Images displayed at lung window  settings demonstrate a mild diffuse interstitial pattern without  consolidation or mass.       Impression:       There is a mild somewhat diffuse interstitial pulmonary  pattern without consolidation, mass, nodule or effusion. There is a  large hiatal hernia.     D:  2018  E:  2018           This report was finalized on 3/6/2018 4:31 PM by Dr. Chris Chinchilla MD.           Results for orders placed during the hospital encounter of 08/17/15   CONVERTED (HISTORICAL) ECHO    Narrative Patient:      ALIYA MARQUEZ    Med Rec#:     [id number]               :          1945            Date:         2015            Age:          69y                   Height:       177.8 cm / 70.0 in  Weight:       116.12 kg / 255.9 lbs  Sex:          M                     BSA:          2.32  Room#:        [facility]                      Sonographer:  Nilesh Shoemaker,[facility]  Referring:    YOLI  Reading:      Beni Ramírez MD  Primary:      Aaron Trejo  ______________________________________________________________________    Transthoracic Echocardiogram    Indication:  PVC's, SANTORO  BP:           147/78    Conclusions  1. The estimated ejection fraction is 55-60%.   2. Abnormal left ventricular diastolic filling is observed, consistent  with impaired relaxation.   3.  There is mild to moderate aortic regurgitation.   4. There is mild mitral regurgitation.   5. There is moderate tricuspid regurgitation.  6. The right ventricular systolic pressure is calculated at 43 mmHg.     Findings       Left Ventricle:  The left ventricular chamber size is normal. Posterior wall hypertrophy  is observed. Global left ventricular wall motion and contractility are  within normal limits. There is normal left ventricular systolic  function. The estimated ejection fraction is 55-60%.  Abnormal left  ventricular diastolic filling is observed, consistent with impaired  relaxation.      Left Atrium:  The left atrial chamber size is normal.     Right Ventricle:  The right ventricle is moderately dilated.  The right ventricular global  systolic function is normal.     Right Atrium:  The right atrial cavity size is normal. No atrial septal defect is  visualized.     Aortic Valve:  The aortic valve is trileaflet. There is mild to moderate aortic  regurgitation.  There is no evidence of aortic stenosis.Pressure  gradients were noted following PVC's throughout the exam, gradients in  the mild range. The calculated aortic regurgitation pressure half-time  is 755 msec.    Mitral Valve:  The mitral valve leaflets appear normal. There is mitral annular  calcification. Mitral valve posterior leaflet calcification is  visualized. There is no evidence of mitral valve prolapse. There is mild  mitral regurgitation.  There is no evidence of mitral stenosis.     Tricuspid Valve:  The tricuspid valve leaflets are normal.  There is moderate tricuspid  regurgitation. The right ventricular systolic pressure is calculated at  43 mmHg.      Pulmonic Valve:  The pulmonic valve appears normal. There is a trace pulmonic  regurgitation.      Pericardium:  There is no evidence of pericardial effusion.     Aorta:  There is no dilatation of the aortic root.     Pulmonary Artery:  The main pulmonary artery appears normal.      Venous:  The venous system appears normal.     Measurements   Chambers  Name                    Value           RVIDd (AP) 2D           3.12 cm         IVSd (2D)               1.01 cm         LVIDd (2D)              5.94 cm         LVIDs (2D)              3.71 cm         LVPWd (2D)              1.42 cm         EF (2D)                 66.8 %          Ao root diameter (2D)   3.3 cm          LA dimension (AP) 2D    4.2 cm          LA:Ao ratio (2D)        1.27 ratio        Volumes  Name                    Value           LV EDV SP 4CH (MOD)     154 ml          LV ESV SP 4CH (MOD)     67 ml           EF SP 4CH (MOD)         56 %              Diastolic/Systolic Function  Name                    Value           MV E-wave Vmax          0.9 m/sec       MV A-wave Vmax          1 m/sec         MV E:A ratio            0.9 ratio       LV lateral e' Vmax      0.1 m/sec       LV E:e' lateral ratio   9.4 ratio         Aortic Valve  Name                    Value           AV Vmax                 2.09 m/sec      AV VTI                  45.3 cm         AV peak gradient        17 mmHg         AV mean gradient        7 mmHg          LVOT diameter           2.2 cm          LVOT Vmax               1.58 m/sec      LVOT VTI                32.5 cm         LVOT peak gradient      10 mmHg         LVOT mean gradient      3 mmHg          SV LVOT                 124 ml          CHRIS (continuity Vmax)   2.87 cm2        CHRIS (continuity VTI)    2.73 cm2        AR PHT                  755 msec        AR peak gradient        63 mmHg           Tricuspid Valve  Name                    Value           TR Vmax                 2.77 m/sec      TR peak gradient        33 mmHg         RAP                     10 mmHg         RVSP                    43 mmHg           Electronically signed by: Beni Ramírez MD on 08/17/2015 19:16:03       I have reviewed the medications.    Assessment/Plan   Assessment / Plan     Hospital Problem List     *  (Principal)Sepsis due to pneumonia    Hyperlipemia    Hypertension    Hyperglycemia    Elevated troponin    Dehydration             Brief Hospital Course to date:  Jeremie Wynn is a 72 y.o. male from home with no lung disease, who came in with temp of 105 and CXR showing likely bilateral pna.       Assessment & Plan:    Sepsis due to pneumonia:  -  vanc and azactam and doxy day 3:  Stop vanc  -- note procal is reassuring  - saline 100cc/hr - stop  - flu PCR neg  --  chest CT diffuse pattern - suspect viral pna     2. Elevated troponin:   -- suspect due to demand ischemia  -- trending down      Hypertension:  - stable for now. Hold diovan and lasix per sepsis protcol       4. Hyperglycemia:  - history of pre-diabetes. Check A1c       DVT Prophylaxis:      CODE STATUS: Full Code    Disposition: I expect the patient to be discharged home in 2 days.      Electronically signed by Carleen Sesay MD, 03/07/18, 2:35 PM.

## 2018-03-07 NOTE — PROGRESS NOTES
Pharmacy Consult-Vancomycin Dosing  Jeremie Wynn is a  72 y.o. male receiving vancomycin therapy.     Indication: Pneumonia  Consulting Provider: Hospitalist  ID Consult: No    Goal Trough: 15-20 mcg/mL    Current Antimicrobial Therapy  Anti-Infectives       Ordered     Dose/Rate Route Frequency Start Stop      03/05/18 0703  vancomycin IVPB 1500 mg in 0.9% NaCl (Premix) 500 mL     Ordering Provider:  Kishore Payne RPH    15 mg/kg × 99.8 kg  over 90 Minutes Intravenous Every 24 Hours 03/06/18 0600 03/11/18 0559    03/05/18 0653  aztreonam (AZACTAM) in SWFI 2 grams/10ml IV PUSH syringe     Ordering Provider:  Harpal Manuel MD    2 g  over 5 Minutes Intravenous Every 8 Hours 03/05/18 1200 03/10/18 1159    03/05/18 0608  doxycycline (MONODOX) capsule 100 mg     Ordering Provider:  Harpal Manuel MD    100 mg Oral Every 12 Hours Scheduled 03/05/18 0715 03/12/18 0859    03/05/18 0653  Pharmacy to dose vancomycin     Ordering Provider:  Harpal Manuel MD     Does not apply Continuous PRN 03/05/18 0652 03/10/18 0651    03/05/18 0237  aztreonam (AZACTAM) in SWFI 2 grams/10ml IV PUSH syringe     Ordering Provider:  Zafar Mayo DO    2 g  over 5 Minutes Intravenous Once 03/05/18 0300 03/05/18 0326    03/05/18 0237  vancomycin IVPB 2000 mg in 0.9% Sodium Chloride (premix) 500 mL     Ordering Provider:  Zafar Mayo DO    20 mg/kg × 99.8 kg Intravenous Once 03/05/18 0300 03/05/18 0552            Allergies  Allergies as of 03/05/2018 - Willy as Reviewed 03/05/2018   Allergen Reaction Noted   • Contrast dye Anaphylaxis 09/07/2016   • Penicillins Anaphylaxis and Other (See Comments) 08/02/2016   • Sulfa antibiotics Shortness Of Breath and Rash 08/02/2016   • Morphine and related Other (See Comments) 08/02/2016       Labs    Results from last 7 days   Lab Units 03/06/18  0323 03/05/18  0824 03/05/18  0246   BUN mg/dL 20 21 27*   CREATININE mg/dL 1.00 1.10 1.30       Results from last 7 days   Lab Units  "03/05/18  1346 03/05/18  0824 03/05/18  0246   WBC 10*3/mm3 19.50* 23.09* 19.08*       Evaluation of Dosing      Ht - 182.9 cm (72\")  Wt - 99.8 kg (220 lb)    Estimated Creatinine Clearance: 79.3 mL/min (by C-G formula based on Cr of 1).    Intake & Output (last 3 days)         03/03 0701 - 03/04 0700 03/04 0701 - 03/05 0700 03/05 0701 - 03/06 0700 03/06 0701 - 03/07 0700      P.O.   700     IV Piggyback  2000      Total Intake(mL/kg)  2000 (20) 700 (7)     Urine (mL/kg/hr)   400 (0.2)     Total Output     400      Net   +2000 +300              Unmeasured Urine Occurrence   5 x             Microbiology and Radiology  Microbiology Results (last 10 days)       Procedure Component Value - Date/Time      Respiratory Culture - Sputum, Cough [[id number]] Collected:  03/05/18 1213    Lab Status:  Preliminary result Specimen:  Sputum from Cough Updated:  03/05/18 1501     Gram Stain Result Moderate (3+) WBCs per low power field      Occasional Epithelial cells per low power field      Few (2+) Gram positive cocci in pairs    Narrative:       mo    Blood Culture - Blood, [[id number]]  (Normal) Collected:  03/05/18 0250    Lab Status:  Preliminary result Specimen:  Blood from Hand, Right Updated:  03/06/18 0516     Blood Culture No growth at 24 hours    Influenza Antigen, Rapid - Swab, Nasopharynx [[id number]]  (Normal) Collected:  03/05/18 0246    Lab Status:  Final result Specimen:  Swab from Nasopharynx Updated:  03/05/18 0335     Influenza A Ag, EIA Negative     Influenza B Ag, EIA Negative    Blood Culture - Blood, [[id number]]  (Normal) Collected:  03/05/18 0240    Lab Status:  Preliminary result Specimen:  Blood from Arm, Right Updated:  03/06/18 0516     Blood Culture No growth at 24 hours            Evaluation of Level    3/7 @ 0525 vancomycin trough = 10.2 mcg/mL (24 hr level, prior to 3rd dose)    Assessment/Plan:    1.  Pharmacy to dose vancomycin for pneumonia. Patient is currently receiving vancomycin 1500 mg IV " q24h.   2.  Vancomycin trough today is subtherapeutic at 10.2 mcg/mL, goal trough 15-20 mcg/mL. SCr continues to improve, 0.9 today. Will increase dose to vancomycin 1750 mg IV q24h.   3.  Vancomycin trough scheduled prior to the 3rd dose of this new regimen, 3/9 @ 0730.  4.  Pharmacy will continue to monitor and adjust vancomycin dose as necessary based on renal function, cultures, labs, and clinical status.     Thank you,  Cuca Alonzo, PharmD  Pharmacy Resident  3/7/2018  7:11 AM

## 2018-03-07 NOTE — PLAN OF CARE
Problem: Sepsis (Adult)  Goal: Signs and Symptoms of Listed Potential Problems Will be Absent or Manageable (Sepsis)  Outcome: Ongoing (interventions implemented as appropriate)   03/07/18 0509   Sepsis   Problems Assessed (Sepsis) all   Problems Present (Sepsis) none

## 2018-03-07 NOTE — PLAN OF CARE
Problem: Patient Care Overview (Adult)  Goal: Plan of Care Review  Outcome: Ongoing (interventions implemented as appropriate)   03/07/18 0564   Patient Care Overview   Progress progress toward functional goals as expected   Coping/Psychosocial Response Interventions   Plan Of Care Reviewed With patient

## 2018-03-07 NOTE — PROGRESS NOTES
Continued Stay Note  [facility]     Patient Name: Jeremie Wynn  MRN: [id number]  Today's Date: 3/7/2018    Admit Date: 3/5/2018          Discharge Plan       03/07/18 1405    Case Management/Social Work Plan    Plan Home    Patient/Family In Agreement With Plan yes    Additional Comments Plan remains to return home at [place]. Denies any needs at this time. CM following.               Discharge Codes     None        Expected Discharge Date and Time     Expected Discharge Date Expected Discharge Time    Mar 9, 2018             Zelda Maloney RN

## 2018-03-08 PROCEDURE — 94760 N-INVAS EAR/PLS OXIMETRY 1: CPT

## 2018-03-08 PROCEDURE — 94799 UNLISTED PULMONARY SVC/PX: CPT

## 2018-03-08 PROCEDURE — 25010000002 ENOXAPARIN PER 10 MG: Performed by: HOSPITALIST

## 2018-03-08 PROCEDURE — 99232 SBSQ HOSP IP/OBS MODERATE 35: CPT | Performed by: INTERNAL MEDICINE

## 2018-03-08 RX ORDER — ALBUTEROL SULFATE 2.5 MG/3ML
2.5 SOLUTION RESPIRATORY (INHALATION) EVERY 6 HOURS PRN
Status: DISCONTINUED | OUTPATIENT
Start: 2018-03-08 | End: 2018-03-09 | Stop reason: HOSPADM

## 2018-03-08 RX ADMIN — MULTIPLE VITAMINS W/ MINERALS TAB 1 TABLET: TAB at 08:58

## 2018-03-08 RX ADMIN — WATER 2 G: 1 INJECTION INTRAMUSCULAR; INTRAVENOUS; SUBCUTANEOUS at 12:10

## 2018-03-08 RX ADMIN — BENZONATATE 100 MG: 100 CAPSULE, LIQUID FILLED ORAL at 21:37

## 2018-03-08 RX ADMIN — WATER 2 G: 1 INJECTION INTRAMUSCULAR; INTRAVENOUS; SUBCUTANEOUS at 05:15

## 2018-03-08 RX ADMIN — MESALAMINE 2.4 G: 1.2 TABLET, DELAYED RELEASE ORAL at 08:59

## 2018-03-08 RX ADMIN — ATORVASTATIN CALCIUM 10 MG: 10 TABLET, FILM COATED ORAL at 08:59

## 2018-03-08 RX ADMIN — ENOXAPARIN SODIUM 40 MG: 40 INJECTION SUBCUTANEOUS at 08:59

## 2018-03-08 RX ADMIN — ALBUTEROL SULFATE 2.5 MG: 2.5 SOLUTION RESPIRATORY (INHALATION) at 00:08

## 2018-03-08 RX ADMIN — ALBUTEROL SULFATE 2.5 MG: 2.5 SOLUTION RESPIRATORY (INHALATION) at 03:55

## 2018-03-08 RX ADMIN — DOXYCYCLINE 100 MG: 100 CAPSULE ORAL at 08:59

## 2018-03-08 RX ADMIN — DOXYCYCLINE 100 MG: 100 CAPSULE ORAL at 21:37

## 2018-03-08 RX ADMIN — CETIRIZINE HYDROCHLORIDE 10 MG: 10 TABLET, FILM COATED ORAL at 09:00

## 2018-03-08 RX ADMIN — BENZONATATE 100 MG: 100 CAPSULE, LIQUID FILLED ORAL at 08:59

## 2018-03-08 RX ADMIN — ASPIRIN 81 MG 81 MG: 81 TABLET ORAL at 08:59

## 2018-03-08 RX ADMIN — POTASSIUM CHLORIDE 10 MEQ: 750 CAPSULE, EXTENDED RELEASE ORAL at 08:59

## 2018-03-08 NOTE — PROGRESS NOTES
[facility] Medicine Services  PROGRESS NOTE    Patient Name: Jeremie Wynn  : 1945  MRN: [id number]    Date of Admission: 3/5/2018  Length of Stay: 3  Primary Care Physician: Aaron Trejo MD    Subjective   Subjective     CC:  10 days of illness, new high fever    HPI:Better.  Slept last night.  Was up in room earlier.  Still gets a bit dyspneic off oxygen    Review of Systems   No chest pain  No other complaints    Knee surg in November - no new pain/swelling with knee.     Otherwise ROS is negative except as mentioned in the HPI.    Objective   Objective     Vital Signs:   Temp:  [97.9 °F (36.6 °C)-98.4 °F (36.9 °C)] 98.1 °F (36.7 °C)  Heart Rate:  [53-65] 53  Resp:  [16-20] 16  BP: (133-152)/(70-82) 133/70        Physical Exam:  Gen:  WD/WN man,very pleasant.  I took o2 off and sats stayed 90-92 but he got a bit dyspneic while talking  Neuro: alert and oriented, clear speech, follows commands, grossly nonfocal  HEENT:  NC/AT PERRL, OP benign  Neck:  Supple, no LAD  Heart RRR no murmur, rub, or gallop  Lungs breath sounds distant throughout,no wheeze heard , nonlabored on NC.  Without oxygen satting 92 improved.  Abd:  Soft, nontender, no rebound or guarding, pos BS  Extrem:  No c/c/e.  Left knee well healed, bends well, nontender, no erythema    Results Reviewed:  I have personally reviewed current lab, radiology, and data and agree.      Results from last 7 days  Lab Units 18  0525 18  1346 18  0824   WBC 10*3/mm3 8.15 19.50* 23.09*   HEMOGLOBIN g/dL 10.2* 11.3* 11.2*   HEMATOCRIT % 32.2* 35.0* 34.4*   PLATELETS 10*3/mm3 193 209 196       Results from last 7 days  Lab Units 18  0525 18  0323 18  0824 18  0246   SODIUM mmol/L 140 139 138 138   POTASSIUM mmol/L 3.6 3.9 3.8 3.9   CHLORIDE mmol/L 112* 110* 108 107   CO2 mmol/L 23.0 24.0 20.0 23.0   BUN mg/dL 17 20 21 27*   CREATININE mg/dL 0.90 1.00 1.10 1.30   GLUCOSE mg/dL 117* 122*  213* 160*   CALCIUM mg/dL 8.2* 8.1* 8.6* 9.6   ALT (SGPT) U/L  --   --   --  14   AST (SGOT) U/L  --   --   --  19   TROPONIN I ng/mL  --   --  0.293* 0.309*     Estimated Creatinine Clearance: 88.1 mL/min (by C-G formula based on Cr of 0.9).  No results found for: BNP  No results found for: PHART    Microbiology Results Abnormal     Procedure Component Value - Date/Time    Blood Culture - Blood, [[id number]]  (Normal) Collected:  03/05/18 0240    Lab Status:  Preliminary result Specimen:  Blood from Arm, Right Updated:  03/08/18 0516     Blood Culture No growth at 3 days    Blood Culture - Blood, [[id number]]  (Normal) Collected:  03/05/18 0250    Lab Status:  Preliminary result Specimen:  Blood from Hand, Right Updated:  03/08/18 0516     Blood Culture No growth at 3 days    S. Pneumo Ag Urine or CSF - Urine, Urine, Clean Catch [[id number]] Collected:  03/05/18 1133    Lab Status:  Final result Specimen:  Urine from Urine, Clean Catch Updated:  03/07/18 1710     Specimen Source Urine     STREP PNEUMONIAE ANTIGEN Negative     Body Fluid Culture, Sterile Not Indicated     Organism ID Not indicated.     Please note Comment      [employer] standards require a culture to be  performed on CSF specimens submitted for bacterial antigen testing.  (CAP HERNÁN.[id number]) Urine specimens will not be cultured.       Narrative:       Performed at:  [address] [place] [address]  [id number]  : Arnold Worthington MD, Phone:  [phone]    Legionella Antigen, Urine - Urine, Urine, Clean Catch [[id number]] Collected:  03/05/18 1133    Lab Status:  Final result Specimen:  Urine from Urine, Clean Catch Updated:  03/07/18 1522     L. pneumophila Serogp 1 Ur Ag Negative      Presumptive negative for L. pneumophila serogroup 1 antigen in urine,  suggesting no recent or current infection. Legionnaires' disease  cannot be ruled out since other serogroups and species may also cause  disease.        Narrative:       Performed at:  [address]  [id number]  : Arnold Worthington MD, Phone:  [phone]    Respiratory Culture - Sputum, Cough [[id number]] Collected:  18 1212    Lab Status:  Final result Specimen:  Sputum from Cough Updated:  18 0801     Respiratory Culture --      Moderate growth (3+) Normal Respiratory Abbi     Gram Stain Result Moderate (3+) WBCs per low power field      Occasional Epithelial cells per low power field      Few (2+) Gram positive cocci in pairs    Narrative:       mo    Influenza A & B, RT PCR - Swab, Nasopharynx [[id number]]  (Normal) Collected:  18 1215    Lab Status:  Final result Specimen:  Swab from Nasopharynx Updated:  18 1344     Influenza A PCR Not Detected     Influenza B PCR Not Detected    Influenza Antigen, Rapid - Swab, Nasopharynx [[id number]]  (Normal) Collected:  18 0246    Lab Status:  Final result Specimen:  Swab from Nasopharynx Updated:  18 0335     Influenza A Ag, EIA Negative     Influenza B Ag, EIA Negative          Imaging Results (last 24 hours)     ** No results found for the last 24 hours. **        Results for orders placed during the hospital encounter of 08/17/15   CONVERTED (HISTORICAL) ECHO    Narrative Patient:      ALIYA MARQUEZ    Med Rec#:     [id number]               :          1945            Date:         2015            Age:          69y                   Height:       177.8 cm / 70.0 in  Weight:       116.12 kg / 255.9 lbs  Sex:          M                     BSA:          2.32  Room#:        [facility]                      Sonographer:  Nilesh Shoemaker BS,[facility]  Referring:    YOLI  Reading:      Beni Ramírez MD  Primary:      Aaron Trejo  ______________________________________________________________________    Transthoracic Echocardiogram    Indication:  PVC's, SANTORO  BP:           147/78    Conclusions  1. The estimated  ejection fraction is 55-60%.   2. Abnormal left ventricular diastolic filling is observed, consistent  with impaired relaxation.   3. There is mild to moderate aortic regurgitation.   4. There is mild mitral regurgitation.   5. There is moderate tricuspid regurgitation.  6. The right ventricular systolic pressure is calculated at 43 mmHg.     Findings       Left Ventricle:  The left ventricular chamber size is normal. Posterior wall hypertrophy  is observed. Global left ventricular wall motion and contractility are  within normal limits. There is normal left ventricular systolic  function. The estimated ejection fraction is 55-60%.  Abnormal left  ventricular diastolic filling is observed, consistent with impaired  relaxation.      Left Atrium:  The left atrial chamber size is normal.     Right Ventricle:  The right ventricle is moderately dilated.  The right ventricular global  systolic function is normal.     Right Atrium:  The right atrial cavity size is normal. No atrial septal defect is  visualized.     Aortic Valve:  The aortic valve is trileaflet. There is mild to moderate aortic  regurgitation.  There is no evidence of aortic stenosis.Pressure  gradients were noted following PVC's throughout the exam, gradients in  the mild range. The calculated aortic regurgitation pressure half-time  is 755 msec.    Mitral Valve:  The mitral valve leaflets appear normal. There is mitral annular  calcification. Mitral valve posterior leaflet calcification is  visualized. There is no evidence of mitral valve prolapse. There is mild  mitral regurgitation.  There is no evidence of mitral stenosis.     Tricuspid Valve:  The tricuspid valve leaflets are normal.  There is moderate tricuspid  regurgitation. The right ventricular systolic pressure is calculated at  43 mmHg.      Pulmonic Valve:  The pulmonic valve appears normal. There is a trace pulmonic  regurgitation.      Pericardium:  There is no evidence of pericardial  effusion.     Aorta:  There is no dilatation of the aortic root.     Pulmonary Artery:  The main pulmonary artery appears normal.     Venous:  The venous system appears normal.     Measurements   Chambers  Name                    Value           RVIDd (AP) 2D           3.12 cm         IVSd (2D)               1.01 cm         LVIDd (2D)              5.94 cm         LVIDs (2D)              3.71 cm         LVPWd (2D)              1.42 cm         EF (2D)                 66.8 %          Ao root diameter (2D)   3.3 cm          LA dimension (AP) 2D    4.2 cm          LA:Ao ratio (2D)        1.27 ratio        Volumes  Name                    Value           LV EDV SP 4CH (MOD)     154 ml          LV ESV SP 4CH (MOD)     67 ml           EF SP 4CH (MOD)         56 %              Diastolic/Systolic Function  Name                    Value           MV E-wave Vmax          0.9 m/sec       MV A-wave Vmax          1 m/sec         MV E:A ratio            0.9 ratio       LV lateral e' Vmax      0.1 m/sec       LV E:e' lateral ratio   9.4 ratio         Aortic Valve  Name                    Value           AV Vmax                 2.09 m/sec      AV VTI                  45.3 cm         AV peak gradient        17 mmHg         AV mean gradient        7 mmHg          LVOT diameter           2.2 cm          LVOT Vmax               1.58 m/sec      LVOT VTI                32.5 cm         LVOT peak gradient      10 mmHg         LVOT mean gradient      3 mmHg          SV LVOT                 124 ml          CHRIS (continuity Vmax)   2.87 cm2        CHRIS (continuity VTI)    2.73 cm2        AR PHT                  755 msec        AR peak gradient        63 mmHg           Tricuspid Valve  Name                    Value           TR Vmax                 2.77 m/sec      TR peak gradient        33 mmHg         RAP                     10 mmHg         RVSP                    43 mmHg           Electronically signed by: Beni Ramírez MD on 08/17/2015  19:16:03       I have reviewed the medications.    Assessment/Plan   Assessment / Plan     Hospital Problem List     * (Principal)Sepsis due to pneumonia    Hyperlipemia    Hypertension    Hyperglycemia    Elevated troponin    Dehydration             Brief Hospital Course to date:  Jeremie Wynn is a 72 y.o. male from home with no lung disease, who came in with temp of 105 and CXR showing likely bilateral pna.       Assessment & Plan:    Sepsis due to pneumonia:  -  Will stop azactam  -- -plan home on doxy tomorrow to complete a 10 day course     2. Elevated troponin:   -- suspect due to demand ischemia  -- trending down      Hypertension:  - stable for now. Hold diovan and lasix per sepsis protcol       4. Hyperglycemia:  - history of pre-diabetes. 6.3 A1c    Still hypoxic - arrange home o2 if he qualifies       DVT Prophylaxis:      CODE STATUS: Full Code    Disposition: I expect the patient to be discharged home in 1 days.      Electronically signed by Carleen Sesay MD, 03/08/18, 2:03 PM.

## 2018-03-08 NOTE — PLAN OF CARE
Problem: Patient Care Overview (Adult)  Goal: Plan of Care Review  Outcome: Ongoing (interventions implemented as appropriate)   03/08/18 0442   Patient Care Overview   Progress progress toward functional goals as expected   Coping/Psychosocial Response Interventions   Plan Of Care Reviewed With patient      03/08/18 0442   Patient Care Overview   Progress progress toward functional goals as expected   Coping/Psychosocial Response Interventions   Plan Of Care Reviewed With patient       Problem: Fall Risk (Adult)  Goal: Absence of Falls  Outcome: Ongoing (interventions implemented as appropriate)   03/08/18 0442   Fall Risk (Adult)   Absence of Falls making progress toward outcome       Problem: Pneumonia (Adult)  Goal: Signs and Symptoms of Listed Potential Problems Will be Absent or Manageable (Pneumonia)  Outcome: Ongoing (interventions implemented as appropriate)   03/08/18 0442   Pneumonia   Problems Assessed (Pneumonia) respiratory compromise   Problems Present (Pneumonia) respiratory compromise   No acute resp. Distress. Cont to c/o frequent nonproductive cough. O2 in use @ 2-3 l/min per n/c

## 2018-03-09 VITALS
WEIGHT: 220 LBS | SYSTOLIC BLOOD PRESSURE: 139 MMHG | HEART RATE: 62 BPM | DIASTOLIC BLOOD PRESSURE: 74 MMHG | OXYGEN SATURATION: 86 % | HEIGHT: 72 IN | RESPIRATION RATE: 16 BRPM | BODY MASS INDEX: 29.8 KG/M2 | TEMPERATURE: 97.9 F

## 2018-03-09 PROBLEM — I50.30 DIASTOLIC CHF: Status: ACTIVE | Noted: 2018-03-09

## 2018-03-09 PROCEDURE — 99239 HOSP IP/OBS DSCHRG MGMT >30: CPT | Performed by: INTERNAL MEDICINE

## 2018-03-09 PROCEDURE — 25010000002 ENOXAPARIN PER 10 MG: Performed by: HOSPITALIST

## 2018-03-09 RX ORDER — DEXTROMETHORPHAN POLISTIREX 30 MG/5ML
30 SUSPENSION ORAL EVERY 12 HOURS PRN
Status: DISCONTINUED | OUTPATIENT
Start: 2018-03-09 | End: 2018-03-09 | Stop reason: HOSPADM

## 2018-03-09 RX ORDER — BENZONATATE 100 MG/1
100 CAPSULE ORAL 3 TIMES DAILY PRN
Qty: 40 CAPSULE | Refills: 0 | Status: ON HOLD | OUTPATIENT
Start: 2018-03-09 | End: 2018-03-24

## 2018-03-09 RX ORDER — DOXYCYCLINE 100 MG/1
100 CAPSULE ORAL EVERY 12 HOURS SCHEDULED
Qty: 10 CAPSULE | Refills: 0 | Status: SHIPPED | OUTPATIENT
Start: 2018-03-09 | End: 2018-03-14

## 2018-03-09 RX ADMIN — ASPIRIN 81 MG 81 MG: 81 TABLET ORAL at 09:44

## 2018-03-09 RX ADMIN — POTASSIUM CHLORIDE 10 MEQ: 750 CAPSULE, EXTENDED RELEASE ORAL at 09:44

## 2018-03-09 RX ADMIN — DOXYCYCLINE 100 MG: 100 CAPSULE ORAL at 09:44

## 2018-03-09 RX ADMIN — MESALAMINE 2.4 G: 1.2 TABLET, DELAYED RELEASE ORAL at 09:44

## 2018-03-09 RX ADMIN — BENZONATATE 100 MG: 100 CAPSULE, LIQUID FILLED ORAL at 04:22

## 2018-03-09 RX ADMIN — DEXTROMETHORPHAN 30 MG: 30 SUSPENSION, EXTENDED RELEASE ORAL at 02:27

## 2018-03-09 RX ADMIN — ENOXAPARIN SODIUM 40 MG: 40 INJECTION SUBCUTANEOUS at 09:44

## 2018-03-09 RX ADMIN — CETIRIZINE HYDROCHLORIDE 10 MG: 10 TABLET, FILM COATED ORAL at 09:44

## 2018-03-09 RX ADMIN — ATORVASTATIN CALCIUM 10 MG: 10 TABLET, FILM COATED ORAL at 09:44

## 2018-03-09 RX ADMIN — BENZONATATE 100 MG: 100 CAPSULE, LIQUID FILLED ORAL at 13:15

## 2018-03-09 NOTE — DISCHARGE PLACEMENT REQUEST
"Jeremie Marquez (72 y.o. Male)     To [facility]  From [facility]    Date of Birth Social Security Number Address Home Phone MRN    1945  [address] [id number] [phone] [id number]    Restorationist Marital Status          None        Admission Date Admission Type Admitting Provider Attending Provider Department, Room/Bed    3/5/18 Emergency Carleen Sesay MD Mini, Jocelyn, MD [facility] 3H, S380/1    Discharge Date Discharge Disposition Discharge Destination         Home or Self Care             Attending Provider: Carleen Sesay MD     Allergies:  Contrast Dye, Penicillins, Sulfa Antibiotics, Morphine And Related    Isolation:  None   Infection:  None   Code Status:  FULL    Ht:  182.9 cm (72\")   Wt:  99.8 kg (220 lb)    Admission Cmt:  None   Principal Problem:  Sepsis due to pneumonia [J18.9,A41.9]                 Active Insurance as of 3/5/2018     Primary Coverage     Payor Plan Insurance Group Employer/Plan Group    HUMANA MEDICARE REPLACEMENT HUMANA MEDICARE REPL [id number]     Payor Plan Address Payor Plan Phone Number Effective From Effective To    [address] [address] [phone] 2013     [place] [address]       Subscriber Name Subscriber Birth Date Member ID       JEREMIE MARQUEZ 1945 [id number]                 Emergency Contacts      (Rel.) Home Phone Work Phone Mobile Phone    Rosie Marquez (Spouse) [phone] -- [phone]               Discharge Summary      Carleen Sesay MD at 3/9/2018  9:26 AM              [facility] Medicine Services  DISCHARGE SUMMARY    Patient Name: Jeremie Marquez  : 1945  MRN: [id number]    Date of Admission: 3/5/2018  Date of Discharge:  3/9/18  Primary Care Physician: Aaron Trejo MD    Consults     No orders found from 2018 to 3/6/2018.        Hospital Course     Presenting Problem:   Pneumonia [J18.9]    Active Hospital Problems (** Indicates Principal Problem)    " Diagnosis Date Noted   • **Sepsis due to pneumonia [J18.9, A41.9] 03/05/2018   • Hyperglycemia [R73.9] 03/05/2018   • Elevated troponin [R74.8] 03/05/2018   • Dehydration [E86.0] 03/05/2018   • Hyperlipemia [E78.5] 09/07/2016   • Hypertension [I10] 09/07/2016      Resolved Hospital Problems    Diagnosis Date Noted Date Resolved   No resolved problems to display.          Hospital Course:  Jeremie Wynn is a 72 y.o. male, generally healthy with no underlying lung disease, who presented with temps to 105 and dyspnea/hypoxia.      Chest x-ray showed nonspecific diffuse chagnes.  He was started on broad-spectrum antibiotics.  He defervesced but remained somewhat hypoxic.  A CT scan was done (without contrast due to history of anaphylaxis) that showed diffuse changes of viral or atypical pneumonia.  A sputum culture showed normal anson only.  Imaging also showed CHF. He will be maintained on daily lasix.  He had an EF of 60 percent in 2015.  Recommend outpatient echo after he recovers from acute lung disease.      He clinically improved each day and antibtiotics were weaned off.  At discharge he has O2 sats of 88-92 at rest, dropping to the 80s with ambulation.  He will go home to complete a full 10 days of doxycycline and will have home oxygen.    His troponins were mildly elevated in the setting of high fever and hypoxia.  He had no chest pain.  This was presumed to be supply/demand mismatch.     He has anemia.  This is likely due to recent knee surgery.  Management/workup is deferred to PCP.            Day of Discharge     HPI:   Feels about the same.  Gets up and walks without oxygen but feels dyspneic when he does.  Continued cough.      Review of Systems   No chest pain  No gi symptoms, eating and drinking well.     Otherwise ROS is negative except as mentioned in the HPI.    Vital Signs:   Temp:  [97.9 °F (36.6 °C)-99.2 °F (37.3 °C)] 97.9 °F (36.6 °C)  Heart Rate:  [62-78] 62  Resp:  [16] 16  BP: (131-162)/(67-82)  139/74     Physical Exam:  Constitutional: No acute distress, awake, alert  Eyes: PERRLA, sclerae anicteric, no conjunctival injection  HENT: NCAT, mucous membranes moist  Neck: Supple, no thyromegaly, no lymphadenopathy, trachea midline  Respiratory: Clear to auscultation bilaterally, nonlabored respirations , no w r r.  Without oxygen, sats drop to 89 percent while talking at rest  Cardiovascular: RRR, no murmurs, rubs, or gallops, palpable pedal pulses bilaterally  Gastrointestinal: Positive bowel sounds, soft, nontender, nondistended  Musculoskeletal: No bilateral ankle edema, no clubbing or cyanosis to extremities  Psychiatric: Appropriate affect, cooperative  Neurologic: Oriented x 3, strength symmetric in all extremities, Cranial Nerves grossly intact to confrontation, speech clear  Skin: No rashes      Pertinent  and/or Most Recent Results       Results from last 7 days  Lab Units 03/07/18  0525 03/06/18  0323 03/05/18  1346 03/05/18  0824 03/05/18  0246   WBC 10*3/mm3 8.15  --  19.50* 23.09* 19.08*   HEMOGLOBIN g/dL 10.2*  --  11.3* 11.2* 13.2   HEMATOCRIT % 32.2*  --  35.0* 34.4* 40.8   PLATELETS 10*3/mm3 193  --  209 196 246   SODIUM mmol/L 140 139  --  138 138   POTASSIUM mmol/L 3.6 3.9  --  3.8 3.9   CHLORIDE mmol/L 112* 110*  --  108 107   CO2 mmol/L 23.0 24.0  --  20.0 23.0   BUN mg/dL 17 20  --  21 27*   CREATININE mg/dL 0.90 1.00  --  1.10 1.30   GLUCOSE mg/dL 117* 122*  --  213* 160*   CALCIUM mg/dL 8.2* 8.1*  --  8.6* 9.6       Results from last 7 days  Lab Units 03/05/18  0246   BILIRUBIN mg/dL 0.6   ALK PHOS U/L 73   ALT (SGPT) U/L 14   AST (SGOT) U/L 19           Invalid input(s): TG, LDLCALC, LDLREALC    Results from last 7 days  Lab Units 03/05/18  0824 03/05/18  0505 03/05/18  0246   HEMOGLOBIN A1C %  --  6.30*  --    BNP pg/mL  --   --  45.0   TROPONIN I ng/mL 0.293*  --  0.309*     Brief Urine Lab Results  (Last result in the past 365 days)      Color   Clarity   Blood   Leuk Est   Nitrite    Protein   CREAT   Urine HCG        03/05/18 0446 Yellow Cloudy(A) Negative Negative Negative Negative               Microbiology Results Abnormal     Procedure Component Value - Date/Time    Blood Culture - Blood, [[id number]]  (Normal) Collected:  03/05/18 0240    Lab Status:  Preliminary result Specimen:  Blood from Arm, Right Updated:  03/09/18 0516     Blood Culture No growth at 4 days    Blood Culture - Blood, [[id number]]  (Normal) Collected:  03/05/18 0250    Lab Status:  Preliminary result Specimen:  Blood from Hand, Right Updated:  03/09/18 0516     Blood Culture No growth at 4 days    S. Pneumo Ag Urine or CSF - Urine, Urine, Clean Catch [[id number]] Collected:  03/05/18 1133    Lab Status:  Final result Specimen:  Urine from Urine, Clean Catch Updated:  03/07/18 1710     Specimen Source Urine     STREP PNEUMONIAE ANTIGEN Negative     Body Fluid Culture, Sterile Not Indicated     Organism ID Not indicated.     Please note Comment      College [employer] standards require a culture to be  performed on CSF specimens submitted for bacterial antigen testing.  (CAP [id number]) Urine specimens will not be cultured.       Narrative:       Performed at:  [address] [place] [address]  [id number]  : Arnold Worthington MD, Phone:  [phone]    Legionella Antigen, Urine - Urine, Urine, Clean Catch [[id number]] Collected:  03/05/18 1133    Lab Status:  Final result Specimen:  Urine from Urine, Clean Catch Updated:  03/07/18 1522     L. pneumophila Serogp 1 Ur Ag Negative      Presumptive negative for L. pneumophila serogroup 1 antigen in urine,  suggesting no recent or current infection. Legionnaires' disease  cannot be ruled out since other serogroups and species may also cause  disease.       Narrative:       Performed at:  [address] [place] [address]  [id number]  : Arnold Worthington MD, Phone:  [phone]    Respiratory Culture  - Sputum, Cough [[id number]] Collected:  03/05/18 1213    Lab Status:  Final result Specimen:  Sputum from Cough Updated:  03/07/18 0801     Respiratory Culture --      Moderate growth (3+) Normal Respiratory Abbi     Gram Stain Result Moderate (3+) WBCs per low power field      Occasional Epithelial cells per low power field      Few (2+) Gram positive cocci in pairs    Narrative:       mo    Influenza A & B, RT PCR - Swab, Nasopharynx [[id number]]  (Normal) Collected:  03/06/18 1215    Lab Status:  Final result Specimen:  Swab from Nasopharynx Updated:  03/06/18 1344     Influenza A PCR Not Detected     Influenza B PCR Not Detected    Influenza Antigen, Rapid - Swab, Nasopharynx [[id number]]  (Normal) Collected:  03/05/18 0246    Lab Status:  Final result Specimen:  Swab from Nasopharynx Updated:  03/05/18 0335     Influenza A Ag, EIA Negative     Influenza B Ag, EIA Negative          Imaging Results (all)     Procedure Component Value Units Date/Time    XR Chest 1 View [[id number]] Collected:  03/05/18 0228     Updated:  03/05/18 0413    Narrative:       EXAM:    XR Chest, 1 View    EXAM DATE/TIME:    3/5/2018 2:28 AM    CLINICAL HISTORY:    72 years old, male; Signs and symptoms; Shortness of breath; Additional info:   Simple sepsis triage protocol    TECHNIQUE:    Single upright AP portable chest at 3:24 AM.    COMPARISON:    CR - XR CHEST 1  2017-11-06 09:26    FINDINGS:    Incomplete inspiratory effort when compared to prior study.    Hazy densities in the perihilar regions bilaterally.  Mild asymmetric density in the RIGHT lung base.  No pneumothorax.  No large pleural effusion.  Elevated RIGHT hemidiaphragm.  Heart appears top normal in size, stable.  Mediastinum unremarkable.  Moderate to large hiatal hernia.  Osteopenia/osteoporosis.  Old LEFT rib fractures.  No acute bone abnormality.      Impression:         Hypoventilatory changes versus early pulmonary edema or peribronchial   thickening perihilar  regions.  Correlate clinically for possibility of   congestive heart failure and/or bronchitis.    Mild asymmetric density RIGHT lung base, possibly atelectasis from poor   inspiratory effort and elevated RIGHT hemidiaphragm.  However, early pneumonia   not excluded.    If clinical symptoms warrant, suggest followup imaging with full inspiratory   effort when clinically feasible.    Additional nonacute findings as noted.    THIS DOCUMENT HAS BEEN ELECTRONICALLY SIGNED BY NASH DAVENPORT MD    CT Chest Without Contrast [[id number]] Collected:  18 1610     Updated:  18 1633    Narrative:       EXAMINATION: CT CHEST WO CONTRAST-2018:      INDICATION: Sepsis; J18.9-Pneumonia, unspecified organism; A41.9-Sepsis,  unspecified organism.         TECHNIQUE: CT scan of the chest was performed without contrast.     The radiation dose reduction device was turned on for each scan per the  ALARA (As Low as Reasonably Achievable) protocol.     COMPARISON: Chest x-ray dated 2018.     FINDINGS: There is no axillary lymphadenopathy. There is no mediastinal  or hilar lymphadenopathy. There is a very large hiatal hernia. Limited  images of the upper abdomen are normal. Images displayed at lung window  settings demonstrate a mild diffuse interstitial pattern without  consolidation or mass.       Impression:       There is a mild somewhat diffuse interstitial pulmonary  pattern without consolidation, mass, nodule or effusion. There is a  large hiatal hernia.     D:  2018  E:  2018           This report was finalized on 3/6/2018 4:31 PM by Dr. Chris Chinchilla MD.                       Results for orders placed during the hospital encounter of 08/17/15   CONVERTED (HISTORICAL) ECHO    Narrative Patient:      ALIYA MARQUEZ    Med Rec#:     [id number]               :          1945            Date:         2015            Age:          69y                   Height:       177.8 cm / 70.0 in  Weight:        116.12 kg / 255.9 lbs  Sex:          M                     BSA:          2.32  Room#:        [facility]                      Sonographer:  Nilesh Shoemaker,RDCS  Referring:    YOLI  Reading:      Beni Ramírez MD  Primary:      Aaron Trejo  ______________________________________________________________________    Transthoracic Echocardiogram    Indication:  PVC's, SANTORO  BP:           147/78    Conclusions  1. The estimated ejection fraction is 55-60%.   2. Abnormal left ventricular diastolic filling is observed, consistent  with impaired relaxation.   3. There is mild to moderate aortic regurgitation.   4. There is mild mitral regurgitation.   5. There is moderate tricuspid regurgitation.  6. The right ventricular systolic pressure is calculated at 43 mmHg.     Findings       Left Ventricle:  The left ventricular chamber size is normal. Posterior wall hypertrophy  is observed. Global left ventricular wall motion and contractility are  within normal limits. There is normal left ventricular systolic  function. The estimated ejection fraction is 55-60%.  Abnormal left  ventricular diastolic filling is observed, consistent with impaired  relaxation.      Left Atrium:  The left atrial chamber size is normal.     Right Ventricle:  The right ventricle is moderately dilated.  The right ventricular global  systolic function is normal.     Right Atrium:  The right atrial cavity size is normal. No atrial septal defect is  visualized.     Aortic Valve:  The aortic valve is trileaflet. There is mild to moderate aortic  regurgitation.  There is no evidence of aortic stenosis.Pressure  gradients were noted following PVC's throughout the exam, gradients in  the mild range. The calculated aortic regurgitation pressure half-time  is 755 msec.    Mitral Valve:  The mitral valve leaflets appear normal. There is mitral annular  calcification. Mitral valve posterior leaflet calcification is  visualized. There is no evidence  of mitral valve prolapse. There is mild  mitral regurgitation.  There is no evidence of mitral stenosis.     Tricuspid Valve:  The tricuspid valve leaflets are normal.  There is moderate tricuspid  regurgitation. The right ventricular systolic pressure is calculated at  43 mmHg.      Pulmonic Valve:  The pulmonic valve appears normal. There is a trace pulmonic  regurgitation.      Pericardium:  There is no evidence of pericardial effusion.     Aorta:  There is no dilatation of the aortic root.     Pulmonary Artery:  The main pulmonary artery appears normal.     Venous:  The venous system appears normal.     Measurements   Chambers  Name                    Value           RVIDd (AP) 2D           3.12 cm         IVSd (2D)               1.01 cm         LVIDd (2D)              5.94 cm         LVIDs (2D)              3.71 cm         LVPWd (2D)              1.42 cm         EF (2D)                 66.8 %          Ao root diameter (2D)   3.3 cm          LA dimension (AP) 2D    4.2 cm          LA:Ao ratio (2D)        1.27 ratio        Volumes  Name                    Value           LV EDV SP 4CH (MOD)     154 ml          LV ESV SP 4CH (MOD)     67 ml           EF SP 4CH (MOD)         56 %              Diastolic/Systolic Function  Name                    Value           MV E-wave Vmax          0.9 m/sec       MV A-wave Vmax          1 m/sec         MV E:A ratio            0.9 ratio       LV lateral e' Vmax      0.1 m/sec       LV E:e' lateral ratio   9.4 ratio         Aortic Valve  Name                    Value           AV Vmax                 2.09 m/sec      AV VTI                  45.3 cm         AV peak gradient        17 mmHg         AV mean gradient        7 mmHg          LVOT diameter           2.2 cm          LVOT Vmax               1.58 m/sec      LVOT VTI                32.5 cm         LVOT peak gradient      10 mmHg         LVOT mean gradient      3 mmHg          SV LVOT                 124 ml          CHRIS  (continuity Vmax)   2.87 cm2        CHRIS (continuity VTI)    2.73 cm2        AR PHT                  755 msec        AR peak gradient        63 mmHg           Tricuspid Valve  Name                    Value           TR Vmax                 2.77 m/sec      TR peak gradient        33 mmHg         RAP                     10 mmHg         RVSP                    43 mmHg           Electronically signed by: Beni Ramírez MD on 08/17/2015 19:16:03        Order Current Status    Blood Culture - Blood, Preliminary result    Blood Culture - Blood, Preliminary result        Discharge Details      Jeremie Wynn   Home Medication Instructions BEAU:[id number]    Printed on:03/09/18 1011   Medication Information                      albuterol (PROVENTIL HFA;VENTOLIN HFA) 108 (90 Base) MCG/ACT inhaler  Inhale 2 puffs Every 4 (Four) Hours As Needed for Wheezing.             aspirin 81 MG tablet  Take 1 tablet by mouth Daily. Resume in 1 month             atorvastatin (LIPITOR) 10 MG tablet  TAKE ONE TABLET BY MOUTH DAILY FOR CHOLESTEROL             benzonatate (TESSALON) 100 MG capsule  Take 1 capsule by mouth 3 (Three) Times a Day As Needed for Cough.             cetirizine (zyrTEC) 10 MG tablet  Take 10 mg by mouth Daily.             doxycycline (MONODOX) 100 MG capsule  Take 1 capsule by mouth Every 12 (Twelve) Hours for 10 doses. Indications: Pneumonia             furosemide (LASIX) 20 MG tablet  TAKE ONE TABLET BY MOUTH DAILY             LIALDA 1.2 G EC tablet  Take 2,400 mg by mouth Daily.             Multiple Vitamins-Minerals (CENTROVITE) tablet  Take 1 tablet by mouth Daily.             potassium chloride (K-DUR) 10 MEQ CR tablet  Take 10 mEq by mouth Daily.             tiZANidine (ZANAFLEX) 4 MG tablet  TAKE ONE TABLET BY MOUTH EVERY NIGHT AT BEDTIME AS NEEDED FOR LEG CRAMPS             traMADol (ULTRAM) 50 MG tablet  Take 50 mg by mouth Every 6 (Six) Hours As Needed for Moderate Pain .             valsartan (DIOVAN) 320  MG tablet  TAKE ONE TABLET BY MOUTH DAILY                   Discharge Disposition: home  Discharge Diet: routine    Discharge Activity:  As tolerated      Special Instructions:    PCP in two weeks      Time Spent on Discharge:  45 minutes    Electronically signed by Carleen Sesay MD, 03/09/18, 9:26 AM.         Electronically signed by Carleen Sesay MD at 3/9/2018 12:30 PM

## 2018-03-09 NOTE — DISCHARGE SUMMARY
[facility] Medicine Services  DISCHARGE SUMMARY    Patient Name: Jeremie Wynn  : 1945  MRN: [id number]    Date of Admission: 3/5/2018  Date of Discharge:  3/9/18  Primary Care Physician: Aaron Trejo MD    Consults     No orders found from 2018 to 3/6/2018.        Hospital Course     Presenting Problem:   Pneumonia [J18.9]    Active Hospital Problems (** Indicates Principal Problem)    Diagnosis Date Noted   • **Sepsis due to pneumonia [J18.9, A41.9] 2018   • Hyperglycemia [R73.9] 2018   • Elevated troponin [R74.8] 2018   • Dehydration [E86.0] 2018   • Hyperlipemia [E78.5] 2016   • Hypertension [I10] 2016      Resolved Hospital Problems    Diagnosis Date Noted Date Resolved   No resolved problems to display.          Hospital Course:  Jeremie Wynn is a 72 y.o. male, generally healthy with no underlying lung disease, who presented with temps to 105 and dyspnea/hypoxia.      Chest x-ray showed nonspecific diffuse chagnes.  He was started on broad-spectrum antibiotics.  He defervesced but remained somewhat hypoxic.  A CT scan was done (without contrast due to history of anaphylaxis) that showed diffuse changes of viral or atypical pneumonia.  A sputum culture showed normal anson only.  Imaging also showed CHF. He will be maintained on daily lasix.  He had an EF of 60 percent in 2015.  Recommend outpatient echo after he recovers from acute lung disease.      He clinically improved each day and antibtiotics were weaned off.  At discharge he has O2 sats of 88-92 at rest, dropping to the 80s with ambulation.  He will go home to complete a full 10 days of doxycycline and will have home oxygen.    His troponins were mildly elevated in the setting of high fever and hypoxia.  He had no chest pain.  This was presumed to be supply/demand mismatch.     He has anemia.  This is likely due to recent knee surgery.  Management/workup is deferred to PCP.             Day of Discharge     HPI:   Feels about the same.  Gets up and walks without oxygen but feels dyspneic when he does.  Continued cough.      Review of Systems   No chest pain  No gi symptoms, eating and drinking well.     Otherwise ROS is negative except as mentioned in the HPI.    Vital Signs:   Temp:  [97.9 °F (36.6 °C)-99.2 °F (37.3 °C)] 97.9 °F (36.6 °C)  Heart Rate:  [62-78] 62  Resp:  [16] 16  BP: (131-162)/(67-82) 139/74     Physical Exam:  Constitutional: No acute distress, awake, alert  Eyes: PERRLA, sclerae anicteric, no conjunctival injection  HENT: NCAT, mucous membranes moist  Neck: Supple, no thyromegaly, no lymphadenopathy, trachea midline  Respiratory: Clear to auscultation bilaterally, nonlabored respirations , no w r r.  Without oxygen, sats drop to 89 percent while talking at rest  Cardiovascular: RRR, no murmurs, rubs, or gallops, palpable pedal pulses bilaterally  Gastrointestinal: Positive bowel sounds, soft, nontender, nondistended  Musculoskeletal: No bilateral ankle edema, no clubbing or cyanosis to extremities  Psychiatric: Appropriate affect, cooperative  Neurologic: Oriented x 3, strength symmetric in all extremities, Cranial Nerves grossly intact to confrontation, speech clear  Skin: No rashes      Pertinent  and/or Most Recent Results       Results from last 7 days  Lab Units 03/07/18  0525 03/06/18  0323 03/05/18  1346 03/05/18  0824 03/05/18  0246   WBC 10*3/mm3 8.15  --  19.50* 23.09* 19.08*   HEMOGLOBIN g/dL 10.2*  --  11.3* 11.2* 13.2   HEMATOCRIT % 32.2*  --  35.0* 34.4* 40.8   PLATELETS 10*3/mm3 193  --  209 196 246   SODIUM mmol/L 140 139  --  138 138   POTASSIUM mmol/L 3.6 3.9  --  3.8 3.9   CHLORIDE mmol/L 112* 110*  --  108 107   CO2 mmol/L 23.0 24.0  --  20.0 23.0   BUN mg/dL 17 20  --  21 27*   CREATININE mg/dL 0.90 1.00  --  1.10 1.30   GLUCOSE mg/dL 117* 122*  --  213* 160*   CALCIUM mg/dL 8.2* 8.1*  --  8.6* 9.6       Results from last 7 days  Lab Units  03/05/18  0246   BILIRUBIN mg/dL 0.6   ALK PHOS U/L 73   ALT (SGPT) U/L 14   AST (SGOT) U/L 19           Invalid input(s): TG, LDLCALC, LDLREALC    Results from last 7 days  Lab Units 03/05/18  0824 03/05/18  0505 03/05/18  0246   HEMOGLOBIN A1C %  --  6.30*  --    BNP pg/mL  --   --  45.0   TROPONIN I ng/mL 0.293*  --  0.309*     Brief Urine Lab Results  (Last result in the past 365 days)      Color   Clarity   Blood   Leuk Est   Nitrite   Protein   CREAT   Urine HCG        03/05/18 0446 Yellow Cloudy(A) Negative Negative Negative Negative               Microbiology Results Abnormal     Procedure Component Value - Date/Time    Blood Culture - Blood, [[id number]]  (Normal) Collected:  03/05/18 0240    Lab Status:  Preliminary result Specimen:  Blood from Arm, Right Updated:  03/09/18 0516     Blood Culture No growth at 4 days    Blood Culture - Blood, [[id number]]  (Normal) Collected:  03/05/18 0250    Lab Status:  Preliminary result Specimen:  Blood from Hand, Right Updated:  03/09/18 0516     Blood Culture No growth at 4 days    S. Pneumo Ag Urine or CSF - Urine, Urine, Clean Catch [[id number]] Collected:  03/05/18 1133    Lab Status:  Final result Specimen:  Urine from Urine, Clean Catch Updated:  03/07/18 1710     Specimen Source Urine     STREP PNEUMONIAE ANTIGEN Negative     Body Fluid Culture, Sterile Not Indicated     Organism ID Not indicated.     Please note Comment      [employer] standards require a culture to be  performed on CSF specimens submitted for bacterial antigen testing.  (CAP [id number]) Urine specimens will not be cultured.       Narrative:       Performed at:  [address] [place] [address]  [id number]  : Arnold Worthington MD, Phone:  [phone]    Legionella Antigen, Urine - Urine, Urine, Clean Catch [[id number]] Collected:  03/05/18 1133    Lab Status:  Final result Specimen:  Urine from Urine, Clean Catch Updated:  03/07/18 1522     L.  pneumophila Serogp 1 Ur Ag Negative      Presumptive negative for L. pneumophila serogroup 1 antigen in urine,  suggesting no recent or current infection. Legionnaires' disease  cannot be ruled out since other serogroups and species may also cause  disease.       Narrative:       Performed at:  [address] [place] [address]  [id number]  : Arnold Worthington MD, Phone:  [phone]    Respiratory Culture - Sputum, Cough [[id number]] Collected:  03/05/18 1213    Lab Status:  Final result Specimen:  Sputum from Cough Updated:  03/07/18 0801     Respiratory Culture --      Moderate growth (3+) Normal Respiratory Abbi     Gram Stain Result Moderate (3+) WBCs per low power field      Occasional Epithelial cells per low power field      Few (2+) Gram positive cocci in pairs    Narrative:       mo    Influenza A & B, RT PCR - Swab, Nasopharynx [[id number]]  (Normal) Collected:  03/06/18 1215    Lab Status:  Final result Specimen:  Swab from Nasopharynx Updated:  03/06/18 1344     Influenza A PCR Not Detected     Influenza B PCR Not Detected    Influenza Antigen, Rapid - Swab, Nasopharynx [[id number]]  (Normal) Collected:  03/05/18 0246    Lab Status:  Final result Specimen:  Swab from Nasopharynx Updated:  03/05/18 0335     Influenza A Ag, EIA Negative     Influenza B Ag, EIA Negative          Imaging Results (all)     Procedure Component Value Units Date/Time    XR Chest 1 View [[id number]] Collected:  03/05/18 0228     Updated:  03/05/18 0413    Narrative:       EXAM:    XR Chest, 1 View    EXAM DATE/TIME:    3/5/2018 2:28 AM    CLINICAL HISTORY:    72 years old, male; Signs and symptoms; Shortness of breath; Additional info:   Simple sepsis triage protocol    TECHNIQUE:    Single upright AP portable chest at 3:24 AM.    COMPARISON:    CR - XR CHEST 1 VW 2017-11-06 09:26    FINDINGS:    Incomplete inspiratory effort when compared to prior study.    Hazy densities in the perihilar regions  bilaterally.  Mild asymmetric density in the RIGHT lung base.  No pneumothorax.  No large pleural effusion.  Elevated RIGHT hemidiaphragm.  Heart appears top normal in size, stable.  Mediastinum unremarkable.  Moderate to large hiatal hernia.  Osteopenia/osteoporosis.  Old LEFT rib fractures.  No acute bone abnormality.      Impression:         Hypoventilatory changes versus early pulmonary edema or peribronchial   thickening perihilar regions.  Correlate clinically for possibility of   congestive heart failure and/or bronchitis.    Mild asymmetric density RIGHT lung base, possibly atelectasis from poor   inspiratory effort and elevated RIGHT hemidiaphragm.  However, early pneumonia   not excluded.    If clinical symptoms warrant, suggest followup imaging with full inspiratory   effort when clinically feasible.    Additional nonacute findings as noted.    THIS DOCUMENT HAS BEEN ELECTRONICALLY SIGNED BY NASH DAVENPORT MD    CT Chest Without Contrast [[id number]] Collected:  03/06/18 1610     Updated:  03/06/18 1633    Narrative:       EXAMINATION: CT CHEST WO CONTRAST-03/06/2018:      INDICATION: Sepsis; J18.9-Pneumonia, unspecified organism; A41.9-Sepsis,  unspecified organism.         TECHNIQUE: CT scan of the chest was performed without contrast.     The radiation dose reduction device was turned on for each scan per the  ALARA (As Low as Reasonably Achievable) protocol.     COMPARISON: Chest x-ray dated 03/05/2018.     FINDINGS: There is no axillary lymphadenopathy. There is no mediastinal  or hilar lymphadenopathy. There is a very large hiatal hernia. Limited  images of the upper abdomen are normal. Images displayed at lung window  settings demonstrate a mild diffuse interstitial pattern without  consolidation or mass.       Impression:       There is a mild somewhat diffuse interstitial pulmonary  pattern without consolidation, mass, nodule or effusion. There is a  large hiatal hernia.     D:  03/06/2018  E:   2018           This report was finalized on 3/6/2018 4:31 PM by Dr. Chris Chinchilla MD.                       Results for orders placed during the hospital encounter of 08/17/15   CONVERTED (HISTORICAL) ECHO    Narrative Patient:      ALIYA MARQUEZ    Med Rec#:     [id number]               :          1945            Date:         2015            Age:          69y                   Height:       177.8 cm / 70.0 in  Weight:       116.12 kg / 255.9 lbs  Sex:          M                     BSA:          2.32  Room#:        [facility]                      Sonographer:  Nilesh Shoemakre BS,[facility]  Referring:    YOLI  Reading:      Beni Ramírez MD  Primary:      Aaron Trejo  ______________________________________________________________________    Transthoracic Echocardiogram    Indication:  PVC's, SANTORO  BP:           147/78    Conclusions  1. The estimated ejection fraction is 55-60%.   2. Abnormal left ventricular diastolic filling is observed, consistent  with impaired relaxation.   3. There is mild to moderate aortic regurgitation.   4. There is mild mitral regurgitation.   5. There is moderate tricuspid regurgitation.  6. The right ventricular systolic pressure is calculated at 43 mmHg.     Findings       Left Ventricle:  The left ventricular chamber size is normal. Posterior wall hypertrophy  is observed. Global left ventricular wall motion and contractility are  within normal limits. There is normal left ventricular systolic  function. The estimated ejection fraction is 55-60%.  Abnormal left  ventricular diastolic filling is observed, consistent with impaired  relaxation.      Left Atrium:  The left atrial chamber size is normal.     Right Ventricle:  The right ventricle is moderately dilated.  The right ventricular global  systolic function is normal.     Right Atrium:  The right atrial cavity size is normal. No atrial septal defect is  visualized.     Aortic Valve:  The aortic valve is  trileaflet. There is mild to moderate aortic  regurgitation.  There is no evidence of aortic stenosis.Pressure  gradients were noted following PVC's throughout the exam, gradients in  the mild range. The calculated aortic regurgitation pressure half-time  is 755 msec.    Mitral Valve:  The mitral valve leaflets appear normal. There is mitral annular  calcification. Mitral valve posterior leaflet calcification is  visualized. There is no evidence of mitral valve prolapse. There is mild  mitral regurgitation.  There is no evidence of mitral stenosis.     Tricuspid Valve:  The tricuspid valve leaflets are normal.  There is moderate tricuspid  regurgitation. The right ventricular systolic pressure is calculated at  43 mmHg.      Pulmonic Valve:  The pulmonic valve appears normal. There is a trace pulmonic  regurgitation.      Pericardium:  There is no evidence of pericardial effusion.     Aorta:  There is no dilatation of the aortic root.     Pulmonary Artery:  The main pulmonary artery appears normal.     Venous:  The venous system appears normal.     Measurements   Chambers  Name                    Value           RVIDd (AP) 2D           3.12 cm         IVSd (2D)               1.01 cm         LVIDd (2D)              5.94 cm         LVIDs (2D)              3.71 cm         LVPWd (2D)              1.42 cm         EF (2D)                 66.8 %          Ao root diameter (2D)   3.3 cm          LA dimension (AP) 2D    4.2 cm          LA:Ao ratio (2D)        1.27 ratio        Volumes  Name                    Value           LV EDV SP 4CH (MOD)     154 ml          LV ESV SP 4CH (MOD)     67 ml           EF SP 4CH (MOD)         56 %              Diastolic/Systolic Function  Name                    Value           MV E-wave Vmax          0.9 m/sec       MV A-wave Vmax          1 m/sec         MV E:A ratio            0.9 ratio       LV lateral e' Vmax      0.1 m/sec       LV E:e' lateral ratio   9.4 ratio         Aortic  Valve  Name                    Value           AV Vmax                 2.09 m/sec      AV VTI                  45.3 cm         AV peak gradient        17 mmHg         AV mean gradient        7 mmHg          LVOT diameter           2.2 cm          LVOT Vmax               1.58 m/sec      LVOT VTI                32.5 cm         LVOT peak gradient      10 mmHg         LVOT mean gradient      3 mmHg          SV LVOT                 124 ml          CHRIS (continuity Vmax)   2.87 cm2        CHRIS (continuity VTI)    2.73 cm2        AR PHT                  755 msec        AR peak gradient        63 mmHg           Tricuspid Valve  Name                    Value           TR Vmax                 2.77 m/sec      TR peak gradient        33 mmHg         RAP                     10 mmHg         RVSP                    43 mmHg           Electronically signed by: Beni Ramírez MD on 08/17/2015 19:16:03        Order Current Status    Blood Culture - Blood, Preliminary result    Blood Culture - Blood, Preliminary result        Discharge Details      Jeremie Wynn   Home Medication Instructions BEAU:[id number]    Printed on:03/09/18 1011   Medication Information                      albuterol (PROVENTIL HFA;VENTOLIN HFA) 108 (90 Base) MCG/ACT inhaler  Inhale 2 puffs Every 4 (Four) Hours As Needed for Wheezing.             aspirin 81 MG tablet  Take 1 tablet by mouth Daily. Resume in 1 month             atorvastatin (LIPITOR) 10 MG tablet  TAKE ONE TABLET BY MOUTH DAILY FOR CHOLESTEROL             benzonatate (TESSALON) 100 MG capsule  Take 1 capsule by mouth 3 (Three) Times a Day As Needed for Cough.             cetirizine (zyrTEC) 10 MG tablet  Take 10 mg by mouth Daily.             doxycycline (MONODOX) 100 MG capsule  Take 1 capsule by mouth Every 12 (Twelve) Hours for 10 doses. Indications: Pneumonia             furosemide (LASIX) 20 MG tablet  TAKE ONE TABLET BY MOUTH DAILY             LIALDA 1.2 G EC tablet  Take 2,400 mg by mouth  Daily.             Multiple Vitamins-Minerals (CENTROVITE) tablet  Take 1 tablet by mouth Daily.             potassium chloride (K-DUR) 10 MEQ CR tablet  Take 10 mEq by mouth Daily.             tiZANidine (ZANAFLEX) 4 MG tablet  TAKE ONE TABLET BY MOUTH EVERY NIGHT AT BEDTIME AS NEEDED FOR LEG CRAMPS             traMADol (ULTRAM) 50 MG tablet  Take 50 mg by mouth Every 6 (Six) Hours As Needed for Moderate Pain .             valsartan (DIOVAN) 320 MG tablet  TAKE ONE TABLET BY MOUTH DAILY                   Discharge Disposition: home  Discharge Diet: routine    Discharge Activity:  As tolerated      Special Instructions:    PCP in two weeks      Time Spent on Discharge:  45 minutes    Electronically signed by Carleen Sesay MD, 03/09/18, 9:26 AM.

## 2018-03-09 NOTE — DISCHARGE PLACEMENT REQUEST
"Jeremie Marquez (72 y.o. Male)   To [facility]    From [facility] at [facility]() [phone]    Date of Birth Social Security Number Address Home Phone MRN    1945  [id number]  [facility] [address] [phone] [id number]    [employer] Marital Status          None        Admission Date Admission Type Admitting Provider Attending Provider Department, Room/Bed    3/5/18 Emergency Carleen Sesay MD Mini, Jocelyn, MD [facility] 3H, S380/1    Discharge Date Discharge Disposition Discharge Destination         Home or Self Care             Attending Provider: Carleen Sesay MD     Allergies:  Contrast Dye, Penicillins, Sulfa Antibiotics, Morphine And Related    Isolation:  None   Infection:  None   Code Status:  FULL    Ht:  182.9 cm (72\")   Wt:  99.8 kg (220 lb)    Admission Cmt:  None   Principal Problem:  Sepsis due to pneumonia [J18.9,A41.9]                 Active Insurance as of 3/5/2018     Primary Coverage     Payor Plan Insurance Group Employer/Plan Group    HUMANA MEDICARE REPLACEMENT HUMANA MEDICARE REPL [id number]     Payor Plan Address Payor Plan Phone Number Effective From Effective To    [address] [address] [phone] 2013     [place] [address]       Subscriber Name Subscriber Birth Date Member ID       JEREMIE MARQUEZ 1945 [id number]                 Emergency Contacts      (Rel.) Home Phone Work Phone Mobile Phone    Rosie Marquez (Spouse) [phone] -- [phone]               Discharge Summary      Carleen Sesay MD at 3/9/2018  9:26 AM              [facility] Medicine Services  DISCHARGE SUMMARY    Patient Name: Jeremie Marquez  : 1945  MRN: [id number]    Date of Admission: 3/5/2018  Date of Discharge:  3/9/18  Primary Care Physician: Aaron Trejo MD    Consults     No orders found from 2018 to 3/6/2018.        Hospital Course     Presenting Problem:   Pneumonia [J18.9]    Active Hospital Problems (** Indicates Principal " Problem)    Diagnosis Date Noted   • **Sepsis due to pneumonia [J18.9, A41.9] 03/05/2018   • Hyperglycemia [R73.9] 03/05/2018   • Elevated troponin [R74.8] 03/05/2018   • Dehydration [E86.0] 03/05/2018   • Hyperlipemia [E78.5] 09/07/2016   • Hypertension [I10] 09/07/2016      Resolved Hospital Problems    Diagnosis Date Noted Date Resolved   No resolved problems to display.          Hospital Course:  Jeremie Wynn is a 72 y.o. male, generally healthy with no underlying lung disease, who presented with temps to 105 and dyspnea/hypoxia.      Chest x-ray showed nonspecific diffuse chagnes.  He was started on broad-spectrum antibiotics.  He defervesced but remained somewhat hypoxic.  A CT scan was done (without contrast due to history of anaphylaxis) that showed diffuse changes of viral or atypical pneumonia.  A sputum culture showed normal anson only.  Imagine also showed likely CHF. He had an EF of 60 percent in 2015.  Recommend outpatient echo after he recovers from acute lung disease.      He clinically improved each day and antibtiotics were weaned off.  At discharge he has O2 sats of 88-92 at rest, dropping to the 80s with ambulation.  He will go home to complete a full 10 days of doxycycline and will have home oxygen.    His troponins were mildly elevated in the setting of high fever and hypoxia.  He had no chest pain.  This was presumed to be supply/demand mismatch.     He has anemia.  This is likely due to recent knee surgery.  Management/workup is deferred to PCP.            Day of Discharge     HPI:   Feels about the same.  Gets up and walks without oxygen but feels dyspneic when he does.  Continued cough.      Review of Systems   No chest pain  No gi symptoms, eating and drinking well.     Otherwise ROS is negative except as mentioned in the HPI.    Vital Signs:   Temp:  [97.9 °F (36.6 °C)-99.2 °F (37.3 °C)] 97.9 °F (36.6 °C)  Heart Rate:  [62-78] 62  Resp:  [16] 16  BP: (131-162)/(67-82) 139/74     Physical  Exam:  Constitutional: No acute distress, awake, alert  Eyes: PERRLA, sclerae anicteric, no conjunctival injection  HENT: NCAT, mucous membranes moist  Neck: Supple, no thyromegaly, no lymphadenopathy, trachea midline  Respiratory: Clear to auscultation bilaterally, nonlabored respirations , no w r r.  Without oxygen, sats drop to 89 percent while talking at rest  Cardiovascular: RRR, no murmurs, rubs, or gallops, palpable pedal pulses bilaterally  Gastrointestinal: Positive bowel sounds, soft, nontender, nondistended  Musculoskeletal: No bilateral ankle edema, no clubbing or cyanosis to extremities  Psychiatric: Appropriate affect, cooperative  Neurologic: Oriented x 3, strength symmetric in all extremities, Cranial Nerves grossly intact to confrontation, speech clear  Skin: No rashes      Pertinent  and/or Most Recent Results       Results from last 7 days  Lab Units 03/07/18  0525 03/06/18  0323 03/05/18  1346 03/05/18  0824 03/05/18  0246   WBC 10*3/mm3 8.15  --  19.50* 23.09* 19.08*   HEMOGLOBIN g/dL 10.2*  --  11.3* 11.2* 13.2   HEMATOCRIT % 32.2*  --  35.0* 34.4* 40.8   PLATELETS 10*3/mm3 193  --  209 196 246   SODIUM mmol/L 140 139  --  138 138   POTASSIUM mmol/L 3.6 3.9  --  3.8 3.9   CHLORIDE mmol/L 112* 110*  --  108 107   CO2 mmol/L 23.0 24.0  --  20.0 23.0   BUN mg/dL 17 20  --  21 27*   CREATININE mg/dL 0.90 1.00  --  1.10 1.30   GLUCOSE mg/dL 117* 122*  --  213* 160*   CALCIUM mg/dL 8.2* 8.1*  --  8.6* 9.6       Results from last 7 days  Lab Units 03/05/18  0246   BILIRUBIN mg/dL 0.6   ALK PHOS U/L 73   ALT (SGPT) U/L 14   AST (SGOT) U/L 19           Invalid input(s): TG, LDLCALC, LDLREALC    Results from last 7 days  Lab Units 03/05/18  0824 03/05/18  0505 03/05/18  0246   HEMOGLOBIN A1C %  --  6.30*  --    BNP pg/mL  --   --  45.0   TROPONIN I ng/mL 0.293*  --  0.309*     Brief Urine Lab Results  (Last result in the past 365 days)      Color   Clarity   Blood   Leuk Est   Nitrite   Protein   CREAT    Urine HCG        03/05/18 0446 Yellow Cloudy(A) Negative Negative Negative Negative               Microbiology Results Abnormal     Procedure Component Value - Date/Time    Blood Culture - Blood, [[id number]]  (Normal) Collected:  03/05/18 0240    Lab Status:  Preliminary result Specimen:  Blood from Arm, Right Updated:  03/09/18 0516     Blood Culture No growth at 4 days    Blood Culture - Blood, [[id number]]  (Normal) Collected:  03/05/18 0250    Lab Status:  Preliminary result Specimen:  Blood from Hand, Right Updated:  03/09/18 0516     Blood Culture No growth at 4 days    S. Pneumo Ag Urine or CSF - Urine, Urine, Clean Catch [[id number]] Collected:  03/05/18 1133    Lab Status:  Final result Specimen:  Urine from Urine, Clean Catch Updated:  03/07/18 1710     Specimen Source Urine     STREP PNEUMONIAE ANTIGEN Negative     Body Fluid Culture, Sterile Not Indicated     Organism ID Not indicated.     Please note Comment      College of [employer]ologists standards require a culture to be  performed on CSF specimens submitted for bacterial antigen testing.  (CAP [id number]) Urine specimens will not be cultured.       Narrative:       Performed at:  [address] [place] [address]  [id number]  : Arnold Worthington MD, Phone:  [phone]    Legionella Antigen, Urine - Urine, Urine, Clean Catch [[id number]] Collected:  03/05/18 1133    Lab Status:  Final result Specimen:  Urine from Urine, Clean Catch Updated:  03/07/18 1522     L. pneumophila Serogp 1 Ur Ag Negative      Presumptive negative for L. pneumophila serogroup 1 antigen in urine,  suggesting no recent or current infection. Legionnaires' disease  cannot be ruled out since other serogroups and species may also cause  disease.       Narrative:       Performed at:   - [address]  [id number]  : Arnold Worthington MD, Phone:  [phone]    Respiratory Culture - Sputum, Cough  [[id number]] Collected:  03/05/18 1213    Lab Status:  Final result Specimen:  Sputum from Cough Updated:  03/07/18 0801     Respiratory Culture --      Moderate growth (3+) Normal Respiratory Abbi     Gram Stain Result Moderate (3+) WBCs per low power field      Occasional Epithelial cells per low power field      Few (2+) Gram positive cocci in pairs    Narrative:       mo    Influenza A & B, RT PCR - Swab, Nasopharynx [[id number]]  (Normal) Collected:  03/06/18 1215    Lab Status:  Final result Specimen:  Swab from Nasopharynx Updated:  03/06/18 1344     Influenza A PCR Not Detected     Influenza B PCR Not Detected    Influenza Antigen, Rapid - Swab, Nasopharynx [[id number]]  (Normal) Collected:  03/05/18 0246    Lab Status:  Final result Specimen:  Swab from Nasopharynx Updated:  03/05/18 0335     Influenza A Ag, EIA Negative     Influenza B Ag, EIA Negative          Imaging Results (all)     Procedure Component Value Units Date/Time    XR Chest 1 View [[id number]] Collected:  03/05/18 0228     Updated:  03/05/18 0413    Narrative:       EXAM:    XR Chest, 1 View    EXAM DATE/TIME:    3/5/2018 2:28 AM    CLINICAL HISTORY:    72 years old, male; Signs and symptoms; Shortness of breath; Additional info:   Simple sepsis triage protocol    TECHNIQUE:    Single upright AP portable chest at 3:24 AM.    COMPARISON:    CR - XR CHEST 1  2017-11-06 09:26    FINDINGS:    Incomplete inspiratory effort when compared to prior study.    Hazy densities in the perihilar regions bilaterally.  Mild asymmetric density in the RIGHT lung base.  No pneumothorax.  No large pleural effusion.  Elevated RIGHT hemidiaphragm.  Heart appears top normal in size, stable.  Mediastinum unremarkable.  Moderate to large hiatal hernia.  Osteopenia/osteoporosis.  Old LEFT rib fractures.  No acute bone abnormality.      Impression:         Hypoventilatory changes versus early pulmonary edema or peribronchial   thickening perihilar regions.  Correlate  clinically for possibility of   congestive heart failure and/or bronchitis.    Mild asymmetric density RIGHT lung base, possibly atelectasis from poor   inspiratory effort and elevated RIGHT hemidiaphragm.  However, early pneumonia   not excluded.    If clinical symptoms warrant, suggest followup imaging with full inspiratory   effort when clinically feasible.    Additional nonacute findings as noted.    THIS DOCUMENT HAS BEEN ELECTRONICALLY SIGNED BY NASH DAVENPORT MD    CT Chest Without Contrast [[id number]] Collected:  18 1610     Updated:  18 1633    Narrative:       EXAMINATION: CT CHEST WO CONTRAST-2018:      INDICATION: Sepsis; J18.9-Pneumonia, unspecified organism; A41.9-Sepsis,  unspecified organism.         TECHNIQUE: CT scan of the chest was performed without contrast.     The radiation dose reduction device was turned on for each scan per the  ALARA (As Low as Reasonably Achievable) protocol.     COMPARISON: Chest x-ray dated 2018.     FINDINGS: There is no axillary lymphadenopathy. There is no mediastinal  or hilar lymphadenopathy. There is a very large hiatal hernia. Limited  images of the upper abdomen are normal. Images displayed at lung window  settings demonstrate a mild diffuse interstitial pattern without  consolidation or mass.       Impression:       There is a mild somewhat diffuse interstitial pulmonary  pattern without consolidation, mass, nodule or effusion. There is a  large hiatal hernia.     D:  2018  E:  2018           This report was finalized on 3/6/2018 4:31 PM by Dr. Chris Chinchilla MD.                       Results for orders placed during the hospital encounter of 08/17/15   CONVERTED (HISTORICAL) ECHO    Narrative Patient:      ALIYA MARQUEZ    Med Rec#:     [id number]               :          1945            Date:         2015            Age:          69y                   Height:       177.8 cm / 70.0 in  Weight:       116.12 kg / 255.9  lbs  Sex:          M                     BSA:          2.32  Room#:        [facility]                      Sonographer:  Nilesh Shoemaker,RDCS  Referring:    YOLI  Reading:      Beni Ramírez MD  Primary:      Aaron Trejo  ______________________________________________________________________    Transthoracic Echocardiogram    Indication:  PVC's, SANTORO  BP:           147/78    Conclusions  1. The estimated ejection fraction is 55-60%.   2. Abnormal left ventricular diastolic filling is observed, consistent  with impaired relaxation.   3. There is mild to moderate aortic regurgitation.   4. There is mild mitral regurgitation.   5. There is moderate tricuspid regurgitation.  6. The right ventricular systolic pressure is calculated at 43 mmHg.     Findings       Left Ventricle:  The left ventricular chamber size is normal. Posterior wall hypertrophy  is observed. Global left ventricular wall motion and contractility are  within normal limits. There is normal left ventricular systolic  function. The estimated ejection fraction is 55-60%.  Abnormal left  ventricular diastolic filling is observed, consistent with impaired  relaxation.      Left Atrium:  The left atrial chamber size is normal.     Right Ventricle:  The right ventricle is moderately dilated.  The right ventricular global  systolic function is normal.     Right Atrium:  The right atrial cavity size is normal. No atrial septal defect is  visualized.     Aortic Valve:  The aortic valve is trileaflet. There is mild to moderate aortic  regurgitation.  There is no evidence of aortic stenosis.Pressure  gradients were noted following PVC's throughout the exam, gradients in  the mild range. The calculated aortic regurgitation pressure half-time  is 755 msec.    Mitral Valve:  The mitral valve leaflets appear normal. There is mitral annular  calcification. Mitral valve posterior leaflet calcification is  visualized. There is no evidence of mitral valve  prolapse. There is mild  mitral regurgitation.  There is no evidence of mitral stenosis.     Tricuspid Valve:  The tricuspid valve leaflets are normal.  There is moderate tricuspid  regurgitation. The right ventricular systolic pressure is calculated at  43 mmHg.      Pulmonic Valve:  The pulmonic valve appears normal. There is a trace pulmonic  regurgitation.      Pericardium:  There is no evidence of pericardial effusion.     Aorta:  There is no dilatation of the aortic root.     Pulmonary Artery:  The main pulmonary artery appears normal.     Venous:  The venous system appears normal.     Measurements   Chambers  Name                    Value           RVIDd (AP) 2D           3.12 cm         IVSd (2D)               1.01 cm         LVIDd (2D)              5.94 cm         LVIDs (2D)              3.71 cm         LVPWd (2D)              1.42 cm         EF (2D)                 66.8 %          Ao root diameter (2D)   3.3 cm          LA dimension (AP) 2D    4.2 cm          LA:Ao ratio (2D)        1.27 ratio        Volumes  Name                    Value           LV EDV SP 4CH (MOD)     154 ml          LV ESV SP 4CH (MOD)     67 ml           EF SP 4CH (MOD)         56 %              Diastolic/Systolic Function  Name                    Value           MV E-wave Vmax          0.9 m/sec       MV A-wave Vmax          1 m/sec         MV E:A ratio            0.9 ratio       LV lateral e' Vmax      0.1 m/sec       LV E:e' lateral ratio   9.4 ratio         Aortic Valve  Name                    Value           AV Vmax                 2.09 m/sec      AV VTI                  45.3 cm         AV peak gradient        17 mmHg         AV mean gradient        7 mmHg          LVOT diameter           2.2 cm          LVOT Vmax               1.58 m/sec      LVOT VTI                32.5 cm         LVOT peak gradient      10 mmHg         LVOT mean gradient      3 mmHg          SV LVOT                 124 ml          CHRIS (continuity Vmax)    2.87 cm2        CHRIS (continuity VTI)    2.73 cm2        AR PHT                  755 msec        AR peak gradient        63 mmHg           Tricuspid Valve  Name                    Value           TR Vmax                 2.77 m/sec      TR peak gradient        33 mmHg         RAP                     10 mmHg         RVSP                    43 mmHg           Electronically signed by: Beni Ramírez MD on 08/17/2015 19:16:03        Order Current Status    Blood Culture - Blood, Preliminary result    Blood Culture - Blood, Preliminary result        Discharge Details      Jeremie Wynn   Home Medication Instructions BEAU:[id number]    Printed on:03/09/18 1011   Medication Information                      albuterol (PROVENTIL HFA;VENTOLIN HFA) 108 (90 Base) MCG/ACT inhaler  Inhale 2 puffs Every 4 (Four) Hours As Needed for Wheezing.             aspirin 81 MG tablet  Take 1 tablet by mouth Daily. Resume in 1 month             atorvastatin (LIPITOR) 10 MG tablet  TAKE ONE TABLET BY MOUTH DAILY FOR CHOLESTEROL             benzonatate (TESSALON) 100 MG capsule  Take 1 capsule by mouth 3 (Three) Times a Day As Needed for Cough.             cetirizine (zyrTEC) 10 MG tablet  Take 10 mg by mouth Daily.             doxycycline (MONODOX) 100 MG capsule  Take 1 capsule by mouth Every 12 (Twelve) Hours for 10 doses. Indications: Pneumonia             furosemide (LASIX) 20 MG tablet  TAKE ONE TABLET BY MOUTH DAILY             LIALDA 1.2 G EC tablet  Take 2,400 mg by mouth Daily.             Multiple Vitamins-Minerals (CENTROVITE) tablet  Take 1 tablet by mouth Daily.             potassium chloride (K-DUR) 10 MEQ CR tablet  Take 10 mEq by mouth Daily.             tiZANidine (ZANAFLEX) 4 MG tablet  TAKE ONE TABLET BY MOUTH EVERY NIGHT AT BEDTIME AS NEEDED FOR LEG CRAMPS             traMADol (ULTRAM) 50 MG tablet  Take 50 mg by mouth Every 6 (Six) Hours As Needed for Moderate Pain .             valsartan (DIOVAN) 320 MG tablet  TAKE ONE  TABLET BY MOUTH DAILY                   Discharge Disposition: home  Discharge Diet: routine    Discharge Activity:  As tolerated      Special Instructions:    PCP in two weeks      Time Spent on Discharge:  45 minutes    Electronically signed by Carleen Sesay MD, 03/09/18, 9:26 AM.         Electronically signed by Carleen Sesay MD at 3/9/2018 12:13 PM

## 2018-03-09 NOTE — PROGRESS NOTES
Continued Stay Note  [facility]     Patient Name: Jeremie Wynn  MRN: [id number]  Today's Date: 3/9/2018    Admit Date: 3/5/2018          Discharge Plan       03/09/18 1235    Case Management/Social Work Plan    Plan home O2    Patient/Family In Agreement With Plan yes    Additional Comments Spoke with Josiane at [facility] and they will deliver a portable O2 tank to pt room today prior to discharge and arrange to set up home O2. Pt is aware and agreeable.       03/09/18 1124    Case Management/Social Work Plan    Plan home O2    Patient/Family In Agreement With Plan yes    Additional Comments Order for home O2. Spoke with pt in room and he has no preference of DME company. Referral made to [facility] with face sheet, order, sao2, and H/P faxed to [phone]. CM will follow up with Josiane at [facility] once referral received. Josiane aware pt medically ready for discharge today.              Discharge Codes     None        Expected Discharge Date and Time     Expected Discharge Date Expected Discharge Time    Mar 9, 2018             Yen Burks RN
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
## 2018-03-09 NOTE — PLAN OF CARE
Problem: Patient Care Overview (Adult)  Goal: Plan of Care Review  Outcome: Ongoing (interventions implemented as appropriate)   03/09/18 0353   Patient Care Overview   Progress improving   Outcome Evaluation   Outcome Summary/Follow up Plan vital signs stable, no complaints of pain, obtained new order for cough med, tolerating ambulation with standby assist   Coping/Psychosocial Response Interventions   Plan Of Care Reviewed With patient       Problem: Fall Risk (Adult)  Goal: Identify Related Risk Factors and Signs and Symptoms  Outcome: Ongoing (interventions implemented as appropriate)      Problem: Pneumonia (Adult)  Goal: Signs and Symptoms of Listed Potential Problems Will be Absent or Manageable (Pneumonia)  Outcome: Ongoing (interventions implemented as appropriate)      Problem: Sepsis (Adult)  Goal: Signs and Symptoms of Listed Potential Problems Will be Absent or Manageable (Sepsis)  Outcome: Ongoing (interventions implemented as appropriate)

## 2018-03-09 NOTE — PROGRESS NOTES
Continued Stay Note  [facility]     Patient Name: Jeremie Wynn  MRN: [id number]  Today's Date: 3/9/2018    Admit Date: 3/5/2018          Discharge Plan       03/09/18 1124    Case Management/Social Work Plan    Plan home O2    Patient/Family In Agreement With Plan yes    Additional Comments Order for home O2. Spoke with pt in room and he has no preference of CME company. Referral made to [facility] with face sheet, order, sao2, and H/P faxed to [phone]. CM will follow up with Josiane at [facility] once referral received. Josiane aware pt medically ready for discharge today.              Discharge Codes     None        Expected Discharge Date and Time     Expected Discharge Date Expected Discharge Time    Mar 9, 2018             Yen Burks RN

## 2018-03-10 LAB
BACTERIA SPEC AEROBE CULT: NORMAL
BACTERIA SPEC AEROBE CULT: NORMAL

## 2018-03-12 ENCOUNTER — TRANSITIONAL CARE MANAGEMENT TELEPHONE ENCOUNTER (OUTPATIENT)
Dept: FAMILY MEDICINE CLINIC | Facility: CLINIC | Age: 73
End: 2018-03-12

## 2018-03-18 RX ORDER — FUROSEMIDE 20 MG/1
TABLET ORAL
Qty: 30 TABLET | Refills: 0 | OUTPATIENT
Start: 2018-03-18

## 2018-03-19 ENCOUNTER — HOSPITAL ENCOUNTER (INPATIENT)
Facility: HOSPITAL | Age: 73
LOS: 5 days | Discharge: HOME HEALTH | End: 2018-03-24
Attending: EMERGENCY MEDICINE | Admitting: HOSPITALIST

## 2018-03-19 ENCOUNTER — APPOINTMENT (OUTPATIENT)
Dept: GENERAL RADIOLOGY | Facility: HOSPITAL | Age: 73
End: 2018-03-19

## 2018-03-19 DIAGNOSIS — A41.9 SEPSIS DUE TO PNEUMONIA (HCC): Primary | ICD-10-CM

## 2018-03-19 DIAGNOSIS — J84.9 INTERSTITIAL LUNG DISEASE (HCC): ICD-10-CM

## 2018-03-19 DIAGNOSIS — J96.01 ACUTE HYPOXEMIC RESPIRATORY FAILURE (HCC): ICD-10-CM

## 2018-03-19 DIAGNOSIS — J96.01 ACUTE RESPIRATORY FAILURE WITH HYPOXIA (HCC): ICD-10-CM

## 2018-03-19 DIAGNOSIS — I10 ESSENTIAL HYPERTENSION: ICD-10-CM

## 2018-03-19 DIAGNOSIS — J18.9 COMMUNITY ACQUIRED PNEUMONIA, UNSPECIFIED LATERALITY: ICD-10-CM

## 2018-03-19 DIAGNOSIS — I50.32 CHRONIC DIASTOLIC HEART FAILURE (HCC): Chronic | ICD-10-CM

## 2018-03-19 DIAGNOSIS — R50.9 ACUTE FEBRILE ILLNESS: ICD-10-CM

## 2018-03-19 DIAGNOSIS — R91.8 PULMONARY INFILTRATES: ICD-10-CM

## 2018-03-19 DIAGNOSIS — R06.09 DYSPNEA ON EXERTION: ICD-10-CM

## 2018-03-19 DIAGNOSIS — J18.9 SEPSIS DUE TO PNEUMONIA (HCC): Primary | ICD-10-CM

## 2018-03-19 DIAGNOSIS — A41.9 SEPSIS, DUE TO UNSPECIFIED ORGANISM: ICD-10-CM

## 2018-03-19 LAB
ALBUMIN SERPL-MCNC: 4 G/DL (ref 3.2–4.8)
ALBUMIN/GLOB SERPL: 1.3 G/DL (ref 1.5–2.5)
ALP SERPL-CCNC: 68 U/L (ref 25–100)
ALT SERPL W P-5'-P-CCNC: 24 U/L (ref 7–40)
ANION GAP SERPL CALCULATED.3IONS-SCNC: 12 MMOL/L (ref 3–11)
APTT PPP: 25.4 SECONDS (ref 24–31)
ARTERIAL PATENCY WRIST A: ABNORMAL
AST SERPL-CCNC: 24 U/L (ref 0–33)
ATMOSPHERIC PRESS: ABNORMAL MMHG
BASE EXCESS BLDA CALC-SCNC: 1.8 MMOL/L (ref 0–2)
BASOPHILS # BLD AUTO: 0.04 10*3/MM3 (ref 0–0.2)
BASOPHILS NFR BLD AUTO: 0.3 % (ref 0–1)
BDY SITE: ABNORMAL
BILIRUB SERPL-MCNC: 0.3 MG/DL (ref 0.3–1.2)
BILIRUB UR QL STRIP: NEGATIVE
BUN BLD-MCNC: 22 MG/DL (ref 9–23)
BUN/CREAT SERPL: 22 (ref 7–25)
CA-I SERPL ISE-MCNC: 1.38 MMOL/L (ref 1.12–1.32)
CALCIUM SPEC-SCNC: 9.7 MG/DL (ref 8.7–10.4)
CHLORIDE SERPL-SCNC: 106 MMOL/L (ref 99–109)
CLARITY UR: CLEAR
CO2 BLDA-SCNC: 24.5 MMOL/L (ref 22–33)
CO2 SERPL-SCNC: 23 MMOL/L (ref 20–31)
COHGB MFR BLD: 1.6 % (ref 0–2)
COLOR UR: YELLOW
CREAT BLD-MCNC: 1 MG/DL (ref 0.6–1.3)
CRP SERPL-MCNC: 1.12 MG/DL (ref 0–1)
D-LACTATE SERPL-SCNC: 2.7 MMOL/L (ref 0.5–2)
DEPRECATED RDW RBC AUTO: 48.6 FL (ref 37–54)
EOSINOPHIL # BLD AUTO: 0.25 10*3/MM3 (ref 0–0.3)
EOSINOPHIL NFR BLD AUTO: 1.6 % (ref 0–3)
ERYTHROCYTE [DISTWIDTH] IN BLOOD BY AUTOMATED COUNT: 14.5 % (ref 11.3–14.5)
GFR SERPL CREATININE-BSD FRML MDRD: 73 ML/MIN/1.73
GLOBULIN UR ELPH-MCNC: 3.2 GM/DL
GLUCOSE BLD-MCNC: 161 MG/DL (ref 70–100)
GLUCOSE UR STRIP-MCNC: NEGATIVE MG/DL
HCO3 BLDA-SCNC: 23.6 MMOL/L (ref 20–26)
HCT VFR BLD AUTO: 39.8 % (ref 38.9–50.9)
HCT VFR BLD CALC: 37.8 %
HGB BLD-MCNC: 12.6 G/DL (ref 13.1–17.5)
HGB BLDA-MCNC: 12.3 G/DL (ref 13.5–17.5)
HGB UR QL STRIP.AUTO: NEGATIVE
HOLD SPECIMEN: NORMAL
HOROWITZ INDEX BLD+IHG-RTO: 36 %
IMM GRANULOCYTES # BLD: 0.06 10*3/MM3 (ref 0–0.03)
IMM GRANULOCYTES NFR BLD: 0.4 % (ref 0–0.6)
INR PPP: 1 (ref 0.91–1.09)
KETONES UR QL STRIP: ABNORMAL
LEUKOCYTE ESTERASE UR QL STRIP.AUTO: NEGATIVE
LYMPHOCYTES # BLD AUTO: 1.01 10*3/MM3 (ref 0.6–4.8)
LYMPHOCYTES NFR BLD AUTO: 6.3 % (ref 24–44)
MAGNESIUM SERPL-MCNC: 2 MG/DL (ref 1.3–2.7)
MCH RBC QN AUTO: 28.9 PG (ref 27–31)
MCHC RBC AUTO-ENTMCNC: 31.7 G/DL (ref 32–36)
MCV RBC AUTO: 91.3 FL (ref 80–99)
METHGB BLD QL: 0.8 % (ref 0–1.5)
MODALITY: ABNORMAL
MONOCYTES # BLD AUTO: 1.08 10*3/MM3 (ref 0–1)
MONOCYTES NFR BLD AUTO: 6.8 % (ref 0–12)
NEUTROPHILS # BLD AUTO: 13.55 10*3/MM3 (ref 1.5–8.3)
NEUTROPHILS NFR BLD AUTO: 84.6 % (ref 41–71)
NITRITE UR QL STRIP: NEGATIVE
OXYHGB MFR BLDV: 91.7 % (ref 94–99)
PCO2 BLDA: 28.2 MM HG (ref 35–48)
PH BLDA: 7.53 PH UNITS (ref 7.35–7.45)
PH UR STRIP.AUTO: <=5 [PH] (ref 5–8)
PHOSPHATE SERPL-MCNC: 1.7 MG/DL (ref 2.4–5.1)
PLATELET # BLD AUTO: 329 10*3/MM3 (ref 150–450)
PMV BLD AUTO: 11.2 FL (ref 6–12)
PO2 BLDA: 62.4 MM HG (ref 83–108)
POTASSIUM BLD-SCNC: 4 MMOL/L (ref 3.5–5.5)
PROT SERPL-MCNC: 7.2 G/DL (ref 5.7–8.2)
PROT UR QL STRIP: NEGATIVE
PROTHROMBIN TIME: 10.5 SECONDS (ref 9.6–11.5)
RBC # BLD AUTO: 4.36 10*6/MM3 (ref 4.2–5.76)
SODIUM BLD-SCNC: 141 MMOL/L (ref 132–146)
SP GR UR STRIP: 1.03 (ref 1–1.03)
TROPONIN I SERPL-MCNC: 0.02 NG/ML (ref 0–0.07)
UROBILINOGEN UR QL STRIP: ABNORMAL
WBC NRBC COR # BLD: 15.99 10*3/MM3 (ref 3.5–10.8)
WHOLE BLOOD HOLD SPECIMEN: NORMAL
WHOLE BLOOD HOLD SPECIMEN: NORMAL

## 2018-03-19 PROCEDURE — 83605 ASSAY OF LACTIC ACID: CPT | Performed by: EMERGENCY MEDICINE

## 2018-03-19 PROCEDURE — G0432 EIA HIV-1/HIV-2 SCREEN: HCPCS | Performed by: INTERNAL MEDICINE

## 2018-03-19 PROCEDURE — 85025 COMPLETE CBC W/AUTO DIFF WBC: CPT | Performed by: EMERGENCY MEDICINE

## 2018-03-19 PROCEDURE — 82805 BLOOD GASES W/O2 SATURATION: CPT | Performed by: EMERGENCY MEDICINE

## 2018-03-19 PROCEDURE — 93005 ELECTROCARDIOGRAM TRACING: CPT | Performed by: EMERGENCY MEDICINE

## 2018-03-19 PROCEDURE — 85379 FIBRIN DEGRADATION QUANT: CPT | Performed by: FAMILY MEDICINE

## 2018-03-19 PROCEDURE — 87040 BLOOD CULTURE FOR BACTERIA: CPT | Performed by: EMERGENCY MEDICINE

## 2018-03-19 PROCEDURE — 36600 WITHDRAWAL OF ARTERIAL BLOOD: CPT | Performed by: EMERGENCY MEDICINE

## 2018-03-19 PROCEDURE — 83735 ASSAY OF MAGNESIUM: CPT | Performed by: EMERGENCY MEDICINE

## 2018-03-19 PROCEDURE — 87502 INFLUENZA DNA AMP PROBE: CPT | Performed by: EMERGENCY MEDICINE

## 2018-03-19 PROCEDURE — 25010000002 MEROPENEM: Performed by: EMERGENCY MEDICINE

## 2018-03-19 PROCEDURE — 81003 URINALYSIS AUTO W/O SCOPE: CPT | Performed by: EMERGENCY MEDICINE

## 2018-03-19 PROCEDURE — 80053 COMPREHEN METABOLIC PANEL: CPT | Performed by: EMERGENCY MEDICINE

## 2018-03-19 PROCEDURE — 82330 ASSAY OF CALCIUM: CPT | Performed by: EMERGENCY MEDICINE

## 2018-03-19 PROCEDURE — 85610 PROTHROMBIN TIME: CPT | Performed by: EMERGENCY MEDICINE

## 2018-03-19 PROCEDURE — 86140 C-REACTIVE PROTEIN: CPT | Performed by: EMERGENCY MEDICINE

## 2018-03-19 PROCEDURE — 36415 COLL VENOUS BLD VENIPUNCTURE: CPT

## 2018-03-19 PROCEDURE — 71045 X-RAY EXAM CHEST 1 VIEW: CPT

## 2018-03-19 PROCEDURE — 99285 EMERGENCY DEPT VISIT HI MDM: CPT

## 2018-03-19 PROCEDURE — 83880 ASSAY OF NATRIURETIC PEPTIDE: CPT | Performed by: NURSE PRACTITIONER

## 2018-03-19 PROCEDURE — P9612 CATHETERIZE FOR URINE SPEC: HCPCS

## 2018-03-19 PROCEDURE — 84100 ASSAY OF PHOSPHORUS: CPT | Performed by: EMERGENCY MEDICINE

## 2018-03-19 PROCEDURE — 85730 THROMBOPLASTIN TIME PARTIAL: CPT | Performed by: EMERGENCY MEDICINE

## 2018-03-19 PROCEDURE — 84484 ASSAY OF TROPONIN QUANT: CPT

## 2018-03-19 PROCEDURE — 25010000002 VANCOMYCIN: Performed by: EMERGENCY MEDICINE

## 2018-03-19 RX ORDER — SODIUM CHLORIDE 0.9 % (FLUSH) 0.9 %
10 SYRINGE (ML) INJECTION AS NEEDED
Status: DISCONTINUED | OUTPATIENT
Start: 2018-03-19 | End: 2018-03-24 | Stop reason: HOSPADM

## 2018-03-19 RX ORDER — FUROSEMIDE 20 MG/1
20 TABLET ORAL DAILY
Qty: 30 TABLET | Refills: 2 | Status: SHIPPED | OUTPATIENT
Start: 2018-03-19 | End: 2018-05-06 | Stop reason: SDUPTHER

## 2018-03-19 RX ORDER — SODIUM CHLORIDE 0.9 % (FLUSH) 0.9 %
10 SYRINGE (ML) INJECTION AS NEEDED
Status: DISCONTINUED | OUTPATIENT
Start: 2018-03-19 | End: 2018-03-19

## 2018-03-19 RX ADMIN — VANCOMYCIN HYDROCHLORIDE 2000 MG: 10 INJECTION, POWDER, LYOPHILIZED, FOR SOLUTION INTRAVENOUS at 22:54

## 2018-03-19 RX ADMIN — MEROPENEM 1 G: 1 INJECTION, POWDER, FOR SOLUTION INTRAVENOUS at 22:16

## 2018-03-19 RX ADMIN — SODIUM CHLORIDE 2259 ML: 9 INJECTION, SOLUTION INTRAVENOUS at 22:13

## 2018-03-20 ENCOUNTER — APPOINTMENT (OUTPATIENT)
Dept: NUCLEAR MEDICINE | Facility: HOSPITAL | Age: 73
End: 2018-03-20

## 2018-03-20 ENCOUNTER — APPOINTMENT (OUTPATIENT)
Dept: CT IMAGING | Facility: HOSPITAL | Age: 73
End: 2018-03-20

## 2018-03-20 PROBLEM — K51.90 ULCERATIVE COLITIS (HCC): Status: ACTIVE | Noted: 2018-03-20

## 2018-03-20 PROBLEM — I50.32 CHRONIC DIASTOLIC HEART FAILURE: Status: ACTIVE | Noted: 2018-03-09

## 2018-03-20 PROBLEM — J96.01 ACUTE RESPIRATORY FAILURE WITH HYPOXIA: Status: ACTIVE | Noted: 2018-03-20

## 2018-03-20 PROBLEM — R73.02 IGT (IMPAIRED GLUCOSE TOLERANCE): Chronic | Status: ACTIVE | Noted: 2018-03-20

## 2018-03-20 PROBLEM — I50.32 CHRONIC DIASTOLIC HEART FAILURE (HCC): Chronic | Status: ACTIVE | Noted: 2018-03-09

## 2018-03-20 LAB
BNP SERPL-MCNC: 28 PG/ML (ref 0–100)
D DIMER PPP FEU-MCNC: 1.33 MG/L (FEU) (ref 0–0.5)
D-LACTATE SERPL-SCNC: 1.4 MMOL/L (ref 0.5–2)
FLUAV SUBTYP SPEC NAA+PROBE: NOT DETECTED
FLUBV RNA ISLT QL NAA+PROBE: NOT DETECTED
HOLD SPECIMEN: NORMAL
PROCALCITONIN SERPL-MCNC: <0.05 NG/ML
TROPONIN I SERPL-MCNC: 0.04 NG/ML
TROPONIN I SERPL-MCNC: 0.09 NG/ML

## 2018-03-20 PROCEDURE — 93005 ELECTROCARDIOGRAM TRACING: CPT | Performed by: NURSE PRACTITIONER

## 2018-03-20 PROCEDURE — 84145 PROCALCITONIN (PCT): CPT | Performed by: NURSE PRACTITIONER

## 2018-03-20 PROCEDURE — 84484 ASSAY OF TROPONIN QUANT: CPT | Performed by: INTERNAL MEDICINE

## 2018-03-20 PROCEDURE — 92610 EVALUATE SWALLOWING FUNCTION: CPT

## 2018-03-20 PROCEDURE — 0 TECHNETIUM ALBUMIN AGGREGATED: Performed by: INTERNAL MEDICINE

## 2018-03-20 PROCEDURE — 84484 ASSAY OF TROPONIN QUANT: CPT | Performed by: NURSE PRACTITIONER

## 2018-03-20 PROCEDURE — A9540 TC99M MAA: HCPCS | Performed by: INTERNAL MEDICINE

## 2018-03-20 PROCEDURE — 93010 ELECTROCARDIOGRAM REPORT: CPT | Performed by: INTERNAL MEDICINE

## 2018-03-20 PROCEDURE — 71250 CT THORAX DX C-: CPT

## 2018-03-20 PROCEDURE — A9558 XE133 XENON 10MCI: HCPCS | Performed by: INTERNAL MEDICINE

## 2018-03-20 PROCEDURE — 0 XENON XE 133: Performed by: INTERNAL MEDICINE

## 2018-03-20 PROCEDURE — 25010000002 CEFEPIME: Performed by: PHYSICIAN ASSISTANT

## 2018-03-20 PROCEDURE — 87633 RESP VIRUS 12-25 TARGETS: CPT | Performed by: NURSE PRACTITIONER

## 2018-03-20 PROCEDURE — 87899 AGENT NOS ASSAY W/OPTIC: CPT | Performed by: NURSE PRACTITIONER

## 2018-03-20 PROCEDURE — 94760 N-INVAS EAR/PLS OXIMETRY 1: CPT

## 2018-03-20 PROCEDURE — 94640 AIRWAY INHALATION TREATMENT: CPT

## 2018-03-20 PROCEDURE — 87449 NOS EACH ORGANISM AG IA: CPT | Performed by: NURSE PRACTITIONER

## 2018-03-20 PROCEDURE — 83605 ASSAY OF LACTIC ACID: CPT | Performed by: EMERGENCY MEDICINE

## 2018-03-20 PROCEDURE — 78582 LUNG VENTILAT&PERFUS IMAGING: CPT

## 2018-03-20 PROCEDURE — 94799 UNLISTED PULMONARY SVC/PX: CPT

## 2018-03-20 PROCEDURE — 25010000002 MEROPENEM: Performed by: NURSE PRACTITIONER

## 2018-03-20 PROCEDURE — 99223 1ST HOSP IP/OBS HIGH 75: CPT | Performed by: FAMILY MEDICINE

## 2018-03-20 PROCEDURE — 25010000002 VANCOMYCIN: Performed by: NURSE PRACTITIONER

## 2018-03-20 PROCEDURE — 25010000002 HEPARIN (PORCINE) PER 1000 UNITS: Performed by: NURSE PRACTITIONER

## 2018-03-20 RX ORDER — FAMOTIDINE 20 MG/1
20 TABLET, FILM COATED ORAL 2 TIMES DAILY PRN
Status: DISCONTINUED | OUTPATIENT
Start: 2018-03-20 | End: 2018-03-24 | Stop reason: HOSPADM

## 2018-03-20 RX ORDER — ALBUTEROL SULFATE 2.5 MG/3ML
2.5 SOLUTION RESPIRATORY (INHALATION) 4 TIMES DAILY PRN
Status: DISCONTINUED | OUTPATIENT
Start: 2018-03-20 | End: 2018-03-24 | Stop reason: HOSPADM

## 2018-03-20 RX ORDER — AZITHROMYCIN 250 MG/1
500 TABLET, FILM COATED ORAL
Status: DISCONTINUED | OUTPATIENT
Start: 2018-03-20 | End: 2018-03-24 | Stop reason: HOSPADM

## 2018-03-20 RX ORDER — TIZANIDINE 4 MG/1
4 TABLET ORAL DAILY PRN
Status: DISCONTINUED | OUTPATIENT
Start: 2018-03-20 | End: 2018-03-24 | Stop reason: HOSPADM

## 2018-03-20 RX ORDER — SODIUM CHLORIDE 0.9 % (FLUSH) 0.9 %
1-10 SYRINGE (ML) INJECTION AS NEEDED
Status: DISCONTINUED | OUTPATIENT
Start: 2018-03-20 | End: 2018-03-24 | Stop reason: HOSPADM

## 2018-03-20 RX ORDER — PROMETHAZINE HYDROCHLORIDE 25 MG/ML
12.5 INJECTION, SOLUTION INTRAMUSCULAR; INTRAVENOUS EVERY 6 HOURS PRN
Status: DISCONTINUED | OUTPATIENT
Start: 2018-03-20 | End: 2018-03-24 | Stop reason: HOSPADM

## 2018-03-20 RX ORDER — BENZONATATE 100 MG/1
100 CAPSULE ORAL 3 TIMES DAILY PRN
Status: DISCONTINUED | OUTPATIENT
Start: 2018-03-20 | End: 2018-03-24 | Stop reason: HOSPADM

## 2018-03-20 RX ORDER — CETIRIZINE HYDROCHLORIDE 10 MG/1
10 TABLET ORAL DAILY
Status: DISCONTINUED | OUTPATIENT
Start: 2018-03-20 | End: 2018-03-24 | Stop reason: HOSPADM

## 2018-03-20 RX ORDER — ACETAMINOPHEN 325 MG/1
650 TABLET ORAL EVERY 4 HOURS PRN
Status: DISCONTINUED | OUTPATIENT
Start: 2018-03-20 | End: 2018-03-24 | Stop reason: HOSPADM

## 2018-03-20 RX ORDER — ATORVASTATIN CALCIUM 10 MG/1
10 TABLET, FILM COATED ORAL NIGHTLY
Status: DISCONTINUED | OUTPATIENT
Start: 2018-03-20 | End: 2018-03-24 | Stop reason: HOSPADM

## 2018-03-20 RX ORDER — HEPARIN SODIUM 5000 [USP'U]/ML
5000 INJECTION, SOLUTION INTRAVENOUS; SUBCUTANEOUS EVERY 12 HOURS SCHEDULED
Status: DISCONTINUED | OUTPATIENT
Start: 2018-03-20 | End: 2018-03-24 | Stop reason: HOSPADM

## 2018-03-20 RX ORDER — GUAIFENESIN 600 MG/1
600 TABLET, EXTENDED RELEASE ORAL 2 TIMES DAILY
Status: DISCONTINUED | OUTPATIENT
Start: 2018-03-20 | End: 2018-03-24 | Stop reason: HOSPADM

## 2018-03-20 RX ORDER — IPRATROPIUM BROMIDE AND ALBUTEROL SULFATE 2.5; .5 MG/3ML; MG/3ML
3 SOLUTION RESPIRATORY (INHALATION)
Status: DISCONTINUED | OUTPATIENT
Start: 2018-03-20 | End: 2018-03-24 | Stop reason: HOSPADM

## 2018-03-20 RX ORDER — PROMETHAZINE HYDROCHLORIDE 12.5 MG/1
12.5 TABLET ORAL EVERY 6 HOURS PRN
Status: DISCONTINUED | OUTPATIENT
Start: 2018-03-20 | End: 2018-03-24 | Stop reason: HOSPADM

## 2018-03-20 RX ORDER — VALSARTAN 160 MG/1
320 TABLET ORAL DAILY
Status: DISCONTINUED | OUTPATIENT
Start: 2018-03-20 | End: 2018-03-24 | Stop reason: HOSPADM

## 2018-03-20 RX ORDER — MULTIPLE VITAMINS W/ MINERALS TAB 9MG-400MCG
1 TAB ORAL DAILY
Status: DISCONTINUED | OUTPATIENT
Start: 2018-03-20 | End: 2018-03-24 | Stop reason: HOSPADM

## 2018-03-20 RX ADMIN — VALSARTAN 320 MG: 160 TABLET, FILM COATED ORAL at 07:59

## 2018-03-20 RX ADMIN — AZITHROMYCIN 500 MG: 250 TABLET, FILM COATED ORAL at 14:22

## 2018-03-20 RX ADMIN — HEPARIN SODIUM 5000 UNITS: 5000 INJECTION, SOLUTION INTRAVENOUS; SUBCUTANEOUS at 20:08

## 2018-03-20 RX ADMIN — BENZONATATE 100 MG: 100 CAPSULE, LIQUID FILLED ORAL at 23:29

## 2018-03-20 RX ADMIN — MULTIPLE VITAMINS W/ MINERALS TAB 1 TABLET: TAB ORAL at 07:59

## 2018-03-20 RX ADMIN — DOXYCYCLINE 100 MG: 100 INJECTION, POWDER, LYOPHILIZED, FOR SOLUTION INTRAVENOUS at 01:27

## 2018-03-20 RX ADMIN — VANCOMYCIN HYDROCHLORIDE 1500 MG: 10 INJECTION, POWDER, LYOPHILIZED, FOR SOLUTION INTRAVENOUS at 11:32

## 2018-03-20 RX ADMIN — HEPARIN SODIUM 5000 UNITS: 5000 INJECTION, SOLUTION INTRAVENOUS; SUBCUTANEOUS at 07:59

## 2018-03-20 RX ADMIN — GUAIFENESIN 600 MG: 600 TABLET, EXTENDED RELEASE ORAL at 07:58

## 2018-03-20 RX ADMIN — XENON XE-133 12.56 MILLICURIE: 10 GAS RESPIRATORY (INHALATION) at 09:17

## 2018-03-20 RX ADMIN — MEROPENEM 1 G: 1 INJECTION, POWDER, FOR SOLUTION INTRAVENOUS at 03:38

## 2018-03-20 RX ADMIN — IPRATROPIUM BROMIDE AND ALBUTEROL SULFATE 3 ML: 2.5; .5 SOLUTION RESPIRATORY (INHALATION) at 07:16

## 2018-03-20 RX ADMIN — IPRATROPIUM BROMIDE AND ALBUTEROL SULFATE 3 ML: 2.5; .5 SOLUTION RESPIRATORY (INHALATION) at 20:25

## 2018-03-20 RX ADMIN — GUAIFENESIN 600 MG: 600 TABLET, EXTENDED RELEASE ORAL at 20:09

## 2018-03-20 RX ADMIN — ATORVASTATIN CALCIUM 10 MG: 10 TABLET, FILM COATED ORAL at 20:09

## 2018-03-20 RX ADMIN — CETIRIZINE HYDROCHLORIDE 10 MG: 10 TABLET, FILM COATED ORAL at 07:58

## 2018-03-20 RX ADMIN — IPRATROPIUM BROMIDE AND ALBUTEROL SULFATE 3 ML: 2.5; .5 SOLUTION RESPIRATORY (INHALATION) at 16:40

## 2018-03-20 RX ADMIN — IPRATROPIUM BROMIDE AND ALBUTEROL SULFATE 3 ML: 2.5; .5 SOLUTION RESPIRATORY (INHALATION) at 12:57

## 2018-03-20 RX ADMIN — Medication 1 DOSE: at 09:27

## 2018-03-20 RX ADMIN — CEFEPIME 2 G: 2 INJECTION, POWDER, FOR SOLUTION INTRAVENOUS at 14:27

## 2018-03-21 PROBLEM — J18.9 COMMUNITY ACQUIRED PNEUMONIA: Status: ACTIVE | Noted: 2018-03-21

## 2018-03-21 PROBLEM — J84.9 INTERSTITIAL LUNG DISEASE: Status: ACTIVE | Noted: 2018-03-21

## 2018-03-21 LAB
ANION GAP SERPL CALCULATED.3IONS-SCNC: 5 MMOL/L (ref 3–11)
BASOPHILS # BLD AUTO: 0.04 10*3/MM3 (ref 0–0.2)
BASOPHILS NFR BLD AUTO: 0.5 % (ref 0–1)
BUN BLD-MCNC: 18 MG/DL (ref 9–23)
BUN/CREAT SERPL: 18 (ref 7–25)
CALCIUM SPEC-SCNC: 8.7 MG/DL (ref 8.7–10.4)
CHLORIDE SERPL-SCNC: 110 MMOL/L (ref 99–109)
CO2 SERPL-SCNC: 27 MMOL/L (ref 20–31)
CREAT BLD-MCNC: 1 MG/DL (ref 0.6–1.3)
DEPRECATED RDW RBC AUTO: 49.2 FL (ref 37–54)
EOSINOPHIL # BLD AUTO: 0.43 10*3/MM3 (ref 0–0.3)
EOSINOPHIL NFR BLD AUTO: 5 % (ref 0–3)
ERYTHROCYTE [DISTWIDTH] IN BLOOD BY AUTOMATED COUNT: 14.6 % (ref 11.3–14.5)
ERYTHROCYTE [SEDIMENTATION RATE] IN BLOOD: 25 MM/HR (ref 0–20)
GFR SERPL CREATININE-BSD FRML MDRD: 73 ML/MIN/1.73
GLUCOSE BLD-MCNC: 111 MG/DL (ref 70–100)
HCT VFR BLD AUTO: 32.6 % (ref 38.9–50.9)
HGB BLD-MCNC: 10.2 G/DL (ref 13.1–17.5)
IMM GRANULOCYTES # BLD: 0.03 10*3/MM3 (ref 0–0.03)
IMM GRANULOCYTES NFR BLD: 0.4 % (ref 0–0.6)
LDH SERPL-CCNC: 172 U/L (ref 120–246)
LYMPHOCYTES # BLD AUTO: 1.32 10*3/MM3 (ref 0.6–4.8)
LYMPHOCYTES NFR BLD AUTO: 15.5 % (ref 24–44)
MCH RBC QN AUTO: 28.7 PG (ref 27–31)
MCHC RBC AUTO-ENTMCNC: 31.3 G/DL (ref 32–36)
MCV RBC AUTO: 91.6 FL (ref 80–99)
MONOCYTES # BLD AUTO: 1.13 10*3/MM3 (ref 0–1)
MONOCYTES NFR BLD AUTO: 13.3 % (ref 0–12)
NEUTROPHILS # BLD AUTO: 5.57 10*3/MM3 (ref 1.5–8.3)
NEUTROPHILS NFR BLD AUTO: 65.3 % (ref 41–71)
PLATELET # BLD AUTO: 213 10*3/MM3 (ref 150–450)
PMV BLD AUTO: 10.3 FL (ref 6–12)
POTASSIUM BLD-SCNC: 4.2 MMOL/L (ref 3.5–5.5)
RBC # BLD AUTO: 3.56 10*6/MM3 (ref 4.2–5.76)
SODIUM BLD-SCNC: 142 MMOL/L (ref 132–146)
WBC NRBC COR # BLD: 8.52 10*3/MM3 (ref 3.5–10.8)

## 2018-03-21 PROCEDURE — 86361 T CELL ABSOLUTE COUNT: CPT | Performed by: PHYSICIAN ASSISTANT

## 2018-03-21 PROCEDURE — 85652 RBC SED RATE AUTOMATED: CPT | Performed by: PHYSICIAN ASSISTANT

## 2018-03-21 PROCEDURE — 83615 LACTATE (LD) (LDH) ENZYME: CPT | Performed by: PHYSICIAN ASSISTANT

## 2018-03-21 PROCEDURE — 94640 AIRWAY INHALATION TREATMENT: CPT

## 2018-03-21 PROCEDURE — 25010000002 HEPARIN (PORCINE) PER 1000 UNITS: Performed by: NURSE PRACTITIONER

## 2018-03-21 PROCEDURE — 83520 IMMUNOASSAY QUANT NOS NONAB: CPT | Performed by: INTERNAL MEDICINE

## 2018-03-21 PROCEDURE — 86038 ANTINUCLEAR ANTIBODIES: CPT | Performed by: INTERNAL MEDICINE

## 2018-03-21 PROCEDURE — 80048 BASIC METABOLIC PNL TOTAL CA: CPT | Performed by: PHYSICIAN ASSISTANT

## 2018-03-21 PROCEDURE — 99233 SBSQ HOSP IP/OBS HIGH 50: CPT | Performed by: HOSPITALIST

## 2018-03-21 PROCEDURE — 86431 RHEUMATOID FACTOR QUANT: CPT | Performed by: INTERNAL MEDICINE

## 2018-03-21 PROCEDURE — 25010000002 CEFEPIME: Performed by: PHYSICIAN ASSISTANT

## 2018-03-21 PROCEDURE — 94799 UNLISTED PULMONARY SVC/PX: CPT

## 2018-03-21 PROCEDURE — 94760 N-INVAS EAR/PLS OXIMETRY 1: CPT

## 2018-03-21 PROCEDURE — 86256 FLUORESCENT ANTIBODY TITER: CPT | Performed by: INTERNAL MEDICINE

## 2018-03-21 PROCEDURE — C1751 CATH, INF, PER/CENT/MIDLINE: HCPCS

## 2018-03-21 PROCEDURE — 99223 1ST HOSP IP/OBS HIGH 75: CPT | Performed by: INTERNAL MEDICINE

## 2018-03-21 PROCEDURE — C1894 INTRO/SHEATH, NON-LASER: HCPCS

## 2018-03-21 PROCEDURE — 85025 COMPLETE CBC W/AUTO DIFF WBC: CPT | Performed by: PHYSICIAN ASSISTANT

## 2018-03-21 PROCEDURE — 02HV33Z INSERTION OF INFUSION DEVICE INTO SUPERIOR VENA CAVA, PERCUTANEOUS APPROACH: ICD-10-PCS | Performed by: INTERNAL MEDICINE

## 2018-03-21 RX ORDER — DEXTROMETHORPHAN POLISTIREX 30 MG/5ML
60 SUSPENSION ORAL EVERY 12 HOURS SCHEDULED
Status: DISCONTINUED | OUTPATIENT
Start: 2018-03-21 | End: 2018-03-24 | Stop reason: HOSPADM

## 2018-03-21 RX ORDER — SODIUM CHLORIDE 0.9 % (FLUSH) 0.9 %
1-10 SYRINGE (ML) INJECTION AS NEEDED
Status: DISCONTINUED | OUTPATIENT
Start: 2018-03-21 | End: 2018-03-24 | Stop reason: HOSPADM

## 2018-03-21 RX ORDER — FUROSEMIDE 20 MG/1
20 TABLET ORAL DAILY
Status: DISCONTINUED | OUTPATIENT
Start: 2018-03-21 | End: 2018-03-24 | Stop reason: HOSPADM

## 2018-03-21 RX ADMIN — ATORVASTATIN CALCIUM 10 MG: 10 TABLET, FILM COATED ORAL at 20:45

## 2018-03-21 RX ADMIN — CEFEPIME 2 G: 2 INJECTION, POWDER, FOR SOLUTION INTRAVENOUS at 01:36

## 2018-03-21 RX ADMIN — HEPARIN SODIUM 5000 UNITS: 5000 INJECTION, SOLUTION INTRAVENOUS; SUBCUTANEOUS at 20:45

## 2018-03-21 RX ADMIN — IPRATROPIUM BROMIDE AND ALBUTEROL SULFATE 3 ML: 2.5; .5 SOLUTION RESPIRATORY (INHALATION) at 16:07

## 2018-03-21 RX ADMIN — MULTIPLE VITAMINS W/ MINERALS TAB 1 TABLET: TAB ORAL at 08:48

## 2018-03-21 RX ADMIN — IPRATROPIUM BROMIDE AND ALBUTEROL SULFATE 3 ML: 2.5; .5 SOLUTION RESPIRATORY (INHALATION) at 10:21

## 2018-03-21 RX ADMIN — DEXTROMETHORPHAN 60 MG: 30 SUSPENSION, EXTENDED RELEASE ORAL at 20:45

## 2018-03-21 RX ADMIN — GUAIFENESIN 600 MG: 600 TABLET, EXTENDED RELEASE ORAL at 08:48

## 2018-03-21 RX ADMIN — CEFEPIME 2 G: 2 INJECTION, POWDER, FOR SOLUTION INTRAVENOUS at 15:38

## 2018-03-21 RX ADMIN — DEXTROMETHORPHAN 60 MG: 30 SUSPENSION, EXTENDED RELEASE ORAL at 01:29

## 2018-03-21 RX ADMIN — DEXTROMETHORPHAN 60 MG: 30 SUSPENSION, EXTENDED RELEASE ORAL at 08:48

## 2018-03-21 RX ADMIN — FUROSEMIDE 20 MG: 20 TABLET ORAL at 15:36

## 2018-03-21 RX ADMIN — AZITHROMYCIN 500 MG: 250 TABLET, FILM COATED ORAL at 08:48

## 2018-03-21 RX ADMIN — VALSARTAN 320 MG: 160 TABLET, FILM COATED ORAL at 08:48

## 2018-03-21 RX ADMIN — HEPARIN SODIUM 5000 UNITS: 5000 INJECTION, SOLUTION INTRAVENOUS; SUBCUTANEOUS at 08:48

## 2018-03-21 RX ADMIN — GUAIFENESIN 600 MG: 600 TABLET, EXTENDED RELEASE ORAL at 20:45

## 2018-03-21 RX ADMIN — IPRATROPIUM BROMIDE AND ALBUTEROL SULFATE 3 ML: 2.5; .5 SOLUTION RESPIRATORY (INHALATION) at 20:17

## 2018-03-21 RX ADMIN — CETIRIZINE HYDROCHLORIDE 10 MG: 10 TABLET, FILM COATED ORAL at 08:48

## 2018-03-22 ENCOUNTER — APPOINTMENT (OUTPATIENT)
Dept: GENERAL RADIOLOGY | Facility: HOSPITAL | Age: 73
End: 2018-03-22

## 2018-03-22 LAB
ANA SER QL: NEGATIVE
ANION GAP SERPL CALCULATED.3IONS-SCNC: 6 MMOL/L (ref 3–11)
BACTERIA FLD CULT: NORMAL
BASOPHILS # BLD AUTO: 0 X10E3/UL (ref 0–0.2)
BASOPHILS NFR BLD AUTO: 1 %
BUN BLD-MCNC: 18 MG/DL (ref 9–23)
BUN/CREAT SERPL: 18 (ref 7–25)
CALCIUM SPEC-SCNC: 8.7 MG/DL (ref 8.7–10.4)
CD3+CD4+ CELLS # BLD: 694 /UL (ref 359–1519)
CD3+CD4+ CELLS NFR BLD: 53.4 % (ref 30.8–58.5)
CHLORIDE SERPL-SCNC: 106 MMOL/L (ref 99–109)
CO2 SERPL-SCNC: 29 MMOL/L (ref 20–31)
CREAT BLD-MCNC: 1 MG/DL (ref 0.6–1.3)
DEPRECATED RDW RBC AUTO: 48.3 FL (ref 37–54)
EOSINOPHIL # BLD AUTO: 0.4 X10E3/UL (ref 0–0.4)
EOSINOPHIL # BLD AUTO: 5 %
EOSINOPHIL NFR FLD MANUAL: 2 %
ERYTHROCYTE [DISTWIDTH] IN BLOOD BY AUTOMATED COUNT: 14.7 % (ref 11.3–14.5)
ERYTHROCYTE [DISTWIDTH] IN BLOOD BY AUTOMATED COUNT: 15 % (ref 12.3–15.4)
GFR SERPL CREATININE-BSD FRML MDRD: 73 ML/MIN/1.73
GLUCOSE BLD-MCNC: 108 MG/DL (ref 70–100)
HCT VFR BLD AUTO: 32 % (ref 37.5–51)
HCT VFR BLD AUTO: 33.3 % (ref 38.9–50.9)
HGB BLD-MCNC: 10.2 G/DL (ref 13–17.7)
HGB BLD-MCNC: 10.4 G/DL (ref 13.1–17.5)
HIV1+2 AB SER QL: NORMAL
IMM GRANULOCYTES # BLD: 0 X10E3/UL (ref 0–0.1)
IMM GRANULOCYTES NFR BLD: 0 %
L PNEUMO1 AG UR QL IA: NEGATIVE
LYMPHOCYTES # BLD AUTO: 1.3 X10E3/UL (ref 0.7–3.1)
LYMPHOCYTES NFR BLD AUTO: 14 %
LYMPHOCYTES NFR FLD MANUAL: 82 %
Lab: NORMAL
MACROPHAGE FLUID: 6 %
MCH RBC QN AUTO: 28.4 PG (ref 27–31)
MCH RBC QN AUTO: 29.1 PG (ref 26.6–33)
MCHC RBC AUTO-ENTMCNC: 31.2 G/DL (ref 32–36)
MCHC RBC AUTO-ENTMCNC: 31.9 G/DL (ref 31.5–35.7)
MCV RBC AUTO: 91 FL (ref 79–97)
MCV RBC AUTO: 91 FL (ref 80–99)
MONOCYTES # BLD AUTO: 1.4 X10E3/UL (ref 0.1–0.9)
MONOCYTES NFR BLD AUTO: 16 %
MONOCYTES NFR FLD: 2 %
NEUTROPHILS # BLD AUTO: 5.7 X10E3/UL (ref 1.4–7)
NEUTROPHILS NFR BLD AUTO: 64 %
NEUTROPHILS NFR FLD MANUAL: 8 %
ORGANISM ID: NORMAL
PLATELET # BLD AUTO: 245 10*3/MM3 (ref 150–450)
PLATELET # BLD AUTO: 252 X10E3/UL (ref 150–379)
PMV BLD AUTO: 10.9 FL (ref 6–12)
POTASSIUM BLD-SCNC: 3.8 MMOL/L (ref 3.5–5.5)
RBC # BLD AUTO: 3.51 X10E6/UL (ref 4.14–5.8)
RBC # BLD AUTO: 3.66 10*6/MM3 (ref 4.2–5.76)
RHEUMATOID FACT SERPL-ACNC: NEGATIVE [IU]/ML
S PNEUM AG SPEC QL LA: NEGATIVE
SODIUM BLD-SCNC: 141 MMOL/L (ref 132–146)
SPECIMEN SOURCE: NORMAL
WBC # BLD AUTO: 8.9 X10E3/UL (ref 3.4–10.8)
WBC NRBC COR # BLD: 8.65 10*3/MM3 (ref 3.5–10.8)

## 2018-03-22 PROCEDURE — 88112 CYTOPATH CELL ENHANCE TECH: CPT | Performed by: INTERNAL MEDICINE

## 2018-03-22 PROCEDURE — 25010000002 CEFEPIME: Performed by: PHYSICIAN ASSISTANT

## 2018-03-22 PROCEDURE — 25010000002 MIDAZOLAM PER 1 MG: Performed by: INTERNAL MEDICINE

## 2018-03-22 PROCEDURE — 0BDC8ZX EXTRACTION OF RIGHT UPPER LUNG LOBE, VIA NATURAL OR ARTIFICIAL OPENING ENDOSCOPIC, DIAGNOSTIC: ICD-10-PCS | Performed by: INTERNAL MEDICINE

## 2018-03-22 PROCEDURE — 31628 BRONCHOSCOPY/LUNG BX EACH: CPT | Performed by: INTERNAL MEDICINE

## 2018-03-22 PROCEDURE — 0B9C8ZX DRAINAGE OF RIGHT UPPER LUNG LOBE, VIA NATURAL OR ARTIFICIAL OPENING ENDOSCOPIC, DIAGNOSTIC: ICD-10-PCS | Performed by: INTERNAL MEDICINE

## 2018-03-22 PROCEDURE — 25010000002 FENTANYL CITRATE (PF) 100 MCG/2ML SOLUTION: Performed by: INTERNAL MEDICINE

## 2018-03-22 PROCEDURE — 87633 RESP VIRUS 12-25 TARGETS: CPT | Performed by: HOSPITALIST

## 2018-03-22 PROCEDURE — 94760 N-INVAS EAR/PLS OXIMETRY 1: CPT

## 2018-03-22 PROCEDURE — 87205 SMEAR GRAM STAIN: CPT | Performed by: INTERNAL MEDICINE

## 2018-03-22 PROCEDURE — 99233 SBSQ HOSP IP/OBS HIGH 50: CPT | Performed by: HOSPITALIST

## 2018-03-22 PROCEDURE — 87070 CULTURE OTHR SPECIMN AEROBIC: CPT | Performed by: INTERNAL MEDICINE

## 2018-03-22 PROCEDURE — 94640 AIRWAY INHALATION TREATMENT: CPT

## 2018-03-22 PROCEDURE — 25010000002 HEPARIN (PORCINE) PER 1000 UNITS: Performed by: NURSE PRACTITIONER

## 2018-03-22 PROCEDURE — 80048 BASIC METABOLIC PNL TOTAL CA: CPT | Performed by: HOSPITALIST

## 2018-03-22 PROCEDURE — 94799 UNLISTED PULMONARY SVC/PX: CPT

## 2018-03-22 PROCEDURE — 88312 SPECIAL STAINS GROUP 1: CPT | Performed by: INTERNAL MEDICINE

## 2018-03-22 PROCEDURE — 87116 MYCOBACTERIA CULTURE: CPT | Performed by: INTERNAL MEDICINE

## 2018-03-22 PROCEDURE — 76000 FLUOROSCOPY <1 HR PHYS/QHP: CPT

## 2018-03-22 PROCEDURE — 87102 FUNGUS ISOLATION CULTURE: CPT | Performed by: INTERNAL MEDICINE

## 2018-03-22 PROCEDURE — 31623 DX BRONCHOSCOPE/BRUSH: CPT | Performed by: INTERNAL MEDICINE

## 2018-03-22 PROCEDURE — 88305 TISSUE EXAM BY PATHOLOGIST: CPT | Performed by: INTERNAL MEDICINE

## 2018-03-22 PROCEDURE — 85027 COMPLETE CBC AUTOMATED: CPT | Performed by: HOSPITALIST

## 2018-03-22 PROCEDURE — 87206 SMEAR FLUORESCENT/ACID STAI: CPT | Performed by: INTERNAL MEDICINE

## 2018-03-22 PROCEDURE — 31624 DX BRONCHOSCOPE/LAVAGE: CPT | Performed by: INTERNAL MEDICINE

## 2018-03-22 PROCEDURE — 71045 X-RAY EXAM CHEST 1 VIEW: CPT

## 2018-03-22 PROCEDURE — 89051 BODY FLUID CELL COUNT: CPT | Performed by: HOSPITALIST

## 2018-03-22 RX ORDER — SODIUM CHLORIDE 9 MG/ML
40 INJECTION, SOLUTION INTRAVENOUS CONTINUOUS
Status: DISCONTINUED | OUTPATIENT
Start: 2018-03-22 | End: 2018-03-24 | Stop reason: HOSPADM

## 2018-03-22 RX ORDER — FENTANYL CITRATE 50 UG/ML
INJECTION, SOLUTION INTRAMUSCULAR; INTRAVENOUS AS NEEDED
Status: DISCONTINUED | OUTPATIENT
Start: 2018-03-22 | End: 2018-03-24 | Stop reason: HOSPADM

## 2018-03-22 RX ORDER — LIDOCAINE HYDROCHLORIDE 40 MG/ML
4 SOLUTION TOPICAL ONCE
Status: DISCONTINUED | OUTPATIENT
Start: 2018-03-22 | End: 2018-03-24 | Stop reason: HOSPADM

## 2018-03-22 RX ORDER — SODIUM CHLORIDE 0.9 % (FLUSH) 0.9 %
1-10 SYRINGE (ML) INJECTION AS NEEDED
Status: DISCONTINUED | OUTPATIENT
Start: 2018-03-22 | End: 2018-03-24 | Stop reason: HOSPADM

## 2018-03-22 RX ORDER — MIDAZOLAM HYDROCHLORIDE 5 MG/ML
INJECTION INTRAMUSCULAR; INTRAVENOUS AS NEEDED
Status: DISCONTINUED | OUTPATIENT
Start: 2018-03-22 | End: 2018-03-24 | Stop reason: HOSPADM

## 2018-03-22 RX ADMIN — GUAIFENESIN 600 MG: 600 TABLET, EXTENDED RELEASE ORAL at 20:29

## 2018-03-22 RX ADMIN — FENTANYL CITRATE 50 MCG: 50 INJECTION, SOLUTION INTRAMUSCULAR; INTRAVENOUS at 11:25

## 2018-03-22 RX ADMIN — IPRATROPIUM BROMIDE AND ALBUTEROL SULFATE 3 ML: 2.5; .5 SOLUTION RESPIRATORY (INHALATION) at 20:15

## 2018-03-22 RX ADMIN — MIDAZOLAM HYDROCHLORIDE 2 MG: 5 INJECTION, SOLUTION INTRAMUSCULAR; INTRAVENOUS at 11:25

## 2018-03-22 RX ADMIN — BENZONATATE 100 MG: 100 CAPSULE, LIQUID FILLED ORAL at 18:21

## 2018-03-22 RX ADMIN — VALSARTAN 320 MG: 160 TABLET, FILM COATED ORAL at 08:51

## 2018-03-22 RX ADMIN — FUROSEMIDE 20 MG: 20 TABLET ORAL at 08:53

## 2018-03-22 RX ADMIN — AZITHROMYCIN 500 MG: 250 TABLET, FILM COATED ORAL at 08:51

## 2018-03-22 RX ADMIN — DEXTROMETHORPHAN 60 MG: 30 SUSPENSION, EXTENDED RELEASE ORAL at 20:28

## 2018-03-22 RX ADMIN — CETIRIZINE HYDROCHLORIDE 10 MG: 10 TABLET, FILM COATED ORAL at 08:51

## 2018-03-22 RX ADMIN — ATORVASTATIN CALCIUM 10 MG: 10 TABLET, FILM COATED ORAL at 20:29

## 2018-03-22 RX ADMIN — DEXTROMETHORPHAN 60 MG: 30 SUSPENSION, EXTENDED RELEASE ORAL at 08:51

## 2018-03-22 RX ADMIN — HEPARIN SODIUM 5000 UNITS: 5000 INJECTION, SOLUTION INTRAVENOUS; SUBCUTANEOUS at 20:28

## 2018-03-22 RX ADMIN — GUAIFENESIN 600 MG: 600 TABLET, EXTENDED RELEASE ORAL at 08:51

## 2018-03-22 RX ADMIN — MULTIPLE VITAMINS W/ MINERALS TAB 1 TABLET: TAB ORAL at 08:51

## 2018-03-22 RX ADMIN — CEFEPIME 2 G: 2 INJECTION, POWDER, FOR SOLUTION INTRAVENOUS at 02:51

## 2018-03-22 RX ADMIN — IPRATROPIUM BROMIDE AND ALBUTEROL SULFATE 3 ML: 2.5; .5 SOLUTION RESPIRATORY (INHALATION) at 07:48

## 2018-03-22 RX ADMIN — CEFEPIME 2 G: 2 INJECTION, POWDER, FOR SOLUTION INTRAVENOUS at 16:02

## 2018-03-22 RX ADMIN — SODIUM CHLORIDE 40 ML/HR: 9 INJECTION, SOLUTION INTRAVENOUS at 13:51

## 2018-03-22 RX ADMIN — MIDAZOLAM HYDROCHLORIDE 1 MG: 5 INJECTION, SOLUTION INTRAMUSCULAR; INTRAVENOUS at 11:30

## 2018-03-22 RX ADMIN — MIDAZOLAM HYDROCHLORIDE 1 MG: 5 INJECTION, SOLUTION INTRAMUSCULAR; INTRAVENOUS at 11:28

## 2018-03-22 RX ADMIN — IPRATROPIUM BROMIDE AND ALBUTEROL SULFATE 3 ML: 2.5; .5 SOLUTION RESPIRATORY (INHALATION) at 16:44

## 2018-03-23 LAB
ANION GAP SERPL CALCULATED.3IONS-SCNC: 8 MMOL/L (ref 3–11)
BASOPHILS # BLD AUTO: 0.05 10*3/MM3 (ref 0–0.2)
BASOPHILS NFR BLD AUTO: 0.7 % (ref 0–1)
BUN BLD-MCNC: 21 MG/DL (ref 9–23)
BUN/CREAT SERPL: 26.3 (ref 7–25)
C-ANCA TITR SER IF: ABNORMAL TITER
CALCIUM SPEC-SCNC: 8.6 MG/DL (ref 8.7–10.4)
CHLORIDE SERPL-SCNC: 106 MMOL/L (ref 99–109)
CO2 SERPL-SCNC: 28 MMOL/L (ref 20–31)
CREAT BLD-MCNC: 0.8 MG/DL (ref 0.6–1.3)
CYTO UR: NORMAL
DEPRECATED RDW RBC AUTO: 49.3 FL (ref 37–54)
EOSINOPHIL # BLD AUTO: 0.45 10*3/MM3 (ref 0–0.3)
EOSINOPHIL NFR BLD AUTO: 5.9 % (ref 0–3)
ERYTHROCYTE [DISTWIDTH] IN BLOOD BY AUTOMATED COUNT: 14.5 % (ref 11.3–14.5)
FLUAV RNA SPEC QL NAA+PROBE: NEGATIVE
FLUBV RNA SPEC QL NAA+PROBE: NEGATIVE
GFR SERPL CREATININE-BSD FRML MDRD: 94 ML/MIN/1.73
GLUCOSE BLD-MCNC: 102 MG/DL (ref 70–100)
HADV DNA SPEC QL NAA+PROBE: NEGATIVE
HCT VFR BLD AUTO: 33 % (ref 38.9–50.9)
HGB BLD-MCNC: 10.3 G/DL (ref 13.1–17.5)
HMPV RNA SPEC QL NAA+PROBE: NEGATIVE
HPIV1 RNA SPEC QL NAA+PROBE: NEGATIVE
HPIV2 SPEC QL CULT: NEGATIVE
HPIV3 SPEC QL CULT: NEGATIVE
IMM GRANULOCYTES # BLD: 0.04 10*3/MM3 (ref 0–0.03)
IMM GRANULOCYTES NFR BLD: 0.5 % (ref 0–0.6)
LAB AP CASE REPORT: NORMAL
LAB AP CLINICAL INFORMATION: NORMAL
LAB AP DIAGNOSIS COMMENT: NORMAL
LYMPHOCYTES # BLD AUTO: 1.36 10*3/MM3 (ref 0.6–4.8)
LYMPHOCYTES NFR BLD AUTO: 17.8 % (ref 24–44)
Lab: NORMAL
MCH RBC QN AUTO: 28.6 PG (ref 27–31)
MCHC RBC AUTO-ENTMCNC: 31.2 G/DL (ref 32–36)
MCV RBC AUTO: 91.7 FL (ref 80–99)
MONOCYTES # BLD AUTO: 1.13 10*3/MM3 (ref 0–1)
MONOCYTES NFR BLD AUTO: 14.8 % (ref 0–12)
MYELOPEROXIDASE AB SER-ACNC: <9 U/ML (ref 0–9)
NEUTROPHILS # BLD AUTO: 4.62 10*3/MM3 (ref 1.5–8.3)
NEUTROPHILS NFR BLD AUTO: 60.3 % (ref 41–71)
P-ANCA ATYPICAL TITR SER IF: ABNORMAL TITER
P-ANCA TITR SER IF: ABNORMAL TITER
PATH REPORT.FINAL DX SPEC: NORMAL
PATH REPORT.GROSS SPEC: NORMAL
PLATELET # BLD AUTO: 223 10*3/MM3 (ref 150–450)
PMV BLD AUTO: 10.8 FL (ref 6–12)
POTASSIUM BLD-SCNC: 3.9 MMOL/L (ref 3.5–5.5)
PROTEINASE3 AB SER IA-ACNC: <3.5 U/ML (ref 0–3.5)
RBC # BLD AUTO: 3.6 10*6/MM3 (ref 4.2–5.76)
RHINOVIRUS RNA SPEC QL NAA+PROBE: NEGATIVE
RSV A: NEGATIVE
RSV B RNA SPEC QL NAA+PROBE: NEGATIVE
SODIUM BLD-SCNC: 142 MMOL/L (ref 132–146)
WBC NRBC COR # BLD: 7.65 10*3/MM3 (ref 3.5–10.8)

## 2018-03-23 PROCEDURE — 80048 BASIC METABOLIC PNL TOTAL CA: CPT | Performed by: HOSPITALIST

## 2018-03-23 PROCEDURE — 94799 UNLISTED PULMONARY SVC/PX: CPT

## 2018-03-23 PROCEDURE — 99233 SBSQ HOSP IP/OBS HIGH 50: CPT | Performed by: HOSPITALIST

## 2018-03-23 PROCEDURE — 85025 COMPLETE CBC W/AUTO DIFF WBC: CPT | Performed by: HOSPITALIST

## 2018-03-23 PROCEDURE — 99233 SBSQ HOSP IP/OBS HIGH 50: CPT | Performed by: INTERNAL MEDICINE

## 2018-03-23 PROCEDURE — 94640 AIRWAY INHALATION TREATMENT: CPT

## 2018-03-23 PROCEDURE — 25010000002 CEFEPIME: Performed by: PHYSICIAN ASSISTANT

## 2018-03-23 PROCEDURE — 25010000002 HEPARIN (PORCINE) PER 1000 UNITS: Performed by: NURSE PRACTITIONER

## 2018-03-23 PROCEDURE — 25010000002 METHYLPREDNISOLONE PER 125 MG: Performed by: INTERNAL MEDICINE

## 2018-03-23 RX ORDER — METHYLPREDNISOLONE SODIUM SUCCINATE 125 MG/2ML
60 INJECTION, POWDER, LYOPHILIZED, FOR SOLUTION INTRAMUSCULAR; INTRAVENOUS ONCE
Status: COMPLETED | OUTPATIENT
Start: 2018-03-23 | End: 2018-03-23

## 2018-03-23 RX ORDER — PREDNISONE 20 MG/1
40 TABLET ORAL
Status: DISCONTINUED | OUTPATIENT
Start: 2018-03-24 | End: 2018-03-24 | Stop reason: HOSPADM

## 2018-03-23 RX ADMIN — DEXTROMETHORPHAN 60 MG: 30 SUSPENSION, EXTENDED RELEASE ORAL at 19:49

## 2018-03-23 RX ADMIN — FUROSEMIDE 20 MG: 20 TABLET ORAL at 08:02

## 2018-03-23 RX ADMIN — CETIRIZINE HYDROCHLORIDE 10 MG: 10 TABLET, FILM COATED ORAL at 08:02

## 2018-03-23 RX ADMIN — IPRATROPIUM BROMIDE AND ALBUTEROL SULFATE 3 ML: 2.5; .5 SOLUTION RESPIRATORY (INHALATION) at 20:19

## 2018-03-23 RX ADMIN — GUAIFENESIN 600 MG: 600 TABLET, EXTENDED RELEASE ORAL at 19:49

## 2018-03-23 RX ADMIN — CEFEPIME 2 G: 2 INJECTION, POWDER, FOR SOLUTION INTRAVENOUS at 02:28

## 2018-03-23 RX ADMIN — AZITHROMYCIN 500 MG: 250 TABLET, FILM COATED ORAL at 08:02

## 2018-03-23 RX ADMIN — GUAIFENESIN 600 MG: 600 TABLET, EXTENDED RELEASE ORAL at 08:02

## 2018-03-23 RX ADMIN — VALSARTAN 320 MG: 160 TABLET, FILM COATED ORAL at 08:02

## 2018-03-23 RX ADMIN — IPRATROPIUM BROMIDE AND ALBUTEROL SULFATE 3 ML: 2.5; .5 SOLUTION RESPIRATORY (INHALATION) at 12:41

## 2018-03-23 RX ADMIN — BENZONATATE 100 MG: 100 CAPSULE, LIQUID FILLED ORAL at 01:57

## 2018-03-23 RX ADMIN — BENZONATATE 100 MG: 100 CAPSULE, LIQUID FILLED ORAL at 16:41

## 2018-03-23 RX ADMIN — CEFEPIME 2 G: 2 INJECTION, POWDER, FOR SOLUTION INTRAVENOUS at 15:42

## 2018-03-23 RX ADMIN — IPRATROPIUM BROMIDE AND ALBUTEROL SULFATE 3 ML: 2.5; .5 SOLUTION RESPIRATORY (INHALATION) at 08:28

## 2018-03-23 RX ADMIN — MULTIPLE VITAMINS W/ MINERALS TAB 1 TABLET: TAB ORAL at 08:02

## 2018-03-23 RX ADMIN — ATORVASTATIN CALCIUM 10 MG: 10 TABLET, FILM COATED ORAL at 19:49

## 2018-03-23 RX ADMIN — DEXTROMETHORPHAN 60 MG: 30 SUSPENSION, EXTENDED RELEASE ORAL at 08:02

## 2018-03-23 RX ADMIN — METHYLPREDNISOLONE SODIUM SUCCINATE 60 MG: 125 INJECTION, POWDER, FOR SOLUTION INTRAMUSCULAR; INTRAVENOUS at 18:26

## 2018-03-23 RX ADMIN — HEPARIN SODIUM 5000 UNITS: 5000 INJECTION, SOLUTION INTRAVENOUS; SUBCUTANEOUS at 19:49

## 2018-03-23 RX ADMIN — IPRATROPIUM BROMIDE AND ALBUTEROL SULFATE 3 ML: 2.5; .5 SOLUTION RESPIRATORY (INHALATION) at 16:04

## 2018-03-23 RX ADMIN — HEPARIN SODIUM 5000 UNITS: 5000 INJECTION, SOLUTION INTRAVENOUS; SUBCUTANEOUS at 08:02

## 2018-03-24 VITALS
WEIGHT: 237.6 LBS | HEART RATE: 84 BPM | DIASTOLIC BLOOD PRESSURE: 77 MMHG | RESPIRATION RATE: 19 BRPM | SYSTOLIC BLOOD PRESSURE: 133 MMHG | HEIGHT: 71 IN | OXYGEN SATURATION: 93 % | TEMPERATURE: 97.3 F | BODY MASS INDEX: 33.26 KG/M2

## 2018-03-24 LAB
BACTERIA SPEC AEROBE CULT: NORMAL
BACTERIA SPEC AEROBE CULT: NORMAL
BACTERIA SPEC RESP CULT: NORMAL
BACTERIA SPEC RESP CULT: NORMAL
GRAM STN SPEC: NORMAL

## 2018-03-24 PROCEDURE — 94799 UNLISTED PULMONARY SVC/PX: CPT

## 2018-03-24 PROCEDURE — 63710000001 PREDNISONE PER 1 MG: Performed by: INTERNAL MEDICINE

## 2018-03-24 PROCEDURE — 25010000002 CEFEPIME: Performed by: PHYSICIAN ASSISTANT

## 2018-03-24 PROCEDURE — 94640 AIRWAY INHALATION TREATMENT: CPT

## 2018-03-24 PROCEDURE — 25010000002 HEPARIN (PORCINE) PER 1000 UNITS: Performed by: NURSE PRACTITIONER

## 2018-03-24 PROCEDURE — 99239 HOSP IP/OBS DSCHRG MGMT >30: CPT | Performed by: NURSE PRACTITIONER

## 2018-03-24 RX ORDER — BENZONATATE 100 MG/1
100 CAPSULE ORAL 3 TIMES DAILY PRN
Qty: 30 CAPSULE | Refills: 0 | Status: SHIPPED | OUTPATIENT
Start: 2018-03-24 | End: 2018-04-03

## 2018-03-24 RX ORDER — FAMOTIDINE 20 MG/1
20 TABLET, FILM COATED ORAL 2 TIMES DAILY PRN
Qty: 60 TABLET | Refills: 0 | Status: SHIPPED | OUTPATIENT
Start: 2018-03-24 | End: 2019-07-29

## 2018-03-24 RX ORDER — AZITHROMYCIN 250 MG/1
TABLET, FILM COATED ORAL
Qty: 14 TABLET | Refills: 0 | Status: SHIPPED | OUTPATIENT
Start: 2018-03-25 | End: 2018-04-03

## 2018-03-24 RX ORDER — IPRATROPIUM BROMIDE AND ALBUTEROL SULFATE 2.5; .5 MG/3ML; MG/3ML
3 SOLUTION RESPIRATORY (INHALATION)
Qty: 360 ML | Refills: 0 | Status: SHIPPED | OUTPATIENT
Start: 2018-03-24 | End: 2021-08-25

## 2018-03-24 RX ORDER — PREDNISONE 20 MG/1
40 TABLET ORAL
Qty: 28 TABLET | Refills: 0 | Status: SHIPPED | OUTPATIENT
Start: 2018-03-25 | End: 2018-04-09 | Stop reason: SDUPTHER

## 2018-03-24 RX ADMIN — PREDNISONE 40 MG: 20 TABLET ORAL at 08:17

## 2018-03-24 RX ADMIN — CETIRIZINE HYDROCHLORIDE 10 MG: 10 TABLET, FILM COATED ORAL at 08:16

## 2018-03-24 RX ADMIN — SODIUM CHLORIDE 40 ML/HR: 9 INJECTION, SOLUTION INTRAVENOUS at 02:43

## 2018-03-24 RX ADMIN — AZITHROMYCIN 500 MG: 250 TABLET, FILM COATED ORAL at 08:17

## 2018-03-24 RX ADMIN — HEPARIN SODIUM 5000 UNITS: 5000 INJECTION, SOLUTION INTRAVENOUS; SUBCUTANEOUS at 08:16

## 2018-03-24 RX ADMIN — IPRATROPIUM BROMIDE AND ALBUTEROL SULFATE 3 ML: 2.5; .5 SOLUTION RESPIRATORY (INHALATION) at 13:03

## 2018-03-24 RX ADMIN — CEFEPIME 2 G: 2 INJECTION, POWDER, FOR SOLUTION INTRAVENOUS at 02:42

## 2018-03-24 RX ADMIN — FUROSEMIDE 20 MG: 20 TABLET ORAL at 08:17

## 2018-03-24 RX ADMIN — VALSARTAN 320 MG: 160 TABLET, FILM COATED ORAL at 08:16

## 2018-03-24 RX ADMIN — IPRATROPIUM BROMIDE AND ALBUTEROL SULFATE 3 ML: 2.5; .5 SOLUTION RESPIRATORY (INHALATION) at 08:41

## 2018-03-24 RX ADMIN — GUAIFENESIN 600 MG: 600 TABLET, EXTENDED RELEASE ORAL at 08:17

## 2018-03-24 RX ADMIN — MULTIPLE VITAMINS W/ MINERALS TAB 1 TABLET: TAB ORAL at 08:16

## 2018-03-24 RX ADMIN — DEXTROMETHORPHAN 60 MG: 30 SUSPENSION, EXTENDED RELEASE ORAL at 08:16

## 2018-03-26 ENCOUNTER — TRANSITIONAL CARE MANAGEMENT TELEPHONE ENCOUNTER (OUTPATIENT)
Dept: FAMILY MEDICINE CLINIC | Facility: CLINIC | Age: 73
End: 2018-03-26

## 2018-03-26 LAB
FLUAV RNA SPEC QL NAA+PROBE: NEGATIVE
FLUBV RNA SPEC QL NAA+PROBE: NEGATIVE
HADV DNA SPEC QL NAA+PROBE: NEGATIVE
HMPV RNA SPEC QL NAA+PROBE: NEGATIVE
HPIV1 RNA SPEC QL NAA+PROBE: NEGATIVE
HPIV2 SPEC QL CULT: NEGATIVE
HPIV3 SPEC QL CULT: NEGATIVE
LAB AP CASE REPORT: NORMAL
Lab: NORMAL
PATH REPORT.FINAL DX SPEC: NORMAL
RHINOVIRUS RNA SPEC QL NAA+PROBE: NEGATIVE
RSV A: NEGATIVE
RSV B RNA SPEC QL NAA+PROBE: NEGATIVE

## 2018-03-28 ENCOUNTER — LAB REQUISITION (OUTPATIENT)
Dept: LAB | Facility: HOSPITAL | Age: 73
End: 2018-03-28

## 2018-03-28 DIAGNOSIS — J96.21 ACUTE AND CHRONIC RESPIRATORY FAILURE WITH HYPOXIA (HCC): ICD-10-CM

## 2018-03-28 DIAGNOSIS — Z00.00 ROUTINE GENERAL MEDICAL EXAMINATION AT A HEALTH CARE FACILITY: ICD-10-CM

## 2018-03-28 DIAGNOSIS — Z96.652 PRESENCE OF LEFT ARTIFICIAL KNEE JOINT: ICD-10-CM

## 2018-03-28 LAB
ALBUMIN SERPL-MCNC: 3.9 G/DL (ref 3.2–4.8)
ALBUMIN/GLOB SERPL: 1.4 G/DL (ref 1.5–2.5)
ALP SERPL-CCNC: 67 U/L (ref 25–100)
ALT SERPL W P-5'-P-CCNC: 45 U/L (ref 7–40)
ANION GAP SERPL CALCULATED.3IONS-SCNC: 11 MMOL/L (ref 3–11)
AST SERPL-CCNC: 26 U/L (ref 0–33)
BASOPHILS # BLD AUTO: 0.03 10*3/MM3 (ref 0–0.2)
BASOPHILS NFR BLD AUTO: 0.2 % (ref 0–1)
BILIRUB SERPL-MCNC: 0.5 MG/DL (ref 0.3–1.2)
BUN BLD-MCNC: 27 MG/DL (ref 9–23)
BUN/CREAT SERPL: 27 (ref 7–25)
CALCIUM SPEC-SCNC: 9.6 MG/DL (ref 8.7–10.4)
CHLORIDE SERPL-SCNC: 101 MMOL/L (ref 99–109)
CO2 SERPL-SCNC: 30 MMOL/L (ref 20–31)
CREAT BLD-MCNC: 1 MG/DL (ref 0.6–1.3)
CRP SERPL-MCNC: 0.14 MG/DL (ref 0–1)
CRYPTOC AG CSF QL: NEGATIVE
DEPRECATED RDW RBC AUTO: 47.5 FL (ref 37–54)
EOSINOPHIL # BLD AUTO: 0.11 10*3/MM3 (ref 0–0.3)
EOSINOPHIL NFR BLD AUTO: 0.7 % (ref 0–3)
ERYTHROCYTE [DISTWIDTH] IN BLOOD BY AUTOMATED COUNT: 14.4 % (ref 11.3–14.5)
ERYTHROCYTE [SEDIMENTATION RATE] IN BLOOD: 32 MM/HR (ref 0–20)
GFR SERPL CREATININE-BSD FRML MDRD: 73 ML/MIN/1.73
GLOBULIN UR ELPH-MCNC: 2.8 GM/DL
GLUCOSE BLD-MCNC: 123 MG/DL (ref 70–100)
HCT VFR BLD AUTO: 38 % (ref 38.9–50.9)
HGB BLD-MCNC: 11.9 G/DL (ref 13.1–17.5)
IMM GRANULOCYTES # BLD: 0.09 10*3/MM3 (ref 0–0.03)
IMM GRANULOCYTES NFR BLD: 0.6 % (ref 0–0.6)
LYMPHOCYTES # BLD AUTO: 0.82 10*3/MM3 (ref 0.6–4.8)
LYMPHOCYTES NFR BLD AUTO: 5.2 % (ref 24–44)
MCH RBC QN AUTO: 28.5 PG (ref 27–31)
MCHC RBC AUTO-ENTMCNC: 31.3 G/DL (ref 32–36)
MCV RBC AUTO: 90.9 FL (ref 80–99)
MONOCYTES # BLD AUTO: 1.16 10*3/MM3 (ref 0–1)
MONOCYTES NFR BLD AUTO: 7.4 % (ref 0–12)
NEUTROPHILS # BLD AUTO: 13.52 10*3/MM3 (ref 1.5–8.3)
NEUTROPHILS NFR BLD AUTO: 85.9 % (ref 41–71)
PLATELET # BLD AUTO: 256 10*3/MM3 (ref 150–450)
PMV BLD AUTO: 10.5 FL (ref 6–12)
POTASSIUM BLD-SCNC: 5 MMOL/L (ref 3.5–5.5)
PROT SERPL-MCNC: 6.7 G/DL (ref 5.7–8.2)
RBC # BLD AUTO: 4.18 10*6/MM3 (ref 4.2–5.76)
SODIUM BLD-SCNC: 142 MMOL/L (ref 132–146)
WBC NRBC COR # BLD: 15.73 10*3/MM3 (ref 3.5–10.8)

## 2018-03-28 PROCEDURE — 85025 COMPLETE CBC W/AUTO DIFF WBC: CPT | Performed by: INTERNAL MEDICINE

## 2018-03-28 PROCEDURE — 85652 RBC SED RATE AUTOMATED: CPT | Performed by: INTERNAL MEDICINE

## 2018-03-28 PROCEDURE — 87899 AGENT NOS ASSAY W/OPTIC: CPT | Performed by: INTERNAL MEDICINE

## 2018-03-28 PROCEDURE — 86140 C-REACTIVE PROTEIN: CPT | Performed by: INTERNAL MEDICINE

## 2018-03-28 PROCEDURE — 80053 COMPREHEN METABOLIC PANEL: CPT | Performed by: INTERNAL MEDICINE

## 2018-04-02 ENCOUNTER — LAB REQUISITION (OUTPATIENT)
Dept: LAB | Facility: HOSPITAL | Age: 73
End: 2018-04-02

## 2018-04-02 DIAGNOSIS — Z00.00 ROUTINE GENERAL MEDICAL EXAMINATION AT A HEALTH CARE FACILITY: ICD-10-CM

## 2018-04-02 LAB
ANION GAP SERPL CALCULATED.3IONS-SCNC: 8 MMOL/L (ref 3–11)
BASOPHILS # BLD AUTO: 0.02 10*3/MM3 (ref 0–0.2)
BASOPHILS NFR BLD AUTO: 0.1 % (ref 0–1)
BUN BLD-MCNC: 26 MG/DL (ref 9–23)
BUN/CREAT SERPL: 26 (ref 7–25)
CALCIUM SPEC-SCNC: 9.8 MG/DL (ref 8.7–10.4)
CHLORIDE SERPL-SCNC: 103 MMOL/L (ref 99–109)
CO2 SERPL-SCNC: 32 MMOL/L (ref 20–31)
CREAT BLD-MCNC: 1 MG/DL (ref 0.6–1.3)
CRP SERPL-MCNC: 0.2 MG/DL (ref 0–1)
DEPRECATED RDW RBC AUTO: 47.3 FL (ref 37–54)
EOSINOPHIL # BLD AUTO: 0.04 10*3/MM3 (ref 0–0.3)
EOSINOPHIL NFR BLD AUTO: 0.2 % (ref 0–3)
ERYTHROCYTE [DISTWIDTH] IN BLOOD BY AUTOMATED COUNT: 14.3 % (ref 11.3–14.5)
ERYTHROCYTE [SEDIMENTATION RATE] IN BLOOD: 41 MM/HR (ref 0–20)
GFR SERPL CREATININE-BSD FRML MDRD: 73 ML/MIN/1.73
GLUCOSE BLD-MCNC: 129 MG/DL (ref 70–100)
HCT VFR BLD AUTO: 38.9 % (ref 38.9–50.9)
HGB BLD-MCNC: 12.2 G/DL (ref 13.1–17.5)
IMM GRANULOCYTES # BLD: 0.15 10*3/MM3 (ref 0–0.03)
IMM GRANULOCYTES NFR BLD: 0.9 % (ref 0–0.6)
LYMPHOCYTES # BLD AUTO: 1.03 10*3/MM3 (ref 0.6–4.8)
LYMPHOCYTES NFR BLD AUTO: 6.2 % (ref 24–44)
MCH RBC QN AUTO: 28.6 PG (ref 27–31)
MCHC RBC AUTO-ENTMCNC: 31.4 G/DL (ref 32–36)
MCV RBC AUTO: 91.1 FL (ref 80–99)
MONOCYTES # BLD AUTO: 0.59 10*3/MM3 (ref 0–1)
MONOCYTES NFR BLD AUTO: 3.5 % (ref 0–12)
NEUTROPHILS # BLD AUTO: 14.89 10*3/MM3 (ref 1.5–8.3)
NEUTROPHILS NFR BLD AUTO: 89.1 % (ref 41–71)
PLATELET # BLD AUTO: 240 10*3/MM3 (ref 150–450)
PMV BLD AUTO: 10.5 FL (ref 6–12)
POTASSIUM BLD-SCNC: 4.3 MMOL/L (ref 3.5–5.5)
RBC # BLD AUTO: 4.27 10*6/MM3 (ref 4.2–5.76)
SODIUM BLD-SCNC: 143 MMOL/L (ref 132–146)
WBC NRBC COR # BLD: 16.72 10*3/MM3 (ref 3.5–10.8)

## 2018-04-02 PROCEDURE — 86140 C-REACTIVE PROTEIN: CPT | Performed by: INTERNAL MEDICINE

## 2018-04-02 PROCEDURE — 85652 RBC SED RATE AUTOMATED: CPT | Performed by: INTERNAL MEDICINE

## 2018-04-02 PROCEDURE — 85025 COMPLETE CBC W/AUTO DIFF WBC: CPT | Performed by: INTERNAL MEDICINE

## 2018-04-02 PROCEDURE — 80048 BASIC METABOLIC PNL TOTAL CA: CPT | Performed by: INTERNAL MEDICINE

## 2018-04-02 PROCEDURE — 36415 COLL VENOUS BLD VENIPUNCTURE: CPT | Performed by: INTERNAL MEDICINE

## 2018-04-03 ENCOUNTER — OFFICE VISIT (OUTPATIENT)
Dept: FAMILY MEDICINE CLINIC | Facility: CLINIC | Age: 73
End: 2018-04-03

## 2018-04-03 VITALS
WEIGHT: 241 LBS | HEART RATE: 70 BPM | OXYGEN SATURATION: 96 % | HEIGHT: 71 IN | RESPIRATION RATE: 16 BRPM | BODY MASS INDEX: 33.74 KG/M2 | SYSTOLIC BLOOD PRESSURE: 126 MMHG | DIASTOLIC BLOOD PRESSURE: 84 MMHG

## 2018-04-03 DIAGNOSIS — J84.9 INTERSTITIAL LUNG DISEASE (HCC): Primary | ICD-10-CM

## 2018-04-03 DIAGNOSIS — J18.9 COMMUNITY ACQUIRED PNEUMONIA, UNSPECIFIED LATERALITY: ICD-10-CM

## 2018-04-03 DIAGNOSIS — I10 ESSENTIAL HYPERTENSION: Chronic | ICD-10-CM

## 2018-04-03 PROCEDURE — 99213 OFFICE O/P EST LOW 20 MIN: CPT | Performed by: FAMILY MEDICINE

## 2018-04-04 PROBLEM — D72.829 LEUKOCYTOSIS: Status: RESOLVED | Noted: 2017-11-07 | Resolved: 2018-04-04

## 2018-04-04 PROBLEM — E86.0 DEHYDRATION: Status: RESOLVED | Noted: 2018-03-05 | Resolved: 2018-04-04

## 2018-04-04 PROBLEM — R73.02 IGT (IMPAIRED GLUCOSE TOLERANCE): Chronic | Status: RESOLVED | Noted: 2018-03-20 | Resolved: 2018-04-04

## 2018-04-04 PROBLEM — R73.9 HYPERGLYCEMIA: Status: RESOLVED | Noted: 2018-03-05 | Resolved: 2018-04-04

## 2018-04-04 PROBLEM — R79.89 ELEVATED TROPONIN: Status: RESOLVED | Noted: 2018-03-05 | Resolved: 2018-04-04

## 2018-04-04 PROBLEM — R77.8 ELEVATED TROPONIN: Status: RESOLVED | Noted: 2018-03-05 | Resolved: 2018-04-04

## 2018-04-04 NOTE — PROGRESS NOTES
Transitional Care Follow Up Visit  Subjective     Jeremieastrid Wynn is a 72 y.o. male who presents for a transitional care management visit.    Within 48 business hours after discharge our office contacted him via telephone to coordinate his care and needs.      I reviewed and discussed the details of that call along with the discharge summary, hospital problems, inpatient lab results, inpatient diagnostic studies, and consultation reports with Jeremie.     Current outpatient and discharge medications have been reconciled for the patient.    Date of TCM Phone Call 11/10/2017 3/12/2018 3/26/2018   [facility]   Date of Admission 11/6/2017 3/5/2018 3/19/2018   Date of Discharge 11/9/2017 3/9/2018 3/24/2018   Discharge Disposition Home or Self Care Home or Self Care Home or Self Care     Risk for Readmission (LACE) Score: 10 (3/24/2018  6:00 AM)    Mr. Wynn is in for follow-up recent hospitalization for fever and respiratory distress.  He is doing better particularly with oxygenation and shortness of breath, is currently on prednisone for pneumonitis perhaps related to a medication he was taking for colitis.  He has completed his antibiotic course.      Pneumonia   He complains of cough, shortness of breath and wheezing. There is no hemoptysis or sputum production. This is a new problem. The current episode started 1 to 4 weeks ago. The problem has been rapidly improving. The cough is productive of sputum. Associated symptoms include dyspnea on exertion and malaise/fatigue. Pertinent negatives include no chest pain, fever, headaches, postnasal drip, sore throat or weight loss. His symptoms are aggravated by exercise and climbing stairs. He reports significant improvement on treatment. His symptoms are not alleviated by beta-agonist and rest (As a nebulizer machine at home which he uses albuterol). Risk factors: Medication exposure to lialda. His past medical history is  significant for pneumonia.      Course During Hospital Stay:  Steadily improved over the course of the hospitalization did require an bronchoscopy with cultures and biopsy.     The following portions of the patient's history were reviewed and updated as appropriate: past family history, past medical history, past social history and past surgical history.    Review of Systems   Constitutional: Positive for fatigue and malaise/fatigue. Negative for chills, fever and weight loss.   HENT: Negative for congestion, postnasal drip and sore throat.    Respiratory: Positive for cough, shortness of breath and wheezing. Negative for hemoptysis and sputum production.    Cardiovascular: Positive for dyspnea on exertion. Negative for chest pain and leg swelling.   Gastrointestinal: Negative for diarrhea, nausea and vomiting.   Endocrine: Negative for cold intolerance and heat intolerance.   Genitourinary: Negative for dysuria and hematuria.   Musculoskeletal: Positive for back pain. Negative for joint swelling.   Neurological: Negative for dizziness, syncope and headaches.       Objective   Physical Exam   Constitutional: He is oriented to person, place, and time. He appears well-developed and well-nourished. He is cooperative.   HENT:   Head: Normocephalic.   Right Ear: External ear normal.   Left Ear: External ear normal.   Nose: Nose normal.   Mouth/Throat: Oropharynx is clear and moist. No oropharyngeal exudate.   Eyes: Conjunctivae are normal. Pupils are equal, round, and reactive to light. No scleral icterus.   Neck: Neck supple. Carotid bruit is not present. No thyromegaly present.   Cardiovascular: Normal rate, regular rhythm and normal heart sounds.    Pulmonary/Chest: Effort normal. He has no rales.   Scattered upper airway rhonchi   Abdominal: There is no hepatosplenomegaly.   Musculoskeletal: Normal range of motion. He exhibits edema.   Neurological: He is alert and oriented to person, place, and time.   No focal  deficits no lateralizing signs   Skin: Skin is warm and dry. No rash noted.   Psychiatric: He has a normal mood and affect. Cognition and memory are normal.   Nursing note and vitals reviewed.      Assessment/Plan   Problems Addressed this Visit     Hypertension (Chronic)    Community acquired pneumonia    Interstitial lung disease - Primary      Other Visit Diagnoses    None.         Keep follow-up visit with pulmonary and infectious disease.

## 2018-04-09 RX ORDER — PREDNISONE 20 MG/1
40 TABLET ORAL
Qty: 28 TABLET | Refills: 0 | Status: SHIPPED | OUTPATIENT
Start: 2018-04-09 | End: 2018-05-03

## 2018-04-09 NOTE — TELEPHONE ENCOUNTER
E-rx  Prednisone 20 mg 2 po qd # 28 no refills  Patient requesting refill, states Dr. Lyon wanted him on it till his follow up on 04/12/18

## 2018-04-12 ENCOUNTER — OFFICE VISIT (OUTPATIENT)
Dept: PULMONOLOGY | Facility: CLINIC | Age: 73
End: 2018-04-12

## 2018-04-12 VITALS
WEIGHT: 246 LBS | BODY MASS INDEX: 35.22 KG/M2 | SYSTOLIC BLOOD PRESSURE: 120 MMHG | HEIGHT: 70 IN | DIASTOLIC BLOOD PRESSURE: 72 MMHG | TEMPERATURE: 98.6 F | OXYGEN SATURATION: 94 % | HEART RATE: 70 BPM

## 2018-04-12 DIAGNOSIS — J84.9 INTERSTITIAL LUNG DISEASE (HCC): ICD-10-CM

## 2018-04-12 DIAGNOSIS — J18.9 COMMUNITY ACQUIRED PNEUMONIA, UNSPECIFIED LATERALITY: Primary | ICD-10-CM

## 2018-04-12 DIAGNOSIS — J96.01 ACUTE RESPIRATORY FAILURE WITH HYPOXIA (HCC): ICD-10-CM

## 2018-04-12 DIAGNOSIS — I50.32 CHRONIC DIASTOLIC HEART FAILURE (HCC): Chronic | ICD-10-CM

## 2018-04-12 DIAGNOSIS — R06.09 DYSPNEA ON EXERTION: ICD-10-CM

## 2018-04-12 PROCEDURE — 94729 DIFFUSING CAPACITY: CPT | Performed by: NURSE PRACTITIONER

## 2018-04-12 PROCEDURE — 99214 OFFICE O/P EST MOD 30 MIN: CPT | Performed by: NURSE PRACTITIONER

## 2018-04-12 PROCEDURE — 94375 RESPIRATORY FLOW VOLUME LOOP: CPT | Performed by: NURSE PRACTITIONER

## 2018-04-12 PROCEDURE — 94726 PLETHYSMOGRAPHY LUNG VOLUMES: CPT | Performed by: NURSE PRACTITIONER

## 2018-04-12 NOTE — PROGRESS NOTES
[facility] Pulmonary Follow up    CHIEF COMPLAINT    Hospital follow up    HISTORY OF PRESENT ILLNESS    Jeremie Wynn is a 72 y.o.male here today for posthospitalization follow-up.  He was in the hospital by Dr. Lyon for community-acquired pneumonia with interstitial lung disease and acute hypoxic respiratory failure.      He has a history of ulcerative colitis on Lialda, remote smoker, hiatal hernia, multiple prior rib fractures in the past was admitted for fever and symptoms of pneumonia with a nonproductive cough.  The patient had been admitted 3/5 for pneumonia.  He reports that his symptoms got better although he was discharged on home oxygen at 2 L.  He states that he actually turned his oxygen up to 3.5 L recently and subsequent to that developed a fever up to 104.  He is brought back to the emergency department on 3/19/2018 and was admitted to the hospitalist service.  Infectious disease was also consulted.  The patient reports he has not been on steroids recently.  He reports he has been on Lialda for the past 4 months, initially taken twice daily and decreased to once daily around [place].  He is followed by Dr. Ariana Sky for his ulcerative colitis.     Dr Lyon performed a bronchoscopy with bronchoalveolar lavage and transbronchial biopsies on 3/22/2018 for worsening ground glass opacities with mild interstitial lung disease on his CT.  Transbronchial biopsy showed a noncaseating granuloma.  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  He was discharged home on a slow prednisone taper 40 mg for 2 weeks, decreasing over the next 6-8 weeks.    Today he comes in for follow-up chest x-ray overall his symptoms have dramatically improved.  He's on was back to normal except being very weak after her to prolonged hospitalizations.  He continues on the prednisone at 40 mg daily today.  We will and over his chest x-ray in the office it is also significantly improved.    He does  continue to wear his oxygen at night but not during the day.  He has been monitoring his oxygen saturations at home and they've been above 94% even with activity.      Patient Active Problem List   Diagnosis   • Diverticulitis   • Anemia   • Bradycardia   • Generalized osteoarthritis   • Hyperlipemia   • Hypertension   • PVC's (premature ventricular contractions)   • Arthralgia of hip   • Dyspnea on exertion   • Obesity   • Arthritis of left knee   • S/P total knee arthroplasty, left   • HLD (hyperlipidemia)   • Prediabetes   • Acute blood loss anemia, mild, asymptomatic   • Sepsis   • Chronic diastolic heart failure   • Acute respiratory failure with hypoxia   • Ulcerative colitis   • Community acquired pneumonia   • Interstitial lung disease       Allergies   Allergen Reactions   • Contrast Dye Anaphylaxis   • Mesalamine Other (See Comments)     Lung toxicity (suspected)   • Penicillins Anaphylaxis and Other (See Comments)   • Sulfa Antibiotics Shortness Of Breath and Rash   • Morphine And Related Other (See Comments)     Told in 1964-can't remember reaction        Current Outpatient Prescriptions:   •  albuterol (PROVENTIL HFA;VENTOLIN HFA) 108 (90 Base) MCG/ACT inhaler, Inhale 2 puffs Every 4 (Four) Hours As Needed for Wheezing., Disp: 1 inhaler, Rfl: 1  •  atorvastatin (LIPITOR) 10 MG tablet, TAKE ONE TABLET BY MOUTH DAILY FOR CHOLESTEROL, Disp: 90 tablet, Rfl: 0  •  cetirizine (zyrTEC) 10 MG tablet, Take 10 mg by mouth Daily., Disp: , Rfl:   •  famotidine (PEPCID) 20 MG tablet, Take 1 tablet by mouth 2 (Two) Times a Day As Needed for Indigestion or Heartburn., Disp: 60 tablet, Rfl: 0  •  furosemide (LASIX) 20 MG tablet, Take 1 tablet by mouth Daily., Disp: 30 tablet, Rfl: 2  •  ipratropium-albuterol (DUO-NEB) 0.5-2.5 mg/mL nebulizer, Take 3 mL by nebulization 4 (Four) Times a Day., Disp: 360 mL, Rfl: 0  •  Multiple Vitamins-Minerals (CENTROVITE) tablet, Take 1 tablet by mouth Daily., Disp: , Rfl:   •   "potassium chloride (K-DUR) 10 MEQ CR tablet, Take 10 mEq by mouth Daily., Disp: , Rfl:   •  predniSONE (DELTASONE) 20 MG tablet, Take 2 tablets by mouth Daily With Breakfast., Disp: 28 tablet, Rfl: 0  •  tiZANidine (ZANAFLEX) 4 MG tablet, TAKE ONE TABLET BY MOUTH EVERY NIGHT AT BEDTIME AS NEEDED FOR LEG CRAMPS, Disp: 30 tablet, Rfl: 3  •  traMADol (ULTRAM) 50 MG tablet, Take 50 mg by mouth Every 6 (Six) Hours As Needed for Moderate Pain ., Disp: , Rfl:   •  valsartan (DIOVAN) 320 MG tablet, TAKE ONE TABLET BY MOUTH DAILY, Disp: 90 tablet, Rfl: 0  MEDICATION LIST AND ALLERGIES REVIEWED.    Social History   Substance Use Topics   • Smoking status: Former Smoker     Years: 10.00     Types: Pipe, Cigars     Quit date: 1967   • Smokeless tobacco: Never Used      Comment: smoked a pipe and cigars for during 20's   • Alcohol use No       FAMILY AND SOCIAL HISTORY REVIEWED.    Review of Systems   Constitutional: Negative for chills, fatigue, fever and unexpected weight change.   HENT: Negative for congestion, nosebleeds, postnasal drip, rhinorrhea, sinus pressure and trouble swallowing.    Respiratory: Negative for cough, chest tightness, shortness of breath and wheezing.    Cardiovascular: Negative for chest pain and leg swelling.   Gastrointestinal: Negative for abdominal pain, constipation, diarrhea, nausea and vomiting.   Genitourinary: Negative for dysuria, frequency, hematuria and urgency.   Musculoskeletal: Negative for myalgias.   Neurological: Negative for dizziness, weakness, numbness and headaches.   All other systems reviewed and are negative.  .    /72 (BP Location: Left arm, Patient Position: Sitting, Cuff Size: Adult)   Pulse 70   Temp 98.6 °F (37 °C)   Ht 177.8 cm (70\")   Wt 112 kg (246 lb)   SpO2 94%   BMI 35.30 kg/m²       There is no immunization history on file for this patient.    Physical Exam   Constitutional: He is oriented to person, place, and time. He appears well-developed and " well-nourished.   HENT:   Head: Normocephalic and atraumatic.   Eyes: EOM are normal. Pupils are equal, round, and reactive to light.   Neck: Normal range of motion. Neck supple.   Cardiovascular: Normal rate and regular rhythm.    No murmur heard.  Pulmonary/Chest: Effort normal and breath sounds normal. No respiratory distress. He has no wheezes. He has no rales.   Abdominal: Soft. Bowel sounds are normal. He exhibits no distension.   Musculoskeletal: Normal range of motion. He exhibits no edema.   Neurological: He is alert and oriented to person, place, and time.   Skin: Skin is warm and dry. No erythema.   Psychiatric: He has a normal mood and affect. His behavior is normal.   Vitals reviewed.        RESULTS    PFTs in the office today:  No obstruction or restriction.  Total lung capacity 79%.  Slightly decreased DLCO 61%, adjusted for alveolar volume 93%    PA and lateral chest x-ray done in the office today, reviewed by me:  Significant improvement in the diffuse bilateral interstitial changes    PROBLEM LIST    Problem List Items Addressed This Visit        Cardiovascular and Mediastinum    Chronic diastolic heart failure (Chronic)       Respiratory    Dyspnea on exertion    Acute respiratory failure with hypoxia    Community acquired pneumonia - Primary    Relevant Orders    XR Chest PA & Lateral    Pulmonary Function Test (Completed)    Interstitial lung disease      Other Visit Diagnoses    None.           DISCUSSION    We went over all this information today including his biopsy results, chest x-ray results at the improving, and oxygen use.  He is doing very well post hospitalization.  He'll continue on his prednisone taper, went ahead and wrote out a taper for him at 20 mg for 2 weeks 10 mg for 2 weeks and then 5 mg for 2 weeks.  He will follow-up in 4 weeks.  Continue to use his oxygen at night.    Follow-up in 4 weeks.    I spent 25 minutes with the patient and family. I spent > 50% percent of this  time counseling and discussing diagnostic testing, evaluation, current status and management.    Karishma Massey, APRN  04/12/201812:07 PM  Electronically signed     Please note that portions of this note were completed with a voice recognition program. Efforts were made to edit the dictations, but occasionally words are mistranscribed.      CC: Aaron Trejo MD

## 2018-04-23 ENCOUNTER — TELEPHONE (OUTPATIENT)
Dept: PULMONOLOGY | Facility: CLINIC | Age: 73
End: 2018-04-23

## 2018-04-23 NOTE — TELEPHONE ENCOUNTER
Patient called stating Prednisone was not called in to his pharmacy.  Apologized.  Called in Prednisone taper of 20mg daily for 2 weeks, 10mg daily for 2 weeks, then 5 mg daily for 2 weeks per Karishma note.  Patient understood.

## 2018-05-01 LAB
FUNGUS WND CULT: ABNORMAL
FUNGUS WND CULT: ABNORMAL
MYCOBACTERIUM SPEC CULT: ABNORMAL
MYCOBACTERIUM SPEC CULT: ABNORMAL
NIGHT BLUE STAIN TISS: ABNORMAL

## 2018-05-03 ENCOUNTER — OFFICE VISIT (OUTPATIENT)
Dept: FAMILY MEDICINE CLINIC | Facility: CLINIC | Age: 73
End: 2018-05-03

## 2018-05-03 VITALS
RESPIRATION RATE: 16 BRPM | HEART RATE: 64 BPM | BODY MASS INDEX: 35.36 KG/M2 | HEIGHT: 70 IN | WEIGHT: 247 LBS | DIASTOLIC BLOOD PRESSURE: 82 MMHG | SYSTOLIC BLOOD PRESSURE: 126 MMHG

## 2018-05-03 DIAGNOSIS — I10 ESSENTIAL HYPERTENSION: Primary | Chronic | ICD-10-CM

## 2018-05-03 DIAGNOSIS — J84.9 INTERSTITIAL LUNG DISEASE (HCC): ICD-10-CM

## 2018-05-03 PROBLEM — J18.9 COMMUNITY ACQUIRED PNEUMONIA: Status: RESOLVED | Noted: 2018-03-21 | Resolved: 2018-05-03

## 2018-05-03 PROBLEM — D62 ACUTE BLOOD LOSS ANEMIA: Status: RESOLVED | Noted: 2017-11-07 | Resolved: 2018-05-03

## 2018-05-03 PROBLEM — M17.12 ARTHRITIS OF LEFT KNEE: Status: RESOLVED | Noted: 2017-11-06 | Resolved: 2018-05-03

## 2018-05-03 PROBLEM — J96.01 ACUTE RESPIRATORY FAILURE WITH HYPOXIA (HCC): Status: RESOLVED | Noted: 2018-03-20 | Resolved: 2018-05-03

## 2018-05-03 PROBLEM — E78.5 HLD (HYPERLIPIDEMIA): Status: RESOLVED | Noted: 2017-11-06 | Resolved: 2018-05-03

## 2018-05-03 LAB
FUNGUS WND CULT: NORMAL
MYCOBACTERIUM SPEC CULT: NORMAL
NIGHT BLUE STAIN TISS: NORMAL

## 2018-05-03 PROCEDURE — 99213 OFFICE O/P EST LOW 20 MIN: CPT | Performed by: FAMILY MEDICINE

## 2018-05-03 RX ORDER — PREDNISONE 1 MG/1
10 TABLET ORAL DAILY
COMMUNITY
Start: 2018-04-23 | End: 2018-08-13

## 2018-05-03 NOTE — PROGRESS NOTES
Subjective   Jeremie Wynn is a 72 y.o. male.     Mr. Wynn is in for follow-up recent hospitalization for fever and respiratory distress.  He is doing better particularly with oxygenation and shortness of breath, is currently on prednisone for pneumonitis perhaps related to a medication he was taking for colitis.  He has completed his antibiotic course.  He is still using oxygen at night and otherwise doing much better with regard to exertion and shortness of breath.      Pneumonia   He complains of cough. There is no hemoptysis, shortness of breath, sputum production or wheezing. This is a new problem. The current episode started more than 1 month ago. The problem has been resolved. The cough is non-productive and dry. Associated symptoms include dyspnea on exertion and malaise/fatigue. Pertinent negatives include no chest pain, fever, headaches, postnasal drip, sore throat or weight loss. His symptoms are aggravated by exercise and climbing stairs. His symptoms are alleviated by oral steroids. He reports significant improvement on treatment. His symptoms are not alleviated by beta-agonist and rest (As a nebulizer machine at home which he uses albuterol). Risk factors: Medication exposure to lialda. His past medical history is significant for pneumonia.   Hypertension   This is a chronic problem. The problem is unchanged. The problem is controlled. Associated symptoms include malaise/fatigue. Pertinent negatives include no chest pain, headaches, palpitations or shortness of breath. Current antihypertension treatment includes angiotensin blockers. The current treatment provides significant improvement. There are no compliance problems.  There is no history of angina, kidney disease or CAD/MI.        The following portions of the patient's history were reviewed and updated as appropriate: allergies, current medications, past social history and problem list.    Review of Systems   Constitutional: Positive for  "malaise/fatigue. Negative for fatigue, fever, unexpected weight change and weight loss.   HENT: Negative for postnasal drip and sore throat.    Respiratory: Positive for cough. Negative for hemoptysis, sputum production, chest tightness, shortness of breath and wheezing.    Cardiovascular: Positive for dyspnea on exertion. Negative for chest pain, palpitations and leg swelling.   Gastrointestinal: Negative for nausea.   Genitourinary: Negative for dysuria and hematuria.   Skin: Negative for color change and rash.   Neurological: Negative for dizziness, syncope, weakness and headaches.   Psychiatric/Behavioral: Negative.        Objective   /82   Pulse 64   Resp 16   Ht 177.8 cm (70\")   Wt 112 kg (247 lb)   BMI 35.44 kg/m²   Physical Exam   Constitutional: He is oriented to person, place, and time. He appears well-developed and well-nourished. He is cooperative.   Color and demeanor are much improved   HENT:   Head: Normocephalic.   Right Ear: External ear normal.   Left Ear: External ear normal.   Nose: Nose normal.   Mouth/Throat: Oropharynx is clear and moist.   Eyes: Conjunctivae are normal. Pupils are equal, round, and reactive to light. No scleral icterus.   Neck: Neck supple. Carotid bruit is not present. No thyromegaly present.   Cardiovascular: Normal rate, regular rhythm and normal heart sounds.    Pulmonary/Chest: Effort normal and breath sounds normal.   Abdominal: There is no hepatosplenomegaly.   Musculoskeletal: Normal range of motion. He exhibits edema.   Neurological: He is alert and oriented to person, place, and time.   No focal deficits no lateralizing signs   Skin: Skin is warm and dry. No rash noted.   Psychiatric: He has a normal mood and affect. Cognition and memory are normal.   Nursing note and vitals reviewed.      Assessment/Plan   Problem List Items Addressed This Visit     Hypertension - Primary (Chronic)    Interstitial lung disease      Other Visit Diagnoses    None.   "       No orders of the defined types were placed in this encounter.    Follow-up in one month or as needed keep regular follow-up with pulmonary.

## 2018-05-06 RX ORDER — FUROSEMIDE 20 MG/1
TABLET ORAL
Qty: 30 TABLET | Refills: 5 | Status: SHIPPED | OUTPATIENT
Start: 2018-05-06 | End: 2018-11-03 | Stop reason: SDUPTHER

## 2018-05-06 RX ORDER — ATORVASTATIN CALCIUM 10 MG/1
TABLET, FILM COATED ORAL
Qty: 90 TABLET | Refills: 1 | Status: SHIPPED | OUTPATIENT
Start: 2018-05-06 | End: 2018-10-08 | Stop reason: SDUPTHER

## 2018-05-06 RX ORDER — TIZANIDINE 4 MG/1
TABLET ORAL
Qty: 30 TABLET | Refills: 5 | Status: SHIPPED | OUTPATIENT
Start: 2018-05-06 | End: 2018-06-04 | Stop reason: SDUPTHER

## 2018-05-06 RX ORDER — POTASSIUM CHLORIDE 750 MG/1
TABLET, EXTENDED RELEASE ORAL
Qty: 30 TABLET | Refills: 5 | Status: SHIPPED | OUTPATIENT
Start: 2018-05-06 | End: 2019-07-29

## 2018-05-07 RX ORDER — FAMOTIDINE 20 MG/1
TABLET, FILM COATED ORAL
Qty: 60 TABLET | Refills: 0 | OUTPATIENT
Start: 2018-05-07

## 2018-05-14 RX ORDER — POTASSIUM CHLORIDE 750 MG/1
TABLET, EXTENDED RELEASE ORAL
Qty: 30 TABLET | Refills: 0 | OUTPATIENT
Start: 2018-05-14

## 2018-05-15 ENCOUNTER — OFFICE VISIT (OUTPATIENT)
Dept: PULMONOLOGY | Facility: CLINIC | Age: 73
End: 2018-05-15

## 2018-05-15 VITALS
SYSTOLIC BLOOD PRESSURE: 120 MMHG | OXYGEN SATURATION: 93 % | TEMPERATURE: 98.6 F | BODY MASS INDEX: 33.93 KG/M2 | DIASTOLIC BLOOD PRESSURE: 84 MMHG | HEIGHT: 70 IN | WEIGHT: 237 LBS | HEART RATE: 63 BPM

## 2018-05-15 DIAGNOSIS — R06.09 DYSPNEA ON EXERTION: ICD-10-CM

## 2018-05-15 DIAGNOSIS — K51.019 ULCERATIVE PANCOLITIS WITH COMPLICATION (HCC): ICD-10-CM

## 2018-05-15 DIAGNOSIS — J84.9 INTERSTITIAL LUNG DISEASE (HCC): Primary | ICD-10-CM

## 2018-05-15 PROCEDURE — 99213 OFFICE O/P EST LOW 20 MIN: CPT | Performed by: NURSE PRACTITIONER

## 2018-05-15 NOTE — PROGRESS NOTES
[facility] Pulmonary Follow up    CHIEF COMPLAINT    ILD, medication associated    HISTORY OF PRESENT ILLNESS    Jeremie Wynn is a 72 y.o.male here today for 4 week follow-up after recent hospitalization.  He has a history of ulcerative colitis on lialda, with a CT scan in March that showed worsening groundglass a pacer disease and interstitial lung disease.  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  He has been on a slow prednisone taper.  I saw him last on April 12.  He has done very well.  He is almost back to baseline with no oxygen requirement and very little dyspnea with activity.  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  He does complain of some generalized fatigue and weakness still, since he had been hospitalized for so long and then had a knee replacement right before that.    He is currently down to 10 mg of prednisone daily and will finish that up this week concerning his 5 mg for 2 weeks.  He has done very well with his prednisone taper, without any worsening symptoms.  He has tolerated prednisone well with only some mild anxiety or irritation.  He has gained about 8 pounds but attributes that both to the prednisone as well as the inactivity.        Patient Active Problem List   Diagnosis   • Generalized osteoarthritis   • Hyperlipemia   • Hypertension   • PVC's (premature ventricular contractions)   • Arthralgia of hip   • Dyspnea on exertion   • Obesity   • S/P total knee arthroplasty, left   • Prediabetes   • Sepsis   • Chronic diastolic heart failure   • Ulcerative colitis   • Interstitial lung disease       Allergies   Allergen Reactions   • Contrast Dye Anaphylaxis   • Mesalamine Other (See Comments)     Lung toxicity (suspected)   • Penicillins Anaphylaxis and Other (See Comments)   • Sulfa Antibiotics Shortness Of Breath and Rash   • Morphine And Related Other (See Comments)     Told in 1964-can't remember reaction         Current Outpatient Prescriptions:   •  albuterol (PROVENTIL HFA;VENTOLIN HFA) 108 (90 Base) MCG/ACT inhaler, Inhale 2 puffs Every 4 (Four) Hours As Needed for Wheezing., Disp: 1 inhaler, Rfl: 1  •  atorvastatin (LIPITOR) 10 MG tablet, TAKE ONE TABLET BY MOUTH DAILY FOR CHOLESTEROL, Disp: 90 tablet, Rfl: 1  •  cetirizine (zyrTEC) 10 MG tablet, Take 10 mg by mouth Daily., Disp: , Rfl:   •  famotidine (PEPCID) 20 MG tablet, Take 1 tablet by mouth 2 (Two) Times a Day As Needed for Indigestion or Heartburn., Disp: 60 tablet, Rfl: 0  •  furosemide (LASIX) 20 MG tablet, TAKE ONE TABLET BY MOUTH DAILY, Disp: 30 tablet, Rfl: 5  •  ipratropium-albuterol (DUO-NEB) 0.5-2.5 mg/mL nebulizer, Take 3 mL by nebulization 4 (Four) Times a Day., Disp: 360 mL, Rfl: 0  •  Multiple Vitamins-Minerals (CENTROVITE) tablet, Take 1 tablet by mouth Daily., Disp: , Rfl:   •  potassium chloride (K-DUR) 10 MEQ CR tablet, Take 10 mEq by mouth Daily., Disp: , Rfl:   •  potassium chloride (K-DUR,KLOR-CON) 10 MEQ CR tablet, TAKE ONE TABLET BY MOUTH DAILY, Disp: 30 tablet, Rfl: 5  •  predniSONE (DELTASONE) 5 MG tablet, Take 10 mg by mouth Daily., Disp: , Rfl:   •  tiZANidine (ZANAFLEX) 4 MG tablet, TAKE ONE TABLET BY MOUTH EVERY NIGHT AT BEDTIME AS NEEDED FOR LEG CRAMPS, Disp: 30 tablet, Rfl: 3  •  tiZANidine (ZANAFLEX) 4 MG tablet, TAKE ONE TABLET BY MOUTH EVERY NIGHT AT BEDTIME AS NEEDED FOR LEG CRAMPS, Disp: 30 tablet, Rfl: 5  •  traMADol (ULTRAM) 50 MG tablet, Take 50 mg by mouth Every 6 (Six) Hours As Needed for Moderate Pain ., Disp: , Rfl:   •  valsartan (DIOVAN) 320 MG tablet, TAKE ONE TABLET BY MOUTH DAILY, Disp: 90 tablet, Rfl: 0  MEDICATION LIST AND ALLERGIES REVIEWED.    Social History   Substance Use Topics   • Smoking status: Former Smoker     Years: 10.00     Types: Pipe, Cigars     Quit date: 1967   • Smokeless tobacco: Never Used      Comment: smoked a pipe and cigars for during 20's   • Alcohol use No       FAMILY AND SOCIAL  "HISTORY REVIEWED.    Review of Systems   Constitutional: Negative for chills, fatigue, fever and unexpected weight change.   HENT: Negative for congestion, nosebleeds, postnasal drip, rhinorrhea, sinus pressure and trouble swallowing.    Respiratory: Negative for cough, chest tightness, shortness of breath and wheezing.    Cardiovascular: Negative for chest pain and leg swelling.   Gastrointestinal: Negative for abdominal pain, constipation, diarrhea, nausea and vomiting.   Genitourinary: Negative for dysuria, frequency, hematuria and urgency.   Musculoskeletal: Negative for myalgias.   Neurological: Negative for dizziness, weakness, numbness and headaches.   All other systems reviewed and are negative.  .    /84 (BP Location: Left arm, Patient Position: Sitting, Cuff Size: Adult)   Pulse 63   Temp 98.6 °F (37 °C)   Ht 177.8 cm (70\")   Wt 108 kg (237 lb)   SpO2 93%   BMI 34.01 kg/m²       There is no immunization history on file for this patient.    Physical Exam   Constitutional: He is oriented to person, place, and time. He appears well-developed and well-nourished.   HENT:   Head: Normocephalic and atraumatic.   Eyes: EOM are normal. Pupils are equal, round, and reactive to light.   Neck: Normal range of motion. Neck supple.   Cardiovascular: Normal rate and regular rhythm.    No murmur heard.  Pulmonary/Chest: Effort normal and breath sounds normal. No respiratory distress. He has no wheezes. He has no rales.   Abdominal: Soft. Bowel sounds are normal. He exhibits no distension.   Musculoskeletal: Normal range of motion. He exhibits no edema.   Neurological: He is alert and oriented to person, place, and time.   Skin: Skin is warm and dry. No erythema.   Psychiatric: He has a normal mood and affect. His behavior is normal.   Vitals reviewed.        RESULTS      PROBLEM LIST    Problem List Items Addressed This Visit        Respiratory    Dyspnea on exertion    Interstitial lung disease - Primary    "    Digestive    Ulcerative colitis      Other Visit Diagnoses    None.           DISCUSSION    Finish out the 2 weeks of 5 mg prednisone.  Overall he has done very well with his drug-induced interstitial lung disease.  He has no symptoms at this time, and is returned to baseline.  His last chest x-ray here in the office show significant improvement.    Follow-up full PFTs and chest x-ray in 3 months to assure resolution.    I spent 15 minutes with the patient. I spent > 50% percent of this time counseling and discussing evaluation, current status and management.    Karishma Massey, APRN  194360:[id number] PM  Electronically signed     Please note that portions of this note were completed with a voice recognition program. Efforts were made to edit the dictations, but occasionally words are mistranscribed.      CC: Aaron Trejo MD

## 2018-05-17 ENCOUNTER — TRANSCRIBE ORDERS (OUTPATIENT)
Dept: ADMINISTRATIVE | Facility: HOSPITAL | Age: 73
End: 2018-05-17

## 2018-05-17 DIAGNOSIS — J18.9 PNEUMONIA DUE TO INFECTIOUS ORGANISM, UNSPECIFIED LATERALITY, UNSPECIFIED PART OF LUNG: Primary | ICD-10-CM

## 2018-05-20 RX ORDER — POTASSIUM CHLORIDE 750 MG/1
TABLET, EXTENDED RELEASE ORAL
Qty: 30 TABLET | Refills: 0 | Status: SHIPPED | OUTPATIENT
Start: 2018-05-20 | End: 2018-06-21 | Stop reason: SDUPTHER

## 2018-05-29 RX ORDER — VALSARTAN 320 MG/1
TABLET ORAL
Qty: 30 TABLET | Refills: 0 | Status: SHIPPED | OUTPATIENT
Start: 2018-05-29 | End: 2018-06-25 | Stop reason: SDUPTHER

## 2018-06-04 ENCOUNTER — OFFICE VISIT (OUTPATIENT)
Dept: FAMILY MEDICINE CLINIC | Facility: CLINIC | Age: 73
End: 2018-06-04

## 2018-06-04 VITALS
HEIGHT: 70 IN | OXYGEN SATURATION: 97 % | HEART RATE: 69 BPM | SYSTOLIC BLOOD PRESSURE: 124 MMHG | WEIGHT: 245 LBS | RESPIRATION RATE: 16 BRPM | BODY MASS INDEX: 35.07 KG/M2 | DIASTOLIC BLOOD PRESSURE: 74 MMHG

## 2018-06-04 DIAGNOSIS — I10 ESSENTIAL HYPERTENSION: Primary | Chronic | ICD-10-CM

## 2018-06-04 DIAGNOSIS — J84.9 INTERSTITIAL LUNG DISEASE (HCC): ICD-10-CM

## 2018-06-04 PROCEDURE — 99213 OFFICE O/P EST LOW 20 MIN: CPT | Performed by: FAMILY MEDICINE

## 2018-06-05 NOTE — PROGRESS NOTES
"Subjective   Jeremie Wynn is a 72 y.o. male.     Hypertension   This is a chronic problem. The current episode started more than 1 year ago. The problem has been gradually improving since onset. The problem is controlled. Associated symptoms include malaise/fatigue and shortness of breath. Pertinent negatives include no chest pain. Risk factors for coronary artery disease include male gender and sedentary lifestyle. Past treatments include angiotensin blockers, diuretics and lifestyle changes. Current antihypertension treatment includes angiotensin blockers, diuretics and lifestyle changes. The current treatment provides significant improvement. There are no compliance problems.  There is no history of angina, kidney disease or CAD/MI.        The following portions of the patient's history were reviewed and updated as appropriate: allergies, current medications, past social history and problem list.    Review of Systems   Constitutional: Positive for malaise/fatigue. Negative for chills and fever.   HENT: Negative for congestion, postnasal drip and sore throat.    Respiratory: Positive for cough and shortness of breath.    Cardiovascular: Negative for chest pain and leg swelling.   Gastrointestinal: Negative for nausea and vomiting.   Genitourinary: Negative for dysuria and hematuria.   Musculoskeletal: Negative for arthralgias and myalgias.   Neurological: Negative for dizziness and syncope.   Psychiatric/Behavioral: Negative.        Objective   /74   Pulse 69   Resp 16   Ht 177.8 cm (70\")   Wt 111 kg (245 lb)   SpO2 97%   BMI 35.15 kg/m²   Physical Exam   Constitutional: He is oriented to person, place, and time. He appears well-developed and well-nourished. He is cooperative.   HENT:   Head: Normocephalic.   Right Ear: External ear normal.   Left Ear: External ear normal.   Nose: Nose normal.   Mouth/Throat: Oropharynx is clear and moist.   Eyes: Conjunctivae are normal. Pupils are equal, round, and " reactive to light. No scleral icterus.   Neck: Neck supple. Carotid bruit is not present. No thyromegaly present.   Cardiovascular: Normal rate, regular rhythm and normal heart sounds.    Pulmonary/Chest: Effort normal and breath sounds normal. He has no wheezes. He has no rales. He exhibits no tenderness.   Abdominal: There is no hepatosplenomegaly.   Musculoskeletal: Normal range of motion. He exhibits no edema.   Neurological: He is alert and oriented to person, place, and time.   No focal deficits no lateralizing signs   Skin: Skin is warm and dry. No rash noted.   Psychiatric: He has a normal mood and affect. Cognition and memory are normal.   Nursing note and vitals reviewed.      Assessment/Plan   Problem List Items Addressed This Visit     Hypertension - Primary (Chronic)    Interstitial lung disease        Consider shingrix later this year.  No orders of the defined types were placed in this encounter.

## 2018-06-20 ENCOUNTER — HOSPITAL ENCOUNTER (OUTPATIENT)
Dept: CT IMAGING | Facility: HOSPITAL | Age: 73
Discharge: HOME | End: 2018-06-20
Attending: INTERNAL MEDICINE | Admitting: INTERNAL MEDICINE

## 2018-06-20 DIAGNOSIS — J18.9 PNEUMONIA DUE TO INFECTIOUS ORGANISM, UNSPECIFIED LATERALITY, UNSPECIFIED PART OF LUNG: ICD-10-CM

## 2018-06-20 PROCEDURE — 71250 CT THORAX DX C-: CPT

## 2018-06-21 RX ORDER — FAMOTIDINE 20 MG/1
TABLET, FILM COATED ORAL
Qty: 60 TABLET | Refills: 0 | OUTPATIENT
Start: 2018-06-21

## 2018-06-21 RX ORDER — POTASSIUM CHLORIDE 750 MG/1
TABLET, EXTENDED RELEASE ORAL
Qty: 30 TABLET | Refills: 0 | OUTPATIENT
Start: 2018-06-21

## 2018-06-21 RX ORDER — POTASSIUM CHLORIDE 750 MG/1
10 TABLET, EXTENDED RELEASE ORAL DAILY
Qty: 30 TABLET | Refills: 0 | Status: SHIPPED | OUTPATIENT
Start: 2018-06-21 | End: 2018-07-16 | Stop reason: SDUPTHER

## 2018-06-25 RX ORDER — VALSARTAN 320 MG/1
TABLET ORAL
Qty: 30 TABLET | Refills: 5 | Status: SHIPPED | OUTPATIENT
Start: 2018-06-25 | End: 2018-08-14 | Stop reason: RX

## 2018-07-03 ENCOUNTER — LAB REQUISITION (OUTPATIENT)
Dept: LAB | Facility: HOSPITAL | Age: 73
End: 2018-07-03

## 2018-07-03 DIAGNOSIS — Z00.00 ROUTINE GENERAL MEDICAL EXAMINATION AT A HEALTH CARE FACILITY: ICD-10-CM

## 2018-07-03 LAB
ALBUMIN SERPL-MCNC: 3.93 G/DL (ref 3.2–4.8)
ALBUMIN/GLOB SERPL: 1.5 G/DL (ref 1.5–2.5)
ALP SERPL-CCNC: 56 U/L (ref 25–100)
ALT SERPL W P-5'-P-CCNC: 26 U/L (ref 7–40)
ANION GAP SERPL CALCULATED.3IONS-SCNC: 8 MMOL/L (ref 3–11)
AST SERPL-CCNC: 24 U/L (ref 0–33)
BASOPHILS # BLD AUTO: 0.04 10*3/MM3 (ref 0–0.2)
BASOPHILS NFR BLD AUTO: 0.5 % (ref 0–1)
BILIRUB SERPL-MCNC: 0.4 MG/DL (ref 0.3–1.2)
BUN BLD-MCNC: 20 MG/DL (ref 9–23)
BUN/CREAT SERPL: 17.9 (ref 7–25)
CALCIUM SPEC-SCNC: 8.9 MG/DL (ref 8.7–10.4)
CHLORIDE SERPL-SCNC: 109 MMOL/L (ref 99–109)
CO2 SERPL-SCNC: 28 MMOL/L (ref 20–31)
CREAT BLD-MCNC: 1.12 MG/DL (ref 0.6–1.3)
CRP SERPL-MCNC: 0.08 MG/DL (ref 0–1)
DEPRECATED RDW RBC AUTO: 52.1 FL (ref 37–54)
EOSINOPHIL # BLD AUTO: 0.15 10*3/MM3 (ref 0–0.3)
EOSINOPHIL NFR BLD AUTO: 1.8 % (ref 0–3)
ERYTHROCYTE [DISTWIDTH] IN BLOOD BY AUTOMATED COUNT: 16.4 % (ref 11.3–14.5)
ERYTHROCYTE [SEDIMENTATION RATE] IN BLOOD: 24 MM/HR (ref 0–20)
GFR SERPL CREATININE-BSD FRML MDRD: 64 ML/MIN/1.73
GLOBULIN UR ELPH-MCNC: 2.7 GM/DL
GLUCOSE BLD-MCNC: 127 MG/DL (ref 70–100)
HCT VFR BLD AUTO: 37.7 % (ref 38.9–50.9)
HGB BLD-MCNC: 11.5 G/DL (ref 13.1–17.5)
IMM GRANULOCYTES # BLD: 0.04 10*3/MM3 (ref 0–0.03)
IMM GRANULOCYTES NFR BLD: 0.5 % (ref 0–0.6)
LYMPHOCYTES # BLD AUTO: 1.03 10*3/MM3 (ref 0.6–4.8)
LYMPHOCYTES NFR BLD AUTO: 12.3 % (ref 24–44)
MCH RBC QN AUTO: 26.9 PG (ref 27–31)
MCHC RBC AUTO-ENTMCNC: 30.5 G/DL (ref 32–36)
MCV RBC AUTO: 88.1 FL (ref 80–99)
MONOCYTES # BLD AUTO: 1.02 10*3/MM3 (ref 0–1)
MONOCYTES NFR BLD AUTO: 12.2 % (ref 0–12)
NEUTROPHILS # BLD AUTO: 6.13 10*3/MM3 (ref 1.5–8.3)
NEUTROPHILS NFR BLD AUTO: 73.2 % (ref 41–71)
PLATELET # BLD AUTO: 217 10*3/MM3 (ref 150–450)
PMV BLD AUTO: 11.7 FL (ref 6–12)
POTASSIUM BLD-SCNC: 4.5 MMOL/L (ref 3.5–5.5)
PROT SERPL-MCNC: 6.6 G/DL (ref 5.7–8.2)
RBC # BLD AUTO: 4.28 10*6/MM3 (ref 4.2–5.76)
SODIUM BLD-SCNC: 145 MMOL/L (ref 132–146)
WBC NRBC COR # BLD: 8.37 10*3/MM3 (ref 3.5–10.8)

## 2018-07-03 PROCEDURE — 85025 COMPLETE CBC W/AUTO DIFF WBC: CPT | Performed by: INTERNAL MEDICINE

## 2018-07-03 PROCEDURE — 87206 SMEAR FLUORESCENT/ACID STAI: CPT | Performed by: INTERNAL MEDICINE

## 2018-07-03 PROCEDURE — 87116 MYCOBACTERIA CULTURE: CPT | Performed by: INTERNAL MEDICINE

## 2018-07-03 PROCEDURE — 86140 C-REACTIVE PROTEIN: CPT | Performed by: INTERNAL MEDICINE

## 2018-07-03 PROCEDURE — 80053 COMPREHEN METABOLIC PANEL: CPT | Performed by: INTERNAL MEDICINE

## 2018-07-16 RX ORDER — POTASSIUM CHLORIDE 750 MG/1
TABLET, EXTENDED RELEASE ORAL
Qty: 30 TABLET | Refills: 4 | Status: SHIPPED | OUTPATIENT
Start: 2018-07-16 | End: 2018-08-13 | Stop reason: SDUPTHER

## 2018-08-13 ENCOUNTER — OFFICE VISIT (OUTPATIENT)
Dept: PULMONOLOGY | Facility: CLINIC | Age: 73
End: 2018-08-13

## 2018-08-13 VITALS
SYSTOLIC BLOOD PRESSURE: 126 MMHG | HEIGHT: 70 IN | BODY MASS INDEX: 35.07 KG/M2 | RESPIRATION RATE: 18 BRPM | HEART RATE: 61 BPM | TEMPERATURE: 98 F | OXYGEN SATURATION: 94 % | WEIGHT: 245 LBS | DIASTOLIC BLOOD PRESSURE: 82 MMHG

## 2018-08-13 DIAGNOSIS — R06.02 SOB (SHORTNESS OF BREATH): Primary | ICD-10-CM

## 2018-08-13 DIAGNOSIS — I50.32 CHRONIC DIASTOLIC HEART FAILURE (HCC): Chronic | ICD-10-CM

## 2018-08-13 DIAGNOSIS — J84.9 INTERSTITIAL LUNG DISEASE (HCC): ICD-10-CM

## 2018-08-13 DIAGNOSIS — E66.09 OBESITY DUE TO EXCESS CALORIES, UNSPECIFIED CLASSIFICATION, UNSPECIFIED WHETHER SERIOUS COMORBIDITY PRESENT: ICD-10-CM

## 2018-08-13 PROCEDURE — 94375 RESPIRATORY FLOW VOLUME LOOP: CPT | Performed by: NURSE PRACTITIONER

## 2018-08-13 PROCEDURE — 94729 DIFFUSING CAPACITY: CPT | Performed by: NURSE PRACTITIONER

## 2018-08-13 PROCEDURE — 99214 OFFICE O/P EST MOD 30 MIN: CPT | Performed by: NURSE PRACTITIONER

## 2018-08-13 PROCEDURE — 94726 PLETHYSMOGRAPHY LUNG VOLUMES: CPT | Performed by: NURSE PRACTITIONER

## 2018-08-13 RX ORDER — FLUTICASONE PROPIONATE 50 MCG
SPRAY, SUSPENSION (ML) NASAL
COMMUNITY
End: 2020-02-04

## 2018-08-13 RX ORDER — BENZONATATE 100 MG/1
CAPSULE ORAL
COMMUNITY
End: 2018-09-06

## 2018-08-13 NOTE — PROGRESS NOTES
[facility]    CHIEF COMPLAINT    ILD, dyspnea    HISTORY OF PRESENT ILLNESS    Jeremie Wynn is a 72 y.o.male here today for outine follow-up. We initially started following him in the hospital in March of this year with a CT scan showing groundglass a pacer disease and interstitial lung disease.  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  Said that he completed a slow prednisone taper.  He had a 28 pound weight gain on the prednisone.    Today he comes in for follow-up.  He is off the prednisone now.  He still has some mild dyspnea on exertion and a chronic nonproductive cough.    He also has significant history of reflux with nightly awakening choking secondary to the reflux.  It is significantly better now.  However he does continue to have a large hiatal hernia with a patulous esophagus likely causing chronic aspiration.      Patient Active Problem List   Diagnosis   • Generalized osteoarthritis   • Hyperlipemia   • Hypertension   • PVC's (premature ventricular contractions)   • Arthralgia of hip   • Dyspnea on exertion   • Obesity   • S/P total knee arthroplasty, left   • Prediabetes   • Sepsis (CMS/HCC)   • Chronic diastolic heart failure (CMS/HCC)   • Ulcerative colitis (CMS/HCC)   • Interstitial lung disease (CMS/HCC)       Allergies   Allergen Reactions   • Contrast Dye Anaphylaxis   • Mesalamine Other (See Comments)     Lung toxicity (suspected)   • Penicillins Anaphylaxis and Other (See Comments)   • Sulfa Antibiotics Shortness Of Breath and Rash   • Morphine And Related Other (See Comments)     Told in 1964-can't remember reaction        Current Outpatient Prescriptions:   •  albuterol (PROVENTIL HFA;VENTOLIN HFA) 108 (90 Base) MCG/ACT inhaler, Inhale 2 puffs Every 4 (Four) Hours As Needed for Wheezing., Disp: 1 inhaler, Rfl: 1  •  atorvastatin (LIPITOR) 10 MG tablet, TAKE ONE TABLET BY MOUTH DAILY FOR  CHOLESTEROL, Disp: 90 tablet, Rfl: 1  •  benzonatate (TESSALON) 100 MG capsule, benzonatate 100 mg capsule prn, Disp: , Rfl:   •  cetirizine (zyrTEC) 10 MG tablet, Take 10 mg by mouth Daily., Disp: , Rfl:   •  famotidine (PEPCID) 20 MG tablet, Take 1 tablet by mouth 2 (Two) Times a Day As Needed for Indigestion or Heartburn., Disp: 60 tablet, Rfl: 0  •  fluticasone (FLONASE ALLERGY RELIEF) 50 MCG/ACT nasal spray, Flonase Allergy Relief 50 mcg/actuation nasal spray,suspension  Daily, Disp: , Rfl:   •  furosemide (LASIX) 20 MG tablet, TAKE ONE TABLET BY MOUTH DAILY, Disp: 30 tablet, Rfl: 5  •  ipratropium-albuterol (DUO-NEB) 0.5-2.5 mg/mL nebulizer, Take 3 mL by nebulization 4 (Four) Times a Day., Disp: 360 mL, Rfl: 0  •  Multiple Vitamins-Minerals (CENTROVITE) tablet, Take 1 tablet by mouth Daily., Disp: , Rfl:   •  nystatin (MYCOSTATIN) 481831 UNIT/ML suspension, , Disp: , Rfl:   •  potassium chloride (K-DUR,KLOR-CON) 10 MEQ CR tablet, TAKE ONE TABLET BY MOUTH DAILY, Disp: 30 tablet, Rfl: 5  •  tiZANidine (ZANAFLEX) 4 MG tablet, TAKE ONE TABLET BY MOUTH EVERY NIGHT AT BEDTIME AS NEEDED FOR LEG CRAMPS, Disp: 30 tablet, Rfl: 3  •  traMADol (ULTRAM) 50 MG tablet, Take 50 mg by mouth Every 6 (Six) Hours As Needed for Moderate Pain ., Disp: , Rfl:   •  valsartan (DIOVAN) 320 MG tablet, TAKE ONE TABLET BY MOUTH DAILY, Disp: 30 tablet, Rfl: 5  MEDICATION LIST AND ALLERGIES REVIEWED.    Social History   Substance Use Topics   • Smoking status: Former Smoker     Years: 10.00     Types: Pipe, Cigars     Quit date: 1967   • Smokeless tobacco: Never Used      Comment: smoked a pipe and cigars for during 20's   • Alcohol use No       FAMILY AND SOCIAL HISTORY REVIEWED.    Review of Systems   Constitutional: Positive for unexpected weight change (wieght gain with prednisone use). Negative for chills, fatigue and fever.   HENT: Negative for congestion, nosebleeds, postnasal drip, rhinorrhea, sinus pressure and trouble swallowing.   "  Respiratory: Positive for cough (chronic, non productive). Negative for chest tightness, shortness of breath and wheezing.    Cardiovascular: Negative for chest pain and leg swelling.   Gastrointestinal: Negative for abdominal pain, constipation, diarrhea, nausea and vomiting.   Genitourinary: Negative for dysuria, frequency, hematuria and urgency.   Musculoskeletal: Positive for arthralgias (knee pain). Negative for myalgias.   Neurological: Negative for dizziness, weakness, numbness and headaches.   All other systems reviewed and are negative.  .    /82 (BP Location: Right arm, Patient Position: Sitting, Cuff Size: Adult)   Pulse 61   Temp 98 °F (36.7 °C)   Resp 18   Ht 177.8 cm (70\")   Wt 111 kg (245 lb)   SpO2 94%   BMI 35.15 kg/m²       There is no immunization history on file for this patient.    Physical Exam   Constitutional: He is oriented to person, place, and time. He appears well-developed and well-nourished.   Obese     HENT:   Head: Normocephalic and atraumatic.   Eyes: Pupils are equal, round, and reactive to light. EOM are normal.   Neck: Normal range of motion. Neck supple.   Cardiovascular: Normal rate and regular rhythm.    No murmur heard.  Pulmonary/Chest: Effort normal and breath sounds normal. No respiratory distress. He has no wheezes. He has no rales.   Abdominal: Soft. Bowel sounds are normal. He exhibits no distension.   Musculoskeletal: Normal range of motion. He exhibits no edema.   Neurological: He is alert and oriented to person, place, and time.   Skin: Skin is warm and dry. No erythema.   Psychiatric: He has a normal mood and affect. His behavior is normal.   Vitals reviewed.        RESULTS    PFTS in the office today, read by me:  No obstruction or restriction, total lung capacity 5.16, 88%.    PROBLEM LIST    Problem List Items Addressed This Visit        Cardiovascular and Mediastinum    Chronic diastolic heart failure (CMS/HCC) (Chronic)       Respiratory    " Interstitial lung disease (CMS/HCC)    Relevant Medications    benzonatate (TESSALON) 100 MG capsule    fluticasone (FLONASE ALLERGY RELIEF) 50 MCG/ACT nasal spray       Digestive    Obesity      Other Visit Diagnoses     SOB (shortness of breath)    -  Primary    Relevant Orders    XR Chest PA & Lateral    Pulmonary Function Test (Completed)            DISCUSSION    We went over that his cough most likely is chronic secondary to his interstitial lung disease and chronic changes from his reflux.  We did go over reflux precautions including elevating the head of his bed at night.  He no longer takes any medicines for his reflux, he is unable to take calms or Rolaids secondary to some medication interaction.    I recommended a Pepcid or Zantac at night to help with his chronic reflux.    His PFTs are unchanged within normal total lung capacity and no restriction.    Recommended continuing with his weight loss efforts and increasing his activity as tolerated.    Follow-up in 6 months.    I spent 25 minutes with the patient. I spent > 50% percent of this time counseling and discussing diagnostic testing, evaluation, current status and treatment options.    Karishma Massey, APRN  08/13/201811:26 AM  Electronically signed     Please note that portions of this note were completed with a voice recognition program. Efforts were made to edit the dictations, but occasionally words are mistranscribed.      CC: Aaron Trejo MD

## 2018-08-14 ENCOUNTER — TELEPHONE (OUTPATIENT)
Dept: FAMILY MEDICINE CLINIC | Facility: CLINIC | Age: 73
End: 2018-08-14

## 2018-08-14 LAB
MYCOBACTERIUM SPEC CULT: ABNORMAL
NIGHT BLUE STAIN TISS: ABNORMAL

## 2018-08-14 RX ORDER — LOSARTAN POTASSIUM 100 MG/1
100 TABLET ORAL DAILY
Qty: 30 TABLET | Refills: 2 | Status: SHIPPED | OUTPATIENT
Start: 2018-08-14 | End: 2018-11-03 | Stop reason: SDUPTHER

## 2018-08-15 ENCOUNTER — APPOINTMENT (OUTPATIENT)
Dept: LAB | Facility: HOSPITAL | Age: 73
End: 2018-08-15

## 2018-08-15 DIAGNOSIS — A31.9 ATYPICAL MYCOBACTERIAL INFECTION: Primary | ICD-10-CM

## 2018-08-15 PROCEDURE — 87181 SC STD AGAR DILUTION PER AGT: CPT

## 2018-08-15 PROCEDURE — 87186 SC STD MICRODIL/AGAR DIL: CPT

## 2018-08-15 PROCEDURE — 36415 COLL VENOUS BLD VENIPUNCTURE: CPT

## 2018-08-28 ENCOUNTER — TRANSCRIBE ORDERS (OUTPATIENT)
Dept: LAB | Facility: HOSPITAL | Age: 73
End: 2018-08-28

## 2018-08-28 ENCOUNTER — LAB (OUTPATIENT)
Dept: LAB | Facility: HOSPITAL | Age: 73
End: 2018-08-28

## 2018-08-28 DIAGNOSIS — R50.9 HYPERTHERMIA-INDUCED DEFECT: Primary | ICD-10-CM

## 2018-08-28 DIAGNOSIS — J96.21 ACUTE AND CHRONIC RESPIRATORY FAILURE WITH HYPOXIA (HCC): ICD-10-CM

## 2018-08-28 DIAGNOSIS — J18.9 PNEUMONIA, ORGANISM UNSPECIFIED(486): ICD-10-CM

## 2018-08-28 DIAGNOSIS — R50.9 HYPERTHERMIA-INDUCED DEFECT: ICD-10-CM

## 2018-08-28 LAB
ALBUMIN SERPL-MCNC: 4.11 G/DL (ref 3.2–4.8)
ALBUMIN/GLOB SERPL: 1.4 G/DL (ref 1.5–2.5)
ALP SERPL-CCNC: 61 U/L (ref 25–100)
ALT SERPL W P-5'-P-CCNC: 10 U/L (ref 7–40)
ANION GAP SERPL CALCULATED.3IONS-SCNC: 11 MMOL/L (ref 3–11)
AST SERPL-CCNC: 16 U/L (ref 0–33)
BASOPHILS # BLD AUTO: 0.04 10*3/MM3 (ref 0–0.2)
BASOPHILS NFR BLD AUTO: 0.6 % (ref 0–1)
BILIRUB SERPL-MCNC: 0.6 MG/DL (ref 0.3–1.2)
BUN BLD-MCNC: 19 MG/DL (ref 9–23)
BUN/CREAT SERPL: 17.8 (ref 7–25)
CALCIUM SPEC-SCNC: 8.9 MG/DL (ref 8.7–10.4)
CHLORIDE SERPL-SCNC: 107 MMOL/L (ref 99–109)
CO2 SERPL-SCNC: 24 MMOL/L (ref 20–31)
CREAT BLD-MCNC: 1.07 MG/DL (ref 0.6–1.3)
CRP SERPL-MCNC: 0.12 MG/DL (ref 0–1)
DEPRECATED RDW RBC AUTO: 45.7 FL (ref 37–54)
EOSINOPHIL # BLD AUTO: 0.25 10*3/MM3 (ref 0–0.3)
EOSINOPHIL NFR BLD AUTO: 3.7 % (ref 0–3)
ERYTHROCYTE [DISTWIDTH] IN BLOOD BY AUTOMATED COUNT: 15.1 % (ref 11.3–14.5)
ERYTHROCYTE [SEDIMENTATION RATE] IN BLOOD: 32 MM/HR (ref 0–20)
GFR SERPL CREATININE-BSD FRML MDRD: 68 ML/MIN/1.73
GLOBULIN UR ELPH-MCNC: 2.9 GM/DL
GLUCOSE BLD-MCNC: 113 MG/DL (ref 70–100)
HCT VFR BLD AUTO: 36 % (ref 38.9–50.9)
HGB BLD-MCNC: 10.8 G/DL (ref 13.1–17.5)
IMM GRANULOCYTES # BLD: 0.01 10*3/MM3 (ref 0–0.03)
IMM GRANULOCYTES NFR BLD: 0.1 % (ref 0–0.6)
LYMPHOCYTES # BLD AUTO: 1.45 10*3/MM3 (ref 0.6–4.8)
LYMPHOCYTES NFR BLD AUTO: 21.2 % (ref 24–44)
MCH RBC QN AUTO: 24.8 PG (ref 27–31)
MCHC RBC AUTO-ENTMCNC: 30 G/DL (ref 32–36)
MCV RBC AUTO: 82.6 FL (ref 80–99)
MONOCYTES # BLD AUTO: 0.95 10*3/MM3 (ref 0–1)
MONOCYTES NFR BLD AUTO: 13.9 % (ref 0–12)
NEUTROPHILS # BLD AUTO: 4.13 10*3/MM3 (ref 1.5–8.3)
NEUTROPHILS NFR BLD AUTO: 60.5 % (ref 41–71)
PLATELET # BLD AUTO: 267 10*3/MM3 (ref 150–450)
PMV BLD AUTO: 11 FL (ref 6–12)
POTASSIUM BLD-SCNC: 4 MMOL/L (ref 3.5–5.5)
PROT SERPL-MCNC: 7 G/DL (ref 5.7–8.2)
RBC # BLD AUTO: 4.36 10*6/MM3 (ref 4.2–5.76)
SODIUM BLD-SCNC: 142 MMOL/L (ref 132–146)
WBC NRBC COR # BLD: 6.83 10*3/MM3 (ref 3.5–10.8)

## 2018-08-28 PROCEDURE — 85025 COMPLETE CBC W/AUTO DIFF WBC: CPT

## 2018-08-28 PROCEDURE — 85652 RBC SED RATE AUTOMATED: CPT

## 2018-08-28 PROCEDURE — 86140 C-REACTIVE PROTEIN: CPT | Performed by: INTERNAL MEDICINE

## 2018-08-28 PROCEDURE — 36415 COLL VENOUS BLD VENIPUNCTURE: CPT | Performed by: INTERNAL MEDICINE

## 2018-08-28 PROCEDURE — 80053 COMPREHEN METABOLIC PANEL: CPT

## 2018-09-06 ENCOUNTER — OFFICE VISIT (OUTPATIENT)
Dept: FAMILY MEDICINE CLINIC | Facility: CLINIC | Age: 73
End: 2018-09-06

## 2018-09-06 VITALS
HEART RATE: 65 BPM | BODY MASS INDEX: 34.36 KG/M2 | SYSTOLIC BLOOD PRESSURE: 112 MMHG | OXYGEN SATURATION: 97 % | WEIGHT: 240 LBS | HEIGHT: 70 IN | RESPIRATION RATE: 16 BRPM | DIASTOLIC BLOOD PRESSURE: 72 MMHG

## 2018-09-06 DIAGNOSIS — E78.01 FAMILIAL HYPERCHOLESTEROLEMIA: ICD-10-CM

## 2018-09-06 DIAGNOSIS — R53.83 FATIGUE DUE TO TREATMENT: ICD-10-CM

## 2018-09-06 DIAGNOSIS — I10 ESSENTIAL HYPERTENSION: Primary | Chronic | ICD-10-CM

## 2018-09-06 PROBLEM — A31.9: Status: ACTIVE | Noted: 2018-09-06

## 2018-09-06 PROBLEM — A41.9 SEPSIS: Status: RESOLVED | Noted: 2018-03-05 | Resolved: 2018-09-06

## 2018-09-06 PROCEDURE — 99213 OFFICE O/P EST LOW 20 MIN: CPT | Performed by: FAMILY MEDICINE

## 2018-09-06 RX ORDER — ONDANSETRON 4 MG/1
4 TABLET, FILM COATED ORAL AS NEEDED
COMMUNITY
Start: 2018-08-28 | End: 2019-10-28

## 2018-09-06 RX ORDER — AZITHROMYCIN 500 MG/1
500 TABLET, FILM COATED ORAL DAILY
COMMUNITY
Start: 2018-08-15 | End: 2018-12-13

## 2018-09-06 RX ORDER — RIFAMPIN 300 MG/1
300 CAPSULE ORAL 2 TIMES DAILY
COMMUNITY
Start: 2018-08-14 | End: 2021-12-22

## 2018-09-06 RX ORDER — ETHAMBUTOL HYDROCHLORIDE 400 MG/1
1600 TABLET, FILM COATED ORAL DAILY
COMMUNITY
Start: 2018-08-15 | End: 2021-12-22

## 2018-09-07 NOTE — PROGRESS NOTES
"Subjective   Jeremie Wynn is a 72 y.o. male.     Hypertension   This is a chronic problem. The current episode started more than 1 year ago. The problem is unchanged. The problem is controlled. Pertinent negatives include no chest pain, headaches, palpitations, peripheral edema or shortness of breath. Risk factors for coronary artery disease include male gender, obesity and sedentary lifestyle. Current antihypertension treatment includes angiotensin blockers, diuretics and lifestyle changes. The current treatment provides significant improvement. There is no history of angina, kidney disease or CAD/MI.        The following portions of the patient's history were reviewed and updated as appropriate: allergies, current medications, past social history and problem list.    Review of Systems   Constitutional: Positive for fatigue. Negative for activity change, chills, fever and unexpected weight change.   HENT: Negative for congestion and sore throat.    Respiratory: Negative for cough and shortness of breath.         Under treatment for atypical mycobacterium   Cardiovascular: Negative for chest pain, palpitations and leg swelling.   Gastrointestinal: Negative for diarrhea, nausea and vomiting.   Genitourinary: Negative for dysuria and hematuria.   Musculoskeletal: Negative for arthralgias and joint swelling.   Skin: Negative for color change and rash.   Allergic/Immunologic: Negative for environmental allergies and food allergies.   Neurological: Negative for syncope and headaches.   Hematological: Negative for adenopathy. Does not bruise/bleed easily.   Psychiatric/Behavioral: Negative for dysphoric mood and sleep disturbance. The patient is not nervous/anxious.        Objective   /72   Pulse 65   Resp 16   Ht 177.8 cm (70\")   Wt 109 kg (240 lb)   SpO2 97%   BMI 34.44 kg/m²   Physical Exam   Constitutional: He is oriented to person, place, and time. He appears well-developed and well-nourished. He is " cooperative.   HENT:   Head: Normocephalic.   Right Ear: External ear normal.   Left Ear: External ear normal.   Nose: Nose normal.   Mouth/Throat: Oropharynx is clear and moist.   Eyes: Pupils are equal, round, and reactive to light. Conjunctivae are normal. No scleral icterus.   Neck: Neck supple. Carotid bruit is not present. No thyromegaly present.   Cardiovascular: Normal rate, regular rhythm and normal heart sounds.    Pulmonary/Chest: Effort normal and breath sounds normal.   Abdominal: There is no hepatosplenomegaly.   Genitourinary: Rectum normal.   Musculoskeletal: Normal range of motion. He exhibits no edema.   Neurological: He is alert and oriented to person, place, and time.   No focal deficits no lateralizing signs   Skin: Skin is warm and dry. No rash noted.   Psychiatric: He has a normal mood and affect. Cognition and memory are normal.   Nursing note and vitals reviewed.      Assessment/Plan   Problem List Items Addressed This Visit     Hypertension - Primary (Chronic)    Hyperlipemia      Other Visit Diagnoses     Fatigue due to treatment              No orders of the defined types were placed in this encounter.

## 2018-09-25 ENCOUNTER — LAB REQUISITION (OUTPATIENT)
Dept: LAB | Facility: HOSPITAL | Age: 73
End: 2018-09-25

## 2018-09-25 DIAGNOSIS — E66.09 OTHER OBESITY DUE TO EXCESS CALORIES: ICD-10-CM

## 2018-09-25 DIAGNOSIS — A31.0 PULMONARY MYCOBACTERIAL INFECTION (HCC): ICD-10-CM

## 2018-09-25 DIAGNOSIS — J18.9 PNEUMONIA: ICD-10-CM

## 2018-09-25 DIAGNOSIS — Z00.00 ROUTINE GENERAL MEDICAL EXAMINATION AT A HEALTH CARE FACILITY: ICD-10-CM

## 2018-09-25 DIAGNOSIS — I10 ESSENTIAL (PRIMARY) HYPERTENSION: ICD-10-CM

## 2018-09-25 DIAGNOSIS — J84.89 OTHER SPECIFIED INTERSTITIAL PULMONARY DISEASES (HCC): ICD-10-CM

## 2018-09-25 DIAGNOSIS — Z96.652 PRESENCE OF LEFT ARTIFICIAL KNEE JOINT: ICD-10-CM

## 2018-09-25 LAB
ALBUMIN SERPL-MCNC: 4.08 G/DL (ref 3.2–4.8)
ALBUMIN/GLOB SERPL: 1.8 G/DL (ref 1.5–2.5)
ALP SERPL-CCNC: 50 U/L (ref 25–100)
ALT SERPL W P-5'-P-CCNC: 13 U/L (ref 7–40)
ANION GAP SERPL CALCULATED.3IONS-SCNC: 7 MMOL/L (ref 3–11)
AST SERPL-CCNC: 19 U/L (ref 0–33)
BASOPHILS # BLD AUTO: 0.03 10*3/MM3 (ref 0–0.2)
BASOPHILS NFR BLD AUTO: 0.5 % (ref 0–1)
BILIRUB SERPL-MCNC: 0.2 MG/DL (ref 0.3–1.2)
BUN BLD-MCNC: 18 MG/DL (ref 9–23)
BUN/CREAT SERPL: 17.5 (ref 7–25)
CALCIUM SPEC-SCNC: 9.1 MG/DL (ref 8.7–10.4)
CHLORIDE SERPL-SCNC: 107 MMOL/L (ref 99–109)
CO2 SERPL-SCNC: 25 MMOL/L (ref 20–31)
CREAT BLD-MCNC: 1.03 MG/DL (ref 0.6–1.3)
CRP SERPL-MCNC: 0.1 MG/DL (ref 0–1)
DEPRECATED RDW RBC AUTO: 46.1 FL (ref 37–54)
EOSINOPHIL # BLD AUTO: 0.25 10*3/MM3 (ref 0–0.3)
EOSINOPHIL NFR BLD AUTO: 3.8 % (ref 0–3)
ERYTHROCYTE [DISTWIDTH] IN BLOOD BY AUTOMATED COUNT: 15.9 % (ref 11.3–14.5)
ERYTHROCYTE [SEDIMENTATION RATE] IN BLOOD: 34 MM/HR (ref 0–20)
GFR SERPL CREATININE-BSD FRML MDRD: 71 ML/MIN/1.73
GLOBULIN UR ELPH-MCNC: 2.3 GM/DL
GLUCOSE BLD-MCNC: 144 MG/DL (ref 70–100)
HCT VFR BLD AUTO: 32.8 % (ref 38.9–50.9)
HGB BLD-MCNC: 9.6 G/DL (ref 13.1–17.5)
IMM GRANULOCYTES # BLD: 0.01 10*3/MM3 (ref 0–0.03)
IMM GRANULOCYTES NFR BLD: 0.2 % (ref 0–0.6)
LYMPHOCYTES # BLD AUTO: 1.38 10*3/MM3 (ref 0.6–4.8)
LYMPHOCYTES NFR BLD AUTO: 20.8 % (ref 24–44)
MCH RBC QN AUTO: 23.2 PG (ref 27–31)
MCHC RBC AUTO-ENTMCNC: 29.3 G/DL (ref 32–36)
MCV RBC AUTO: 79.4 FL (ref 80–99)
MONOCYTES # BLD AUTO: 0.81 10*3/MM3 (ref 0–1)
MONOCYTES NFR BLD AUTO: 12.2 % (ref 0–12)
NEUTROPHILS # BLD AUTO: 4.16 10*3/MM3 (ref 1.5–8.3)
NEUTROPHILS NFR BLD AUTO: 62.7 % (ref 41–71)
PLATELET # BLD AUTO: 260 10*3/MM3 (ref 150–450)
PMV BLD AUTO: 11.2 FL (ref 6–12)
POTASSIUM BLD-SCNC: 4.2 MMOL/L (ref 3.5–5.5)
PROT SERPL-MCNC: 6.4 G/DL (ref 5.7–8.2)
RBC # BLD AUTO: 4.13 10*6/MM3 (ref 4.2–5.76)
SODIUM BLD-SCNC: 139 MMOL/L (ref 132–146)
WBC NRBC COR # BLD: 6.63 10*3/MM3 (ref 3.5–10.8)

## 2018-09-25 PROCEDURE — 80053 COMPREHEN METABOLIC PANEL: CPT | Performed by: INTERNAL MEDICINE

## 2018-09-25 PROCEDURE — 85025 COMPLETE CBC W/AUTO DIFF WBC: CPT | Performed by: INTERNAL MEDICINE

## 2018-09-25 PROCEDURE — 86140 C-REACTIVE PROTEIN: CPT | Performed by: INTERNAL MEDICINE

## 2018-09-25 PROCEDURE — 36415 COLL VENOUS BLD VENIPUNCTURE: CPT | Performed by: INTERNAL MEDICINE

## 2018-10-04 LAB — REF LAB TEST RESULTS: NORMAL

## 2018-10-09 RX ORDER — ATORVASTATIN CALCIUM 10 MG/1
TABLET, FILM COATED ORAL
Qty: 27 TABLET | Refills: 0 | Status: SHIPPED | OUTPATIENT
Start: 2018-10-09 | End: 2018-11-03 | Stop reason: SDUPTHER

## 2018-10-23 ENCOUNTER — LAB REQUISITION (OUTPATIENT)
Dept: LAB | Facility: HOSPITAL | Age: 73
End: 2018-10-23

## 2018-10-23 DIAGNOSIS — J84.89 OTHER SPECIFIED INTERSTITIAL PULMONARY DISEASES (HCC): ICD-10-CM

## 2018-10-23 DIAGNOSIS — Z00.00 ROUTINE GENERAL MEDICAL EXAMINATION AT A HEALTH CARE FACILITY: ICD-10-CM

## 2018-10-23 DIAGNOSIS — J18.9 PNEUMONIA: ICD-10-CM

## 2018-10-23 DIAGNOSIS — R05.9 COUGH: ICD-10-CM

## 2018-10-23 DIAGNOSIS — A31.0 PULMONARY MYCOBACTERIAL INFECTION (HCC): ICD-10-CM

## 2018-10-23 LAB
ALBUMIN SERPL-MCNC: 3.84 G/DL (ref 3.2–4.8)
ALBUMIN/GLOB SERPL: 1.7 G/DL (ref 1.5–2.5)
ALP SERPL-CCNC: 53 U/L (ref 25–100)
ALT SERPL W P-5'-P-CCNC: 15 U/L (ref 7–40)
ANION GAP SERPL CALCULATED.3IONS-SCNC: 6 MMOL/L (ref 3–11)
AST SERPL-CCNC: 19 U/L (ref 0–33)
BASOPHILS # BLD AUTO: 0.05 10*3/MM3 (ref 0–0.2)
BASOPHILS NFR BLD AUTO: 0.9 % (ref 0–1)
BILIRUB SERPL-MCNC: 0.2 MG/DL (ref 0.3–1.2)
BUN BLD-MCNC: 18 MG/DL (ref 9–23)
BUN/CREAT SERPL: 17.8 (ref 7–25)
CALCIUM SPEC-SCNC: 8.5 MG/DL (ref 8.7–10.4)
CHLORIDE SERPL-SCNC: 109 MMOL/L (ref 99–109)
CO2 SERPL-SCNC: 25 MMOL/L (ref 20–31)
CREAT BLD-MCNC: 1.01 MG/DL (ref 0.6–1.3)
CRP SERPL-MCNC: 0.25 MG/DL (ref 0–1)
DEPRECATED RDW RBC AUTO: 44.8 FL (ref 37–54)
EOSINOPHIL # BLD AUTO: 0.28 10*3/MM3 (ref 0–0.3)
EOSINOPHIL NFR BLD AUTO: 5.3 % (ref 0–3)
ERYTHROCYTE [DISTWIDTH] IN BLOOD BY AUTOMATED COUNT: 15.9 % (ref 11.3–14.5)
ERYTHROCYTE [SEDIMENTATION RATE] IN BLOOD: 29 MM/HR (ref 0–20)
GFR SERPL CREATININE-BSD FRML MDRD: 72 ML/MIN/1.73
GLOBULIN UR ELPH-MCNC: 2.3 GM/DL
GLUCOSE BLD-MCNC: 122 MG/DL (ref 70–100)
HCT VFR BLD AUTO: 30.6 % (ref 38.9–50.9)
HGB BLD-MCNC: 8.8 G/DL (ref 13.1–17.5)
IMM GRANULOCYTES # BLD: 0.01 10*3/MM3 (ref 0–0.03)
IMM GRANULOCYTES NFR BLD: 0.2 % (ref 0–0.6)
LYMPHOCYTES # BLD AUTO: 1.3 10*3/MM3 (ref 0.6–4.8)
LYMPHOCYTES NFR BLD AUTO: 24.7 % (ref 24–44)
MCH RBC QN AUTO: 22.1 PG (ref 27–31)
MCHC RBC AUTO-ENTMCNC: 28.8 G/DL (ref 32–36)
MCV RBC AUTO: 76.9 FL (ref 80–99)
MONOCYTES # BLD AUTO: 0.67 10*3/MM3 (ref 0–1)
MONOCYTES NFR BLD AUTO: 12.7 % (ref 0–12)
NEUTROPHILS # BLD AUTO: 2.97 10*3/MM3 (ref 1.5–8.3)
NEUTROPHILS NFR BLD AUTO: 56.4 % (ref 41–71)
NRBC BLD MANUAL-RTO: 0 /100 WBC (ref 0–0)
PLATELET # BLD AUTO: 242 10*3/MM3 (ref 150–450)
PMV BLD AUTO: 10.6 FL (ref 6–12)
POTASSIUM BLD-SCNC: 3.9 MMOL/L (ref 3.5–5.5)
PROT SERPL-MCNC: 6.1 G/DL (ref 5.7–8.2)
RBC # BLD AUTO: 3.98 10*6/MM3 (ref 4.2–5.76)
SODIUM BLD-SCNC: 140 MMOL/L (ref 132–146)
WBC NRBC COR # BLD: 5.27 10*3/MM3 (ref 3.5–10.8)

## 2018-10-23 PROCEDURE — 87206 SMEAR FLUORESCENT/ACID STAI: CPT | Performed by: INTERNAL MEDICINE

## 2018-10-23 PROCEDURE — 87116 MYCOBACTERIA CULTURE: CPT | Performed by: INTERNAL MEDICINE

## 2018-10-23 PROCEDURE — 80053 COMPREHEN METABOLIC PANEL: CPT | Performed by: INTERNAL MEDICINE

## 2018-10-23 PROCEDURE — 36415 COLL VENOUS BLD VENIPUNCTURE: CPT | Performed by: INTERNAL MEDICINE

## 2018-10-23 PROCEDURE — 86140 C-REACTIVE PROTEIN: CPT | Performed by: INTERNAL MEDICINE

## 2018-10-23 PROCEDURE — 85652 RBC SED RATE AUTOMATED: CPT | Performed by: INTERNAL MEDICINE

## 2018-10-23 PROCEDURE — 85025 COMPLETE CBC W/AUTO DIFF WBC: CPT | Performed by: INTERNAL MEDICINE

## 2018-11-05 RX ORDER — ATORVASTATIN CALCIUM 10 MG/1
TABLET, FILM COATED ORAL
Qty: 30 TABLET | Refills: 3 | Status: SHIPPED | OUTPATIENT
Start: 2018-11-05 | End: 2019-02-26 | Stop reason: SDUPTHER

## 2018-11-05 RX ORDER — LOSARTAN POTASSIUM 100 MG/1
TABLET ORAL
Qty: 30 TABLET | Refills: 3 | Status: SHIPPED | OUTPATIENT
Start: 2018-11-05 | End: 2019-02-26 | Stop reason: SDUPTHER

## 2018-11-05 RX ORDER — FUROSEMIDE 20 MG/1
TABLET ORAL
Qty: 30 TABLET | Refills: 3 | Status: SHIPPED | OUTPATIENT
Start: 2018-11-05 | End: 2019-02-26 | Stop reason: SDUPTHER

## 2018-11-06 ENCOUNTER — FLU SHOT (OUTPATIENT)
Dept: FAMILY MEDICINE CLINIC | Facility: CLINIC | Age: 73
End: 2018-11-06

## 2018-11-06 DIAGNOSIS — Z23 NEED FOR VACCINATION: Primary | ICD-10-CM

## 2018-11-06 PROCEDURE — G0009 ADMIN PNEUMOCOCCAL VACCINE: HCPCS | Performed by: FAMILY MEDICINE

## 2018-11-06 PROCEDURE — 90662 IIV NO PRSV INCREASED AG IM: CPT | Performed by: FAMILY MEDICINE

## 2018-11-06 PROCEDURE — 90670 PCV13 VACCINE IM: CPT | Performed by: FAMILY MEDICINE

## 2018-11-06 PROCEDURE — G0008 ADMIN INFLUENZA VIRUS VAC: HCPCS | Performed by: FAMILY MEDICINE

## 2018-11-26 ENCOUNTER — LAB REQUISITION (OUTPATIENT)
Dept: LAB | Facility: HOSPITAL | Age: 73
End: 2018-11-26

## 2018-11-26 DIAGNOSIS — Z00.00 ROUTINE GENERAL MEDICAL EXAMINATION AT A HEALTH CARE FACILITY: ICD-10-CM

## 2018-11-26 LAB
ALBUMIN SERPL-MCNC: 3.77 G/DL (ref 3.2–4.8)
ALBUMIN/GLOB SERPL: 1.6 G/DL (ref 1.5–2.5)
ALP SERPL-CCNC: 50 U/L (ref 25–100)
ALT SERPL W P-5'-P-CCNC: 14 U/L (ref 7–40)
ANION GAP SERPL CALCULATED.3IONS-SCNC: 8 MMOL/L (ref 3–11)
AST SERPL-CCNC: 16 U/L (ref 0–33)
BILIRUB SERPL-MCNC: 0.2 MG/DL (ref 0.3–1.2)
BUN BLD-MCNC: 17 MG/DL (ref 9–23)
BUN/CREAT SERPL: 16.5 (ref 7–25)
CALCIUM SPEC-SCNC: 8.6 MG/DL (ref 8.7–10.4)
CHLORIDE SERPL-SCNC: 109 MMOL/L (ref 99–109)
CO2 SERPL-SCNC: 26 MMOL/L (ref 20–31)
CREAT BLD-MCNC: 1.03 MG/DL (ref 0.6–1.3)
CRP SERPL-MCNC: 0.06 MG/DL (ref 0–1)
DEPRECATED RDW RBC AUTO: 48.4 FL (ref 37–54)
ERYTHROCYTE [DISTWIDTH] IN BLOOD BY AUTOMATED COUNT: 17.1 % (ref 11.3–14.5)
ERYTHROCYTE [SEDIMENTATION RATE] IN BLOOD: 27 MM/HR (ref 0–20)
GFR SERPL CREATININE-BSD FRML MDRD: 70 ML/MIN/1.73
GLOBULIN UR ELPH-MCNC: 2.3 GM/DL
GLUCOSE BLD-MCNC: 148 MG/DL (ref 70–100)
HCT VFR BLD AUTO: 30.2 % (ref 38.9–50.9)
HGB BLD-MCNC: 8.4 G/DL (ref 13.1–17.5)
MCH RBC QN AUTO: 21.5 PG (ref 27–31)
MCHC RBC AUTO-ENTMCNC: 27.8 G/DL (ref 32–36)
MCV RBC AUTO: 77.2 FL (ref 80–99)
PLATELET # BLD AUTO: 248 10*3/MM3 (ref 150–450)
PMV BLD AUTO: 10.3 FL (ref 6–12)
POTASSIUM BLD-SCNC: 4 MMOL/L (ref 3.5–5.5)
PROT SERPL-MCNC: 6.1 G/DL (ref 5.7–8.2)
RBC # BLD AUTO: 3.91 10*6/MM3 (ref 4.2–5.76)
SODIUM BLD-SCNC: 143 MMOL/L (ref 132–146)
WBC NRBC COR # BLD: 5.53 10*3/MM3 (ref 3.5–10.8)

## 2018-11-26 PROCEDURE — 80053 COMPREHEN METABOLIC PANEL: CPT | Performed by: INTERNAL MEDICINE

## 2018-11-26 PROCEDURE — 85652 RBC SED RATE AUTOMATED: CPT | Performed by: INTERNAL MEDICINE

## 2018-11-26 PROCEDURE — 36415 COLL VENOUS BLD VENIPUNCTURE: CPT | Performed by: INTERNAL MEDICINE

## 2018-11-26 PROCEDURE — 85027 COMPLETE CBC AUTOMATED: CPT | Performed by: INTERNAL MEDICINE

## 2018-11-26 PROCEDURE — 86140 C-REACTIVE PROTEIN: CPT | Performed by: INTERNAL MEDICINE

## 2018-11-26 RX ORDER — TIZANIDINE 4 MG/1
TABLET ORAL
Qty: 30 TABLET | Refills: 3 | Status: SHIPPED | OUTPATIENT
Start: 2018-11-26 | End: 2019-02-26 | Stop reason: SDUPTHER

## 2018-12-03 RX ORDER — POTASSIUM CHLORIDE 750 MG/1
TABLET, EXTENDED RELEASE ORAL
Qty: 30 TABLET | Refills: 2 | Status: SHIPPED | OUTPATIENT
Start: 2018-12-03 | End: 2019-02-26 | Stop reason: SDUPTHER

## 2018-12-05 LAB
MYCOBACTERIUM SPEC CULT: ABNORMAL
NIGHT BLUE STAIN TISS: ABNORMAL

## 2018-12-13 ENCOUNTER — OFFICE VISIT (OUTPATIENT)
Dept: FAMILY MEDICINE CLINIC | Facility: CLINIC | Age: 73
End: 2018-12-13

## 2018-12-13 VITALS
BODY MASS INDEX: 33.79 KG/M2 | WEIGHT: 236 LBS | RESPIRATION RATE: 16 BRPM | SYSTOLIC BLOOD PRESSURE: 122 MMHG | HEART RATE: 70 BPM | DIASTOLIC BLOOD PRESSURE: 82 MMHG | OXYGEN SATURATION: 96 % | TEMPERATURE: 98.1 F | HEIGHT: 70 IN

## 2018-12-13 DIAGNOSIS — I10 ESSENTIAL HYPERTENSION: Chronic | ICD-10-CM

## 2018-12-13 DIAGNOSIS — E78.01 FAMILIAL HYPERCHOLESTEROLEMIA: ICD-10-CM

## 2018-12-13 DIAGNOSIS — D50.8 IRON DEFICIENCY ANEMIA SECONDARY TO INADEQUATE DIETARY IRON INTAKE: ICD-10-CM

## 2018-12-13 DIAGNOSIS — Z00.00 MEDICARE ANNUAL WELLNESS VISIT, SUBSEQUENT: Primary | ICD-10-CM

## 2018-12-13 DIAGNOSIS — E55.9 VITAMIN D DEFICIENCY: ICD-10-CM

## 2018-12-13 DIAGNOSIS — J01.00 SUBACUTE MAXILLARY SINUSITIS: ICD-10-CM

## 2018-12-13 PROBLEM — I50.32 CHRONIC DIASTOLIC HEART FAILURE: Chronic | Status: RESOLVED | Noted: 2018-03-09 | Resolved: 2018-12-13

## 2018-12-13 LAB
25(OH)D3+25(OH)D2 SERPL-MCNC: 22.4 NG/ML
ALBUMIN SERPL-MCNC: 4.18 G/DL (ref 3.2–4.8)
ALBUMIN/GLOB SERPL: 1.8 G/DL (ref 1.5–2.5)
ALP SERPL-CCNC: 54 U/L (ref 25–100)
ALT SERPL-CCNC: 14 U/L (ref 7–40)
AST SERPL-CCNC: 18 U/L (ref 0–33)
BASOPHILS # BLD AUTO: 0.04 10*3/MM3 (ref 0–0.2)
BASOPHILS NFR BLD AUTO: 0.6 % (ref 0–1)
BILIRUB BLD-MCNC: NEGATIVE MG/DL
BILIRUB SERPL-MCNC: 0.3 MG/DL (ref 0.3–1.2)
BUN SERPL-MCNC: 17 MG/DL (ref 9–23)
BUN/CREAT SERPL: 15.7 (ref 7–25)
CALCIUM SERPL-MCNC: 8.9 MG/DL (ref 8.7–10.4)
CHLORIDE SERPL-SCNC: 109 MMOL/L (ref 99–109)
CHOLEST SERPL-MCNC: 136 MG/DL (ref 0–200)
CLARITY, POC: CLEAR
CO2 SERPL-SCNC: 25 MMOL/L (ref 20–31)
COLOR UR: YELLOW
CREAT SERPL-MCNC: 1.08 MG/DL (ref 0.6–1.3)
DIFFERENTIAL COMMENT: NORMAL
EOSINOPHIL # BLD AUTO: 0.46 10*3/MM3 (ref 0–0.3)
EOSINOPHIL NFR BLD AUTO: 6.7 % (ref 0–3)
ERYTHROCYTE [DISTWIDTH] IN BLOOD BY AUTOMATED COUNT: 16.8 % (ref 11.3–14.5)
GLOBULIN SER CALC-MCNC: 2.3 GM/DL
GLUCOSE SERPL-MCNC: 112 MG/DL (ref 70–100)
GLUCOSE UR STRIP-MCNC: NEGATIVE MG/DL
HCT VFR BLD AUTO: 31.1 % (ref 38.9–50.9)
HDLC SERPL-MCNC: 48 MG/DL (ref 40–60)
HGB BLD-MCNC: 8.4 G/DL (ref 13.1–17.5)
IMM GRANULOCYTES # BLD: 0.02 10*3/MM3 (ref 0–0.03)
IMM GRANULOCYTES NFR BLD: 0.3 % (ref 0–0.6)
KETONES UR QL: NEGATIVE
LDLC SERPL CALC-MCNC: 70 MG/DL (ref 0–100)
LEUKOCYTE EST, POC: NEGATIVE
LYMPHOCYTES # BLD AUTO: 1.15 10*3/MM3 (ref 0.6–4.8)
LYMPHOCYTES NFR BLD AUTO: 16.9 % (ref 24–44)
MCH RBC QN AUTO: 20.6 PG (ref 27–31)
MCHC RBC AUTO-ENTMCNC: 27 G/DL (ref 32–36)
MCV RBC AUTO: 76.4 FL (ref 80–99)
MONOCYTES # BLD AUTO: 1.01 10*3/MM3 (ref 0–1)
MONOCYTES NFR BLD AUTO: 14.8 % (ref 0–12)
NEUTROPHILS # BLD AUTO: 4.16 10*3/MM3 (ref 1.5–8.3)
NEUTROPHILS NFR BLD AUTO: 61 % (ref 41–71)
NITRITE UR-MCNC: NEGATIVE MG/ML
PH UR: 5.5 [PH] (ref 5–8)
PLATELET # BLD AUTO: 252 10*3/MM3 (ref 150–450)
PLATELET BLD QL SMEAR: NORMAL
POTASSIUM SERPL-SCNC: 4.3 MMOL/L (ref 3.5–5.5)
PROT SERPL-MCNC: 6.5 G/DL (ref 5.7–8.2)
PROT UR STRIP-MCNC: NEGATIVE MG/DL
RBC # BLD AUTO: 4.07 10*6/MM3 (ref 4.2–5.76)
RBC # UR STRIP: NEGATIVE /UL
RBC MORPH BLD: NORMAL
SODIUM SERPL-SCNC: 141 MMOL/L (ref 132–146)
SP GR UR: 1.02 (ref 1–1.03)
TRIGL SERPL-MCNC: 91 MG/DL (ref 0–150)
TSH SERPL DL<=0.005 MIU/L-ACNC: 2.33 MIU/ML (ref 0.35–5.35)
UROBILINOGEN UR QL: NORMAL
VLDLC SERPL CALC-MCNC: 18.2 MG/DL
WBC # BLD AUTO: 6.82 10*3/MM3 (ref 3.5–10.8)

## 2018-12-13 PROCEDURE — 36415 COLL VENOUS BLD VENIPUNCTURE: CPT | Performed by: FAMILY MEDICINE

## 2018-12-13 PROCEDURE — G0439 PPPS, SUBSEQ VISIT: HCPCS | Performed by: FAMILY MEDICINE

## 2018-12-13 PROCEDURE — 81003 URINALYSIS AUTO W/O SCOPE: CPT | Performed by: FAMILY MEDICINE

## 2018-12-13 PROCEDURE — 96160 PT-FOCUSED HLTH RISK ASSMT: CPT | Performed by: FAMILY MEDICINE

## 2018-12-13 PROCEDURE — 93000 ELECTROCARDIOGRAM COMPLETE: CPT | Performed by: FAMILY MEDICINE

## 2018-12-13 RX ORDER — ONDANSETRON 8 MG/1
8 TABLET, ORALLY DISINTEGRATING ORAL AS NEEDED
COMMUNITY
Start: 2018-11-26 | End: 2019-10-28

## 2018-12-13 RX ORDER — LEVOFLOXACIN 500 MG/1
500 TABLET, FILM COATED ORAL DAILY
Qty: 10 TABLET | Refills: 0 | Status: SHIPPED | OUTPATIENT
Start: 2018-12-13 | End: 2019-04-03

## 2018-12-13 NOTE — PROGRESS NOTES
QUICK REFERENCE INFORMATION:  The ABCs of the Annual Wellness Visit    Subsequent Medicare Wellness Visit    HEALTH RISK ASSESSMENT    1945    Recent Hospitalizations:  Recently treated at the following:  [facility].        Current Medical Providers:  Patient Care Team:  Aaron Trejo MD as PCP - General  Aaron Trejo MD as PCP - Family Medicine  Zeb Palacios MD as Consulting Physician (Infectious Diseases)        Smoking Status:  Social History     Tobacco Use   Smoking Status Former Smoker   • Years: 10.00   • Types: Pipe, Cigars   • Last attempt to quit:    • Years since quittin.9   Smokeless Tobacco Never Used   Tobacco Comment    smoked a pipe and cigars for during        Alcohol Consumption:  Social History     Substance and Sexual Activity   Alcohol Use No       Depression Screen:   PHQ-2/PHQ-9 Depression Screening 2018   Little interest or pleasure in doing things 0   Feeling down, depressed, or hopeless 0   Total Score 0       Health Habits and Functional and Cognitive Screening:  Functional & Cognitive Status 2018   Do you have difficulty preparing food and eating? No   Do you have difficulty bathing yourself, getting dressed or grooming yourself? No   Do you have difficulty using the toilet? No   Do you have difficulty moving around from place to place? No   Do you have trouble with steps or getting out of a bed or a chair? No   In the past year have you fallen or experienced a near fall? No   Current Diet Well Balanced Diet   Dental Exam Up to date   Eye Exam Up to date   Exercise (times per week) 5 times per week   Current Exercise Activities Include (No Data)   Do you need help using the phone?  No   Are you deaf or do you have serious difficulty hearing?  No   Do you need help with transportation? No   Do you need help shopping? No   Do you need help preparing meals?  No   Do you need help with housework?  No   Do you need help with laundry? No    Do you need help taking your medications? No   Do you need help managing money? No   Do you ever drive or ride in a car without wearing a seat belt? No   Have you felt unusual stress, anger or loneliness in the last month? No   Who do you live with? Spouse   If you need help, do you have trouble finding someone available to you? No   Do you have difficulty concentrating, remembering or making decisions? No           Does the patient have evidence of cognitive impairment? No    Aspirin use counseling: Taking ASA appropriately as indicated      Recent Lab Results:  CMP:  Lab Results   Component Value Date     (H) 03/28/2017    BUN 17 11/26/2018    CREATININE 1.03 11/26/2018    EGFRIFNONA 70 11/26/2018    EGFRIFAFRI 80 03/28/2017    BCR 16.5 11/26/2018     11/26/2018    K 4.0 11/26/2018    CO2 26.0 11/26/2018    CALCIUM 8.6 (L) 11/26/2018    PROTENTOTREF 7.3 03/28/2017    ALBUMIN 3.77 11/26/2018    LABGLOBREF 3.1 03/28/2017    LABIL2 1.4 (L) 03/28/2017    BILITOT 0.2 (L) 11/26/2018    ALKPHOS 50 11/26/2018    AST 16 11/26/2018    ALT 14 11/26/2018     Lipid Panel:  Lab Results   Component Value Date    TRIG 158 (H) 03/28/2017    HDL 48 03/28/2017    VLDL 31.6 03/28/2017     HbA1c:  Lab Results   Component Value Date    HGBA1C 6.30 (H) 03/05/2018       Visual Acuity:  No exam data present    Age-appropriate Screening Schedule:  Refer to the list below for future screening recommendations based on patient's age, sex and/or medical conditions. Orders for these recommended tests are listed in the plan section. The patient has been provided with a written plan.    Health Maintenance   Topic Date Due   • TDAP/TD VACCINES (1 - Tdap) 11/01/1962   • ZOSTER VACCINE (2 of 2) 05/23/2017   • LIPID PANEL  03/28/2018   • COLONOSCOPY  01/23/2027   • INFLUENZA VACCINE  Completed   • PNEUMOCOCCAL VACCINES (65+ LOW/MEDIUM RISK)  Addressed        Subjective   History of Present Illness    Jeremie Wnyn is a 73 y.o. male who  presents for an Subsequent Wellness Visit.    The following portions of the patient's history were reviewed and updated as appropriate: allergies, current medications, past family history, past medical history, past social history, past surgical history and problem list.    Outpatient Medications Prior to Visit   Medication Sig Dispense Refill   • albuterol (PROVENTIL HFA;VENTOLIN HFA) 108 (90 Base) MCG/ACT inhaler Inhale 2 puffs Every 4 (Four) Hours As Needed for Wheezing. 1 inhaler 1   • atorvastatin (LIPITOR) 10 MG tablet TAKE ONE TABLET BY MOUTH DAILY FOR CHOLESTEROL 30 tablet 3   • cetirizine (zyrTEC) 10 MG tablet Take 10 mg by mouth Daily.     • ethambutol (MYAMBUTOL) 400 MG tablet Take 1,600 mg by mouth Daily.     • famotidine (PEPCID) 20 MG tablet Take 1 tablet by mouth 2 (Two) Times a Day As Needed for Indigestion or Heartburn. 60 tablet 0   • fluticasone (FLONASE ALLERGY RELIEF) 50 MCG/ACT nasal spray Flonase Allergy Relief 50 mcg/actuation nasal spray,suspension   Daily     • furosemide (LASIX) 20 MG tablet TAKE ONE TABLET BY MOUTH DAILY 30 tablet 3   • ipratropium-albuterol (DUO-NEB) 0.5-2.5 mg/mL nebulizer Take 3 mL by nebulization 4 (Four) Times a Day. 360 mL 0   • losartan (COZAAR) 100 MG tablet TAKE ONE TABLET BY MOUTH DAILY ** REPLACES VALSARTIN** 30 tablet 3   • Multiple Vitamins-Minerals (CENTROVITE) tablet Take 1 tablet by mouth Daily.     • nystatin (MYCOSTATIN) 046395 UNIT/ML suspension      • ondansetron (ZOFRAN) 4 MG tablet Take 4 mg by mouth As Needed.     • ondansetron ODT (ZOFRAN-ODT) 8 MG disintegrating tablet Take 8 mg by mouth As Needed.     • potassium chloride (K-DUR,KLOR-CON) 10 MEQ CR tablet TAKE ONE TABLET BY MOUTH DAILY 30 tablet 5   • potassium chloride (K-DUR,KLOR-CON) 10 MEQ CR tablet TAKE ONE TABLET BY MOUTH DAILY 30 tablet 2   • rifAMPin (RIFADIN) 300 MG capsule Take 300 mg by mouth 2 (Two) Times a Day.     • tiZANidine (ZANAFLEX) 4 MG tablet TAKE ONE TABLET BY MOUTH EVERY  NIGHT AT BEDTIME AS NEEDED FOR LEG CRAMPS 30 tablet 3   • tiZANidine (ZANAFLEX) 4 MG tablet TAKE ONE TABLET BY MOUTH EVERY NIGHT AT BEDTIME AS NEEDED FOR LEG CRAMPS 30 tablet 3   • traMADol (ULTRAM) 50 MG tablet Take 50 mg by mouth Every 6 (Six) Hours As Needed for Moderate Pain .     • azithromycin (ZITHROMAX) 500 MG tablet Take 500 mg by mouth Daily.       No facility-administered medications prior to visit.        Patient Active Problem List   Diagnosis   • Generalized osteoarthritis   • Hyperlipemia   • Hypertension   • PVC's (premature ventricular contractions)   • Arthralgia of hip   • Dyspnea on exertion   • Obesity   • S/P total knee arthroplasty, left   • Prediabetes   • Chronic diastolic heart failure (CMS/HCC)   • Ulcerative colitis (CMS/HCC)   • Interstitial lung disease (CMS/HCC)   • Bilateral sensorineural hearing loss   • Mycobacterium infection, atypical       Advance Care Planning:  has an advance directive - a copy HAS NOT been provided    Identification of Risk Factors:  Risk factors include: cardiovascular risk.    Review of Systems   Constitutional: Positive for fatigue. Negative for chills and fever.   HENT: Positive for congestion and postnasal drip. Negative for sore throat.    Respiratory: Positive for cough.    Cardiovascular: Negative for chest pain, palpitations and leg swelling.   Gastrointestinal: Negative for diarrhea, nausea and vomiting.   Endocrine: Negative for cold intolerance and heat intolerance.   Genitourinary: Positive for frequency. Negative for dysuria.   Musculoskeletal: Positive for arthralgias and joint swelling.        Right knee pain   Neurological: Negative for dizziness and syncope.   Psychiatric/Behavioral: Positive for sleep disturbance.       Compared to one year ago, the patient feels his physical health is worse.  Compared to one year ago, the patient feels his mental health is the same.    Objective       Physical Exam   Constitutional: He is oriented to  "person, place, and time. He appears well-developed and well-nourished. He is cooperative.   HENT:   Head: Normocephalic.   Right Ear: External ear normal.   Left Ear: External ear normal.   Nose: Nose normal.   Loudly thick postnasal drip   Eyes: Conjunctivae are normal. Pupils are equal, round, and reactive to light. No scleral icterus.   Neck: Neck supple. Carotid bruit is not present. No thyromegaly present.   Cardiovascular: Normal rate and regular rhythm.   Pulmonary/Chest: Effort normal. He has wheezes.   A few scattered upper airway rhonchi no rales   Abdominal: There is no hepatosplenomegaly.   Musculoskeletal: Normal range of motion.   Neurological: He is alert and oriented to person, place, and time.   No focal deficits no lateralizing signs   Skin: Skin is warm and dry. No rash noted.   Psychiatric: He has a normal mood and affect. Cognition and memory are normal.   Nursing note and vitals reviewed.      Vitals:    12/13/18 0917   BP: 122/82   Pulse: 70   Resp: 16   Temp: 98.1 °F (36.7 °C)   SpO2: 96%   Weight: 107 kg (236 lb)   Height: 177.8 cm (70\")   PainSc: 0-No pain       Patient's Body mass index is 33.86 kg/m². BMI is above normal parameters. Recommendations include: educational material.      Assessment/Plan   Patient Self-Management and Personalized Health Advice  The patient has been provided with information about: prevention of cardiac or vascular disease and preventive services including:   · Diabetes screening, see lab orders, Prostate cancer screening discussed, Screening electrocardiogram.    Visit Diagnoses:    ICD-10-CM ICD-9-CM   1. Medicare annual wellness visit, subsequent Z00.00 V70.0   2. Essential hypertension I10 401.9   3. Familial hypercholesterolemia E78.01 272.0   4. Vitamin D deficiency E55.9 268.9   5. Subacute maxillary sinusitis J01.00 461.0       Orders Placed This Encounter   Procedures   • Comprehensive Metabolic Panel   • Lipid Panel   • TSH   • Vitamin D 25 Hydroxy "   • POC Urinalysis Dipstick, Automated   • ECG 12 Lead     Order Specific Question:   Reason for Exam:     Answer:   htn   • CBC & Differential     Order Specific Question:   Manual Differential     Answer:   No       Outpatient Encounter Medications as of 12/13/2018   Medication Sig Dispense Refill   • albuterol (PROVENTIL HFA;VENTOLIN HFA) 108 (90 Base) MCG/ACT inhaler Inhale 2 puffs Every 4 (Four) Hours As Needed for Wheezing. 1 inhaler 1   • atorvastatin (LIPITOR) 10 MG tablet TAKE ONE TABLET BY MOUTH DAILY FOR CHOLESTEROL 30 tablet 3   • cetirizine (zyrTEC) 10 MG tablet Take 10 mg by mouth Daily.     • ethambutol (MYAMBUTOL) 400 MG tablet Take 1,600 mg by mouth Daily.     • famotidine (PEPCID) 20 MG tablet Take 1 tablet by mouth 2 (Two) Times a Day As Needed for Indigestion or Heartburn. 60 tablet 0   • fluticasone (FLONASE ALLERGY RELIEF) 50 MCG/ACT nasal spray Flonase Allergy Relief 50 mcg/actuation nasal spray,suspension   Daily     • furosemide (LASIX) 20 MG tablet TAKE ONE TABLET BY MOUTH DAILY 30 tablet 3   • ipratropium-albuterol (DUO-NEB) 0.5-2.5 mg/mL nebulizer Take 3 mL by nebulization 4 (Four) Times a Day. 360 mL 0   • losartan (COZAAR) 100 MG tablet TAKE ONE TABLET BY MOUTH DAILY ** REPLACES VALSARTIN** 30 tablet 3   • Multiple Vitamins-Minerals (CENTROVITE) tablet Take 1 tablet by mouth Daily.     • nystatin (MYCOSTATIN) [id number] UNIT/ML suspension      • ondansetron (ZOFRAN) 4 MG tablet Take 4 mg by mouth As Needed.     • ondansetron ODT (ZOFRAN-ODT) 8 MG disintegrating tablet Take 8 mg by mouth As Needed.     • potassium chloride (K-DUR,KLOR-CON) 10 MEQ CR tablet TAKE ONE TABLET BY MOUTH DAILY 30 tablet 5   • potassium chloride (K-DUR,KLOR-CON) 10 MEQ CR tablet TAKE ONE TABLET BY MOUTH DAILY 30 tablet 2   • rifAMPin (RIFADIN) 300 MG capsule Take 300 mg by mouth 2 (Two) Times a Day.     • tiZANidine (ZANAFLEX) 4 MG tablet TAKE ONE TABLET BY MOUTH EVERY NIGHT AT BEDTIME AS NEEDED FOR LEG CRAMPS 30  tablet 3   • tiZANidine (ZANAFLEX) 4 MG tablet TAKE ONE TABLET BY MOUTH EVERY NIGHT AT BEDTIME AS NEEDED FOR LEG CRAMPS 30 tablet 3   • traMADol (ULTRAM) 50 MG tablet Take 50 mg by mouth Every 6 (Six) Hours As Needed for Moderate Pain .     • levoFLOXacin (LEVAQUIN) 500 MG tablet Take 1 tablet by mouth Daily. 10 tablet 0   • tiotropium bromide-olodaterol (STIOLTO RESPIMAT) 2.5-2.5 MCG/ACT aerosol solution inhaler Inhale 2 puffs Daily. 1 inhaler 0   • [DISCONTINUED] azithromycin (ZITHROMAX) 500 MG tablet Take 500 mg by mouth Daily.       No facility-administered encounter medications on file as of 12/13/2018.        Reviewed use of high risk medication in the elderly: yes  Reviewed for potential of harmful drug interactions in the elderly: not applicable    Follow Up:  No Follow-up on file.     An After Visit Summary and PPPS with all of these plans were given to the patient.    See form       ECG 12 Lead  Date/Time: 12/13/2018 5:38 PM  Performed by: Aaron Trejo MD  Authorized by: Aaron Trejo MD   Comparison: compared with previous ECG   Similar to previous ECG  Rhythm: sinus rhythm  Rate: normal  BPM: 67  Conduction: left bundle branch block  ST Segments: ST segments normal  T Waves: T waves normal  QRS axis: left  Clinical impression: abnormal ECG  Comments: HTN

## 2019-01-15 ENCOUNTER — LAB REQUISITION (OUTPATIENT)
Dept: LAB | Facility: HOSPITAL | Age: 74
End: 2019-01-15

## 2019-01-15 DIAGNOSIS — J84.89 OTHER SPECIFIED INTERSTITIAL PULMONARY DISEASES (HCC): ICD-10-CM

## 2019-01-15 DIAGNOSIS — Z00.00 ROUTINE GENERAL MEDICAL EXAMINATION AT A HEALTH CARE FACILITY: ICD-10-CM

## 2019-01-15 DIAGNOSIS — J18.9 PNEUMONIA: ICD-10-CM

## 2019-01-15 DIAGNOSIS — R11.0 NAUSEA: ICD-10-CM

## 2019-01-15 DIAGNOSIS — R05.9 COUGH: ICD-10-CM

## 2019-01-15 DIAGNOSIS — A31.0 PULMONARY MYCOBACTERIAL INFECTION (HCC): ICD-10-CM

## 2019-01-15 LAB
ALBUMIN SERPL-MCNC: 3.95 G/DL (ref 3.2–4.8)
ALBUMIN/GLOB SERPL: 1.8 G/DL (ref 1.5–2.5)
ALP SERPL-CCNC: 59 U/L (ref 25–100)
ALT SERPL W P-5'-P-CCNC: 13 U/L (ref 7–40)
ANION GAP SERPL CALCULATED.3IONS-SCNC: 5 MMOL/L (ref 3–11)
AST SERPL-CCNC: 17 U/L (ref 0–33)
BASOPHILS # BLD AUTO: 0.03 10*3/MM3 (ref 0–0.2)
BASOPHILS NFR BLD AUTO: 0.6 % (ref 0–1)
BILIRUB SERPL-MCNC: 0.2 MG/DL (ref 0.3–1.2)
BUN BLD-MCNC: 17 MG/DL (ref 9–23)
BUN/CREAT SERPL: 16 (ref 7–25)
CALCIUM SPEC-SCNC: 8.7 MG/DL (ref 8.7–10.4)
CHLORIDE SERPL-SCNC: 108 MMOL/L (ref 99–109)
CO2 SERPL-SCNC: 27 MMOL/L (ref 20–31)
CREAT BLD-MCNC: 1.06 MG/DL (ref 0.6–1.3)
CRP SERPL-MCNC: 0.11 MG/DL (ref 0–1)
DEPRECATED RDW RBC AUTO: 47.1 FL (ref 37–54)
EOSINOPHIL # BLD AUTO: 0.27 10*3/MM3 (ref 0–0.3)
EOSINOPHIL NFR BLD AUTO: 5.3 % (ref 0–3)
ERYTHROCYTE [DISTWIDTH] IN BLOOD BY AUTOMATED COUNT: 17 % (ref 11.3–14.5)
ERYTHROCYTE [SEDIMENTATION RATE] IN BLOOD: 22 MM/HR (ref 0–20)
GFR SERPL CREATININE-BSD FRML MDRD: 68 ML/MIN/1.73
GLOBULIN UR ELPH-MCNC: 2.3 GM/DL
GLUCOSE BLD-MCNC: 151 MG/DL (ref 70–100)
HCT VFR BLD AUTO: 30.6 % (ref 38.9–50.9)
HGB BLD-MCNC: 8.3 G/DL (ref 13.1–17.5)
IMM GRANULOCYTES # BLD AUTO: 0.01 10*3/MM3 (ref 0–0.03)
IMM GRANULOCYTES NFR BLD AUTO: 0.2 % (ref 0–0.6)
LYMPHOCYTES # BLD AUTO: 1.4 10*3/MM3 (ref 0.6–4.8)
LYMPHOCYTES NFR BLD AUTO: 27.2 % (ref 24–44)
MCH RBC QN AUTO: 20.5 PG (ref 27–31)
MCHC RBC AUTO-ENTMCNC: 27.1 G/DL (ref 32–36)
MCV RBC AUTO: 75.6 FL (ref 80–99)
MONOCYTES # BLD AUTO: 0.55 10*3/MM3 (ref 0–1)
MONOCYTES NFR BLD AUTO: 10.7 % (ref 0–12)
NEUTROPHILS # BLD AUTO: 2.89 10*3/MM3 (ref 1.5–8.3)
NEUTROPHILS NFR BLD AUTO: 56.2 % (ref 41–71)
PLAT MORPH BLD: NORMAL
PLATELET # BLD AUTO: 215 10*3/MM3 (ref 150–450)
PMV BLD AUTO: 10.7 FL (ref 6–12)
POTASSIUM BLD-SCNC: 4.5 MMOL/L (ref 3.5–5.5)
PROT SERPL-MCNC: 6.2 G/DL (ref 5.7–8.2)
RBC # BLD AUTO: 4.05 10*6/MM3 (ref 4.2–5.76)
RBC MORPH BLD: NORMAL
SODIUM BLD-SCNC: 140 MMOL/L (ref 132–146)
WBC MORPH BLD: NORMAL
WBC NRBC COR # BLD: 5.14 10*3/MM3 (ref 3.5–10.8)

## 2019-01-15 PROCEDURE — 86140 C-REACTIVE PROTEIN: CPT | Performed by: INTERNAL MEDICINE

## 2019-01-15 PROCEDURE — 85652 RBC SED RATE AUTOMATED: CPT | Performed by: INTERNAL MEDICINE

## 2019-01-15 PROCEDURE — 85025 COMPLETE CBC W/AUTO DIFF WBC: CPT | Performed by: INTERNAL MEDICINE

## 2019-01-15 PROCEDURE — 85007 BL SMEAR W/DIFF WBC COUNT: CPT | Performed by: INTERNAL MEDICINE

## 2019-01-15 PROCEDURE — 87116 MYCOBACTERIA CULTURE: CPT | Performed by: INTERNAL MEDICINE

## 2019-01-15 PROCEDURE — 87206 SMEAR FLUORESCENT/ACID STAI: CPT | Performed by: INTERNAL MEDICINE

## 2019-01-15 PROCEDURE — 36415 COLL VENOUS BLD VENIPUNCTURE: CPT | Performed by: INTERNAL MEDICINE

## 2019-01-15 PROCEDURE — 80053 COMPREHEN METABOLIC PANEL: CPT | Performed by: INTERNAL MEDICINE

## 2019-02-04 ENCOUNTER — LAB REQUISITION (OUTPATIENT)
Dept: LAB | Facility: HOSPITAL | Age: 74
End: 2019-02-04

## 2019-02-04 DIAGNOSIS — Z00.00 ROUTINE GENERAL MEDICAL EXAMINATION AT A HEALTH CARE FACILITY: ICD-10-CM

## 2019-02-04 LAB
ALBUMIN SERPL-MCNC: 3.83 G/DL (ref 3.2–4.8)
ALBUMIN/GLOB SERPL: 1.7 G/DL (ref 1.5–2.5)
ALP SERPL-CCNC: 57 U/L (ref 25–100)
ALT SERPL W P-5'-P-CCNC: 15 U/L (ref 7–40)
ANION GAP SERPL CALCULATED.3IONS-SCNC: 5 MMOL/L (ref 3–11)
AST SERPL-CCNC: 17 U/L (ref 0–33)
BASOPHILS # BLD AUTO: 0.05 10*3/MM3 (ref 0–0.2)
BASOPHILS NFR BLD AUTO: 0.9 % (ref 0–1)
BILIRUB SERPL-MCNC: 0.2 MG/DL (ref 0.3–1.2)
BUN BLD-MCNC: 14 MG/DL (ref 9–23)
BUN/CREAT SERPL: 12.1 (ref 7–25)
CALCIUM SPEC-SCNC: 8.6 MG/DL (ref 8.7–10.4)
CHLORIDE SERPL-SCNC: 107 MMOL/L (ref 99–109)
CO2 SERPL-SCNC: 27 MMOL/L (ref 20–31)
CREAT BLD-MCNC: 1.16 MG/DL (ref 0.6–1.3)
CRP SERPL-MCNC: 0.06 MG/DL (ref 0–1)
DEPRECATED RDW RBC AUTO: 46 FL (ref 37–54)
EOSINOPHIL # BLD AUTO: 0.4 10*3/MM3 (ref 0–0.3)
EOSINOPHIL NFR BLD AUTO: 7.1 % (ref 0–3)
ERYTHROCYTE [DISTWIDTH] IN BLOOD BY AUTOMATED COUNT: 16.8 % (ref 11.3–14.5)
ERYTHROCYTE [SEDIMENTATION RATE] IN BLOOD: 20 MM/HR (ref 0–20)
GFR SERPL CREATININE-BSD FRML MDRD: 61 ML/MIN/1.73
GLOBULIN UR ELPH-MCNC: 2.3 GM/DL
GLUCOSE BLD-MCNC: 160 MG/DL (ref 70–100)
HCT VFR BLD AUTO: 30.9 % (ref 38.9–50.9)
HGB BLD-MCNC: 8.5 G/DL (ref 13.1–17.5)
IMM GRANULOCYTES # BLD AUTO: 0.01 10*3/MM3 (ref 0–0.03)
IMM GRANULOCYTES NFR BLD AUTO: 0.2 % (ref 0–0.6)
LYMPHOCYTES # BLD AUTO: 1.11 10*3/MM3 (ref 0.6–4.8)
LYMPHOCYTES NFR BLD AUTO: 19.8 % (ref 24–44)
MCH RBC QN AUTO: 20.5 PG (ref 27–31)
MCHC RBC AUTO-ENTMCNC: 27.5 G/DL (ref 32–36)
MCV RBC AUTO: 74.5 FL (ref 80–99)
MONOCYTES # BLD AUTO: 0.57 10*3/MM3 (ref 0–1)
MONOCYTES NFR BLD AUTO: 10.1 % (ref 0–12)
NEUTROPHILS # BLD AUTO: 3.49 10*3/MM3 (ref 1.5–8.3)
NEUTROPHILS NFR BLD AUTO: 62.1 % (ref 41–71)
PLATELET # BLD AUTO: 219 10*3/MM3 (ref 150–450)
PMV BLD AUTO: 10.5 FL (ref 6–12)
POTASSIUM BLD-SCNC: 4.1 MMOL/L (ref 3.5–5.5)
PROT SERPL-MCNC: 6.1 G/DL (ref 5.7–8.2)
RBC # BLD AUTO: 4.15 10*6/MM3 (ref 4.2–5.76)
SODIUM BLD-SCNC: 139 MMOL/L (ref 132–146)
WBC NRBC COR # BLD: 5.62 10*3/MM3 (ref 3.5–10.8)

## 2019-02-04 PROCEDURE — 80053 COMPREHEN METABOLIC PANEL: CPT | Performed by: INTERNAL MEDICINE

## 2019-02-04 PROCEDURE — 86140 C-REACTIVE PROTEIN: CPT | Performed by: INTERNAL MEDICINE

## 2019-02-04 PROCEDURE — 85025 COMPLETE CBC W/AUTO DIFF WBC: CPT | Performed by: INTERNAL MEDICINE

## 2019-02-04 PROCEDURE — 85652 RBC SED RATE AUTOMATED: CPT | Performed by: INTERNAL MEDICINE

## 2019-02-04 PROCEDURE — 36415 COLL VENOUS BLD VENIPUNCTURE: CPT | Performed by: INTERNAL MEDICINE

## 2019-02-11 ENCOUNTER — OFFICE VISIT (OUTPATIENT)
Dept: PULMONOLOGY | Facility: CLINIC | Age: 74
End: 2019-02-11

## 2019-02-11 VITALS
HEART RATE: 76 BPM | WEIGHT: 239.4 LBS | DIASTOLIC BLOOD PRESSURE: 102 MMHG | TEMPERATURE: 98 F | OXYGEN SATURATION: 96 % | HEIGHT: 70 IN | BODY MASS INDEX: 34.27 KG/M2 | SYSTOLIC BLOOD PRESSURE: 172 MMHG

## 2019-02-11 DIAGNOSIS — A31.9 MYCOBACTERIUM INFECTION, ATYPICAL: ICD-10-CM

## 2019-02-11 DIAGNOSIS — J84.9 INTERSTITIAL LUNG DISEASE (HCC): Primary | ICD-10-CM

## 2019-02-11 PROCEDURE — 99213 OFFICE O/P EST LOW 20 MIN: CPT | Performed by: NURSE PRACTITIONER

## 2019-02-11 RX ORDER — AZITHROMYCIN 500 MG/1
TABLET, FILM COATED ORAL
COMMUNITY
Start: 2019-01-29 | End: 2019-04-03

## 2019-02-11 NOTE — PROGRESS NOTES
"[facility] Follow up    CHIEF COMPLAINT    ILD, abnormal Bronchoscopy     Subjective   HISTORY OF PRESENT ILLNESS    Jeremie Wynn is a 73 y.o.male here today for Routine follow-up.  I last saw him in August 2018.We follow him chronically in the office for some interstitial lung disease as well as some noncaseating granulomas on transbronchial biopsies.  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    Dr Lyon performed a bronchoscopy with bronchoalveolar lavage and transbronchial biopsies on 3/22/2018 for worsening ground glass opacities with mild interstitial lung disease on his CT.  Transbronchial biopsy showed a noncaseating granuloma.  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    Since then he has followed up with Dr. Palacios with infectious disease and is on long-term treatment for atypical mycobacterium.  He does have a difficulty time tolerating medication, with nausea.    He also has significant history of reflux with nightly awakening choking secondary to the reflux.   However he does continue to have a large hiatal hernia with a patulous esophagus likely causing chronic aspiration.He continues to have episodes of dysphagia especially when eating large meals.  He describes it as \"hitting the wall\".  If he doesn't stop eating then he will regurgitate some of his food.  He does follow-up with gastroenterology.             Patient Active Problem List   Diagnosis   • Generalized osteoarthritis   • Hyperlipemia   • Hypertension   • Arthralgia of hip   • Dyspnea on exertion   • Obesity   • S/P total knee arthroplasty, left   • Prediabetes   • Ulcerative colitis (CMS/HCC)   • Interstitial lung disease (CMS/HCC)   • Bilateral sensorineural hearing loss   • Mycobacterium infection, atypical       Allergies   Allergen Reactions   • Contrast " Dye Anaphylaxis   • Mesalamine Other (See Comments)     Lung toxicity (suspected)   • Penicillins Anaphylaxis and Other (See Comments)   • Sulfa Antibiotics Shortness Of Breath and Rash   • Morphine And Related Other (See Comments)     Told in 1964-can't remember reaction        Current Outpatient Medications:   •  albuterol (PROVENTIL HFA;VENTOLIN HFA) 108 (90 Base) MCG/ACT inhaler, Inhale 2 puffs Every 4 (Four) Hours As Needed for Wheezing., Disp: 1 inhaler, Rfl: 1  •  atorvastatin (LIPITOR) 10 MG tablet, TAKE ONE TABLET BY MOUTH DAILY FOR CHOLESTEROL, Disp: 30 tablet, Rfl: 3  •  azithromycin (ZITHROMAX) 500 MG tablet, , Disp: , Rfl:   •  cetirizine (zyrTEC) 10 MG tablet, Take 10 mg by mouth Daily., Disp: , Rfl:   •  ethambutol (MYAMBUTOL) 400 MG tablet, Take 1,600 mg by mouth Daily., Disp: , Rfl:   •  famotidine (PEPCID) 20 MG tablet, Take 1 tablet by mouth 2 (Two) Times a Day As Needed for Indigestion or Heartburn., Disp: 60 tablet, Rfl: 0  •  fluticasone (FLONASE ALLERGY RELIEF) 50 MCG/ACT nasal spray, Flonase Allergy Relief 50 mcg/actuation nasal spray,suspension  Daily, Disp: , Rfl:   •  furosemide (LASIX) 20 MG tablet, TAKE ONE TABLET BY MOUTH DAILY, Disp: 30 tablet, Rfl: 3  •  ipratropium-albuterol (DUO-NEB) 0.5-2.5 mg/mL nebulizer, Take 3 mL by nebulization 4 (Four) Times a Day., Disp: 360 mL, Rfl: 0  •  levoFLOXacin (LEVAQUIN) 500 MG tablet, Take 1 tablet by mouth Daily., Disp: 10 tablet, Rfl: 0  •  losartan (COZAAR) 100 MG tablet, TAKE ONE TABLET BY MOUTH DAILY ** REPLACES VALSARTIN**, Disp: 30 tablet, Rfl: 3  •  Multiple Vitamins-Minerals (CENTROVITE) tablet, Take 1 tablet by mouth Daily., Disp: , Rfl:   •  nystatin (MYCOSTATIN) 838431 UNIT/ML suspension, , Disp: , Rfl:   •  ondansetron (ZOFRAN) 4 MG tablet, Take 4 mg by mouth As Needed., Disp: , Rfl:   •  ondansetron ODT (ZOFRAN-ODT) 8 MG disintegrating tablet, Take 8 mg by mouth As Needed., Disp: , Rfl:   •  potassium chloride (K-DUR,KLOR-CON) 10 MEQ  CR tablet, TAKE ONE TABLET BY MOUTH DAILY, Disp: 30 tablet, Rfl: 5  •  potassium chloride (K-DUR,KLOR-CON) 10 MEQ CR tablet, TAKE ONE TABLET BY MOUTH DAILY, Disp: 30 tablet, Rfl: 2  •  rifAMPin (RIFADIN) 300 MG capsule, Take 300 mg by mouth 2 (Two) Times a Day., Disp: , Rfl:   •  tiotropium bromide-olodaterol (STIOLTO RESPIMAT) 2.5-2.5 MCG/ACT aerosol solution inhaler, Inhale 2 puffs Daily., Disp: 1 inhaler, Rfl: 0  •  tiZANidine (ZANAFLEX) 4 MG tablet, TAKE ONE TABLET BY MOUTH EVERY NIGHT AT BEDTIME AS NEEDED FOR LEG CRAMPS, Disp: 30 tablet, Rfl: 3  •  tiZANidine (ZANAFLEX) 4 MG tablet, TAKE ONE TABLET BY MOUTH EVERY NIGHT AT BEDTIME AS NEEDED FOR LEG CRAMPS, Disp: 30 tablet, Rfl: 3  •  traMADol (ULTRAM) 50 MG tablet, Take 50 mg by mouth Every 6 (Six) Hours As Needed for Moderate Pain ., Disp: , Rfl:   MEDICATION LIST AND ALLERGIES REVIEWED.    Social History     Tobacco Use   • Smoking status: Former Smoker     Years: 10.00     Types: Pipe, Cigars     Last attempt to quit: 1967     Years since quittin.1   • Smokeless tobacco: Never Used   • Tobacco comment: smoked a pipe and cigars for during 20's   Substance Use Topics   • Alcohol use: No   • Drug use: No       FAMILY AND SOCIAL HISTORY REVIEWED.    Review of Systems   Constitutional: Positive for fatigue. Negative for chills, fever and unexpected weight change.   HENT: Positive for congestion. Negative for nosebleeds, postnasal drip, rhinorrhea, sinus pressure and trouble swallowing.    Respiratory: Positive for cough and shortness of breath. Negative for chest tightness and wheezing.    Cardiovascular: Negative for chest pain and leg swelling.   Gastrointestinal: Positive for nausea. Negative for abdominal pain, constipation, diarrhea and vomiting.   Genitourinary: Negative for dysuria, frequency, hematuria and urgency.   Musculoskeletal: Negative for myalgias.   Neurological: Negative for dizziness, weakness, numbness and headaches.   All other systems  "reviewed and are negative.  .  Objective   BP (!) 172/102 (BP Location: Left arm, Patient Position: Sitting)   Pulse 76   Temp 98 °F (36.7 °C)   Ht 177.8 cm (70\")   Wt 109 kg (239 lb 6.4 oz)   SpO2 96% Comment: room resting temp  BMI 34.35 kg/m²     Immunization History   Administered Date(s) Administered   • Flu Vaccine High Dose PF 65YR+ 11/06/2018   • Pneumococcal Conjugate 13-Valent (PCV13) 11/06/2018       Physical Exam   Constitutional: He is oriented to person, place, and time. He appears well-developed and well-nourished.   HENT:   Head: Normocephalic and atraumatic.   Eyes: EOM are normal. Pupils are equal, round, and reactive to light.   Neck: Normal range of motion. Neck supple.   Cardiovascular: Normal rate and regular rhythm.   No murmur heard.  Pulmonary/Chest: Effort normal and breath sounds normal. No respiratory distress. He has no wheezes. He has no rales.   Abdominal: Soft. Bowel sounds are normal. He exhibits no distension.   Musculoskeletal: Normal range of motion. He exhibits no edema.   Neurological: He is alert and oriented to person, place, and time.   Skin: Skin is warm and dry. No erythema.   Psychiatric: He has a normal mood and affect. His behavior is normal.   Vitals reviewed.        RESULTS    PFTS in the office today, read by me:    10/23/2018  AFB Culture Mycobacterium fortuitum Critical          07/03/2018  AFB Culture Mycobacterium avium Critical          03/22/2018 - Bronch  AFB Culture       Mycobacterium avium complex Critical            CT Chest 6/20/2018  Extended lung windows without  focal groundglass opacification or consolidation. Minimal subsegmental  dependent atelectasis. Calcified granuloma right upper lobe.    Assessment/Plan     PROBLEM LIST    Problem List Items Addressed This Visit        Respiratory    Interstitial lung disease (CMS/HCC) - Primary       Other    Mycobacterium infection, atypical    Relevant Medications    azithromycin (ZITHROMAX) 500 MG " tablet            DISCUSSION    Continue follow-up with Dr. Palacios for treatment of his Mycobacterium.    Continue follow-up with gastroenterology for his reflux, dysphagia, and hernia.    It may be helpful to follow-up on his CT scan in the future as well.    Follow-up with us as needed.    I spent 15 minutes with the patient. I spent > 50% percent of this time counseling and discussing diagnostic testing, evaluation, current status and management.    Karishma Massey, APRN  02/11/201911:44 AM  Electronically signed     Please note that portions of this note were completed with a voice recognition program. Efforts were made to edit the dictations, but occasionally words are mistranscribed.      CC: Aaron Trejo MD

## 2019-02-26 LAB
MYCOBACTERIUM SPEC CULT: ABNORMAL
NIGHT BLUE STAIN TISS: ABNORMAL

## 2019-02-26 RX ORDER — LOSARTAN POTASSIUM 100 MG/1
TABLET ORAL
Qty: 90 TABLET | Refills: 0 | Status: SHIPPED | OUTPATIENT
Start: 2019-02-26 | End: 2019-07-29

## 2019-02-26 RX ORDER — ATORVASTATIN CALCIUM 10 MG/1
TABLET, FILM COATED ORAL
Qty: 90 TABLET | Refills: 0 | Status: SHIPPED | OUTPATIENT
Start: 2019-02-26 | End: 2019-05-16 | Stop reason: SDUPTHER

## 2019-02-26 RX ORDER — FUROSEMIDE 20 MG/1
TABLET ORAL
Qty: 90 TABLET | Refills: 0 | Status: SHIPPED | OUTPATIENT
Start: 2019-02-26 | End: 2019-05-16 | Stop reason: SDUPTHER

## 2019-02-26 RX ORDER — POTASSIUM CHLORIDE 750 MG/1
TABLET, EXTENDED RELEASE ORAL
Qty: 90 TABLET | Refills: 0 | Status: SHIPPED | OUTPATIENT
Start: 2019-02-26 | End: 2019-07-29

## 2019-02-26 RX ORDER — TIZANIDINE 4 MG/1
TABLET ORAL
Qty: 68 TABLET | Refills: 1 | Status: SHIPPED | OUTPATIENT
Start: 2019-02-26 | End: 2019-07-29

## 2019-02-27 RX ORDER — POTASSIUM CHLORIDE 750 MG/1
TABLET, EXTENDED RELEASE ORAL
Qty: 30 TABLET | Refills: 3 | OUTPATIENT
Start: 2019-02-27

## 2019-02-27 RX ORDER — ATORVASTATIN CALCIUM 10 MG/1
TABLET, FILM COATED ORAL
Qty: 27 TABLET | Refills: 0 | OUTPATIENT
Start: 2019-02-27

## 2019-02-27 RX ORDER — FUROSEMIDE 20 MG/1
TABLET ORAL
Qty: 30 TABLET | Refills: 4 | OUTPATIENT
Start: 2019-02-27

## 2019-02-27 RX ORDER — LOSARTAN POTASSIUM 100 MG/1
TABLET ORAL
Qty: 30 TABLET | Refills: 1 | OUTPATIENT
Start: 2019-02-27

## 2019-03-19 ENCOUNTER — TRANSCRIBE ORDERS (OUTPATIENT)
Dept: ADMINISTRATIVE | Facility: HOSPITAL | Age: 74
End: 2019-03-19

## 2019-03-19 ENCOUNTER — LAB REQUISITION (OUTPATIENT)
Dept: LAB | Facility: HOSPITAL | Age: 74
End: 2019-03-19

## 2019-03-19 DIAGNOSIS — A31.0 PULMONARY DISEASE DUE TO MYCOBACTERIA (HCC): Primary | ICD-10-CM

## 2019-03-19 DIAGNOSIS — Z00.00 ROUTINE GENERAL MEDICAL EXAMINATION AT A HEALTH CARE FACILITY: ICD-10-CM

## 2019-03-19 LAB
ALBUMIN SERPL-MCNC: 4 G/DL (ref 3.2–4.8)
ALBUMIN/GLOB SERPL: 1.9 G/DL (ref 1.5–2.5)
ALP SERPL-CCNC: 68 U/L (ref 25–100)
ALT SERPL W P-5'-P-CCNC: 16 U/L (ref 7–40)
ANION GAP SERPL CALCULATED.3IONS-SCNC: 10 MMOL/L (ref 3–11)
AST SERPL-CCNC: 20 U/L (ref 0–33)
BILIRUB SERPL-MCNC: 0.3 MG/DL (ref 0.3–1.2)
BUN BLD-MCNC: 18 MG/DL (ref 9–23)
BUN/CREAT SERPL: 19.4 (ref 7–25)
CALCIUM SPEC-SCNC: 8.8 MG/DL (ref 8.7–10.4)
CHLORIDE SERPL-SCNC: 107 MMOL/L (ref 99–109)
CO2 SERPL-SCNC: 26 MMOL/L (ref 20–31)
CREAT BLD-MCNC: 0.93 MG/DL (ref 0.6–1.3)
CRP SERPL-MCNC: 0.14 MG/DL (ref 0–1)
DEPRECATED RDW RBC AUTO: 48.1 FL (ref 37–54)
ERYTHROCYTE [DISTWIDTH] IN BLOOD BY AUTOMATED COUNT: 18 % (ref 11.3–14.5)
ERYTHROCYTE [SEDIMENTATION RATE] IN BLOOD: 24 MM/HR (ref 0–20)
GFR SERPL CREATININE-BSD FRML MDRD: 79 ML/MIN/1.73
GLOBULIN UR ELPH-MCNC: 2.1 GM/DL
GLUCOSE BLD-MCNC: 98 MG/DL (ref 70–100)
HCT VFR BLD AUTO: 32.3 % (ref 38.9–50.9)
HGB BLD-MCNC: 9.5 G/DL (ref 13.1–17.5)
MCH RBC QN AUTO: 21.4 PG (ref 27–31)
MCHC RBC AUTO-ENTMCNC: 29.4 G/DL (ref 32–36)
MCV RBC AUTO: 72.7 FL (ref 80–99)
PLATELET # BLD AUTO: 208 10*3/MM3 (ref 150–450)
PMV BLD AUTO: 9.9 FL (ref 6–12)
POTASSIUM BLD-SCNC: 4.4 MMOL/L (ref 3.5–5.5)
PROT SERPL-MCNC: 6.1 G/DL (ref 5.7–8.2)
RBC # BLD AUTO: 4.44 10*6/MM3 (ref 4.2–5.76)
SODIUM BLD-SCNC: 143 MMOL/L (ref 132–146)
WBC NRBC COR # BLD: 5.73 10*3/MM3 (ref 3.5–10.8)

## 2019-03-19 PROCEDURE — 86140 C-REACTIVE PROTEIN: CPT | Performed by: INTERNAL MEDICINE

## 2019-03-19 PROCEDURE — 85027 COMPLETE CBC AUTOMATED: CPT | Performed by: INTERNAL MEDICINE

## 2019-03-19 PROCEDURE — 80053 COMPREHEN METABOLIC PANEL: CPT | Performed by: INTERNAL MEDICINE

## 2019-03-19 PROCEDURE — 85652 RBC SED RATE AUTOMATED: CPT | Performed by: INTERNAL MEDICINE

## 2019-04-03 ENCOUNTER — OFFICE VISIT (OUTPATIENT)
Dept: FAMILY MEDICINE CLINIC | Facility: CLINIC | Age: 74
End: 2019-04-03

## 2019-04-03 VITALS
HEIGHT: 70 IN | RESPIRATION RATE: 16 BRPM | DIASTOLIC BLOOD PRESSURE: 70 MMHG | BODY MASS INDEX: 34.13 KG/M2 | WEIGHT: 238.4 LBS | OXYGEN SATURATION: 96 % | HEART RATE: 69 BPM | SYSTOLIC BLOOD PRESSURE: 112 MMHG

## 2019-04-03 DIAGNOSIS — I10 ESSENTIAL HYPERTENSION: Chronic | ICD-10-CM

## 2019-04-03 DIAGNOSIS — G47.34 NOCTURNAL HYPOXIA: Primary | ICD-10-CM

## 2019-04-03 PROCEDURE — 99213 OFFICE O/P EST LOW 20 MIN: CPT | Performed by: FAMILY MEDICINE

## 2019-04-03 RX ORDER — POTASSIUM CHLORIDE 750 MG/1
TABLET, EXTENDED RELEASE ORAL
Qty: 30 TABLET | Refills: 3 | OUTPATIENT
Start: 2019-04-03

## 2019-04-03 NOTE — PROGRESS NOTES
"Subjective   Jeremie Wynn is a 73 y.o. male.     Shortness of Breath   This is a chronic (Requires oxygen supplementation at night) problem. The current episode started more than 1 year ago. The problem occurs intermittently. The problem has been unchanged. Pertinent negatives include no chest pain, coryza, headaches, hemoptysis, leg swelling, sore throat or vomiting. The patient has no known risk factors for DVT/PE. Treatments tried: Oxygen 2 L by nasal cannula at bedtime. His past medical history is significant for chronic lung disease.   Hypertension   This is a chronic problem. The current episode started more than 1 year ago. The problem is unchanged. The problem is controlled. Associated symptoms include shortness of breath. Pertinent negatives include no chest pain, headaches or palpitations. Risk factors for coronary artery disease include male gender, obesity and sedentary lifestyle. Past treatments include angiotensin blockers and direct vasodilators. Current antihypertension treatment includes angiotensin blockers, diuretics and lifestyle changes. There is no history of angina, kidney disease or CAD/MI.        The following portions of the patient's history were reviewed and updated as appropriate: allergies, current medications, past social history and problem list.    Review of Systems   Constitutional: Negative for activity change and fatigue.   HENT: Negative for congestion and sore throat.    Eyes: Negative for pain and visual disturbance.   Respiratory: Positive for shortness of breath. Negative for hemoptysis and chest tightness.    Cardiovascular: Negative for chest pain, palpitations and leg swelling.   Gastrointestinal: Negative for diarrhea, nausea and vomiting.   Genitourinary: Negative for dysuria and hematuria.   Musculoskeletal: Positive for arthralgias.   Neurological: Negative for dizziness, syncope and headaches.       Objective   /70   Pulse 69   Resp 16   Ht 177.8 cm (70\")   " Wt 108 kg (238 lb 6.4 oz)   SpO2 96%   BMI 34.21 kg/m²   Physical Exam   Constitutional: He is oriented to person, place, and time. He appears well-developed and well-nourished. He is cooperative.   HENT:   Head: Normocephalic.   Right Ear: External ear normal.   Left Ear: External ear normal.   Nose: Nose normal.   Mouth/Throat: Oropharynx is clear and moist.   Eyes: Conjunctivae are normal. Pupils are equal, round, and reactive to light. No scleral icterus.   Neck: Neck supple. No JVD present. Carotid bruit is not present. No thyromegaly present.   Cardiovascular: Normal rate and regular rhythm.   Pulmonary/Chest: Effort normal.   Distant breath sounds no wheezing no rales.   Abdominal: There is no hepatosplenomegaly.   Musculoskeletal: Normal range of motion. He exhibits no edema.   Lymphadenopathy:     He has no cervical adenopathy.   Neurological: He is alert and oriented to person, place, and time.   No focal deficits no lateralizing signs   Skin: Skin is warm and dry. No rash noted.   Psychiatric: He has a normal mood and affect. Cognition and memory are normal.   Nursing note and vitals reviewed.      Assessment/Plan   Problem List Items Addressed This Visit        Active Problems    Hypertension (Chronic)      Other Visit Diagnoses     Nocturnal hypoxia    -  Primary          No orders of the defined types were placed in this encounter.    Jeremie is a patient who needs nocturnal oxygen and this has been confirmed by overnight oximetry.  Forms for the oxygen company will be completed and forwarded back to the company.

## 2019-04-16 ENCOUNTER — HOSPITAL ENCOUNTER (OUTPATIENT)
Dept: CT IMAGING | Facility: HOSPITAL | Age: 74
Discharge: HOME | End: 2019-04-16
Admitting: INTERNAL MEDICINE

## 2019-04-16 DIAGNOSIS — A31.0 PULMONARY DISEASE DUE TO MYCOBACTERIA (HCC): ICD-10-CM

## 2019-04-16 PROCEDURE — 71250 CT THORAX DX C-: CPT

## 2019-05-07 ENCOUNTER — LAB REQUISITION (OUTPATIENT)
Dept: LAB | Facility: HOSPITAL | Age: 74
End: 2019-05-07

## 2019-05-07 DIAGNOSIS — Z00.00 ROUTINE GENERAL MEDICAL EXAMINATION AT A HEALTH CARE FACILITY: ICD-10-CM

## 2019-05-07 LAB
ALBUMIN SERPL-MCNC: 3.8 G/DL (ref 3.5–5.2)
ALBUMIN/GLOB SERPL: 1.2 G/DL
ALP SERPL-CCNC: 68 U/L (ref 39–117)
ALT SERPL W P-5'-P-CCNC: 10 U/L (ref 1–41)
ANION GAP SERPL CALCULATED.3IONS-SCNC: 13 MMOL/L
AST SERPL-CCNC: 17 U/L (ref 1–40)
BASOPHILS # BLD AUTO: 0.04 10*3/MM3 (ref 0–0.2)
BASOPHILS NFR BLD AUTO: 0.8 % (ref 0–1.5)
BILIRUB SERPL-MCNC: 0.2 MG/DL (ref 0.2–1.2)
BUN BLD-MCNC: 16 MG/DL (ref 8–23)
BUN/CREAT SERPL: 14.3 (ref 7–25)
CALCIUM SPEC-SCNC: 8.7 MG/DL (ref 8.6–10.5)
CHLORIDE SERPL-SCNC: 106 MMOL/L (ref 98–107)
CO2 SERPL-SCNC: 24 MMOL/L (ref 22–29)
CREAT BLD-MCNC: 1.12 MG/DL (ref 0.76–1.27)
CRP SERPL-MCNC: 0.15 MG/DL (ref 0–0.5)
DEPRECATED RDW RBC AUTO: 53.1 FL (ref 37–54)
EOSINOPHIL # BLD AUTO: 0.34 10*3/MM3 (ref 0–0.4)
EOSINOPHIL NFR BLD AUTO: 6.7 % (ref 0.3–6.2)
ERYTHROCYTE [DISTWIDTH] IN BLOOD BY AUTOMATED COUNT: 19 % (ref 12.3–15.4)
ERYTHROCYTE [SEDIMENTATION RATE] IN BLOOD: 17 MM/HR (ref 0–20)
GFR SERPL CREATININE-BSD FRML MDRD: 64 ML/MIN/1.73
GLOBULIN UR ELPH-MCNC: 3.1 GM/DL
GLUCOSE BLD-MCNC: 133 MG/DL (ref 65–99)
HCT VFR BLD AUTO: 34.4 % (ref 37.5–51)
HGB BLD-MCNC: 10 G/DL (ref 13–17.7)
HYPOCHROMIA BLD QL: NORMAL
IMM GRANULOCYTES # BLD AUTO: 0.01 10*3/MM3 (ref 0–0.05)
IMM GRANULOCYTES NFR BLD AUTO: 0.2 % (ref 0–0.5)
LARGE PLATELETS: NORMAL
LYMPHOCYTES # BLD AUTO: 1.08 10*3/MM3 (ref 0.7–3.1)
LYMPHOCYTES NFR BLD AUTO: 21.3 % (ref 19.6–45.3)
MACROCYTES BLD QL SMEAR: NORMAL
MCH RBC QN AUTO: 22.2 PG (ref 26.6–33)
MCHC RBC AUTO-ENTMCNC: 29.1 G/DL (ref 31.5–35.7)
MCV RBC AUTO: 76.3 FL (ref 79–97)
MONOCYTES # BLD AUTO: 0.54 10*3/MM3 (ref 0.1–0.9)
MONOCYTES NFR BLD AUTO: 10.7 % (ref 5–12)
NEUTROPHILS # BLD AUTO: 3.06 10*3/MM3 (ref 1.7–7)
NEUTROPHILS NFR BLD AUTO: 60.5 % (ref 42.7–76)
PLATELET # BLD AUTO: 193 10*3/MM3 (ref 140–450)
PMV BLD AUTO: 10.8 FL (ref 6–12)
POTASSIUM BLD-SCNC: 4.1 MMOL/L (ref 3.5–5.2)
PROT SERPL-MCNC: 6.9 G/DL (ref 6–8.5)
RBC # BLD AUTO: 4.51 10*6/MM3 (ref 4.14–5.8)
SODIUM BLD-SCNC: 143 MMOL/L (ref 136–145)
WBC MORPH BLD: NORMAL
WBC NRBC COR # BLD: 5.06 10*3/MM3 (ref 3.4–10.8)

## 2019-05-07 PROCEDURE — 86140 C-REACTIVE PROTEIN: CPT | Performed by: INTERNAL MEDICINE

## 2019-05-07 PROCEDURE — 87116 MYCOBACTERIA CULTURE: CPT | Performed by: INTERNAL MEDICINE

## 2019-05-07 PROCEDURE — 85652 RBC SED RATE AUTOMATED: CPT | Performed by: INTERNAL MEDICINE

## 2019-05-07 PROCEDURE — 80053 COMPREHEN METABOLIC PANEL: CPT | Performed by: INTERNAL MEDICINE

## 2019-05-07 PROCEDURE — 87206 SMEAR FLUORESCENT/ACID STAI: CPT | Performed by: INTERNAL MEDICINE

## 2019-05-07 PROCEDURE — 85007 BL SMEAR W/DIFF WBC COUNT: CPT | Performed by: INTERNAL MEDICINE

## 2019-05-07 PROCEDURE — 85025 COMPLETE CBC W/AUTO DIFF WBC: CPT | Performed by: INTERNAL MEDICINE

## 2019-05-16 RX ORDER — LOSARTAN POTASSIUM 100 MG/1
TABLET ORAL
Qty: 30 TABLET | Refills: 4 | Status: SHIPPED | OUTPATIENT
Start: 2019-05-16 | End: 2019-10-10 | Stop reason: SDUPTHER

## 2019-05-16 RX ORDER — ATORVASTATIN CALCIUM 10 MG/1
TABLET, FILM COATED ORAL
Qty: 90 TABLET | Refills: 0 | Status: SHIPPED | OUTPATIENT
Start: 2019-05-16 | End: 2019-07-29 | Stop reason: SDUPTHER

## 2019-05-16 RX ORDER — FUROSEMIDE 20 MG/1
TABLET ORAL
Qty: 90 TABLET | Refills: 0 | Status: SHIPPED | OUTPATIENT
Start: 2019-05-16 | End: 2019-08-11 | Stop reason: SDUPTHER

## 2019-05-16 RX ORDER — POTASSIUM CHLORIDE 750 MG/1
TABLET, EXTENDED RELEASE ORAL
Qty: 30 TABLET | Refills: 4 | Status: SHIPPED | OUTPATIENT
Start: 2019-05-16 | End: 2019-09-10 | Stop reason: SDUPTHER

## 2019-06-26 LAB
MYCOBACTERIUM SPEC CULT: ABNORMAL
NIGHT BLUE STAIN TISS: ABNORMAL

## 2019-07-16 ENCOUNTER — LAB REQUISITION (OUTPATIENT)
Dept: LAB | Facility: HOSPITAL | Age: 74
End: 2019-07-16

## 2019-07-16 DIAGNOSIS — A31.0 PULMONARY MYCOBACTERIAL INFECTION (HCC): ICD-10-CM

## 2019-07-16 DIAGNOSIS — J18.9 PNEUMONIA: ICD-10-CM

## 2019-07-16 DIAGNOSIS — J84.89 OTHER SPECIFIED INTERSTITIAL PULMONARY DISEASES (HCC): ICD-10-CM

## 2019-07-16 DIAGNOSIS — J96.21 ACUTE AND CHRONIC RESPIRATORY FAILURE WITH HYPOXIA (HCC): ICD-10-CM

## 2019-07-16 DIAGNOSIS — R05.9 COUGH: ICD-10-CM

## 2019-07-16 DIAGNOSIS — R11.0 NAUSEA: ICD-10-CM

## 2019-07-16 LAB
ALBUMIN SERPL-MCNC: 4 G/DL (ref 3.5–5.2)
ALBUMIN/GLOB SERPL: 1.4 G/DL
ALP SERPL-CCNC: 58 U/L (ref 39–117)
ALT SERPL W P-5'-P-CCNC: 8 U/L (ref 1–41)
ANION GAP SERPL CALCULATED.3IONS-SCNC: 11 MMOL/L (ref 5–15)
AST SERPL-CCNC: 14 U/L (ref 1–40)
BASOPHILS # BLD AUTO: 0.03 10*3/MM3 (ref 0–0.2)
BASOPHILS NFR BLD AUTO: 0.5 % (ref 0–1.5)
BILIRUB SERPL-MCNC: 0.2 MG/DL (ref 0.2–1.2)
BUN BLD-MCNC: 16 MG/DL (ref 8–23)
BUN/CREAT SERPL: 18 (ref 7–25)
CALCIUM SPEC-SCNC: 9 MG/DL (ref 8.6–10.5)
CHLORIDE SERPL-SCNC: 106 MMOL/L (ref 98–107)
CO2 SERPL-SCNC: 26 MMOL/L (ref 22–29)
CREAT BLD-MCNC: 0.89 MG/DL (ref 0.76–1.27)
CRP SERPL-MCNC: 0.07 MG/DL (ref 0–0.5)
DEPRECATED RDW RBC AUTO: 54.1 FL (ref 37–54)
EOSINOPHIL # BLD AUTO: 0.4 10*3/MM3 (ref 0–0.4)
EOSINOPHIL NFR BLD AUTO: 6.5 % (ref 0.3–6.2)
ERYTHROCYTE [DISTWIDTH] IN BLOOD BY AUTOMATED COUNT: 18.4 % (ref 12.3–15.4)
ERYTHROCYTE [SEDIMENTATION RATE] IN BLOOD: 23 MM/HR (ref 0–20)
GFR SERPL CREATININE-BSD FRML MDRD: 83 ML/MIN/1.73
GLOBULIN UR ELPH-MCNC: 2.9 GM/DL
GLUCOSE BLD-MCNC: 118 MG/DL (ref 65–99)
HCT VFR BLD AUTO: 37.5 % (ref 37.5–51)
HGB BLD-MCNC: 11.1 G/DL (ref 13–17.7)
IMM GRANULOCYTES # BLD AUTO: 0.01 10*3/MM3 (ref 0–0.05)
IMM GRANULOCYTES NFR BLD AUTO: 0.2 % (ref 0–0.5)
LYMPHOCYTES # BLD AUTO: 1.23 10*3/MM3 (ref 0.7–3.1)
LYMPHOCYTES NFR BLD AUTO: 20.1 % (ref 19.6–45.3)
MCH RBC QN AUTO: 24.1 PG (ref 26.6–33)
MCHC RBC AUTO-ENTMCNC: 29.6 G/DL (ref 31.5–35.7)
MCV RBC AUTO: 81.3 FL (ref 79–97)
MONOCYTES # BLD AUTO: 0.64 10*3/MM3 (ref 0.1–0.9)
MONOCYTES NFR BLD AUTO: 10.5 % (ref 5–12)
NEUTROPHILS # BLD AUTO: 3.8 10*3/MM3 (ref 1.7–7)
NEUTROPHILS NFR BLD AUTO: 62.2 % (ref 42.7–76)
NRBC BLD AUTO-RTO: 0 /100 WBC (ref 0–0.2)
PLATELET # BLD AUTO: 175 10*3/MM3 (ref 140–450)
PMV BLD AUTO: 10.8 FL (ref 6–12)
POTASSIUM BLD-SCNC: 4.1 MMOL/L (ref 3.5–5.2)
PROT SERPL-MCNC: 6.9 G/DL (ref 6–8.5)
RBC # BLD AUTO: 4.61 10*6/MM3 (ref 4.14–5.8)
SODIUM BLD-SCNC: 143 MMOL/L (ref 136–145)
WBC NRBC COR # BLD: 6.11 10*3/MM3 (ref 3.4–10.8)

## 2019-07-16 PROCEDURE — 86140 C-REACTIVE PROTEIN: CPT | Performed by: INTERNAL MEDICINE

## 2019-07-16 PROCEDURE — 85025 COMPLETE CBC W/AUTO DIFF WBC: CPT | Performed by: INTERNAL MEDICINE

## 2019-07-16 PROCEDURE — 80053 COMPREHEN METABOLIC PANEL: CPT | Performed by: INTERNAL MEDICINE

## 2019-07-16 PROCEDURE — 85652 RBC SED RATE AUTOMATED: CPT | Performed by: INTERNAL MEDICINE

## 2019-07-25 RX ORDER — TIZANIDINE 4 MG/1
TABLET ORAL
Qty: 68 TABLET | Refills: 0 | Status: SHIPPED | OUTPATIENT
Start: 2019-07-25 | End: 2019-10-28

## 2019-07-29 ENCOUNTER — OFFICE VISIT (OUTPATIENT)
Dept: FAMILY MEDICINE CLINIC | Facility: CLINIC | Age: 74
End: 2019-07-29

## 2019-07-29 VITALS
BODY MASS INDEX: 34.01 KG/M2 | HEIGHT: 70 IN | HEART RATE: 49 BPM | RESPIRATION RATE: 16 BRPM | OXYGEN SATURATION: 97 % | WEIGHT: 237.6 LBS | DIASTOLIC BLOOD PRESSURE: 84 MMHG | SYSTOLIC BLOOD PRESSURE: 136 MMHG

## 2019-07-29 DIAGNOSIS — I10 ESSENTIAL HYPERTENSION: Primary | Chronic | ICD-10-CM

## 2019-07-29 DIAGNOSIS — E78.01 FAMILIAL HYPERCHOLESTEROLEMIA: ICD-10-CM

## 2019-07-29 DIAGNOSIS — R73.03 PREDIABETES: ICD-10-CM

## 2019-07-29 PROCEDURE — 99213 OFFICE O/P EST LOW 20 MIN: CPT | Performed by: FAMILY MEDICINE

## 2019-07-29 PROCEDURE — 36415 COLL VENOUS BLD VENIPUNCTURE: CPT | Performed by: FAMILY MEDICINE

## 2019-07-29 RX ORDER — ATORVASTATIN CALCIUM 10 MG/1
10 TABLET, FILM COATED ORAL DAILY
Qty: 90 TABLET | Refills: 3 | Status: SHIPPED | OUTPATIENT
Start: 2019-07-29 | End: 2020-09-28

## 2019-07-29 NOTE — PROGRESS NOTES
"Subjective   Jeremie Wynn is a 73 y.o. male.     Hypertension   This is a chronic problem. The current episode started more than 1 year ago. The problem is unchanged. The problem is controlled. Pertinent negatives include no chest pain, headaches, palpitations, peripheral edema or shortness of breath. Risk factors for coronary artery disease include male gender, obesity and sedentary lifestyle. Current antihypertension treatment includes angiotensin blockers, diuretics and lifestyle changes. The current treatment provides significant improvement. There is no history of angina, kidney disease or CAD/MI.   Hyperlipidemia   This is a chronic problem. The problem is controlled. Pertinent negatives include no chest pain, myalgias or shortness of breath. Current antihyperlipidemic treatment includes statins and diet change. The current treatment provides significant improvement of lipids. There are no compliance problems.         The following portions of the patient's history were reviewed and updated as appropriate: allergies, current medications, past social history and problem list.    Review of Systems   Constitutional: Negative for activity change, chills, fatigue and fever.   HENT: Negative for congestion and sore throat.    Eyes: Negative for pain and visual disturbance.   Respiratory: Negative for chest tightness and shortness of breath.    Cardiovascular: Negative for chest pain, palpitations and leg swelling.   Gastrointestinal: Negative for diarrhea, nausea and vomiting.   Endocrine: Negative for cold intolerance and heat intolerance.   Genitourinary: Negative for dysuria and hematuria.   Musculoskeletal: Positive for arthralgias. Negative for myalgias.   Neurological: Negative for dizziness, syncope and headaches.   Psychiatric/Behavioral: Negative for agitation. The patient is not nervous/anxious.        Objective   /84   Pulse (!) 49   Resp 16   Ht 177.8 cm (70\")   Wt 108 kg (237 lb 9.6 oz)   " SpO2 97%   BMI 34.09 kg/m²   Physical Exam   Constitutional: He is oriented to person, place, and time. He appears well-developed and well-nourished. He is cooperative.   HENT:   Head: Normocephalic.   Right Ear: External ear normal.   Left Ear: External ear normal.   Nose: Nose normal.   Mouth/Throat: Oropharynx is clear and moist.   Eyes: Conjunctivae are normal. Pupils are equal, round, and reactive to light. No scleral icterus.   Neck: Neck supple. No JVD present. Carotid bruit is not present. No thyromegaly present.   Cardiovascular: Normal rate, regular rhythm and normal heart sounds. Exam reveals no friction rub.   No murmur heard.  Pulmonary/Chest: Effort normal and breath sounds normal.   Abdominal: There is no hepatosplenomegaly.   Musculoskeletal: Normal range of motion. He exhibits no edema or tenderness.   Neurological: He is alert and oriented to person, place, and time.   No focal deficits no lateralizing signs   Skin: Skin is warm and dry. No rash noted.   Psychiatric: He has a normal mood and affect. Cognition and memory are normal.   Nursing note and vitals reviewed.      Assessment/Plan   Problem List Items Addressed This Visit        Active Problems    Hypertension - Primary (Chronic)    Hyperlipemia    Relevant Medications    atorvastatin (LIPITOR) 10 MG tablet    Other Relevant Orders    Lipid Panel    Prediabetes    Relevant Orders    Hemoglobin A1c          New Medications Ordered This Visit   Medications   • atorvastatin (LIPITOR) 10 MG tablet     Sig: Take 1 tablet by mouth Daily. for cholesterol.     Dispense:  90 tablet     Refill:  3     Continue current meds for bp

## 2019-07-30 LAB
CHOLEST SERPL-MCNC: 149 MG/DL (ref 0–200)
HBA1C MFR BLD: 6.3 % (ref 4.8–5.6)
HDLC SERPL-MCNC: 51 MG/DL (ref 40–60)
LDLC SERPL CALC-MCNC: 74 MG/DL (ref 0–100)
TRIGL SERPL-MCNC: 118 MG/DL (ref 0–150)
VLDLC SERPL CALC-MCNC: 23.6 MG/DL

## 2019-08-13 ENCOUNTER — OFFICE VISIT (OUTPATIENT)
Dept: PULMONOLOGY | Facility: CLINIC | Age: 74
End: 2019-08-13

## 2019-08-13 VITALS
HEIGHT: 70 IN | DIASTOLIC BLOOD PRESSURE: 80 MMHG | OXYGEN SATURATION: 96 % | WEIGHT: 236 LBS | HEART RATE: 72 BPM | TEMPERATURE: 98.4 F | BODY MASS INDEX: 33.79 KG/M2 | SYSTOLIC BLOOD PRESSURE: 130 MMHG

## 2019-08-13 DIAGNOSIS — K21.9 HIATAL HERNIA WITH GERD: ICD-10-CM

## 2019-08-13 DIAGNOSIS — J84.9 INTERSTITIAL LUNG DISEASE (HCC): ICD-10-CM

## 2019-08-13 DIAGNOSIS — R06.09 DYSPNEA ON EXERTION: Primary | ICD-10-CM

## 2019-08-13 DIAGNOSIS — K44.9 HIATAL HERNIA WITH GERD: ICD-10-CM

## 2019-08-13 DIAGNOSIS — A31.9 MYCOBACTERIUM INFECTION, ATYPICAL: ICD-10-CM

## 2019-08-13 PROCEDURE — 94729 DIFFUSING CAPACITY: CPT | Performed by: NURSE PRACTITIONER

## 2019-08-13 PROCEDURE — 99213 OFFICE O/P EST LOW 20 MIN: CPT | Performed by: NURSE PRACTITIONER

## 2019-08-13 PROCEDURE — 94726 PLETHYSMOGRAPHY LUNG VOLUMES: CPT | Performed by: NURSE PRACTITIONER

## 2019-08-13 PROCEDURE — 94375 RESPIRATORY FLOW VOLUME LOOP: CPT | Performed by: NURSE PRACTITIONER

## 2019-08-13 RX ORDER — ATORVASTATIN CALCIUM 10 MG/1
TABLET, FILM COATED ORAL
Qty: 90 TABLET | Refills: 2 | Status: SHIPPED | OUTPATIENT
Start: 2019-08-13 | End: 2019-10-28

## 2019-08-13 RX ORDER — FUROSEMIDE 20 MG/1
TABLET ORAL
Qty: 90 TABLET | Refills: 2 | Status: SHIPPED | OUTPATIENT
Start: 2019-08-13 | End: 2020-05-05

## 2019-08-13 NOTE — PROGRESS NOTES
[facility]    CHIEF COMPLAINT    HX ILD, shortness of air    Subjective   HISTORY OF PRESENT ILLNESS    Jeremie Wynn is a 73 y.o.male here today for routine follow up.      Dr Lyon performed a bronchoscopy with bronchoalveolar lavage and transbronchial biopsies on 3/22/2018 for worsening ground glass opacities with mild interstitial lung disease on his CT.  Transbronchial biopsy showed a noncaseating granuloma.  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  He completed a course of prednisone.  His symptoms resolved, and his chest xray and CT improved.      AFB culutre from the bronch was positive for MAC, he has followed up with Dr. Palacios with infectious disease and is on long-term treatment for atypical mycobacterium.  He does have a difficulty time tolerating medication, with nausea, loss of appetite, and weight loss.    He also has significant history of reflux with nightly awakening choking secondary to reflux.   He also has a have a large hiatal hernia with a patulous esophagus likely causing chronic aspiration.  He does follow up with GI as well, within the last few months.     He had been on oxygen after his hospital stay last year with some acute hypoxic respiratory failure.  He has not used it in weeks.  He continues to monitor his oxygen at home and maintains around 90 to 94%.    Patient Active Problem List   Diagnosis   • Generalized osteoarthritis   • Hyperlipemia   • Hypertension   • Arthralgia of hip   • Dyspnea on exertion   • Obesity   • S/P total knee arthroplasty, left   • Prediabetes   • Ulcerative colitis (CMS/HCC)   • Interstitial lung disease (CMS/HCC)   • Bilateral sensorineural hearing loss   • Mycobacterium infection, atypical   • Hiatal hernia with GERD       Allergies   Allergen Reactions   • Contrast Dye Anaphylaxis   • Mesalamine Other (See Comments)     Lung toxicity (suspected)   • Penicillins Anaphylaxis and Other (See Comments)   • Sulfa  Antibiotics Shortness Of Breath and Rash       Current Outpatient Medications:   •  atorvastatin (LIPITOR) 10 MG tablet, TAKE ONE TABLET BY MOUTH DAILY FOR CHOLESTEROL, Disp: 90 tablet, Rfl: 2  •  cetirizine (zyrTEC) 10 MG tablet, Take 10 mg by mouth Daily., Disp: , Rfl:   •  ethambutol (MYAMBUTOL) 400 MG tablet, Take 1,600 mg by mouth Daily., Disp: , Rfl:   •  fluticasone (FLONASE ALLERGY RELIEF) 50 MCG/ACT nasal spray, Flonase Allergy Relief 50 mcg/actuation nasal spray,suspension  Daily, Disp: , Rfl:   •  furosemide (LASIX) 20 MG tablet, TAKE ONE TABLET BY MOUTH DAILY, Disp: 90 tablet, Rfl: 2  •  ipratropium-albuterol (DUO-NEB) 0.5-2.5 mg/mL nebulizer, Take 3 mL by nebulization 4 (Four) Times a Day., Disp: 360 mL, Rfl: 0  •  losartan (COZAAR) 100 MG tablet, TAKE ONE TABLET BY MOUTH DAILY ** REPLACES VALSARTIN**, Disp: 30 tablet, Rfl: 4  •  Multiple Vitamins-Minerals (CENTROVITE) tablet, Take 1 tablet by mouth Daily., Disp: , Rfl:   •  nystatin (MYCOSTATIN) 593297 UNIT/ML suspension, , Disp: , Rfl:   •  ondansetron (ZOFRAN) 4 MG tablet, Take 4 mg by mouth As Needed., Disp: , Rfl:   •  ondansetron ODT (ZOFRAN-ODT) 8 MG disintegrating tablet, Take 8 mg by mouth As Needed., Disp: , Rfl:   •  potassium chloride (K-DUR,KLOR-CON) 10 MEQ CR tablet, TAKE ONE TABLET BY MOUTH DAILY, Disp: 30 tablet, Rfl: 4  •  rifAMPin (RIFADIN) 300 MG capsule, Take 300 mg by mouth 2 (Two) Times a Day., Disp: , Rfl:   •  tiotropium bromide-olodaterol (STIOLTO RESPIMAT) 2.5-2.5 MCG/ACT aerosol solution inhaler, Inhale 2 puffs Daily., Disp: 1 inhaler, Rfl: 0  •  tiZANidine (ZANAFLEX) 4 MG tablet, TAKE ONE TABLET BY MOUTH AT BEDTIME AS NEEDED FOR LEG CRAMPS, Disp: 68 tablet, Rfl: 0  •  traMADol (ULTRAM) 50 MG tablet, Take 50 mg by mouth Every 6 (Six) Hours As Needed for Moderate Pain ., Disp: , Rfl:   •  atorvastatin (LIPITOR) 10 MG tablet, Take 1 tablet by mouth Daily. for cholesterol., Disp: 90 tablet, Rfl: 3  MEDICATION LIST AND ALLERGIES  "REVIEWED.    Social History     Tobacco Use   • Smoking status: Former Smoker     Years: 10.00     Types: Pipe, Cigars     Last attempt to quit: 1967     Years since quittin.6   • Smokeless tobacco: Never Used   • Tobacco comment: smoked a pipe and cigars for during 20's   Substance Use Topics   • Alcohol use: No   • Drug use: No       FAMILY AND SOCIAL HISTORY REVIEWED.    Review of Systems   Constitutional: Positive for appetite change and unexpected weight change.   Gastrointestinal: Positive for diarrhea and nausea.   .  Objective   /80   Pulse 72   Temp 98.4 °F (36.9 °C)   Ht 177.8 cm (70\")   Wt 107 kg (236 lb)   SpO2 96% Comment: room air at rest  BMI 33.86 kg/m²     Immunization History   Administered Date(s) Administered   • Flu Vaccine High Dose PF 65YR+ 2018   • Pneumococcal Conjugate 13-Valent (PCV13) 2018       Physical Exam   Constitutional: He is oriented to person, place, and time. He appears well-developed and well-nourished.   HENT:   Head: Normocephalic and atraumatic.   Eyes: EOM are normal. Pupils are equal, round, and reactive to light.   Neck: Normal range of motion. Neck supple.   Cardiovascular: Normal rate and regular rhythm.   No murmur heard.  Pulmonary/Chest: Effort normal and breath sounds normal. No respiratory distress. He has no wheezes. He has no rales.   Abdominal: Soft. Bowel sounds are normal. He exhibits no distension.   Musculoskeletal: Normal range of motion. He exhibits no edema.   Neurological: He is alert and oriented to person, place, and time.   Skin: Skin is warm and dry. No erythema.   Psychiatric: He has a normal mood and affect. His behavior is normal.   Vitals reviewed.        RESULTS    PFTS in the office today, read by me:  No obstruction or restriction.  FVC is 3.75, 80% predicted normal diffusion        EXAMINATION: CT CHEST WO CONTRAST- 2019     INDICATION: A31.0  PULMONARY INFECTION; A31.0-Pulmonary mycobacterial  infection   "   TECHNIQUE: 5 mm unenhanced images the chest and upper abdomen     The radiation dose reduction device was turned on for each scan per the  ALARA (As Low as Reasonably Achievable) protocol.     COMPARISON: 06/20/2018 chest CT scan     FINDINGS: Previous exam report indicates no evidence of acute disease,  moderate to large hiatal hernia.     As on the previous exam, lungs appear clear bilaterally except for a  tiny right middle lobe calcified granuloma.. No significant pulmonary  interstitial changes are seen. Mediastinal window images show no  evidence of significant adenopathy. A large irregular hiatal hernia is  again noted unchanged. Included images of the upper abdomen show no  significant abnormalities of the visualized portions of the liver,  spleen, pancreas, adrenal glands, or right upper renal pole. Small  nonobstructing 2 mm left renal calculus is noted. Old healed left-sided  rib fractures are again noted..           IMPRESSION:  1. Irregularly-shaped hiatal hernia but unchanged from prior study.  2. No evidence of significant pulmonary parenchymal disease or other  active chest disease.         CT 3/06/2018  IMPRESSION:  There is a mild somewhat diffuse interstitial pulmonary  pattern without consolidation, mass, nodule or effusion. There is a  large hiatal hernia.    Assessment/Plan     PROBLEM LIST    Problem List Items Addressed This Visit        Respiratory    Dyspnea on exertion - Primary    Relevant Orders    Pulmonary Function Test (Completed)    Interstitial lung disease (CMS/HCC)    Overview     Due to drug reaction - resolved            Digestive    Hiatal hernia with GERD       Other    Mycobacterium infection, atypical            DISCUSSION    We will go ahead and discontinue his oxygen at this time.  He no longer needs it.  His acute respiratory failure resolved.    His most recent CT scan shows no more evidence of underlying interstitial lung disease, felt due to the Lialda  acutely.    Continue follow-up with infectious disease Dr. Palacios.    Follow-up annually with PFTs or as needed.      I spent 15 minutes with the patient. I spent > 50% percent of this time counseling and discussing diagnostic testing, evaluation, current status and management.    Karishma Massey, APRN  08/13/201912:06 PM  Electronically signed     Please note that portions of this note were completed with a voice recognition program. Efforts were made to edit the dictations, but occasionally words are mistranscribed.      CC: Aaron Trejo MD

## 2019-09-10 ENCOUNTER — LAB REQUISITION (OUTPATIENT)
Dept: LAB | Facility: HOSPITAL | Age: 74
End: 2019-09-10

## 2019-09-10 ENCOUNTER — TRANSCRIBE ORDERS (OUTPATIENT)
Dept: LAB | Facility: HOSPITAL | Age: 74
End: 2019-09-10

## 2019-09-10 DIAGNOSIS — J96.11 CHRONIC RESPIRATORY FAILURE WITH HYPOXIA (HCC): ICD-10-CM

## 2019-09-10 DIAGNOSIS — A31.0 PULMONARY DISEASE DUE TO MYCOBACTERIA (HCC): ICD-10-CM

## 2019-09-10 DIAGNOSIS — R11.0 NAUSEA: ICD-10-CM

## 2019-09-10 DIAGNOSIS — J96.11 CHRONIC HYPOXEMIC RESPIRATORY FAILURE (HCC): ICD-10-CM

## 2019-09-10 DIAGNOSIS — R11.0 NAUSEA: Primary | ICD-10-CM

## 2019-09-10 DIAGNOSIS — J96.21 ACUTE AND CHRONIC RESPIRATORY FAILURE WITH HYPOXIA (HCC): ICD-10-CM

## 2019-09-10 DIAGNOSIS — R05.9 COUGH: ICD-10-CM

## 2019-09-10 DIAGNOSIS — A31.0 PULMONARY MYCOBACTERIAL INFECTION (HCC): ICD-10-CM

## 2019-09-10 DIAGNOSIS — I10 ESSENTIAL (PRIMARY) HYPERTENSION: ICD-10-CM

## 2019-09-10 DIAGNOSIS — J18.9 UNRESOLVED PNEUMONIA: ICD-10-CM

## 2019-09-10 DIAGNOSIS — I10 ESSENTIAL HYPERTENSION, MALIGNANT: ICD-10-CM

## 2019-09-10 DIAGNOSIS — J18.9 PNEUMONIA: ICD-10-CM

## 2019-09-10 LAB
ALBUMIN SERPL-MCNC: 4 G/DL (ref 3.5–5.2)
ALBUMIN/GLOB SERPL: 1.2 G/DL
ALP SERPL-CCNC: 67 U/L (ref 39–117)
ALT SERPL W P-5'-P-CCNC: 10 U/L (ref 1–41)
ANION GAP SERPL CALCULATED.3IONS-SCNC: 11 MMOL/L (ref 5–15)
AST SERPL-CCNC: 14 U/L (ref 1–40)
BASOPHILS # BLD AUTO: 0.04 10*3/MM3 (ref 0–0.2)
BASOPHILS NFR BLD AUTO: 0.6 % (ref 0–1.5)
BILIRUB SERPL-MCNC: 0.2 MG/DL (ref 0.2–1.2)
BUN BLD-MCNC: 19 MG/DL (ref 8–23)
BUN/CREAT SERPL: 19.2 (ref 7–25)
CALCIUM SPEC-SCNC: 9.5 MG/DL (ref 8.6–10.5)
CHLORIDE SERPL-SCNC: 104 MMOL/L (ref 98–107)
CO2 SERPL-SCNC: 28 MMOL/L (ref 22–29)
CREAT BLD-MCNC: 0.99 MG/DL (ref 0.76–1.27)
CRP SERPL-MCNC: 1.47 MG/DL (ref 0–0.5)
DEPRECATED RDW RBC AUTO: 54.4 FL (ref 37–54)
EOSINOPHIL # BLD AUTO: 0.34 10*3/MM3 (ref 0–0.4)
EOSINOPHIL NFR BLD AUTO: 5.4 % (ref 0.3–6.2)
ERYTHROCYTE [DISTWIDTH] IN BLOOD BY AUTOMATED COUNT: 17.2 % (ref 12.3–15.4)
ERYTHROCYTE [SEDIMENTATION RATE] IN BLOOD: 29 MM/HR (ref 0–20)
GFR SERPL CREATININE-BSD FRML MDRD: 74 ML/MIN/1.73
GLOBULIN UR ELPH-MCNC: 3.4 GM/DL
GLUCOSE BLD-MCNC: 122 MG/DL (ref 65–99)
HCT VFR BLD AUTO: 41 % (ref 37.5–51)
HGB BLD-MCNC: 12.4 G/DL (ref 13–17.7)
IMM GRANULOCYTES # BLD AUTO: 0.02 10*3/MM3 (ref 0–0.05)
IMM GRANULOCYTES NFR BLD AUTO: 0.3 % (ref 0–0.5)
LYMPHOCYTES # BLD AUTO: 1.07 10*3/MM3 (ref 0.7–3.1)
LYMPHOCYTES NFR BLD AUTO: 17 % (ref 19.6–45.3)
MCH RBC QN AUTO: 26.2 PG (ref 26.6–33)
MCHC RBC AUTO-ENTMCNC: 30.2 G/DL (ref 31.5–35.7)
MCV RBC AUTO: 86.5 FL (ref 79–97)
MONOCYTES # BLD AUTO: 0.63 10*3/MM3 (ref 0.1–0.9)
MONOCYTES NFR BLD AUTO: 10 % (ref 5–12)
NEUTROPHILS # BLD AUTO: 4.21 10*3/MM3 (ref 1.7–7)
NEUTROPHILS NFR BLD AUTO: 66.7 % (ref 42.7–76)
NRBC BLD AUTO-RTO: 0 /100 WBC (ref 0–0.2)
PLATELET # BLD AUTO: 184 10*3/MM3 (ref 140–450)
PMV BLD AUTO: 11.2 FL (ref 6–12)
POTASSIUM BLD-SCNC: 4.2 MMOL/L (ref 3.5–5.2)
PROT SERPL-MCNC: 7.4 G/DL (ref 6–8.5)
RBC # BLD AUTO: 4.74 10*6/MM3 (ref 4.14–5.8)
SODIUM BLD-SCNC: 143 MMOL/L (ref 136–145)
WBC NRBC COR # BLD: 6.31 10*3/MM3 (ref 3.4–10.8)

## 2019-09-10 PROCEDURE — 85025 COMPLETE CBC W/AUTO DIFF WBC: CPT | Performed by: INTERNAL MEDICINE

## 2019-09-10 PROCEDURE — 36415 COLL VENOUS BLD VENIPUNCTURE: CPT | Performed by: INTERNAL MEDICINE

## 2019-09-10 PROCEDURE — 86140 C-REACTIVE PROTEIN: CPT | Performed by: INTERNAL MEDICINE

## 2019-09-10 PROCEDURE — 85652 RBC SED RATE AUTOMATED: CPT | Performed by: INTERNAL MEDICINE

## 2019-09-10 PROCEDURE — 87206 SMEAR FLUORESCENT/ACID STAI: CPT | Performed by: INTERNAL MEDICINE

## 2019-09-10 PROCEDURE — 87116 MYCOBACTERIA CULTURE: CPT | Performed by: INTERNAL MEDICINE

## 2019-09-10 PROCEDURE — 80053 COMPREHEN METABOLIC PANEL: CPT | Performed by: INTERNAL MEDICINE

## 2019-09-10 RX ORDER — POTASSIUM CHLORIDE 750 MG/1
TABLET, EXTENDED RELEASE ORAL
Qty: 60 TABLET | Refills: 3 | Status: SHIPPED | OUTPATIENT
Start: 2019-09-10 | End: 2019-10-28 | Stop reason: SDUPTHER

## 2019-09-10 RX ORDER — TIZANIDINE 4 MG/1
TABLET ORAL
Qty: 68 TABLET | Refills: 2 | Status: SHIPPED | OUTPATIENT
Start: 2019-09-10 | End: 2020-02-08

## 2019-10-10 RX ORDER — LOSARTAN POTASSIUM 100 MG/1
TABLET ORAL
Qty: 30 TABLET | Refills: 5 | Status: SHIPPED | OUTPATIENT
Start: 2019-10-10 | End: 2020-01-08

## 2019-10-28 ENCOUNTER — OFFICE VISIT (OUTPATIENT)
Dept: FAMILY MEDICINE CLINIC | Facility: CLINIC | Age: 74
End: 2019-10-28

## 2019-10-28 VITALS
SYSTOLIC BLOOD PRESSURE: 136 MMHG | RESPIRATION RATE: 16 BRPM | DIASTOLIC BLOOD PRESSURE: 86 MMHG | WEIGHT: 229 LBS | HEIGHT: 70 IN | BODY MASS INDEX: 32.78 KG/M2 | HEART RATE: 51 BPM | OXYGEN SATURATION: 96 %

## 2019-10-28 DIAGNOSIS — R73.03 PREDIABETES: Primary | ICD-10-CM

## 2019-10-28 DIAGNOSIS — E78.01 FAMILIAL HYPERCHOLESTEROLEMIA: ICD-10-CM

## 2019-10-28 DIAGNOSIS — I10 ESSENTIAL HYPERTENSION: Chronic | ICD-10-CM

## 2019-10-28 LAB — HBA1C MFR BLD: 5.6 %

## 2019-10-28 PROCEDURE — G0008 ADMIN INFLUENZA VIRUS VAC: HCPCS | Performed by: FAMILY MEDICINE

## 2019-10-28 PROCEDURE — 90653 IIV ADJUVANT VACCINE IM: CPT | Performed by: FAMILY MEDICINE

## 2019-10-28 PROCEDURE — 83036 HEMOGLOBIN GLYCOSYLATED A1C: CPT | Performed by: FAMILY MEDICINE

## 2019-10-28 PROCEDURE — 99213 OFFICE O/P EST LOW 20 MIN: CPT | Performed by: FAMILY MEDICINE

## 2019-10-28 RX ORDER — AZITHROMYCIN 500 MG/1
500 TABLET, FILM COATED ORAL DAILY
COMMUNITY
Start: 2019-10-25 | End: 2019-10-28

## 2019-10-28 RX ORDER — POTASSIUM CHLORIDE 750 MG/1
10 TABLET, EXTENDED RELEASE ORAL DAILY
Qty: 90 TABLET | Refills: 1 | Status: SHIPPED | OUTPATIENT
Start: 2019-10-28 | End: 2020-03-09

## 2019-10-28 NOTE — PROGRESS NOTES
Subjective   Jeremie Wynn is a 73 y.o. male.     Hypertension   This is a chronic problem. The current episode started more than 1 year ago. The problem is unchanged. The problem is controlled. Pertinent negatives include no chest pain, headaches, palpitations, peripheral edema or shortness of breath. Risk factors for coronary artery disease include male gender, obesity and sedentary lifestyle. Current antihypertension treatment includes angiotensin blockers, diuretics and lifestyle changes. The current treatment provides significant improvement. There is no history of angina, kidney disease or CAD/MI.   Hyperlipidemia   This is a chronic problem. The problem is controlled. Pertinent negatives include no chest pain, myalgias or shortness of breath. Current antihyperlipidemic treatment includes statins and diet change. The current treatment provides significant improvement of lipids. There are no compliance problems.    Diabetes   Diabetes visit type: Doing well on diet alone. Diabetes type: Prediabetes. His disease course has been stable. Pertinent negatives for hypoglycemia include no dizziness or headaches. Pertinent negatives for diabetes include no chest pain and no fatigue. Risk factors for coronary artery disease include male sex, obesity and dyslipidemia. Current diabetic treatment includes diet. He is compliant with treatment most of the time. He rarely participates in exercise.        The following portions of the patient's history were reviewed and updated as appropriate: allergies, current medications, past social history and problem list.    Review of Systems   Constitutional: Negative for activity change and fatigue.   HENT: Negative for congestion and sore throat.    Eyes: Negative for pain and visual disturbance.   Respiratory: Negative for chest tightness and shortness of breath.    Cardiovascular: Negative for chest pain, palpitations and leg swelling.   Gastrointestinal: Negative for diarrhea,  "nausea and vomiting.   Genitourinary: Negative for dysuria and hematuria.   Musculoskeletal: Positive for arthralgias. Negative for myalgias.   Neurological: Negative for dizziness, syncope and headaches.       Objective   /86   Pulse 51   Resp 16   Ht 177.8 cm (70\")   Wt 104 kg (229 lb)   SpO2 96%   BMI 32.86 kg/m²   Physical Exam   Constitutional: He is oriented to person, place, and time. He appears well-developed and well-nourished. He is cooperative.   HENT:   Head: Normocephalic.   Right Ear: External ear normal.   Left Ear: External ear normal.   Nose: Nose normal.   Mouth/Throat: Oropharynx is clear and moist.   Eyes: Conjunctivae are normal. Pupils are equal, round, and reactive to light. No scleral icterus.   Neck: Neck supple. No JVD present. Carotid bruit is not present. No thyromegaly present.   Cardiovascular: Normal rate, regular rhythm, normal heart sounds and intact distal pulses.   Pulmonary/Chest: Effort normal and breath sounds normal.   Abdominal: There is no hepatosplenomegaly.   Musculoskeletal: Normal range of motion. He exhibits no edema.   Lymphadenopathy:     He has no cervical adenopathy.   Neurological: He is alert and oriented to person, place, and time.   No focal deficits no lateralizing signs   Skin: Skin is warm and dry. No rash noted.   Psychiatric: He has a normal mood and affect. Cognition and memory are normal.   Nursing note and vitals reviewed.      Assessment/Plan   Problem List Items Addressed This Visit        Active Problems    Hypertension (Chronic)    Relevant Medications    potassium chloride (K-DUR,KLOR-CON) 10 MEQ CR tablet    Hyperlipemia    Prediabetes - Primary    Relevant Orders    POC Glycosylated Hemoglobin (Hb A1C) (Completed)          New Medications Ordered This Visit   Medications   • potassium chloride (K-DUR,KLOR-CON) 10 MEQ CR tablet     Sig: Take 1 tablet by mouth Daily.     Dispense:  90 tablet     Refill:  1              "

## 2019-10-30 LAB
MYCOBACTERIUM SPEC CULT: ABNORMAL
NIGHT BLUE STAIN TISS: ABNORMAL

## 2019-11-05 ENCOUNTER — LAB REQUISITION (OUTPATIENT)
Dept: LAB | Facility: HOSPITAL | Age: 74
End: 2019-11-05

## 2019-11-05 DIAGNOSIS — Z00.00 ROUTINE GENERAL MEDICAL EXAMINATION AT A HEALTH CARE FACILITY: ICD-10-CM

## 2019-11-05 LAB
ALBUMIN SERPL-MCNC: 4 G/DL (ref 3.5–5.2)
ALBUMIN/GLOB SERPL: 1.2 G/DL
ALP SERPL-CCNC: 64 U/L (ref 39–117)
ALT SERPL W P-5'-P-CCNC: 10 U/L (ref 1–41)
ANION GAP SERPL CALCULATED.3IONS-SCNC: 12 MMOL/L (ref 5–15)
AST SERPL-CCNC: 15 U/L (ref 1–40)
BASOPHILS # BLD AUTO: 0.06 10*3/MM3 (ref 0–0.2)
BASOPHILS NFR BLD AUTO: 1 % (ref 0–1.5)
BILIRUB SERPL-MCNC: 0.3 MG/DL (ref 0.2–1.2)
BUN BLD-MCNC: 16 MG/DL (ref 8–23)
BUN/CREAT SERPL: 15 (ref 7–25)
CALCIUM SPEC-SCNC: 9.1 MG/DL (ref 8.6–10.5)
CHLORIDE SERPL-SCNC: 105 MMOL/L (ref 98–107)
CO2 SERPL-SCNC: 27 MMOL/L (ref 22–29)
CREAT BLD-MCNC: 1.07 MG/DL (ref 0.76–1.27)
CRP SERPL-MCNC: 0.24 MG/DL (ref 0–0.5)
DEPRECATED RDW RBC AUTO: 51.1 FL (ref 37–54)
EOSINOPHIL # BLD AUTO: 0.37 10*3/MM3 (ref 0–0.4)
EOSINOPHIL NFR BLD AUTO: 6.3 % (ref 0.3–6.2)
ERYTHROCYTE [DISTWIDTH] IN BLOOD BY AUTOMATED COUNT: 15.8 % (ref 12.3–15.4)
ERYTHROCYTE [SEDIMENTATION RATE] IN BLOOD: 18 MM/HR (ref 0–20)
GFR SERPL CREATININE-BSD FRML MDRD: 67 ML/MIN/1.73
GLOBULIN UR ELPH-MCNC: 3.3 GM/DL
GLUCOSE BLD-MCNC: 112 MG/DL (ref 65–99)
HCT VFR BLD AUTO: 42.7 % (ref 37.5–51)
HGB BLD-MCNC: 13.7 G/DL (ref 13–17.7)
IMM GRANULOCYTES # BLD AUTO: 0.02 10*3/MM3 (ref 0–0.05)
IMM GRANULOCYTES NFR BLD AUTO: 0.3 % (ref 0–0.5)
LYMPHOCYTES # BLD AUTO: 1.44 10*3/MM3 (ref 0.7–3.1)
LYMPHOCYTES NFR BLD AUTO: 24.3 % (ref 19.6–45.3)
MCH RBC QN AUTO: 28.4 PG (ref 26.6–33)
MCHC RBC AUTO-ENTMCNC: 32.1 G/DL (ref 31.5–35.7)
MCV RBC AUTO: 88.4 FL (ref 79–97)
MONOCYTES # BLD AUTO: 0.56 10*3/MM3 (ref 0.1–0.9)
MONOCYTES NFR BLD AUTO: 9.5 % (ref 5–12)
NEUTROPHILS # BLD AUTO: 3.47 10*3/MM3 (ref 1.7–7)
NEUTROPHILS NFR BLD AUTO: 58.6 % (ref 42.7–76)
NRBC BLD AUTO-RTO: 0 /100 WBC (ref 0–0.2)
PLATELET # BLD AUTO: 163 10*3/MM3 (ref 140–450)
PMV BLD AUTO: 11.6 FL (ref 6–12)
POTASSIUM BLD-SCNC: 3.9 MMOL/L (ref 3.5–5.2)
PROT SERPL-MCNC: 7.3 G/DL (ref 6–8.5)
RBC # BLD AUTO: 4.83 10*6/MM3 (ref 4.14–5.8)
SODIUM BLD-SCNC: 144 MMOL/L (ref 136–145)
WBC NRBC COR # BLD: 5.92 10*3/MM3 (ref 3.4–10.8)

## 2019-11-05 PROCEDURE — 86140 C-REACTIVE PROTEIN: CPT | Performed by: INTERNAL MEDICINE

## 2019-11-05 PROCEDURE — 85652 RBC SED RATE AUTOMATED: CPT | Performed by: INTERNAL MEDICINE

## 2019-11-05 PROCEDURE — 85025 COMPLETE CBC W/AUTO DIFF WBC: CPT | Performed by: INTERNAL MEDICINE

## 2019-11-05 PROCEDURE — 80053 COMPREHEN METABOLIC PANEL: CPT | Performed by: INTERNAL MEDICINE

## 2019-12-17 ENCOUNTER — LAB REQUISITION (OUTPATIENT)
Dept: LAB | Facility: HOSPITAL | Age: 74
End: 2019-12-17

## 2019-12-17 DIAGNOSIS — Z00.00 ROUTINE GENERAL MEDICAL EXAMINATION AT A HEALTH CARE FACILITY: ICD-10-CM

## 2019-12-17 DIAGNOSIS — J84.89 OTHER SPECIFIED INTERSTITIAL PULMONARY DISEASES (HCC): ICD-10-CM

## 2019-12-17 DIAGNOSIS — R11.0 NAUSEA: ICD-10-CM

## 2019-12-17 DIAGNOSIS — A31.0 PULMONARY MYCOBACTERIAL INFECTION (HCC): ICD-10-CM

## 2019-12-17 LAB
ALBUMIN SERPL-MCNC: 4 G/DL (ref 3.5–5.2)
ALBUMIN/GLOB SERPL: 1.3 G/DL
ALP SERPL-CCNC: 69 U/L (ref 39–117)
ALT SERPL W P-5'-P-CCNC: 11 U/L (ref 1–41)
ANION GAP SERPL CALCULATED.3IONS-SCNC: 12 MMOL/L (ref 5–15)
AST SERPL-CCNC: 19 U/L (ref 1–40)
BILIRUB SERPL-MCNC: 0.3 MG/DL (ref 0.2–1.2)
BUN BLD-MCNC: 14 MG/DL (ref 8–23)
BUN/CREAT SERPL: 14.4 (ref 7–25)
CALCIUM SPEC-SCNC: 9.2 MG/DL (ref 8.6–10.5)
CHLORIDE SERPL-SCNC: 108 MMOL/L (ref 98–107)
CO2 SERPL-SCNC: 23 MMOL/L (ref 22–29)
CREAT BLD-MCNC: 0.97 MG/DL (ref 0.76–1.27)
CRP SERPL-MCNC: 0.7 MG/DL (ref 0–0.5)
DEPRECATED RDW RBC AUTO: 48.5 FL (ref 37–54)
ERYTHROCYTE [DISTWIDTH] IN BLOOD BY AUTOMATED COUNT: 14.3 % (ref 12.3–15.4)
ERYTHROCYTE [SEDIMENTATION RATE] IN BLOOD: 23 MM/HR (ref 0–20)
GFR SERPL CREATININE-BSD FRML MDRD: 75 ML/MIN/1.73
GLOBULIN UR ELPH-MCNC: 3.1 GM/DL
GLUCOSE BLD-MCNC: 127 MG/DL (ref 65–99)
HCT VFR BLD AUTO: 41.7 % (ref 37.5–51)
HGB BLD-MCNC: 13.8 G/DL (ref 13–17.7)
MCH RBC QN AUTO: 30.8 PG (ref 26.6–33)
MCHC RBC AUTO-ENTMCNC: 33.1 G/DL (ref 31.5–35.7)
MCV RBC AUTO: 93.1 FL (ref 79–97)
PLATELET # BLD AUTO: 153 10*3/MM3 (ref 140–450)
PMV BLD AUTO: 11.2 FL (ref 6–12)
POTASSIUM BLD-SCNC: 3.9 MMOL/L (ref 3.5–5.2)
PROT SERPL-MCNC: 7.1 G/DL (ref 6–8.5)
RBC # BLD AUTO: 4.48 10*6/MM3 (ref 4.14–5.8)
SODIUM BLD-SCNC: 143 MMOL/L (ref 136–145)
WBC NRBC COR # BLD: 6.25 10*3/MM3 (ref 3.4–10.8)

## 2019-12-17 PROCEDURE — 36415 COLL VENOUS BLD VENIPUNCTURE: CPT | Performed by: INTERNAL MEDICINE

## 2019-12-17 PROCEDURE — 87206 SMEAR FLUORESCENT/ACID STAI: CPT | Performed by: INTERNAL MEDICINE

## 2019-12-17 PROCEDURE — 85027 COMPLETE CBC AUTOMATED: CPT | Performed by: INTERNAL MEDICINE

## 2019-12-17 PROCEDURE — 86140 C-REACTIVE PROTEIN: CPT | Performed by: INTERNAL MEDICINE

## 2019-12-17 PROCEDURE — 85652 RBC SED RATE AUTOMATED: CPT | Performed by: INTERNAL MEDICINE

## 2019-12-17 PROCEDURE — 80053 COMPREHEN METABOLIC PANEL: CPT | Performed by: INTERNAL MEDICINE

## 2019-12-17 PROCEDURE — 87116 MYCOBACTERIA CULTURE: CPT | Performed by: INTERNAL MEDICINE

## 2019-12-31 ENCOUNTER — TRANSCRIBE ORDERS (OUTPATIENT)
Dept: ADMINISTRATIVE | Facility: HOSPITAL | Age: 74
End: 2019-12-31

## 2019-12-31 DIAGNOSIS — J18.9 CHRONIC PNEUMONIA: Primary | ICD-10-CM

## 2020-01-08 RX ORDER — LOSARTAN POTASSIUM 100 MG/1
TABLET ORAL
Qty: 90 TABLET | Refills: 1 | Status: SHIPPED | OUTPATIENT
Start: 2020-01-08 | End: 2020-09-28

## 2020-01-14 ENCOUNTER — HOSPITAL ENCOUNTER (OUTPATIENT)
Dept: CT IMAGING | Facility: HOSPITAL | Age: 75
Discharge: HOME | End: 2020-01-14
Admitting: INTERNAL MEDICINE

## 2020-01-14 ENCOUNTER — APPOINTMENT (OUTPATIENT)
Dept: LAB | Facility: HOSPITAL | Age: 75
End: 2020-01-14

## 2020-01-14 DIAGNOSIS — J96.21 ACUTE AND CHRONIC RESPIRATORY FAILURE WITH HYPOXIA (HCC): ICD-10-CM

## 2020-01-14 DIAGNOSIS — A31.9 MYCOBACTERIUM INFECTION, ATYPICAL: Primary | ICD-10-CM

## 2020-01-14 DIAGNOSIS — J18.9 CHRONIC PNEUMONIA: ICD-10-CM

## 2020-01-14 DIAGNOSIS — J96.10 CHRONIC RESPIRATORY FAILURE, UNSPECIFIED WHETHER WITH HYPOXIA OR HYPERCAPNIA (HCC): ICD-10-CM

## 2020-01-14 DIAGNOSIS — J84.9 INTERSTITIAL LUNG DISEASE (HCC): ICD-10-CM

## 2020-01-14 PROCEDURE — 71250 CT THORAX DX C-: CPT

## 2020-01-14 PROCEDURE — 87186 SC STD MICRODIL/AGAR DIL: CPT

## 2020-01-14 PROCEDURE — 36415 COLL VENOUS BLD VENIPUNCTURE: CPT

## 2020-01-29 LAB
MYCOBACTERIUM SPEC CULT: ABNORMAL
NIGHT BLUE STAIN TISS: ABNORMAL

## 2020-01-30 LAB — REF LAB TEST RESULTS: NORMAL

## 2020-02-04 ENCOUNTER — OFFICE VISIT (OUTPATIENT)
Dept: FAMILY MEDICINE CLINIC | Facility: CLINIC | Age: 75
End: 2020-02-04

## 2020-02-04 VITALS
DIASTOLIC BLOOD PRESSURE: 88 MMHG | TEMPERATURE: 97.6 F | HEART RATE: 56 BPM | RESPIRATION RATE: 18 BRPM | HEIGHT: 70 IN | BODY MASS INDEX: 33.87 KG/M2 | WEIGHT: 236.6 LBS | SYSTOLIC BLOOD PRESSURE: 162 MMHG | OXYGEN SATURATION: 97 %

## 2020-02-04 DIAGNOSIS — I10 ESSENTIAL HYPERTENSION: Chronic | ICD-10-CM

## 2020-02-04 DIAGNOSIS — E55.9 VITAMIN D DEFICIENCY: ICD-10-CM

## 2020-02-04 DIAGNOSIS — E78.01 FAMILIAL HYPERCHOLESTEROLEMIA: ICD-10-CM

## 2020-02-04 DIAGNOSIS — M54.50 CHRONIC BILATERAL LOW BACK PAIN WITHOUT SCIATICA: ICD-10-CM

## 2020-02-04 DIAGNOSIS — Z00.00 MEDICARE ANNUAL WELLNESS VISIT, SUBSEQUENT: Primary | ICD-10-CM

## 2020-02-04 DIAGNOSIS — G89.29 CHRONIC BILATERAL LOW BACK PAIN WITHOUT SCIATICA: ICD-10-CM

## 2020-02-04 LAB
BILIRUB BLD-MCNC: NEGATIVE MG/DL
CLARITY, POC: CLEAR
COLOR UR: YELLOW
GLUCOSE UR STRIP-MCNC: NEGATIVE MG/DL
KETONES UR QL: NEGATIVE
LEUKOCYTE EST, POC: NEGATIVE
NITRITE UR-MCNC: NEGATIVE MG/ML
PH UR: 5.5 [PH] (ref 5–8)
PROT UR STRIP-MCNC: NEGATIVE MG/DL
RBC # UR STRIP: NEGATIVE /UL
SP GR UR: 1.01 (ref 1–1.03)
UROBILINOGEN UR QL: NORMAL

## 2020-02-04 PROCEDURE — 96160 PT-FOCUSED HLTH RISK ASSMT: CPT | Performed by: FAMILY MEDICINE

## 2020-02-04 PROCEDURE — 93000 ELECTROCARDIOGRAM COMPLETE: CPT | Performed by: FAMILY MEDICINE

## 2020-02-04 PROCEDURE — 81003 URINALYSIS AUTO W/O SCOPE: CPT | Performed by: FAMILY MEDICINE

## 2020-02-04 PROCEDURE — G0439 PPPS, SUBSEQ VISIT: HCPCS | Performed by: FAMILY MEDICINE

## 2020-02-04 NOTE — PROGRESS NOTES
The ABCs of the Annual Wellness Visit  Subsequent Medicare Wellness Visit    Chief Complaint   Patient presents with   • Medicare Wellness-subsequent       Subjective   History of Present Illness:  Jeremie Wynn is a 74 y.o. male who presents for a Subsequent Medicare Wellness Visit.    HEALTH RISK ASSESSMENT    Recent Hospitalizations:  No hospitalization(s) within the last year.    Current Medical Providers:  Patient Care Team:  Aaron Trejo MD as PCP - General  Aaron Trejo MD as PCP - Family Medicine  Zeb Palacios MD as Consulting Physician (Infectious Diseases)    Smoking Status:  Social History     Tobacco Use   Smoking Status Former Smoker   • Years: 10.00   • Types: Pipe, Cigars   • Last attempt to quit:    • Years since quittin.1   Smokeless Tobacco Never Used   Tobacco Comment    smoked a pipe and cigars for during 20's       Alcohol Consumption:  Social History     Substance and Sexual Activity   Alcohol Use No       Depression Screen:   PHQ-2/PHQ-9 Depression Screening 2020   Little interest or pleasure in doing things 0   Feeling down, depressed, or hopeless 0   Total Score 0       Fall Risk Screen:  STEADI Fall Risk Assessment was completed, and patient is at LOW risk for falls.Assessment completed on:2020    Health Habits and Functional and Cognitive Screening:  Functional & Cognitive Status 2020   Do you have difficulty preparing food and eating? No   Do you have difficulty bathing yourself, getting dressed or grooming yourself? No   Do you have difficulty using the toilet? No   Do you have difficulty moving around from place to place? No   Do you have trouble with steps or getting out of a bed or a chair? No   Current Diet Well Balanced Diet   Dental Exam Up to date   Eye Exam Up to date   Exercise (times per week) 5 times per week   Current Exercise Activities Include (No Data)   Do you need help using the phone?  No   Are you deaf or do you have serious difficulty  hearing?  No   Do you need help with transportation? No   Do you need help shopping? No   Do you need help preparing meals?  No   Do you need help with housework?  No   Do you need help with laundry? No   Do you need help taking your medications? No   Do you need help managing money? No   Do you ever drive or ride in a car without wearing a seat belt? No   Have you felt unusual stress, anger or loneliness in the last month? No   Who do you live with? Spouse   If you need help, do you have trouble finding someone available to you? No   Do you have difficulty concentrating, remembering or making decisions? No         Does the patient have evidence of cognitive impairment? No    Asprin use counseling:Taking ASA appropriately as indicated    Age-appropriate Screening Schedule:  Refer to the list below for future screening recommendations based on patient's age, sex and/or medical conditions. Orders for these recommended tests are listed in the plan section. The patient has been provided with a written plan.    Health Maintenance   Topic Date Due   • TDAP/TD VACCINES (1 - Tdap) 11/01/1956   • ZOSTER VACCINE (2 of 2) 05/23/2017   • LIPID PANEL  07/29/2020   • COLONOSCOPY  01/23/2027   • INFLUENZA VACCINE  Completed          The following portions of the patient's history were reviewed and updated as appropriate: allergies, current medications, past family history, past medical history, past social history, past surgical history and problem list.    Outpatient Medications Prior to Visit   Medication Sig Dispense Refill   • atorvastatin (LIPITOR) 10 MG tablet Take 1 tablet by mouth Daily. for cholesterol. 90 tablet 3   • cetirizine (zyrTEC) 10 MG tablet Take 10 mg by mouth Daily.     • ethambutol (MYAMBUTOL) 400 MG tablet Take 1,600 mg by mouth Daily.     • furosemide (LASIX) 20 MG tablet TAKE ONE TABLET BY MOUTH DAILY 90 tablet 2   • ipratropium-albuterol (DUO-NEB) 0.5-2.5 mg/mL nebulizer Take 3 mL by nebulization 4 (Four)  Times a Day. 360 mL 0   • losartan (COZAAR) 100 MG tablet TAKE ONE TABLET BY MOUTH DAILY 90 tablet 1   • Multiple Vitamins-Minerals (CENTROVITE) tablet Take 1 tablet by mouth Daily.     • nystatin (MYCOSTATIN) 264269 UNIT/ML suspension      • potassium chloride (K-DUR,KLOR-CON) 10 MEQ CR tablet Take 1 tablet by mouth Daily. 90 tablet 1   • rifAMPin (RIFADIN) 300 MG capsule Take 300 mg by mouth 2 (Two) Times a Day.     • tiZANidine (ZANAFLEX) 4 MG tablet TAKE ONE TABLET BY MOUTH AT BEDTIME AS NEEDED FOR LEG CRAMPS 68 tablet 2   • traMADol (ULTRAM) 50 MG tablet Take 50 mg by mouth Every 6 (Six) Hours As Needed for Moderate Pain .     • fluticasone (FLONASE ALLERGY RELIEF) 50 MCG/ACT nasal spray Flonase Allergy Relief 50 mcg/actuation nasal spray,suspension   Daily       No facility-administered medications prior to visit.        Patient Active Problem List   Diagnosis   • Generalized osteoarthritis   • Hyperlipemia   • Hypertension   • Arthralgia of hip   • Dyspnea on exertion   • Obesity   • S/P total knee arthroplasty, left   • Prediabetes   • Ulcerative colitis (CMS/HCC)   • Interstitial lung disease (CMS/HCC)   • Bilateral sensorineural hearing loss   • Mycobacterium infection, atypical   • Hiatal hernia with GERD       Advanced Care Planning:  ACP discussion was held with the patient during this visit. Patient has an advance directive, copy requested.    Review of Systems   Constitutional: Negative for activity change and unexpected weight change.   HENT: Negative for congestion and sore throat.    Respiratory: Negative for cough and shortness of breath.    Cardiovascular: Negative for chest pain, palpitations and leg swelling.   Gastrointestinal: Negative for diarrhea, nausea and vomiting.   Endocrine: Negative for cold intolerance.   Genitourinary: Negative for dysuria and hematuria.   Musculoskeletal: Positive for arthralgias and back pain. Negative for joint swelling.   Skin: Negative for color change and  "rash.   Allergic/Immunologic: Negative for environmental allergies and food allergies.   Neurological: Negative for syncope and headaches.   Hematological: Negative for adenopathy. Does not bruise/bleed easily.   Psychiatric/Behavioral: Negative for dysphoric mood and sleep disturbance. The patient is not nervous/anxious.        Compared to one year ago, the patient feels his physical health is the same.  Compared to one year ago, the patient feels his mental health is the same.    Reviewed chart for potential of high risk medication in the elderly: yes  Reviewed chart for potential of harmful drug interactions in the elderly:not applicable    Objective         Vitals:    02/04/20 0910   BP: 162/88   Pulse: 56   Resp: 18   Temp: 97.6 °F (36.4 °C)   SpO2: 97%   Weight: 107 kg (236 lb 9.6 oz)   Height: 177.8 cm (70\")   PainSc: 0-No pain       Body mass index is 33.95 kg/m².  Discussed the patient's BMI with him. The BMI is above average; BMI management plan is completed.    Physical Exam   Constitutional: He is oriented to person, place, and time. He appears well-developed and well-nourished. He is cooperative.   HENT:   Head: Normocephalic.   Right Ear: External ear normal.   Left Ear: External ear normal.   Nose: Nose normal.   Mouth/Throat: Oropharynx is clear and moist.   Eyes: Pupils are equal, round, and reactive to light. Conjunctivae are normal. No scleral icterus.   Neck: Neck supple. No JVD present. Carotid bruit is not present. No thyromegaly present.   Cardiovascular: Normal rate, regular rhythm, normal heart sounds and intact distal pulses.   Pulmonary/Chest: Effort normal and breath sounds normal. He has no wheezes. He has no rales.   Abdominal: Soft. Bowel sounds are normal. He exhibits no mass. There is no hepatosplenomegaly.   Musculoskeletal: Normal range of motion. He exhibits tenderness. He exhibits no edema.   Slight dorsal kyphosis lower lumbar cage/pain on arising from a chair   Neurological: He " is alert and oriented to person, place, and time.   No focal deficits no lateralizing signs   Skin: Skin is warm and dry. No rash noted.   Psychiatric: He has a normal mood and affect. Cognition and memory are normal.   Nursing note and vitals reviewed.            Assessment/Plan   Medicare Risks and Personalized Health Plan  CMS Preventative Services Quick Reference  Cardiovascular risk  Obesity/Overweight     The above risks/problems have been discussed with the patient.  Pertinent information has been shared with the patient in the After Visit Summary.  Follow up plans and orders are seen below in the Assessment/Plan Section.    Diagnoses and all orders for this visit:    1. Medicare annual wellness visit, subsequent (Primary)  -     Comprehensive Metabolic Panel  -     Lipid Panel  -     TSH  -     CBC (No Diff)  -     POC Urinalysis Dipstick, Automated  -     ECG 12 Lead    2. Vitamin D deficiency  -     Vitamin D 25 Hydroxy    3. Essential hypertension  -     Comprehensive Metabolic Panel  -     ECG 12 Lead    4. Familial hypercholesterolemia  -     Lipid Panel    5. Chronic bilateral low back pain without sciatica  -     XR Spine Lumbar 2 or 3 View; Future      Follow Up:  Return in about 4 months (around 6/4/2020) for Recheck.     An After Visit Summary and PPPS were given to the patient.         ECG 12 Lead  Date/Time: 2/4/2020 5:28 PM  Performed by: Aaron Trejo MD  Authorized by: Aaron Trejo MD   Comparison: compared with previous ECG   Similar to previous ECG  Rhythm: sinus rhythm  Rate: normal  BPM: 60  Conduction: left bundle branch block  QRS axis: left  Other findings: non-specific ST-T wave changes    Clinical impression: abnormal EKG          See form

## 2020-02-05 LAB
25(OH)D3+25(OH)D2 SERPL-MCNC: 24.6 NG/ML (ref 30–100)
ALBUMIN SERPL-MCNC: 4.1 G/DL (ref 3.5–5.2)
ALBUMIN/GLOB SERPL: 1.5 G/DL
ALP SERPL-CCNC: 61 U/L (ref 39–117)
ALT SERPL-CCNC: 12 U/L (ref 1–41)
AST SERPL-CCNC: 15 U/L (ref 1–40)
BILIRUB SERPL-MCNC: 0.5 MG/DL (ref 0.2–1.2)
BUN SERPL-MCNC: 23 MG/DL (ref 8–23)
BUN/CREAT SERPL: 19.5 (ref 7–25)
CALCIUM SERPL-MCNC: 9.1 MG/DL (ref 8.6–10.5)
CHLORIDE SERPL-SCNC: 106 MMOL/L (ref 98–107)
CHOLEST SERPL-MCNC: 169 MG/DL (ref 0–200)
CO2 SERPL-SCNC: 24.9 MMOL/L (ref 22–29)
CREAT SERPL-MCNC: 1.18 MG/DL (ref 0.76–1.27)
ERYTHROCYTE [DISTWIDTH] IN BLOOD BY AUTOMATED COUNT: 13.5 % (ref 12.3–15.4)
GLOBULIN SER CALC-MCNC: 2.8 GM/DL
GLUCOSE SERPL-MCNC: 106 MG/DL (ref 65–99)
HCT VFR BLD AUTO: 40.7 % (ref 37.5–51)
HDLC SERPL-MCNC: 58 MG/DL (ref 40–60)
HGB BLD-MCNC: 14.4 G/DL (ref 13–17.7)
LDLC SERPL CALC-MCNC: 93 MG/DL (ref 0–100)
MCH RBC QN AUTO: 32.7 PG (ref 26.6–33)
MCHC RBC AUTO-ENTMCNC: 35.4 G/DL (ref 31.5–35.7)
MCV RBC AUTO: 92.3 FL (ref 79–97)
PLATELET # BLD AUTO: 164 10*3/MM3 (ref 140–450)
POTASSIUM SERPL-SCNC: 4.3 MMOL/L (ref 3.5–5.2)
PROT SERPL-MCNC: 6.9 G/DL (ref 6–8.5)
RBC # BLD AUTO: 4.41 10*6/MM3 (ref 4.14–5.8)
SODIUM SERPL-SCNC: 143 MMOL/L (ref 136–145)
TRIGL SERPL-MCNC: 88 MG/DL (ref 0–150)
TSH SERPL DL<=0.005 MIU/L-ACNC: 2.11 UIU/ML (ref 0.27–4.2)
VLDLC SERPL CALC-MCNC: 17.6 MG/DL
WBC # BLD AUTO: 7.6 10*3/MM3 (ref 3.4–10.8)

## 2020-02-08 RX ORDER — TIZANIDINE 4 MG/1
TABLET ORAL
Qty: 30 TABLET | Refills: 1 | Status: SHIPPED | OUTPATIENT
Start: 2020-02-08 | End: 2020-06-08

## 2020-02-12 ENCOUNTER — HOSPITAL ENCOUNTER (OUTPATIENT)
Dept: GENERAL RADIOLOGY | Facility: HOSPITAL | Age: 75
Discharge: HOME | End: 2020-02-12
Admitting: FAMILY MEDICINE

## 2020-02-12 DIAGNOSIS — M54.50 CHRONIC BILATERAL LOW BACK PAIN WITHOUT SCIATICA: ICD-10-CM

## 2020-02-12 DIAGNOSIS — G89.29 CHRONIC BILATERAL LOW BACK PAIN WITHOUT SCIATICA: ICD-10-CM

## 2020-02-12 PROCEDURE — 72100 X-RAY EXAM L-S SPINE 2/3 VWS: CPT

## 2020-02-25 ENCOUNTER — LAB REQUISITION (OUTPATIENT)
Dept: LAB | Facility: HOSPITAL | Age: 75
End: 2020-02-25

## 2020-02-25 DIAGNOSIS — Z00.00 ROUTINE GENERAL MEDICAL EXAMINATION AT A HEALTH CARE FACILITY: ICD-10-CM

## 2020-02-25 LAB
ALBUMIN SERPL-MCNC: 4.1 G/DL (ref 3.5–5.2)
ALBUMIN/GLOB SERPL: 1.3 G/DL
ALP SERPL-CCNC: 59 U/L (ref 39–117)
ALT SERPL W P-5'-P-CCNC: 11 U/L (ref 1–41)
ANION GAP SERPL CALCULATED.3IONS-SCNC: 12 MMOL/L (ref 5–15)
AST SERPL-CCNC: 18 U/L (ref 1–40)
BASOPHILS # BLD AUTO: 0.04 10*3/MM3 (ref 0–0.2)
BASOPHILS NFR BLD AUTO: 0.6 % (ref 0–1.5)
BILIRUB SERPL-MCNC: 0.5 MG/DL (ref 0.2–1.2)
BUN BLD-MCNC: 19 MG/DL (ref 8–23)
BUN/CREAT SERPL: 18.4 (ref 7–25)
CALCIUM SPEC-SCNC: 8.8 MG/DL (ref 8.6–10.5)
CHLORIDE SERPL-SCNC: 105 MMOL/L (ref 98–107)
CO2 SERPL-SCNC: 26 MMOL/L (ref 22–29)
CREAT BLD-MCNC: 1.03 MG/DL (ref 0.76–1.27)
CRP SERPL-MCNC: 0.15 MG/DL (ref 0–0.5)
DEPRECATED RDW RBC AUTO: 46.8 FL (ref 37–54)
EOSINOPHIL # BLD AUTO: 0.32 10*3/MM3 (ref 0–0.4)
EOSINOPHIL NFR BLD AUTO: 4.5 % (ref 0.3–6.2)
ERYTHROCYTE [DISTWIDTH] IN BLOOD BY AUTOMATED COUNT: 13.2 % (ref 12.3–15.4)
ERYTHROCYTE [SEDIMENTATION RATE] IN BLOOD: 20 MM/HR (ref 0–20)
GFR SERPL CREATININE-BSD FRML MDRD: 70 ML/MIN/1.73
GLOBULIN UR ELPH-MCNC: 3.1 GM/DL
GLUCOSE BLD-MCNC: 103 MG/DL (ref 65–99)
HCT VFR BLD AUTO: 43 % (ref 37.5–51)
HGB BLD-MCNC: 14.5 G/DL (ref 13–17.7)
IMM GRANULOCYTES # BLD AUTO: 0.02 10*3/MM3 (ref 0–0.05)
IMM GRANULOCYTES NFR BLD AUTO: 0.3 % (ref 0–0.5)
LYMPHOCYTES # BLD AUTO: 1.5 10*3/MM3 (ref 0.7–3.1)
LYMPHOCYTES NFR BLD AUTO: 21.3 % (ref 19.6–45.3)
MCH RBC QN AUTO: 32.4 PG (ref 26.6–33)
MCHC RBC AUTO-ENTMCNC: 33.7 G/DL (ref 31.5–35.7)
MCV RBC AUTO: 96 FL (ref 79–97)
MONOCYTES # BLD AUTO: 0.7 10*3/MM3 (ref 0.1–0.9)
MONOCYTES NFR BLD AUTO: 9.9 % (ref 5–12)
NEUTROPHILS # BLD AUTO: 4.47 10*3/MM3 (ref 1.7–7)
NEUTROPHILS NFR BLD AUTO: 63.4 % (ref 42.7–76)
NRBC BLD AUTO-RTO: 0 /100 WBC (ref 0–0.2)
PLATELET # BLD AUTO: 168 10*3/MM3 (ref 140–450)
PMV BLD AUTO: 11.2 FL (ref 6–12)
POTASSIUM BLD-SCNC: 4 MMOL/L (ref 3.5–5.2)
PROT SERPL-MCNC: 7.2 G/DL (ref 6–8.5)
RBC # BLD AUTO: 4.48 10*6/MM3 (ref 4.14–5.8)
SODIUM BLD-SCNC: 143 MMOL/L (ref 136–145)
WBC NRBC COR # BLD: 7.05 10*3/MM3 (ref 3.4–10.8)

## 2020-02-25 PROCEDURE — 87206 SMEAR FLUORESCENT/ACID STAI: CPT

## 2020-02-25 PROCEDURE — 85025 COMPLETE CBC W/AUTO DIFF WBC: CPT

## 2020-02-25 PROCEDURE — 87116 MYCOBACTERIA CULTURE: CPT

## 2020-02-25 PROCEDURE — 86140 C-REACTIVE PROTEIN: CPT

## 2020-02-25 PROCEDURE — 80053 COMPREHEN METABOLIC PANEL: CPT

## 2020-02-25 PROCEDURE — 85652 RBC SED RATE AUTOMATED: CPT
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
## 2020-03-08 DIAGNOSIS — I10 ESSENTIAL HYPERTENSION: Chronic | ICD-10-CM

## 2020-03-09 RX ORDER — POTASSIUM CHLORIDE 750 MG/1
TABLET, EXTENDED RELEASE ORAL
Qty: 90 TABLET | Refills: 1 | Status: SHIPPED | OUTPATIENT
Start: 2020-03-09 | End: 2021-01-05

## 2020-04-06 LAB
MYCOBACTERIUM SPEC CULT: ABNORMAL
NIGHT BLUE STAIN TISS: ABNORMAL

## 2020-04-06 RX ORDER — FUROSEMIDE 20 MG/1
TABLET ORAL
Qty: 90 TABLET | Refills: 1 | OUTPATIENT
Start: 2020-04-06

## 2020-05-05 RX ORDER — FUROSEMIDE 20 MG/1
TABLET ORAL
Qty: 90 TABLET | Refills: 0 | Status: SHIPPED | OUTPATIENT
Start: 2020-05-05 | End: 2020-07-31

## 2020-06-04 ENCOUNTER — TELEMEDICINE (OUTPATIENT)
Dept: FAMILY MEDICINE CLINIC | Facility: CLINIC | Age: 75
End: 2020-06-04

## 2020-06-04 DIAGNOSIS — J01.00 ACUTE MAXILLARY SINUSITIS, RECURRENCE NOT SPECIFIED: Primary | ICD-10-CM

## 2020-06-04 PROCEDURE — 99213 OFFICE O/P EST LOW 20 MIN: CPT | Performed by: FAMILY MEDICINE

## 2020-06-04 RX ORDER — AZITHROMYCIN 250 MG/1
TABLET, FILM COATED ORAL
Qty: 6 TABLET | Refills: 0 | Status: SHIPPED | OUTPATIENT
Start: 2020-06-04 | End: 2021-08-25

## 2020-06-04 NOTE — PROGRESS NOTES
Subjective   Jeremie Wynn is a 74 y.o. male.     Sinusitis   This is a new (Sputum has changed to a yellow-green color) problem. The current episode started in the past 7 days. The problem is unchanged. There has been no fever. He is experiencing no pain. Associated symptoms include congestion and sinus pressure. Pertinent negatives include no chills, coughing, shortness of breath or sore throat. Past treatments include acetaminophen. The treatment provided no relief.        The following portions of the patient's history were reviewed and updated as appropriate: allergies, current medications, past social history and problem list.    Review of Systems   Constitutional: Negative for chills and fever.   HENT: Positive for congestion, postnasal drip and sinus pressure. Negative for sore throat.    Respiratory: Negative for cough and shortness of breath.    Cardiovascular: Negative for chest pain.       Objective   There were no vitals taken for this visit.  Physical Exam   Constitutional: He is oriented to person, place, and time.   Neurological: He is alert and oriented to person, place, and time.   Psychiatric: He has a normal mood and affect.     Visit converted to telephone visit  Assessment/Plan   Problem List Items Addressed This Visit     None      Visit Diagnoses     Acute maxillary sinusitis, recurrence not specified    -  Primary          New Medications Ordered This Visit   Medications   • azithromycin (Zithromax) 250 MG tablet     Sig: Take 2 tablets the first day, then 1 tablet daily for 4 days.     Dispense:  6 tablet     Refill:  0   Continue over-the-counter antihistamine return to clinic if symptoms persist or worsen.  Unable to complete visit using a video connection to the patient. A phone visit was used to complete this visits. Total time of discussion was 12 minutes.

## 2020-06-08 RX ORDER — TIZANIDINE 4 MG/1
TABLET ORAL
Qty: 24 TABLET | Refills: 1 | Status: SHIPPED | OUTPATIENT
Start: 2020-06-08 | End: 2020-06-16

## 2020-06-16 RX ORDER — TIZANIDINE 4 MG/1
TABLET ORAL
Qty: 24 TABLET | Refills: 0 | Status: SHIPPED | OUTPATIENT
Start: 2020-06-16 | End: 2020-08-13

## 2020-07-31 RX ORDER — FUROSEMIDE 20 MG/1
TABLET ORAL
Qty: 90 TABLET | Refills: 0 | Status: SHIPPED | OUTPATIENT
Start: 2020-07-31 | End: 2020-10-26

## 2020-08-13 RX ORDER — TIZANIDINE 4 MG/1
TABLET ORAL
Qty: 24 TABLET | Refills: 0 | Status: SHIPPED | OUTPATIENT
Start: 2020-08-13 | End: 2020-09-03

## 2020-09-03 RX ORDER — TIZANIDINE 4 MG/1
TABLET ORAL
Qty: 24 TABLET | Refills: 0 | Status: SHIPPED | OUTPATIENT
Start: 2020-09-03 | End: 2020-09-10 | Stop reason: SDUPTHER

## 2020-09-10 RX ORDER — TIZANIDINE 4 MG/1
TABLET ORAL
Qty: 24 TABLET | Refills: 0 | Status: SHIPPED | OUTPATIENT
Start: 2020-09-10 | End: 2020-10-12

## 2020-09-27 DIAGNOSIS — E78.01 FAMILIAL HYPERCHOLESTEROLEMIA: ICD-10-CM

## 2020-09-28 RX ORDER — LOSARTAN POTASSIUM 100 MG/1
TABLET ORAL
Qty: 85 TABLET | Refills: 0 | Status: SHIPPED | OUTPATIENT
Start: 2020-09-28 | End: 2020-12-07 | Stop reason: SDUPTHER

## 2020-09-28 RX ORDER — ATORVASTATIN CALCIUM 10 MG/1
TABLET, FILM COATED ORAL
Qty: 85 TABLET | Refills: 1 | Status: SHIPPED | OUTPATIENT
Start: 2020-09-28 | End: 2020-12-19

## 2020-10-12 RX ORDER — TIZANIDINE 4 MG/1
TABLET ORAL
Qty: 90 TABLET | Refills: 0 | Status: SHIPPED | OUTPATIENT
Start: 2020-10-12

## 2020-10-26 ENCOUNTER — FLU SHOT (OUTPATIENT)
Dept: FAMILY MEDICINE CLINIC | Facility: CLINIC | Age: 75
End: 2020-10-26

## 2020-10-26 DIAGNOSIS — Z23 NEED FOR INFLUENZA VACCINATION: ICD-10-CM

## 2020-10-26 PROCEDURE — 90694 VACC AIIV4 NO PRSRV 0.5ML IM: CPT | Performed by: FAMILY MEDICINE

## 2020-10-26 PROCEDURE — G0008 ADMIN INFLUENZA VIRUS VAC: HCPCS | Performed by: FAMILY MEDICINE

## 2020-10-26 RX ORDER — FUROSEMIDE 20 MG/1
TABLET ORAL
Qty: 90 TABLET | Refills: 0 | Status: SHIPPED | OUTPATIENT
Start: 2020-10-26 | End: 2021-01-04

## 2020-12-07 RX ORDER — LOSARTAN POTASSIUM 100 MG/1
100 TABLET ORAL DAILY
Qty: 85 TABLET | Refills: 0 | Status: SHIPPED | OUTPATIENT
Start: 2020-12-07 | End: 2020-12-19

## 2020-12-19 DIAGNOSIS — E78.01 FAMILIAL HYPERCHOLESTEROLEMIA: ICD-10-CM

## 2020-12-19 RX ORDER — ATORVASTATIN CALCIUM 10 MG/1
TABLET, FILM COATED ORAL
Qty: 85 TABLET | Refills: 0 | Status: SHIPPED | OUTPATIENT
Start: 2020-12-19 | End: 2021-05-13

## 2020-12-19 RX ORDER — LOSARTAN POTASSIUM 100 MG/1
TABLET ORAL
Qty: 85 TABLET | Refills: 0 | Status: SHIPPED | OUTPATIENT
Start: 2020-12-19 | End: 2021-05-13

## 2021-01-04 RX ORDER — FUROSEMIDE 20 MG/1
TABLET ORAL
Qty: 89 TABLET | Refills: 0 | Status: SHIPPED | OUTPATIENT
Start: 2021-01-04 | End: 2021-03-31

## 2021-01-05 DIAGNOSIS — I10 ESSENTIAL HYPERTENSION: Chronic | ICD-10-CM

## 2021-01-05 RX ORDER — POTASSIUM CHLORIDE 750 MG/1
TABLET, EXTENDED RELEASE ORAL
Qty: 58 TABLET | Refills: 0 | Status: SHIPPED | OUTPATIENT
Start: 2021-01-05 | End: 2021-03-15

## 2021-03-14 DIAGNOSIS — I10 ESSENTIAL HYPERTENSION: Chronic | ICD-10-CM

## 2021-03-15 RX ORDER — POTASSIUM CHLORIDE 750 MG/1
TABLET, EXTENDED RELEASE ORAL
Qty: 58 TABLET | Refills: 0 | Status: SHIPPED | OUTPATIENT
Start: 2021-03-15 | End: 2021-05-13

## 2021-03-31 RX ORDER — FUROSEMIDE 20 MG/1
TABLET ORAL
Qty: 90 TABLET | Refills: 0 | Status: SHIPPED | OUTPATIENT
Start: 2021-03-31 | End: 2021-08-03

## 2021-05-10 ENCOUNTER — TRANSCRIBE ORDERS (OUTPATIENT)
Dept: ADMINISTRATIVE | Facility: HOSPITAL | Age: 76
End: 2021-05-10

## 2021-05-10 DIAGNOSIS — A31.0 PULMONARY DISEASE DUE TO MYCOBACTERIA (HCC): Primary | ICD-10-CM

## 2021-05-13 DIAGNOSIS — E78.01 FAMILIAL HYPERCHOLESTEROLEMIA: ICD-10-CM

## 2021-05-13 DIAGNOSIS — I10 ESSENTIAL HYPERTENSION: Chronic | ICD-10-CM

## 2021-05-13 RX ORDER — LOSARTAN POTASSIUM 100 MG/1
TABLET ORAL
Qty: 90 TABLET | Refills: 0 | Status: SHIPPED | OUTPATIENT
Start: 2021-05-13 | End: 2021-08-09

## 2021-05-13 RX ORDER — ATORVASTATIN CALCIUM 10 MG/1
TABLET, FILM COATED ORAL
Qty: 90 TABLET | Refills: 0 | Status: SHIPPED | OUTPATIENT
Start: 2021-05-13 | End: 2021-11-15

## 2021-05-13 RX ORDER — POTASSIUM CHLORIDE 750 MG/1
TABLET, EXTENDED RELEASE ORAL
Qty: 90 TABLET | Refills: 0 | Status: SHIPPED | OUTPATIENT
Start: 2021-05-13 | End: 2021-08-09

## 2021-05-20 ENCOUNTER — HOSPITAL ENCOUNTER (OUTPATIENT)
Dept: CT IMAGING | Facility: HOSPITAL | Age: 76
Discharge: HOME | End: 2021-05-20
Admitting: INTERNAL MEDICINE

## 2021-05-20 DIAGNOSIS — A31.0 PULMONARY DISEASE DUE TO MYCOBACTERIA (HCC): ICD-10-CM

## 2021-05-20 PROCEDURE — 71250 CT THORAX DX C-: CPT

## 2021-08-03 RX ORDER — FUROSEMIDE 20 MG/1
TABLET ORAL
Qty: 90 TABLET | Refills: 0 | Status: SHIPPED | OUTPATIENT
Start: 2021-08-03 | End: 2021-10-05

## 2021-08-08 DIAGNOSIS — I10 ESSENTIAL HYPERTENSION: Chronic | ICD-10-CM

## 2021-08-09 RX ORDER — LOSARTAN POTASSIUM 100 MG/1
TABLET ORAL
Qty: 30 TABLET | Refills: 0 | Status: SHIPPED | OUTPATIENT
Start: 2021-08-09 | End: 2021-08-13

## 2021-08-09 RX ORDER — POTASSIUM CHLORIDE 750 MG/1
TABLET, EXTENDED RELEASE ORAL
Qty: 90 TABLET | Refills: 0 | Status: SHIPPED | OUTPATIENT
Start: 2021-08-09 | End: 2021-08-13

## 2021-08-11 DIAGNOSIS — I10 ESSENTIAL HYPERTENSION: Chronic | ICD-10-CM

## 2021-08-13 RX ORDER — POTASSIUM CHLORIDE 750 MG/1
TABLET, EXTENDED RELEASE ORAL
Qty: 30 TABLET | Refills: 0 | Status: SHIPPED | OUTPATIENT
Start: 2021-08-13 | End: 2021-12-04

## 2021-08-13 RX ORDER — LOSARTAN POTASSIUM 100 MG/1
TABLET ORAL
Qty: 30 TABLET | Refills: 0 | Status: SHIPPED | OUTPATIENT
Start: 2021-08-13 | End: 2021-08-23 | Stop reason: SDUPTHER

## 2021-08-23 RX ORDER — LOSARTAN POTASSIUM 100 MG/1
100 TABLET ORAL DAILY
Qty: 30 TABLET | Refills: 2 | Status: SHIPPED | OUTPATIENT
Start: 2021-08-23 | End: 2021-12-04

## 2021-08-25 ENCOUNTER — OFFICE VISIT (OUTPATIENT)
Dept: FAMILY MEDICINE CLINIC | Facility: CLINIC | Age: 76
End: 2021-08-25

## 2021-08-25 VITALS
HEIGHT: 70 IN | RESPIRATION RATE: 18 BRPM | OXYGEN SATURATION: 98 % | SYSTOLIC BLOOD PRESSURE: 110 MMHG | HEART RATE: 61 BPM | TEMPERATURE: 98.4 F | DIASTOLIC BLOOD PRESSURE: 78 MMHG | WEIGHT: 229.8 LBS | BODY MASS INDEX: 32.9 KG/M2

## 2021-08-25 DIAGNOSIS — M54.50 ACUTE RIGHT-SIDED LOW BACK PAIN WITHOUT SCIATICA: Primary | ICD-10-CM

## 2021-08-25 DIAGNOSIS — G57.93 NEUROPATHY OF BOTH FEET: ICD-10-CM

## 2021-08-25 DIAGNOSIS — E78.01 FAMILIAL HYPERCHOLESTEROLEMIA: ICD-10-CM

## 2021-08-25 DIAGNOSIS — I10 ESSENTIAL HYPERTENSION: ICD-10-CM

## 2021-08-25 DIAGNOSIS — R13.19 ESOPHAGEAL DYSPHAGIA: ICD-10-CM

## 2021-08-25 PROCEDURE — 99214 OFFICE O/P EST MOD 30 MIN: CPT | Performed by: FAMILY MEDICINE

## 2021-08-25 RX ORDER — PANTOPRAZOLE SODIUM 40 MG/1
40 TABLET, DELAYED RELEASE ORAL DAILY
Qty: 30 TABLET | Refills: 0 | Status: SHIPPED | OUTPATIENT
Start: 2021-08-25 | End: 2021-09-10

## 2021-08-25 RX ORDER — AZITHROMYCIN 500 MG/1
TABLET, FILM COATED ORAL
COMMUNITY
Start: 2021-08-10 | End: 2021-09-14

## 2021-08-25 NOTE — PROGRESS NOTES
"Chief Complaint  Abdominal Pain (having trouble keeping food down)    Subjective          Jeremie Wynn presents to [facility]  History of Present Illness  Jeremie Wynn is a pleasant male who presents today for follow-up.       Vomit and nausea   The patient states that he ate 5 bites of steak/foods the other night and had difficulty swallowing. He admits to vomiting meals previously on various occasions. He reports a history of hiatal hernia. He has undergone a cholecystectomy in the past.    Neuropathy in the lower extremities  Mr. Wynn admits to having foot pain. He reports that his feet are always cold and numb. He believes this may be related to poor circulation.    Lumbar pain  The patient reports pain in his lumbar region and has strained muscles when standing. He adds that this has caused him to fall back on the couch on occasion. Mr. Wynn has curvature of the spine and admits to being in a hunched back posture daily.    Inflammation in the sacroiliac joint   The patient has inflammation of his sacroiliac joint.    Tremor in the hands  Mr. Wynn also admits to having increased tremor of his hands.  Objective   Vital Signs:   /78   Pulse 61   Temp 98.4 °F (36.9 °C)   Resp 18   Ht 177.8 cm (70\")   Wt 104 kg (229 lb 12.8 oz)   SpO2 98%   BMI 32.97 kg/m²     Physical Exam  Vitals and nursing note reviewed.   Constitutional:       Appearance: He is well-developed.   HENT:      Head: Normocephalic.      Right Ear: External ear normal.      Left Ear: External ear normal.      Nose: Nose normal.   Eyes:      General: No scleral icterus.     Conjunctiva/sclera: Conjunctivae normal.      Pupils: Pupils are equal, round, and reactive to light.   Neck:      Thyroid: No thyromegaly.      Vascular: No carotid bruit.   Cardiovascular:      Rate and Rhythm: Normal rate and regular rhythm.   Pulmonary:      Effort: Pulmonary effort is normal.      Breath sounds: Normal breath " sounds.   Musculoskeletal:         General: Normal range of motion.      Cervical back: Neck supple.      Comments: Bilateral hips reveal inflammation in the sacroiliac joint. He also has curvature of the spine.   Skin:     General: Skin is warm and dry.      Findings: No rash.   Neurological:      Mental Status: He is alert and oriented to person, place, and time.      Comments: No focal deficits no lateralizing signs   Psychiatric:         Behavior: Behavior is cooperative.        Result Review :                 Assessment and Plan    Diagnoses and all orders for this visit:    1. Acute right-sided low back pain without sciatica (Primary)  -     XR Spine Lumbar 2 or 3 View; Future    2. Neuropathy of both feet  -     Comprehensive Metabolic Panel; Future  -     Vitamin B12; Future    3. Esophageal dysphagia  -     Ambulatory referral for Screening EGD  -     pantoprazole (PROTONIX) 40 MG EC tablet; Take 1 tablet by mouth Daily. For reflux  Dispense: 30 tablet; Refill: 0    4. Familial hypercholesterolemia  -     Comprehensive Metabolic Panel; Future  -     Lipid Panel; Future  -     TSH; Future    5. Essential hypertension  -     CBC (No Diff); Future        Follow Up   Return in about 3 months (around 11/25/2021) for Medicare Wellness, fasting appt.  Patient was given instructions and counseling regarding his condition or for health maintenance advice. Please see specific information pulled into the AVS if appropriate.     Transcribed from ambient dictation for Aaron Trejo MD by Yas Meyer.  08/25/21   16:39 EDT    I have personally performed the services described in this document as transcribed by the above individual, and it is both accurate and complete.  Aaron Trejo MD  8/25/2021  22:49 EDT

## 2021-08-29 ENCOUNTER — APPOINTMENT (OUTPATIENT)
Dept: PREADMISSION TESTING | Facility: HOSPITAL | Age: 76
End: 2021-08-29

## 2021-08-29 PROCEDURE — U0004 COV-19 TEST NON-CDC HGH THRU: HCPCS

## 2021-08-29 PROCEDURE — C9803 HOPD COVID-19 SPEC COLLECT: HCPCS

## 2021-08-30 LAB — SARS-COV-2 RNA PNL SPEC NAA+PROBE: NOT DETECTED

## 2021-08-31 ENCOUNTER — OUTSIDE FACILITY SERVICE (OUTPATIENT)
Dept: GASTROENTEROLOGY | Facility: CLINIC | Age: 76
End: 2021-08-31

## 2021-08-31 PROCEDURE — 88305 TISSUE EXAM BY PATHOLOGIST: CPT | Performed by: INTERNAL MEDICINE

## 2021-08-31 PROCEDURE — 43239 EGD BIOPSY SINGLE/MULTIPLE: CPT | Performed by: INTERNAL MEDICINE

## 2021-09-01 ENCOUNTER — LAB REQUISITION (OUTPATIENT)
Dept: LAB | Facility: HOSPITAL | Age: 76
End: 2021-09-01

## 2021-09-01 DIAGNOSIS — K21.00 GASTRO-ESOPHAGEAL REFLUX DISEASE WITH ESOPHAGITIS, WITHOUT BLEEDING: ICD-10-CM

## 2021-09-01 DIAGNOSIS — K29.70 GASTRITIS, UNSPECIFIED, WITHOUT BLEEDING: ICD-10-CM

## 2021-09-01 DIAGNOSIS — R13.10 DYSPHAGIA, UNSPECIFIED: ICD-10-CM

## 2021-09-01 DIAGNOSIS — K44.9 DIAPHRAGMATIC HERNIA WITHOUT OBSTRUCTION OR GANGRENE: ICD-10-CM

## 2021-09-02 LAB
CYTO UR: NORMAL
LAB AP CASE REPORT: NORMAL
LAB AP CLINICAL INFORMATION: NORMAL
PATH REPORT.FINAL DX SPEC: NORMAL
PATH REPORT.GROSS SPEC: NORMAL

## 2021-09-10 DIAGNOSIS — R13.19 ESOPHAGEAL DYSPHAGIA: ICD-10-CM

## 2021-09-10 RX ORDER — PANTOPRAZOLE SODIUM 40 MG/1
TABLET, DELAYED RELEASE ORAL
Qty: 90 TABLET | Refills: 0 | Status: SHIPPED | OUTPATIENT
Start: 2021-09-10 | End: 2021-11-01 | Stop reason: SDUPTHER

## 2021-09-14 ENCOUNTER — OFFICE VISIT (OUTPATIENT)
Dept: FAMILY MEDICINE CLINIC | Facility: CLINIC | Age: 76
End: 2021-09-14

## 2021-09-14 VITALS
OXYGEN SATURATION: 96 % | BODY MASS INDEX: 33.01 KG/M2 | HEIGHT: 70 IN | WEIGHT: 230.6 LBS | RESPIRATION RATE: 18 BRPM | HEART RATE: 61 BPM | SYSTOLIC BLOOD PRESSURE: 116 MMHG | DIASTOLIC BLOOD PRESSURE: 72 MMHG | TEMPERATURE: 96.9 F

## 2021-09-14 DIAGNOSIS — R07.89 CHEST WALL PAIN: Primary | ICD-10-CM

## 2021-09-14 PROCEDURE — 99213 OFFICE O/P EST LOW 20 MIN: CPT | Performed by: FAMILY MEDICINE

## 2021-09-14 RX ORDER — TRAMADOL HYDROCHLORIDE 50 MG/1
50 TABLET ORAL EVERY 6 HOURS PRN
Qty: 40 TABLET | Refills: 0 | Status: SHIPPED | OUTPATIENT
Start: 2021-09-14 | End: 2021-09-27

## 2021-09-15 NOTE — PROGRESS NOTES
"Chief Complaint  Back Pain (right mid x3 days with rash ), Hypertension (f/u), Hyperlipidemia, and Med Refill (90 day protonix and requesting medications to now be 90day scripts)    Subjective          Jeremie Wynn presents to [facility]  Back Pain  This is a new problem. The current episode started yesterday. The problem occurs constantly. The problem is unchanged. The pain is present in the thoracic spine. The quality of the pain is described as aching. The pain does not radiate. The pain is at a severity of 8/10. The pain is the same all the time. Stiffness is present all day. Pertinent negatives include no bladder incontinence, bowel incontinence, dysuria or paresthesias. He has tried nothing for the symptoms. The treatment provided no relief.   Hypertension  This is a chronic problem. The current episode started more than 1 year ago. The problem is unchanged. The problem is controlled. Current antihypertension treatment includes angiotensin blockers, diuretics and lifestyle changes. The current treatment provides significant improvement. There are no compliance problems.        Objective   Vital Signs:   /72 (BP Location: Left arm, Patient Position: Sitting, Cuff Size: Adult)   Pulse 61   Temp 96.9 °F (36.1 °C) (Infrared)   Resp 18   Ht 177.8 cm (70\")   Wt 105 kg (230 lb 9.6 oz)   SpO2 96%   BMI 33.09 kg/m²     Physical Exam  Vitals and nursing note reviewed.   Constitutional:       Appearance: Normal appearance. He is well-developed.   HENT:      Head: Normocephalic and atraumatic.      Right Ear: External ear normal.      Left Ear: External ear normal.      Nose: Nose normal.   Eyes:      General: No scleral icterus.     Conjunctiva/sclera: Conjunctivae normal.      Pupils: Pupils are equal, round, and reactive to light.   Neck:      Thyroid: No thyromegaly.      Vascular: No carotid bruit.   Cardiovascular:      Rate and Rhythm: Normal rate and regular rhythm. "      Heart sounds: Normal heart sounds.   Pulmonary:      Effort: Pulmonary effort is normal.      Breath sounds: Normal breath sounds.   Musculoskeletal:         General: Normal range of motion.      Cervical back: Neck supple.   Skin:     General: Skin is warm and dry.      Findings: No rash.      Comments: No evidence of a shingles rash in the left flank area some paraspinous muscle tenderness   Neurological:      Mental Status: He is alert and oriented to person, place, and time.      Comments: No focal deficits no lateralizing signs   Psychiatric:         Behavior: Behavior is cooperative.        Result Review :                 Assessment and Plan    Diagnoses and all orders for this visit:    1. Chest wall pain (Primary)  -     traMADol (ULTRAM) 50 MG tablet; Take 1 tablet by mouth Every 6 (Six) Hours As Needed for Moderate Pain .  Dispense: 40 tablet; Refill: 0        Follow Up   Return if symptoms worsen or fail to improve.  Patient was given instructions and counseling regarding his condition or for health maintenance advice. Please see specific information pulled into the AVS if appropriate.

## 2021-09-25 DIAGNOSIS — R07.89 CHEST WALL PAIN: ICD-10-CM

## 2021-09-27 RX ORDER — TRAMADOL HYDROCHLORIDE 50 MG/1
TABLET ORAL
Qty: 28 TABLET | Refills: 0 | Status: SHIPPED | OUTPATIENT
Start: 2021-09-27 | End: 2021-12-22 | Stop reason: SDUPTHER

## 2021-09-28 ENCOUNTER — HOSPITAL ENCOUNTER (OUTPATIENT)
Dept: GENERAL RADIOLOGY | Facility: HOSPITAL | Age: 76
Discharge: HOME | End: 2021-09-28

## 2021-09-28 ENCOUNTER — LAB (OUTPATIENT)
Dept: LAB | Facility: HOSPITAL | Age: 76
End: 2021-09-28

## 2021-09-28 DIAGNOSIS — M54.6 CHRONIC MIDLINE THORACIC BACK PAIN: Primary | ICD-10-CM

## 2021-09-28 DIAGNOSIS — G89.29 CHRONIC MIDLINE THORACIC BACK PAIN: Primary | ICD-10-CM

## 2021-09-28 DIAGNOSIS — M54.50 ACUTE RIGHT-SIDED LOW BACK PAIN WITHOUT SCIATICA: ICD-10-CM

## 2021-09-28 DIAGNOSIS — G89.29 CHRONIC MIDLINE THORACIC BACK PAIN: ICD-10-CM

## 2021-09-28 DIAGNOSIS — M54.6 CHRONIC MIDLINE THORACIC BACK PAIN: ICD-10-CM

## 2021-09-28 PROCEDURE — 72100 X-RAY EXAM L-S SPINE 2/3 VWS: CPT

## 2021-09-28 PROCEDURE — 72070 X-RAY EXAM THORAC SPINE 2VWS: CPT

## 2021-10-05 RX ORDER — FUROSEMIDE 20 MG/1
TABLET ORAL
Qty: 90 TABLET | Refills: 0 | Status: SHIPPED | OUTPATIENT
Start: 2021-10-05 | End: 2022-01-18

## 2021-10-26 ENCOUNTER — LAB (OUTPATIENT)
Dept: LAB | Facility: HOSPITAL | Age: 76
End: 2021-10-26

## 2021-10-26 ENCOUNTER — TRANSCRIBE ORDERS (OUTPATIENT)
Dept: LAB | Facility: HOSPITAL | Age: 76
End: 2021-10-26

## 2021-10-26 DIAGNOSIS — J18.9 UNRESOLVED PNEUMONIA: ICD-10-CM

## 2021-10-26 DIAGNOSIS — J34.89 OVERDEVELOPMENT OF NASAL BONES: ICD-10-CM

## 2021-10-26 DIAGNOSIS — J84.89 ORGANIZED PNEUMONIA (HCC): ICD-10-CM

## 2021-10-26 DIAGNOSIS — A31.0 PULMONARY DISEASE DUE TO MYCOBACTERIA (HCC): ICD-10-CM

## 2021-10-26 DIAGNOSIS — J34.89 OVERDEVELOPMENT OF NASAL BONES: Primary | ICD-10-CM

## 2021-10-26 PROCEDURE — 87206 SMEAR FLUORESCENT/ACID STAI: CPT

## 2021-10-26 PROCEDURE — 87116 MYCOBACTERIA CULTURE: CPT

## 2021-11-01 ENCOUNTER — OFFICE VISIT (OUTPATIENT)
Dept: GASTROENTEROLOGY | Facility: CLINIC | Age: 76
End: 2021-11-01

## 2021-11-01 VITALS
SYSTOLIC BLOOD PRESSURE: 164 MMHG | DIASTOLIC BLOOD PRESSURE: 61 MMHG | HEART RATE: 66 BPM | WEIGHT: 235.8 LBS | TEMPERATURE: 97.7 F | BODY MASS INDEX: 33.01 KG/M2 | HEIGHT: 71 IN

## 2021-11-01 DIAGNOSIS — K21.00 GASTROESOPHAGEAL REFLUX DISEASE WITH ESOPHAGITIS WITHOUT HEMORRHAGE: Primary | ICD-10-CM

## 2021-11-01 DIAGNOSIS — R13.19 ESOPHAGEAL DYSPHAGIA: ICD-10-CM

## 2021-11-01 PROCEDURE — 99213 OFFICE O/P EST LOW 20 MIN: CPT | Performed by: NURSE PRACTITIONER

## 2021-11-01 RX ORDER — PANTOPRAZOLE SODIUM 40 MG/1
40 TABLET, DELAYED RELEASE ORAL DAILY
Qty: 90 TABLET | Refills: 3 | Status: SHIPPED | OUTPATIENT
Start: 2021-11-01 | End: 2021-12-22 | Stop reason: SDUPTHER

## 2021-11-01 RX ORDER — AZITHROMYCIN 500 MG/1
500 TABLET, FILM COATED ORAL DAILY
COMMUNITY
Start: 2021-09-25 | End: 2021-12-22
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
## 2021-11-08 ENCOUNTER — CLINICAL SUPPORT (OUTPATIENT)
Dept: FAMILY MEDICINE CLINIC | Facility: CLINIC | Age: 76
End: 2021-11-08

## 2021-11-08 DIAGNOSIS — Z23 NEED FOR INFLUENZA VACCINATION: Primary | ICD-10-CM

## 2021-11-08 PROCEDURE — G0008 ADMIN INFLUENZA VIRUS VAC: HCPCS | Performed by: FAMILY MEDICINE

## 2021-11-08 PROCEDURE — 90662 IIV NO PRSV INCREASED AG IM: CPT | Performed by: FAMILY MEDICINE

## 2021-11-15 DIAGNOSIS — E78.01 FAMILIAL HYPERCHOLESTEROLEMIA: ICD-10-CM

## 2021-11-15 RX ORDER — ATORVASTATIN CALCIUM 10 MG/1
TABLET, FILM COATED ORAL
Qty: 90 TABLET | Refills: 1 | Status: SHIPPED | OUTPATIENT
Start: 2021-11-15 | End: 2022-05-11

## 2021-12-04 DIAGNOSIS — I10 ESSENTIAL HYPERTENSION: Chronic | ICD-10-CM

## 2021-12-04 RX ORDER — LOSARTAN POTASSIUM 100 MG/1
TABLET ORAL
Qty: 30 TABLET | Refills: 2 | Status: SHIPPED | OUTPATIENT
Start: 2021-12-04 | End: 2022-03-04

## 2021-12-04 RX ORDER — POTASSIUM CHLORIDE 750 MG/1
TABLET, EXTENDED RELEASE ORAL
Qty: 30 TABLET | Refills: 0 | Status: SHIPPED | OUTPATIENT
Start: 2021-12-04 | End: 2022-01-18

## 2021-12-07 LAB
MYCOBACTERIUM SPEC CULT: NORMAL
NIGHT BLUE STAIN TISS: NORMAL

## 2021-12-22 ENCOUNTER — OFFICE VISIT (OUTPATIENT)
Dept: FAMILY MEDICINE CLINIC | Facility: CLINIC | Age: 76
End: 2021-12-22

## 2021-12-22 VITALS
HEART RATE: 56 BPM | TEMPERATURE: 98 F | OXYGEN SATURATION: 98 % | RESPIRATION RATE: 16 BRPM | DIASTOLIC BLOOD PRESSURE: 80 MMHG | SYSTOLIC BLOOD PRESSURE: 140 MMHG | WEIGHT: 232 LBS | HEIGHT: 71 IN | BODY MASS INDEX: 32.48 KG/M2

## 2021-12-22 DIAGNOSIS — I10 PRIMARY HYPERTENSION: Chronic | ICD-10-CM

## 2021-12-22 DIAGNOSIS — R07.89 CHEST WALL PAIN: ICD-10-CM

## 2021-12-22 DIAGNOSIS — E78.01 FAMILIAL HYPERCHOLESTEROLEMIA: ICD-10-CM

## 2021-12-22 DIAGNOSIS — M15.9 GENERALIZED OSTEOARTHRITIS: ICD-10-CM

## 2021-12-22 DIAGNOSIS — R13.19 ESOPHAGEAL DYSPHAGIA: ICD-10-CM

## 2021-12-22 DIAGNOSIS — Z00.00 MEDICARE ANNUAL WELLNESS VISIT, SUBSEQUENT: Primary | ICD-10-CM

## 2021-12-22 PROCEDURE — 96160 PT-FOCUSED HLTH RISK ASSMT: CPT | Performed by: FAMILY MEDICINE

## 2021-12-22 PROCEDURE — G0439 PPPS, SUBSEQ VISIT: HCPCS | Performed by: FAMILY MEDICINE

## 2021-12-22 PROCEDURE — 1170F FXNL STATUS ASSESSED: CPT | Performed by: FAMILY MEDICINE

## 2021-12-22 PROCEDURE — 93000 ELECTROCARDIOGRAM COMPLETE: CPT | Performed by: FAMILY MEDICINE

## 2021-12-22 PROCEDURE — 1159F MED LIST DOCD IN RCRD: CPT | Performed by: FAMILY MEDICINE

## 2021-12-22 RX ORDER — PANTOPRAZOLE SODIUM 40 MG/1
40 TABLET, DELAYED RELEASE ORAL DAILY
Qty: 90 TABLET | Refills: 3 | Status: SHIPPED | OUTPATIENT
Start: 2021-12-22 | End: 2023-03-06

## 2021-12-22 RX ORDER — TRAMADOL HYDROCHLORIDE 50 MG/1
50 TABLET ORAL EVERY 6 HOURS PRN
Qty: 60 TABLET | Refills: 1 | Status: SHIPPED | OUTPATIENT
Start: 2021-12-22 | End: 2022-10-05

## 2021-12-22 NOTE — PROGRESS NOTES
The ABCs of the Annual Wellness Visit  Subsequent Medicare Wellness Visit    Chief Complaint   Patient presents with   • Welcome To Medicare      Subjective    History of Present Illness:  Jeremie Wynn is a 76 y.o. male who presents for a Subsequent Medicare Wellness Visit.      The following portions of the patient's history were reviewed and   updated as appropriate: allergies, current medications, past family history, past medical history, past social history, past surgical history and problem list.     Compared to one year ago, the patient feels his physical   health is the same.    Compared to one year ago, the patient feels his mental   health is the same.    Recent Hospitalizations:  He was not admitted to the hospital during the last year.       Current Medical Providers:  Patient Care Team:  Aaron Trejo MD as PCP - General  Aaron Trejo MD as PCP - Family Medicine  Zeb Palacios MD as Consulting Physician (Infectious Diseases)    Outpatient Medications Prior to Visit   Medication Sig Dispense Refill   • atorvastatin (LIPITOR) 10 MG tablet TAKE ONE TABLET BY MOUTH DAILY FOR CHOLESTEROL 90 tablet 1   • cetirizine (zyrTEC) 10 MG tablet Take 10 mg by mouth Daily.     • furosemide (LASIX) 20 MG tablet TAKE ONE TABLET BY MOUTH DAILY 90 tablet 0   • losartan (COZAAR) 100 MG tablet TAKE ONE TABLET BY MOUTH DAILY 30 tablet 2   • nystatin (MYCOSTATIN) 840613 UNIT/ML suspension      • potassium chloride (K-DUR,KLOR-CON) 10 MEQ CR tablet TAKE ONE TABLET BY MOUTH DAILY 30 tablet 0   • tiZANidine (ZANAFLEX) 4 MG tablet TAKE ONE TABLET BY MOUTH EVERY NIGHT AT BEDTIME AS NEEDED FOR LEG CRAMPS 90 tablet 0   • azithromycin (ZITHROMAX) 500 MG tablet Take 500 mg by mouth Daily.     • ethambutol (MYAMBUTOL) 400 MG tablet Take 1,600 mg by mouth Daily.     • pantoprazole (PROTONIX) 40 MG EC tablet Take 1 tablet by mouth Daily. 90 tablet 3   • traMADol (ULTRAM) 50 MG tablet TAKE ONE TABLET BY MOUTH EVERY 6 HOURS AS  "NEEDED FOR MODERATE PAIN 28 tablet 0   • Multiple Vitamins-Minerals (CENTROVITE) tablet Take 1 tablet by mouth Daily.     • rifAMPin (RIFADIN) 300 MG capsule Take 300 mg by mouth 2 (Two) Times a Day.       No facility-administered medications prior to visit.       Opioid medication/s are on active medication list.  and I have evaluated his active treatment plan and pain score trends (see table).  There were no vitals filed for this visit.  I have reviewed the chart for potential of high risk medication and harmful drug interactions in the elderly.            Aspirin is not on active medication list.  Aspirin use is not indicated based on review of current medical condition/s. Risk of harm outweighs potential benefits.  .    Patient Active Problem List   Diagnosis   • Generalized osteoarthritis   • Hyperlipemia   • Hypertension   • Arthralgia of hip   • Dyspnea on exertion   • Obesity   • S/P total knee arthroplasty, left   • Prediabetes   • Ulcerative colitis (HCC)   • Interstitial lung disease (HCC)   • Bilateral sensorineural hearing loss   • Mycobacterium infection, atypical   • Hiatal hernia with GERD     Advance Care Planning   Advance Directive is on file.  ACP discussion was held with the patient during this visit. Patient has an advance directive in EMR which is still valid.     Review of Systems      Objective       Vitals:    12/22/21 1247   BP: 140/80   Pulse: 56   Resp: 16   Temp: 98 °F (36.7 °C)   SpO2: 98%   Weight: 105 kg (232 lb)   Height: 180.3 cm (71\")     BMI Readings from Last 1 Encounters:   12/22/21 32.36 kg/m²   BMI is above normal parameters. Recommendations include: educational material    Does the patient have evidence of cognitive impairment? No    Physical Exam  Lab Results   Component Value Date    CHLPL 138 09/28/2021    TRIG 92 09/28/2021    HDL 46 09/28/2021    LDL 75 09/28/2021    VLDL 17 09/28/2021            HEALTH RISK ASSESSMENT    Smoking Status:  Social History     Tobacco Use "   Smoking Status Former Smoker   • Years: 10.00   • Types: Pipe, Cigars   • Start date:    • Quit date:    • Years since quittin.0   Smokeless Tobacco Never Used   Tobacco Comment    smoked a pipe and cigars for during 20's     Alcohol Consumption:  Social History     Substance and Sexual Activity   Alcohol Use No     Fall Risk Screen:    AUDREY Fall Risk Assessment was completed, and patient is at MODERATE risk for falls. Assessment completed on:2021    Depression Screening:  PHQ-2/PHQ-9 Depression Screening 2021   Little interest or pleasure in doing things 0   Feeling down, depressed, or hopeless 0   Total Score 0       Health Habits and Functional and Cognitive Screening:  Functional & Cognitive Status 2020   Do you have difficulty preparing food and eating? No   Do you have difficulty bathing yourself, getting dressed or grooming yourself? No   Do you have difficulty using the toilet? No   Do you have difficulty moving around from place to place? No   Do you have trouble with steps or getting out of a bed or a chair? No   Current Diet Well Balanced Diet   Dental Exam Up to date   Eye Exam Up to date   Exercise (times per week) 5 times per week   Current Exercise Activities Include (No Data)   Do you need help using the phone?  No   Are you deaf or do you have serious difficulty hearing?  No   Do you need help with transportation? No   Do you need help shopping? No   Do you need help preparing meals?  No   Do you need help with housework?  No   Do you need help with laundry? No   Do you need help taking your medications? No   Do you need help managing money? No   Do you ever drive or ride in a car without wearing a seat belt? No   Have you felt unusual stress, anger or loneliness in the last month? No   Who do you live with? Spouse   If you need help, do you have trouble finding someone available to you? No   Do you have difficulty concentrating, remembering or making decisions? No        Age-appropriate Screening Schedule:  Refer to the list below for future screening recommendations based on patient's age, sex and/or medical conditions. Orders for these recommended tests are listed in the plan section. The patient has been provided with a written plan.    Health Maintenance   Topic Date Due   • TDAP/TD VACCINES (1 - Tdap) Never done   • ZOSTER VACCINE (2 of 2) 05/23/2017   • LIPID PANEL  09/28/2022   • INFLUENZA VACCINE  Completed              Assessment/Plan     CMS Preventative Services Quick Reference  Risk Factors Identified During Encounter  Cardiovascular Disease  Immunizations Discussed/Encouraged (specific Immunizations; Td and Shingrix  The above risks/problems have been discussed with the patient.  Follow up actions/plans if indicated are seen below in the Assessment/Plan Section.  Pertinent information has been shared with the patient in the After Visit Summary.    Diagnoses and all orders for this visit:    1. Medicare annual wellness visit, subsequent (Primary)    2. Primary hypertension    3. Familial hypercholesterolemia    4. Generalized osteoarthritis    5. Chest wall pain  -     traMADol (ULTRAM) 50 MG tablet; Take 1 tablet by mouth Every 6 (Six) Hours As Needed for Moderate Pain .  Dispense: 60 tablet; Refill: 1    6. Esophageal dysphagia  -     pantoprazole (PROTONIX) 40 MG EC tablet; Take 1 tablet by mouth Daily.  Dispense: 90 tablet; Refill: 3    see form    ECG 12 Lead    Date/Time: 12/22/2021 2:34 PM  Performed by: Aaron Trejo MD  Authorized by: Aaron Trejo MD   Comparison: compared with previous ECG   Similar to previous ECG  Rhythm: sinus rhythm  Rate: bradycardic  BPM: 55  Conduction: left bundle branch block  QRS axis: left  Other findings: non-specific ST-T wave changes    Clinical impression: abnormal EKG              Follow Up:   Return in about 4 months (around 4/22/2022) for fasting appt.     An After Visit Summary and PPPS were given to the  patient.

## 2022-01-17 DIAGNOSIS — I10 ESSENTIAL HYPERTENSION: Chronic | ICD-10-CM

## 2022-01-18 RX ORDER — FUROSEMIDE 20 MG/1
TABLET ORAL
Qty: 90 TABLET | Refills: 0 | Status: SHIPPED | OUTPATIENT
Start: 2022-01-18 | End: 2022-04-25 | Stop reason: SDUPTHER

## 2022-01-18 RX ORDER — POTASSIUM CHLORIDE 750 MG/1
TABLET, EXTENDED RELEASE ORAL
Qty: 30 TABLET | Refills: 0 | Status: SHIPPED | OUTPATIENT
Start: 2022-01-18 | End: 2022-02-10 | Stop reason: SDUPTHER

## 2022-02-10 DIAGNOSIS — I10 ESSENTIAL HYPERTENSION: Chronic | ICD-10-CM

## 2022-02-10 RX ORDER — POTASSIUM CHLORIDE 750 MG/1
10 TABLET, EXTENDED RELEASE ORAL DAILY
Qty: 30 TABLET | Refills: 2 | Status: SHIPPED | OUTPATIENT
Start: 2022-02-10 | End: 2022-05-03

## 2022-03-04 RX ORDER — LOSARTAN POTASSIUM 100 MG/1
TABLET ORAL
Qty: 30 TABLET | Refills: 2 | Status: SHIPPED | OUTPATIENT
Start: 2022-03-04 | End: 2022-06-02

## 2022-04-25 ENCOUNTER — TELEPHONE (OUTPATIENT)
Dept: FAMILY MEDICINE CLINIC | Facility: CLINIC | Age: 77
End: 2022-04-25

## 2022-04-25 ENCOUNTER — OFFICE VISIT (OUTPATIENT)
Dept: FAMILY MEDICINE CLINIC | Facility: CLINIC | Age: 77
End: 2022-04-25

## 2022-04-25 VITALS
SYSTOLIC BLOOD PRESSURE: 140 MMHG | DIASTOLIC BLOOD PRESSURE: 78 MMHG | HEIGHT: 70 IN | WEIGHT: 232.2 LBS | OXYGEN SATURATION: 99 % | BODY MASS INDEX: 33.24 KG/M2 | TEMPERATURE: 98.6 F | HEART RATE: 57 BPM

## 2022-04-25 DIAGNOSIS — M79.672 FOOT PAIN, BILATERAL: Primary | ICD-10-CM

## 2022-04-25 DIAGNOSIS — M79.671 FOOT PAIN, BILATERAL: Primary | ICD-10-CM

## 2022-04-25 PROCEDURE — 99213 OFFICE O/P EST LOW 20 MIN: CPT | Performed by: FAMILY MEDICINE

## 2022-04-25 RX ORDER — AZITHROMYCIN 500 MG/1
TABLET, FILM COATED ORAL
Qty: 30 TABLET | Refills: 1 | Status: SHIPPED | OUTPATIENT
Start: 2022-04-25 | End: 2022-07-25 | Stop reason: SDUPTHER

## 2022-04-25 RX ORDER — AZITHROMYCIN 500 MG/1
TABLET, FILM COATED ORAL
Qty: 30 TABLET | Refills: 1 | Status: SHIPPED | OUTPATIENT
Start: 2022-04-25 | End: 2022-04-25 | Stop reason: SDUPTHER

## 2022-04-25 RX ORDER — FUROSEMIDE 20 MG/1
20 TABLET ORAL DAILY
Qty: 90 TABLET | Refills: 1 | Status: SHIPPED | OUTPATIENT
Start: 2022-04-25 | End: 2022-10-17 | Stop reason: SDUPTHER

## 2022-04-25 NOTE — TELEPHONE ENCOUNTER
RICA WITH [facility] CALLED IN REGARDS TO RX   azithromycin (Zithromax) 500 MG tablet   WOULD LIKE TO GET CLARIFICATION FOR QUANTITY.    PLEASE ADVISE.  CALL BACK:[phone]

## 2022-04-26 NOTE — PROGRESS NOTES
"Chief Complaint  Foot Pain (Bal foot ) and review medication     Subjective          Jeremie Wynn presents to [facility]  Foot Injury   Incident onset: pain on outsides of both feet with callous formation. There was no injury mechanism. The pain is present in the right foot and left foot. The quality of the pain is described as aching. The pain is moderate. The pain has been constant since onset. Pertinent negatives include no inability to bear weight, numbness or tingling. The symptoms are aggravated by weight bearing. The treatment provided no relief.       Objective   Vital Signs:   /78   Pulse 57   Temp 98.6 °F (37 °C)   Ht 177.8 cm (70\")   Wt 105 kg (232 lb 3.2 oz)   SpO2 99%   BMI 33.32 kg/m²            Physical Exam  Vitals and nursing note reviewed.   Constitutional:       Appearance: Normal appearance.   HENT:      Head: Normocephalic and atraumatic.   Eyes:      Conjunctiva/sclera: Conjunctivae normal.      Pupils: Pupils are equal, round, and reactive to light.   Cardiovascular:      Rate and Rhythm: Normal rate and regular rhythm.   Pulmonary:      Effort: Pulmonary effort is normal.   Musculoskeletal:         General: Deformity present.      Comments: Tender over 5th metatarsal area with callous formation on both feet   Neurological:      General: No focal deficit present.      Mental Status: He is alert.        Result Review :                 Assessment and Plan    Diagnoses and all orders for this visit:    1. Foot pain, bilateral (Primary)  -     Ambulatory Referral to Podiatry    Other orders  -     furosemide (LASIX) 20 MG tablet; Take 1 tablet by mouth Daily.  Dispense: 90 tablet; Refill: 1  -     Discontinue: azithromycin (Zithromax) 500 MG tablet; Take 2 tablets the first day, then 1 tablet daily for 4 days.  Dispense: 30 tablet; Refill: 1  -     azithromycin (Zithromax) 500 MG tablet; Take 1 daily  Dispense: 30 tablet; Refill: 1        Follow Up "   Return in about 3 months (around 7/25/2022) for fasting appt.  Patient was given instructions and counseling regarding his condition or for health maintenance advice. Please see specific information pulled into the AVS if appropriate.

## 2022-05-03 DIAGNOSIS — I10 ESSENTIAL HYPERTENSION: Chronic | ICD-10-CM

## 2022-05-03 RX ORDER — POTASSIUM CHLORIDE 750 MG/1
TABLET, EXTENDED RELEASE ORAL
Qty: 90 TABLET | Refills: 1 | Status: SHIPPED | OUTPATIENT
Start: 2022-05-03 | End: 2022-08-16

## 2022-05-11 DIAGNOSIS — E78.01 FAMILIAL HYPERCHOLESTEROLEMIA: ICD-10-CM

## 2022-05-11 RX ORDER — ATORVASTATIN CALCIUM 10 MG/1
TABLET, FILM COATED ORAL
Qty: 90 TABLET | Refills: 0 | Status: SHIPPED | OUTPATIENT
Start: 2022-05-11 | End: 2022-08-09

## 2022-06-02 RX ORDER — LOSARTAN POTASSIUM 100 MG/1
TABLET ORAL
Qty: 30 TABLET | Refills: 2 | Status: SHIPPED | OUTPATIENT
Start: 2022-06-02 | End: 2022-07-25 | Stop reason: SDUPTHER

## 2022-07-25 ENCOUNTER — OFFICE VISIT (OUTPATIENT)
Dept: FAMILY MEDICINE CLINIC | Facility: CLINIC | Age: 77
End: 2022-07-25

## 2022-07-25 VITALS
DIASTOLIC BLOOD PRESSURE: 80 MMHG | OXYGEN SATURATION: 95 % | HEIGHT: 70 IN | HEART RATE: 85 BPM | WEIGHT: 236 LBS | BODY MASS INDEX: 33.79 KG/M2 | TEMPERATURE: 97.1 F | SYSTOLIC BLOOD PRESSURE: 132 MMHG

## 2022-07-25 DIAGNOSIS — A31.9 MYCOBACTERIUM INFECTION, ATYPICAL: ICD-10-CM

## 2022-07-25 DIAGNOSIS — I10 PRIMARY HYPERTENSION: Primary | ICD-10-CM

## 2022-07-25 DIAGNOSIS — I10 PRIMARY HYPERTENSION: Primary | Chronic | ICD-10-CM

## 2022-07-25 DIAGNOSIS — E78.01 FAMILIAL HYPERCHOLESTEROLEMIA: ICD-10-CM

## 2022-07-25 PROCEDURE — 91305 COVID-19 (PFIZER) 12+ YRS: CPT | Performed by: FAMILY MEDICINE

## 2022-07-25 PROCEDURE — 0004A COVID-19 (PFIZER) 12+ YRS: CPT | Performed by: FAMILY MEDICINE

## 2022-07-25 PROCEDURE — 99213 OFFICE O/P EST LOW 20 MIN: CPT | Performed by: FAMILY MEDICINE

## 2022-07-25 RX ORDER — AZITHROMYCIN 500 MG/1
TABLET, FILM COATED ORAL
Qty: 30 TABLET | Refills: 1 | Status: SHIPPED | OUTPATIENT
Start: 2022-07-25

## 2022-07-25 RX ORDER — LOSARTAN POTASSIUM 100 MG/1
100 TABLET ORAL DAILY
Qty: 90 TABLET | Refills: 1 | Status: SHIPPED | OUTPATIENT
Start: 2022-07-25 | End: 2023-01-28

## 2022-07-26 NOTE — PROGRESS NOTES
"Chief Complaint  review medcation  and Hypertension    Subjective        Jeremie Wynn presents to [facility]  Hypertension  This is a chronic problem. The current episode started more than 1 year ago. The problem is unchanged. The problem is controlled. Associated symptoms include malaise/fatigue. Pertinent negatives include no peripheral edema. Risk factors for coronary artery disease include male gender, obesity and sedentary lifestyle. Current antihypertension treatment includes angiotensin blockers, diuretics and lifestyle changes. The current treatment provides significant improvement. There are no compliance problems.  There is no history of angina, CAD/MI or CVA.       Objective   Vital Signs:  /80   Pulse 85   Temp 97.1 °F (36.2 °C)   Ht 177.8 cm (70\")   Wt 107 kg (236 lb)   SpO2 95%   BMI 33.86 kg/m²   Estimated body mass index is 33.86 kg/m² as calculated from the following:    Height as of this encounter: 177.8 cm (70\").    Weight as of this encounter: 107 kg (236 lb).          Physical Exam  Vitals and nursing note reviewed.   Constitutional:       Appearance: Normal appearance. He is well-developed.   HENT:      Head: Normocephalic and atraumatic.      Right Ear: External ear normal.      Left Ear: External ear normal.      Nose: Nose normal.   Eyes:      General: No scleral icterus.     Conjunctiva/sclera: Conjunctivae normal.      Pupils: Pupils are equal, round, and reactive to light.   Neck:      Thyroid: No thyromegaly.   Cardiovascular:      Rate and Rhythm: Normal rate and regular rhythm.      Heart sounds: Normal heart sounds.   Pulmonary:      Effort: Pulmonary effort is normal.      Breath sounds: Normal breath sounds.   Musculoskeletal:         General: Normal range of motion.      Cervical back: Neck supple.      Right lower leg: No edema.      Left lower leg: No edema.   Skin:     General: Skin is warm and dry.      Findings: No rash. "   Neurological:      General: No focal deficit present.      Mental Status: He is alert and oriented to person, place, and time.      Comments: No focal deficits no lateralizing signs   Psychiatric:         Behavior: Behavior is cooperative.        Result Review :                Assessment and Plan   Diagnoses and all orders for this visit:    1. Primary hypertension (Primary)  -     losartan (COZAAR) 100 MG tablet; Take 1 tablet by mouth Daily.  Dispense: 90 tablet; Refill: 1    2. Mycobacterium infection, atypical  -     azithromycin (Zithromax) 500 MG tablet; Take 1 daily  Dispense: 30 tablet; Refill: 1    Other orders  -     COVID-19 Vaccine (Pfizer) Gray Cap             Follow Up   Return for Keep Appt..  Patient was given instructions and counseling regarding his condition or for health maintenance advice. Please see specific information pulled into the AVS if appropriate.

## 2022-07-27 ENCOUNTER — OFFICE VISIT (OUTPATIENT)
Dept: FAMILY MEDICINE CLINIC | Facility: CLINIC | Age: 77
End: 2022-07-27

## 2022-07-27 VITALS
SYSTOLIC BLOOD PRESSURE: 110 MMHG | DIASTOLIC BLOOD PRESSURE: 58 MMHG | HEART RATE: 74 BPM | BODY MASS INDEX: 33.73 KG/M2 | RESPIRATION RATE: 18 BRPM | HEIGHT: 70 IN | WEIGHT: 235.6 LBS | OXYGEN SATURATION: 97 % | TEMPERATURE: 97.5 F

## 2022-07-27 DIAGNOSIS — S29.9XXA RIB INJURY: Primary | ICD-10-CM

## 2022-07-27 PROCEDURE — 99213 OFFICE O/P EST LOW 20 MIN: CPT | Performed by: FAMILY MEDICINE

## 2022-07-27 RX ORDER — HYDROCODONE BITARTRATE AND ACETAMINOPHEN 5; 325 MG/1; MG/1
1 TABLET ORAL EVERY 6 HOURS PRN
Qty: 20 TABLET | Refills: 0 | Status: SHIPPED | OUTPATIENT
Start: 2022-07-27 | End: 2022-10-17

## 2022-07-28 NOTE — PROGRESS NOTES
"Chief Complaint  Fall (Pt states he fell on Monday)    Subjective        Jeremie Wynn presents to [facility]  Rib Injury  This is a new (fell onto a iron railing at home no loc) problem. The current episode started yesterday. The problem has been unchanged. Pertinent negatives include no chills or fever. The symptoms are aggravated by twisting. He has tried rest and acetaminophen for the symptoms. The treatment provided no relief.       Objective   Vital Signs:  /58 (BP Location: Left arm, Patient Position: Sitting, Cuff Size: Adult)   Pulse 74   Temp 97.5 °F (36.4 °C)   Resp 18   Ht 177.8 cm (70\")   Wt 107 kg (235 lb 9.6 oz)   SpO2 97%   BMI 33.81 kg/m²   Estimated body mass index is 33.81 kg/m² as calculated from the following:    Height as of this encounter: 177.8 cm (70\").    Weight as of this encounter: 107 kg (235 lb 9.6 oz).          Physical Exam  Vitals and nursing note reviewed.   Constitutional:       Appearance: Normal appearance.   HENT:      Head: Normocephalic and atraumatic.   Cardiovascular:      Rate and Rhythm: Normal rate and regular rhythm.   Musculoskeletal:         General: Tenderness present.      Cervical back: Neck supple.      Comments: Very tender left lower ribcage bs clear to the bases   Neurological:      General: No focal deficit present.      Mental Status: He is alert.        Result Review :                Assessment and Plan   Diagnoses and all orders for this visit:    1. Rib injury (Primary)  -     HYDROcodone-acetaminophen (Norco) 5-325 MG per tablet; Take 1 tablet by mouth Every 6 (Six) Hours As Needed for Severe Pain .  Dispense: 20 tablet; Refill: 0  -     XR Ribs Left With PA Chest; Future             Follow Up   No follow-ups on file.  Patient was given instructions and counseling regarding his condition or for health maintenance advice. Please see specific information pulled into the AVS if appropriate.       "

## 2022-08-09 DIAGNOSIS — E78.01 FAMILIAL HYPERCHOLESTEROLEMIA: ICD-10-CM

## 2022-08-09 RX ORDER — ATORVASTATIN CALCIUM 10 MG/1
TABLET, FILM COATED ORAL
Qty: 90 TABLET | Refills: 0 | Status: SHIPPED | OUTPATIENT
Start: 2022-08-09 | End: 2022-10-17 | Stop reason: SDUPTHER

## 2022-08-16 DIAGNOSIS — I10 ESSENTIAL HYPERTENSION: Chronic | ICD-10-CM

## 2022-08-16 RX ORDER — POTASSIUM CHLORIDE 750 MG/1
TABLET, EXTENDED RELEASE ORAL
Qty: 90 TABLET | Refills: 1 | Status: SHIPPED | OUTPATIENT
Start: 2022-08-16 | End: 2022-08-31

## 2022-08-17 ENCOUNTER — TRANSCRIBE ORDERS (OUTPATIENT)
Dept: ADMINISTRATIVE | Facility: HOSPITAL | Age: 77
End: 2022-08-17

## 2022-08-17 DIAGNOSIS — A31.0 PULMONARY DISEASE DUE TO MYCOBACTERIA: Primary | ICD-10-CM

## 2022-08-25 ENCOUNTER — HOSPITAL ENCOUNTER (OUTPATIENT)
Dept: CT IMAGING | Facility: HOSPITAL | Age: 77
Discharge: HOME | End: 2022-08-25
Admitting: INTERNAL MEDICINE

## 2022-08-25 DIAGNOSIS — A31.0 PULMONARY DISEASE DUE TO MYCOBACTERIA: ICD-10-CM

## 2022-08-25 PROCEDURE — 71250 CT THORAX DX C-: CPT

## 2022-08-31 DIAGNOSIS — I10 ESSENTIAL HYPERTENSION: Chronic | ICD-10-CM

## 2022-08-31 RX ORDER — POTASSIUM CHLORIDE 750 MG/1
TABLET, EXTENDED RELEASE ORAL
Qty: 90 TABLET | Refills: 1 | Status: SHIPPED | OUTPATIENT
Start: 2022-08-31

## 2022-09-09 ENCOUNTER — LAB (OUTPATIENT)
Dept: LAB | Facility: HOSPITAL | Age: 77
End: 2022-09-09
Payer: MEDICARE

## 2022-09-09 DIAGNOSIS — A31.0 PULMONARY MYCOBACTERIA: Primary | ICD-10-CM

## 2022-09-09 PROCEDURE — 87186 SC STD MICRODIL/AGAR DIL: CPT

## 2022-09-10 ENCOUNTER — APPOINTMENT (OUTPATIENT)
Dept: LAB | Facility: HOSPITAL | Age: 77
End: 2022-09-10

## 2022-10-05 DIAGNOSIS — R07.89 CHEST WALL PAIN: ICD-10-CM

## 2022-10-05 RX ORDER — TRAMADOL HYDROCHLORIDE 50 MG/1
TABLET ORAL
Qty: 60 TABLET | Refills: 0 | Status: SHIPPED | OUTPATIENT
Start: 2022-10-05

## 2022-10-17 ENCOUNTER — TELEPHONE (OUTPATIENT)
Dept: FAMILY MEDICINE CLINIC | Facility: CLINIC | Age: 77
End: 2022-10-17

## 2022-10-17 ENCOUNTER — OFFICE VISIT (OUTPATIENT)
Dept: FAMILY MEDICINE CLINIC | Facility: CLINIC | Age: 77
End: 2022-10-17

## 2022-10-17 VITALS
HEART RATE: 56 BPM | HEIGHT: 70 IN | SYSTOLIC BLOOD PRESSURE: 120 MMHG | WEIGHT: 239.6 LBS | OXYGEN SATURATION: 98 % | BODY MASS INDEX: 34.3 KG/M2 | DIASTOLIC BLOOD PRESSURE: 74 MMHG | TEMPERATURE: 98.5 F

## 2022-10-17 DIAGNOSIS — L04.9 ADENITIS, ACUTE: Primary | ICD-10-CM

## 2022-10-17 DIAGNOSIS — E78.01 FAMILIAL HYPERCHOLESTEROLEMIA: ICD-10-CM

## 2022-10-17 PROCEDURE — 99213 OFFICE O/P EST LOW 20 MIN: CPT | Performed by: FAMILY MEDICINE

## 2022-10-17 RX ORDER — LEVOFLOXACIN 500 MG/1
500 TABLET, FILM COATED ORAL DAILY
Qty: 10 TABLET | Refills: 0 | Status: SHIPPED | OUTPATIENT
Start: 2022-10-17 | End: 2022-10-31

## 2022-10-17 RX ORDER — FUROSEMIDE 20 MG/1
20 TABLET ORAL DAILY
Qty: 90 TABLET | Refills: 1 | Status: SHIPPED | OUTPATIENT
Start: 2022-10-17

## 2022-10-17 RX ORDER — ATORVASTATIN CALCIUM 10 MG/1
10 TABLET, FILM COATED ORAL DAILY
Qty: 90 TABLET | Refills: 1 | Status: SHIPPED | OUTPATIENT
Start: 2022-10-17

## 2022-10-17 NOTE — TELEPHONE ENCOUNTER
Caller: Jeremie Wynn    Relationship: Self    Best call back number: [phone]    What is the best time to reach you: ANY    Who are you requesting to speak with (clinical staff, provider,  specific staff member): DR. KALYAN MORE    Do you know the name of the person who called: JEREMIE    What was the call regarding: PATIENT STATES HE HAS SWELLING IN HIS GUM LINE. WE ATTEMPTED TO GET AN APPOINTMENT WITH HIS PRIMARY CARE AS HE DID NOT WANT TO SEE ANYONE ELSE. HE THEN REQUESTED HIS PRIMARY CARE CALL HIM AS SOON AS POSSIBLE AS WE DIDN'T HAVE AN APPOINTMENT UNTIL 11/1/2022.    Do you require a callback: YES

## 2022-10-18 NOTE — PROGRESS NOTES
"Chief Complaint  lump of right of face     Subjective        Jeremie Wynn presents to [facility]  Neck Pain   This is a new (swollen area under right jawline) problem. The current episode started in the past 7 days. The problem has been unchanged. The pain is present in the right side. The quality of the pain is described as aching. The pain is moderate. Pertinent negatives include no fever. He has tried acetaminophen for the symptoms. The treatment provided no relief.       Objective   Vital Signs:  /74   Pulse 56   Temp 98.5 °F (36.9 °C)   Ht 177.8 cm (70\")   Wt 109 kg (239 lb 9.6 oz)   SpO2 98%   BMI 34.38 kg/m²   Estimated body mass index is 34.38 kg/m² as calculated from the following:    Height as of this encounter: 177.8 cm (70\").    Weight as of this encounter: 109 kg (239 lb 9.6 oz).          Physical Exam  Vitals and nursing note reviewed.   Constitutional:       Appearance: Normal appearance.   HENT:      Right Ear: Tympanic membrane and external ear normal.      Left Ear: Tympanic membrane and external ear normal.      Nose: Nose normal.      Mouth/Throat:      Mouth: Mucous membranes are moist.      Pharynx: Oropharynx is clear.      Comments: dentures  Eyes:      Conjunctiva/sclera: Conjunctivae normal.      Pupils: Pupils are equal, round, and reactive to light.   Neck:      Comments: Swollen tender gland at right angle 0.5 cm tender but mobile  Cardiovascular:      Rate and Rhythm: Normal rate.      Pulses: Normal pulses.      Heart sounds: Normal heart sounds.   Pulmonary:      Breath sounds: Normal breath sounds.   Lymphadenopathy:      Cervical: Cervical adenopathy present.   Skin:     General: Skin is warm and dry.   Neurological:      General: No focal deficit present.      Mental Status: He is alert.        Result Review :                Assessment and Plan   Diagnoses and all orders for this visit:    1. Adenitis, acute (Primary)  -     " levoFLOXacin (Levaquin) 500 MG tablet; Take 1 tablet by mouth Daily.  Dispense: 10 tablet; Refill: 0    2. Familial hypercholesterolemia  -     atorvastatin (LIPITOR) 10 MG tablet; Take 1 tablet by mouth Daily. for cholesterol.  Dispense: 90 tablet; Refill: 1    Other orders  -     furosemide (LASIX) 20 MG tablet; Take 1 tablet by mouth Daily.  Dispense: 90 tablet; Refill: 1      Call or message me if not improving in 7 days will ok ent referral       Follow Up   Return for Keep Appt..  Patient was given instructions and counseling regarding his condition or for health maintenance advice. Please see specific information pulled into the AVS if appropriate.

## 2022-10-31 ENCOUNTER — OFFICE VISIT (OUTPATIENT)
Dept: FAMILY MEDICINE CLINIC | Facility: CLINIC | Age: 77
End: 2022-10-31

## 2022-10-31 VITALS
WEIGHT: 245 LBS | SYSTOLIC BLOOD PRESSURE: 122 MMHG | BODY MASS INDEX: 35.07 KG/M2 | HEIGHT: 70 IN | TEMPERATURE: 98.3 F | HEART RATE: 78 BPM | OXYGEN SATURATION: 98 % | DIASTOLIC BLOOD PRESSURE: 78 MMHG

## 2022-10-31 DIAGNOSIS — L04.9 ADENITIS, ACUTE: ICD-10-CM

## 2022-10-31 DIAGNOSIS — M25.561 CHRONIC PAIN OF RIGHT KNEE: Primary | ICD-10-CM

## 2022-10-31 DIAGNOSIS — G89.29 CHRONIC PAIN OF RIGHT KNEE: Primary | ICD-10-CM

## 2022-10-31 PROCEDURE — 99214 OFFICE O/P EST MOD 30 MIN: CPT | Performed by: FAMILY MEDICINE

## 2022-10-31 PROCEDURE — G0008 ADMIN INFLUENZA VIRUS VAC: HCPCS | Performed by: FAMILY MEDICINE

## 2022-10-31 PROCEDURE — 90662 IIV NO PRSV INCREASED AG IM: CPT | Performed by: FAMILY MEDICINE

## 2022-10-31 RX ORDER — METHYLPREDNISOLONE ACETATE 40 MG/ML
40 INJECTION, SUSPENSION INTRA-ARTICULAR; INTRALESIONAL; INTRAMUSCULAR; SOFT TISSUE ONCE
Status: COMPLETED | OUTPATIENT
Start: 2022-10-31 | End: 2022-10-31

## 2022-10-31 RX ADMIN — METHYLPREDNISOLONE ACETATE 40 MG: 40 INJECTION, SUSPENSION INTRA-ARTICULAR; INTRALESIONAL; INTRAMUSCULAR; SOFT TISSUE at 11:14

## 2022-12-19 LAB — REF LAB TEST RESULTS: NORMAL

## 2023-01-04 DIAGNOSIS — R05.3 CHRONIC COUGH: ICD-10-CM

## 2023-01-04 DIAGNOSIS — A31.0 PULMONARY MYCOBACTERIUM AVIUM COMPLEX (MAC) INFECTION: Primary | ICD-10-CM

## 2023-01-28 DIAGNOSIS — I10 PRIMARY HYPERTENSION: Chronic | ICD-10-CM

## 2023-01-28 RX ORDER — RIFAMPIN 300 MG/1
CAPSULE ORAL
COMMUNITY
Start: 2023-01-05

## 2023-01-28 RX ORDER — LOSARTAN POTASSIUM 100 MG/1
TABLET ORAL
Qty: 90 TABLET | Refills: 1 | Status: SHIPPED | OUTPATIENT
Start: 2023-01-28

## 2023-02-03 ENCOUNTER — LAB (OUTPATIENT)
Dept: LAB | Facility: HOSPITAL | Age: 78
End: 2023-02-03
Payer: MEDICARE

## 2023-02-03 DIAGNOSIS — I10 PRIMARY HYPERTENSION: ICD-10-CM

## 2023-02-03 DIAGNOSIS — R05.3 CHRONIC COUGH: ICD-10-CM

## 2023-02-03 DIAGNOSIS — A31.0 PULMONARY MYCOBACTERIUM AVIUM COMPLEX (MAC) INFECTION: ICD-10-CM

## 2023-02-03 LAB
BASOPHILS # BLD AUTO: 0.06 10*3/MM3 (ref 0–0.2)
BASOPHILS NFR BLD AUTO: 0.9 % (ref 0–1.5)
DEPRECATED RDW RBC AUTO: 47.7 FL (ref 37–54)
EOSINOPHIL # BLD AUTO: 0.35 10*3/MM3 (ref 0–0.4)
EOSINOPHIL NFR BLD AUTO: 5 % (ref 0.3–6.2)
ERYTHROCYTE [DISTWIDTH] IN BLOOD BY AUTOMATED COUNT: 15.3 % (ref 12.3–15.4)
ERYTHROCYTE [SEDIMENTATION RATE] IN BLOOD: 27 MM/HR (ref 0–20)
HCT VFR BLD AUTO: 42.8 % (ref 37.5–51)
HGB BLD-MCNC: 13.9 G/DL (ref 13–17.7)
IMM GRANULOCYTES # BLD AUTO: 0.02 10*3/MM3 (ref 0–0.05)
IMM GRANULOCYTES NFR BLD AUTO: 0.3 % (ref 0–0.5)
LYMPHOCYTES # BLD AUTO: 1.52 10*3/MM3 (ref 0.7–3.1)
LYMPHOCYTES NFR BLD AUTO: 21.9 % (ref 19.6–45.3)
MCH RBC QN AUTO: 28.4 PG (ref 26.6–33)
MCHC RBC AUTO-ENTMCNC: 32.5 G/DL (ref 31.5–35.7)
MCV RBC AUTO: 87.3 FL (ref 79–97)
MONOCYTES # BLD AUTO: 0.75 10*3/MM3 (ref 0.1–0.9)
MONOCYTES NFR BLD AUTO: 10.8 % (ref 5–12)
NEUTROPHILS NFR BLD AUTO: 4.24 10*3/MM3 (ref 1.7–7)
NEUTROPHILS NFR BLD AUTO: 61.1 % (ref 42.7–76)
NRBC BLD AUTO-RTO: 0 /100 WBC (ref 0–0.2)
PLATELET # BLD AUTO: 176 10*3/MM3 (ref 140–450)
PMV BLD AUTO: 12 FL (ref 6–12)
RBC # BLD AUTO: 4.9 10*6/MM3 (ref 4.14–5.8)
WBC NRBC COR # BLD: 6.94 10*3/MM3 (ref 3.4–10.8)

## 2023-02-03 PROCEDURE — 80053 COMPREHEN METABOLIC PANEL: CPT

## 2023-02-03 PROCEDURE — 86140 C-REACTIVE PROTEIN: CPT

## 2023-02-03 PROCEDURE — 80061 LIPID PANEL: CPT

## 2023-02-03 PROCEDURE — 36415 COLL VENOUS BLD VENIPUNCTURE: CPT

## 2023-02-03 PROCEDURE — 85652 RBC SED RATE AUTOMATED: CPT

## 2023-02-03 PROCEDURE — 85025 COMPLETE CBC W/AUTO DIFF WBC: CPT

## 2023-02-04 LAB
ALBUMIN SERPL-MCNC: 4.4 G/DL (ref 3.5–5.2)
ALBUMIN/GLOB SERPL: 1.3 G/DL
ALP SERPL-CCNC: 61 U/L (ref 39–117)
ALT SERPL W P-5'-P-CCNC: 15 U/L (ref 1–41)
ANION GAP SERPL CALCULATED.3IONS-SCNC: 11.2 MMOL/L (ref 5–15)
AST SERPL-CCNC: 22 U/L (ref 1–40)
BILIRUB SERPL-MCNC: 0.5 MG/DL (ref 0–1.2)
BUN SERPL-MCNC: 19 MG/DL (ref 8–23)
BUN/CREAT SERPL: 15.6 (ref 7–25)
CALCIUM SPEC-SCNC: 9.5 MG/DL (ref 8.6–10.5)
CHLORIDE SERPL-SCNC: 104 MMOL/L (ref 98–107)
CHOLEST SERPL-MCNC: 170 MG/DL (ref 0–200)
CO2 SERPL-SCNC: 26.8 MMOL/L (ref 22–29)
CREAT SERPL-MCNC: 1.22 MG/DL (ref 0.76–1.27)
CRP SERPL-MCNC: <0.3 MG/DL (ref 0–0.5)
EGFRCR SERPLBLD CKD-EPI 2021: 60.3 ML/MIN/1.73
GLOBULIN UR ELPH-MCNC: 3.3 GM/DL
GLUCOSE SERPL-MCNC: 112 MG/DL (ref 65–99)
HDLC SERPL-MCNC: 55 MG/DL (ref 40–60)
LDLC SERPL CALC-MCNC: 94 MG/DL (ref 0–100)
LDLC/HDLC SERPL: 1.65 {RATIO}
POTASSIUM SERPL-SCNC: 3.9 MMOL/L (ref 3.5–5.2)
PROT SERPL-MCNC: 7.7 G/DL (ref 6–8.5)
SODIUM SERPL-SCNC: 142 MMOL/L (ref 136–145)
TRIGL SERPL-MCNC: 120 MG/DL (ref 0–150)
VLDLC SERPL-MCNC: 21 MG/DL (ref 5–40)

## 2023-03-04 DIAGNOSIS — R13.19 ESOPHAGEAL DYSPHAGIA: ICD-10-CM

## 2023-03-06 RX ORDER — PANTOPRAZOLE SODIUM 40 MG/1
TABLET, DELAYED RELEASE ORAL
Qty: 90 TABLET | Refills: 3 | Status: SHIPPED | OUTPATIENT
Start: 2023-03-06

## 2023-04-19 RX ORDER — FUROSEMIDE 20 MG/1
TABLET ORAL
Qty: 90 TABLET | Refills: 1 | Status: SHIPPED | OUTPATIENT
Start: 2023-04-19

## 2023-04-26 DIAGNOSIS — E78.01 FAMILIAL HYPERCHOLESTEROLEMIA: ICD-10-CM

## 2023-04-27 RX ORDER — ATORVASTATIN CALCIUM 10 MG/1
TABLET, FILM COATED ORAL
Qty: 90 TABLET | Refills: 1 | Status: SHIPPED | OUTPATIENT
Start: 2023-04-27

## 2023-08-16 DIAGNOSIS — I10 PRIMARY HYPERTENSION: Chronic | ICD-10-CM

## 2023-08-16 RX ORDER — LOSARTAN POTASSIUM 100 MG/1
100 TABLET ORAL DAILY
Qty: 90 TABLET | Refills: 1 | Status: SHIPPED | OUTPATIENT
Start: 2023-08-16

## 2023-08-16 NOTE — TELEPHONE ENCOUNTER
Caller: Kingsley Jeremie HOLLI    Relationship: Self    Best call back number: [phone]    Requested Prescriptions:   Requested Prescriptions     Pending Prescriptions Disp Refills    losartan (COZAAR) 100 MG tablet 90 tablet 1     Sig: Take 1 tablet by mouth Daily.        Pharmacy where request should be sent: [facility] [id number] - [phone] [facility] [phone] FX     Last office visit with prescribing clinician: Visit date not found   Last telemedicine visit with prescribing clinician: Visit date not found   Next office visit with prescribing clinician: 8/21/2023     Additional details provided by patient: THE PATIENT IS OUT OF MEDICATION     Does the patient have less than a 3 day supply:  [x] Yes  [] No    Would you like a call back once the refill request has been completed: [] Yes [x] No    If the office needs to give you a call back, can they leave a voicemail: [] Yes [x] No    Zuleima Hurtado Rep   08/16/23 11:59 EDT

## 2023-08-21 ENCOUNTER — OFFICE VISIT (OUTPATIENT)
Dept: FAMILY MEDICINE CLINIC | Facility: CLINIC | Age: 78
End: 2023-08-21
Payer: MEDICARE

## 2023-08-21 VITALS
HEIGHT: 70 IN | DIASTOLIC BLOOD PRESSURE: 66 MMHG | BODY MASS INDEX: 31.92 KG/M2 | OXYGEN SATURATION: 98 % | HEART RATE: 51 BPM | TEMPERATURE: 98.6 F | SYSTOLIC BLOOD PRESSURE: 124 MMHG | WEIGHT: 223 LBS

## 2023-08-21 DIAGNOSIS — A31.9 MYCOBACTERIUM INFECTION, ATYPICAL: ICD-10-CM

## 2023-08-21 DIAGNOSIS — I07.1 TRICUSPID VALVE INSUFFICIENCY, UNSPECIFIED ETIOLOGY: ICD-10-CM

## 2023-08-21 DIAGNOSIS — E78.01 FAMILIAL HYPERCHOLESTEROLEMIA: ICD-10-CM

## 2023-08-21 DIAGNOSIS — R13.19 ESOPHAGEAL DYSPHAGIA: ICD-10-CM

## 2023-08-21 DIAGNOSIS — I10 PRIMARY HYPERTENSION: Primary | Chronic | ICD-10-CM

## 2023-08-21 DIAGNOSIS — I35.1 AORTIC VALVE INSUFFICIENCY, ETIOLOGY OF CARDIAC VALVE DISEASE UNSPECIFIED: ICD-10-CM

## 2023-08-21 DIAGNOSIS — Z76.89 ENCOUNTER TO ESTABLISH CARE WITH NEW DOCTOR: ICD-10-CM

## 2023-08-21 PROBLEM — R09.81 SINUS CONGESTION: Status: RESOLVED | Noted: 2019-12-17 | Resolved: 2023-08-21

## 2023-08-21 PROBLEM — R05.9 COUGH: Status: ACTIVE | Noted: 2018-03-26

## 2023-08-21 PROBLEM — Z96.652 PRESENCE OF LEFT ARTIFICIAL KNEE JOINT: Status: ACTIVE | Noted: 2018-03-26

## 2023-08-21 PROBLEM — J84.89 NONSPECIFIC INTERSTITIAL PNEUMONITIS: Status: ACTIVE | Noted: 2018-03-26

## 2023-08-21 PROBLEM — R09.81 SINUS CONGESTION: Status: ACTIVE | Noted: 2019-12-17

## 2023-08-21 PROBLEM — R11.2 NAUSEA AND VOMITING: Status: ACTIVE | Noted: 2023-03-13
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
## 2023-08-21 NOTE — PATIENT INSTRUCTIONS
Take medications as ordered.  Low fat, low salt diet.  Exercise as tolerated.  Continue to follow with Infectious Disease.  Keep appt with Cardiology.

## 2023-09-06 ENCOUNTER — HOSPITAL ENCOUNTER (OUTPATIENT)
Dept: CARDIOLOGY | Facility: HOSPITAL | Age: 78
Discharge: HOME | End: 2023-09-06
Payer: MEDICARE

## 2023-09-06 ENCOUNTER — OFFICE VISIT (OUTPATIENT)
Dept: CARDIOLOGY | Facility: HOSPITAL | Age: 78
End: 2023-09-06
Payer: MEDICARE

## 2023-09-06 VITALS
SYSTOLIC BLOOD PRESSURE: 138 MMHG | OXYGEN SATURATION: 94 % | RESPIRATION RATE: 16 BRPM | DIASTOLIC BLOOD PRESSURE: 70 MMHG | HEIGHT: 70 IN | BODY MASS INDEX: 32.13 KG/M2 | TEMPERATURE: 97.3 F | HEART RATE: 64 BPM | WEIGHT: 224.4 LBS

## 2023-09-06 DIAGNOSIS — I38 VALVULAR HEART DISEASE: Primary | ICD-10-CM

## 2023-09-06 DIAGNOSIS — I44.7 LBBB (LEFT BUNDLE BRANCH BLOCK): ICD-10-CM

## 2023-09-06 DIAGNOSIS — R55 NEAR SYNCOPE: ICD-10-CM

## 2023-09-06 DIAGNOSIS — I49.3 PVC'S (PREMATURE VENTRICULAR CONTRACTIONS): ICD-10-CM

## 2023-09-06 DIAGNOSIS — R60.0 LOWER EXTREMITY EDEMA: ICD-10-CM

## 2023-09-06 DIAGNOSIS — I10 PRIMARY HYPERTENSION: ICD-10-CM

## 2023-09-06 LAB
QT INTERVAL: 498 MS
QTC INTERVAL: 522 MS

## 2023-09-06 PROCEDURE — 93005 ELECTROCARDIOGRAM TRACING: CPT | Performed by: NURSE PRACTITIONER

## 2023-09-06 NOTE — PROGRESS NOTES
"[facility], [facility] Vascular    Chief Complaint  Syncope (Near syncope)    Subjective    History of Present Illness {CC  Problem List  Visit  Diagnosis   Encounters  Notes  Medications  Labs  Result Review Imaging  Media :23}     Jeremie Wynn presents to [facility] CARDIOLOGY for   History of Present Illness     77-year-old male with history of hypertension, dyslipidemia, history of left bundle branch block, PVCs, valvular heart disease, bradycardia, edema, GERD. Hx of MAC disease      Pt denies CP or pressure,  baseline dyspnea (not worsened).   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  No syncope.  NO palpitations.  Reports feeling \"spinning\" sensation.  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  He had only drank sodas that day.      Edema well controlled on lasix.        Objective     Vital Signs:   Vitals:    09/06/23 1326 09/06/23 1327 09/06/23 1328   BP: 135/63 141/73 138/70   BP Location: Right arm Left arm Left arm   Patient Position: Sitting Standing Sitting   Cuff Size: Adult Adult Adult   Pulse: 60 65 64   Resp:   16   Temp:  97.3 °F (36.3 °C) 97.3 °F (36.3 °C)   TempSrc:  Temporal Temporal   SpO2: 94% 97% 94%   Weight:   102 kg (224 lb 6.4 oz)   Height:   177.8 cm (70\")     Body mass index is 32.2 kg/m².  Physical Exam  Vitals reviewed.   Constitutional:       General: He is not in acute distress.     Appearance: Normal appearance.   Neck:      Vascular: No carotid bruit.   Cardiovascular:      Rate and Rhythm: Normal rate and regular rhythm.      Pulses:           Radial pulses are 2+ on the right side and 2+ on the left side.        Dorsalis pedis pulses are 2+ on the right side and 2+ on the left side.        Posterior tibial pulses are 2+ on the right side and 2+ on the left side.      Heart sounds: Normal heart sounds. Murmur heard. "   Pulmonary:      Effort: Pulmonary effort is normal.      Breath sounds: Normal breath sounds.   Musculoskeletal:      Right lower leg: No edema.      Left lower leg: No edema.   Skin:     General: Skin is warm and dry.   Neurological:      Mental Status: He is alert.   Psychiatric:         Mood and Affect: Mood normal.         Behavior: Behavior is cooperative.            Result Review  Data Reviewed:{ Labs  Result Review  Imaging  Med Tab  Media :23}   Echocardiogram 2015: EF 55 to 60%, diastolic dysfunction, mild to moderate AR, mild MR, moderate TR, RVSP 43 mmHg.                  Assessment and Plan {CC Problem List  Visit Diagnosis  ROS  Review (Popup)  Health Maintenance  Quality  BestPractice  Medications  SmartSets  SnapShot Encounters  Media :23}   1. Valvular heart disease    - Adult Transthoracic Echo Complete W/ Cont if Necessary Per Protocol; Future  - Ambulatory Referral to Cardiology    2. PVC's (premature ventricular contractions)    - ECG 12 Lead; Future  - Mobile Cardiac Outpatient Telemetry; Future  - Adult Transthoracic Echo Complete W/ Cont if Necessary Per Protocol; Future  - Ambulatory Referral to Cardiology    3. Primary hypertension  Controlled today  Losartan,   - ECG 12 Lead; Future    4. Lower extremity edema  Controlled on lasix and KCL    5. LBBB (left bundle branch block)  SR with PVC  - Ambulatory Referral to Cardiology    6. 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 Dehydration    - Mobile Cardiac Outpatient Telemetry; Future  - Adult Transthoracic Echo Complete W/ Cont if Necessary Per Protocol; Future  - Ambulatory Referral to Cardiology    Referral to [facility] for establish care and continued management.       Follow Up {Instructions Charge Capture  Follow-up Communications :23}   Return if symptoms worsen or fail to improve.    Patient was given instructions and counseling regarding his condition or for health maintenance advice.  Please see specific information pulled into the AVS if appropriate.  Patient was instructed to call the [facility] Center with any questions, concerns, or worsening symptoms.

## 2023-09-06 NOTE — PROGRESS NOTES
[facility] Heart Monitor Documentation    Jeremie Wynn  1945  [id number]  09/06/23      [] ZIO XT Patch  Model [id number] Prescribed for  Days    Serial Number: (N + 9 Digits) N   Apply-By Date on Box:   USPS Tracking Number:   USPS Tracking        [x] [employer]ian MINI PLUS Mobile Cardiac Telemetry  Model BGMINIPLUS Prescribed for 30 Days    Serial Number: (BGM + 7 Digits) [id number]  Shipped-By Date on Box: 08/15/2023  UPS Tracking Number: [id number]  UPS Tracking      [] [employer] MINI Holter Monitor  Model BGMINIEL Prescribed for  Days    Serial Number: (7 Digits) \Shipped-By Date on Box:   UPS Tracking Number: [id number]  UPS Tracking        This monitor was applied to the patient's chest and checked for proper functioning.  Mr. Jeremie Wynn was instructed in the proper use of this monitor.  He was given the opportunity to ask questions and left the office with the device 's instruction manual.    Roberta Bermudez, [facility], 13:51 EDT, 09/06/23                  [facility] 8.8.2019  330

## 2023-09-11 ENCOUNTER — TELEPHONE (OUTPATIENT)
Dept: CARDIOLOGY | Facility: HOSPITAL | Age: 78
End: 2023-09-11
Payer: MEDICARE

## 2023-09-11 NOTE — TELEPHONE ENCOUNTER
Caller: Jeremie Wynn    Relationship: Self    Best call back number: [phone]    What is the best time to reach you: ANY     Who are you requesting to speak with (clinical staff, provider,  specific staff member): ANY     What was the call regarding: PT STATES THAT HIS HEART MONITOR ISN'T CHARGING. PT STATES THAT HE IS SUPPOSED TO HAVE IT ON FOR 30 DAYS, BUT IT ONLY LASTED A FEW BEFORE IT STOPPED CHARGING. PT STATES HE'S HAD IT SINCE LAST THURSDAY. PLEASE REACH OUT TO FURTHER ADVISE.       Is it okay if the provider responds through [employer]: NO

## 2023-09-12 ENCOUNTER — CLINICAL SUPPORT (OUTPATIENT)
Dept: CARDIOLOGY | Facility: HOSPITAL | Age: 78
End: 2023-09-12
Payer: MEDICARE

## 2023-09-12 NOTE — PROGRESS NOTES
Patient was seen at the [facility] today due to monitor not want to charge.  was switched out to a new  and advised patient to go home and place his monitor and phone on charge.

## 2023-09-20 ENCOUNTER — TELEPHONE (OUTPATIENT)
Dept: CARDIOLOGY | Facility: HOSPITAL | Age: 78
End: 2023-09-20
Payer: MEDICARE

## 2023-09-20 NOTE — TELEPHONE ENCOUNTER
Reports received and review.  HR 38-41 on 09/20/23 early AM.  Not on HR lowering meds.  We will continue to monitor.

## 2023-09-20 NOTE — TELEPHONE ENCOUNTER
Vivian with [employer] called to report a critical event showing sinus bradycardia with 1st degree AV block. HR 41 bpm. She is faxing over the report.

## 2023-09-21 ENCOUNTER — TELEPHONE (OUTPATIENT)
Dept: CARDIOLOGY | Facility: HOSPITAL | Age: 78
End: 2023-09-21
Payer: MEDICARE

## 2023-09-21 NOTE — TELEPHONE ENCOUNTER
Caller: Jeremie Wynn     Relationship: [unfilled]     Best call back number: [phone]    What is your medical concern? PT STATES HE IS IN NEED OF MORE PATCHES FOR HEART MONITOR(2004 PATCHES HE BELIEVES) AND WOULD LIKE TO HAVE THOSE MAILED TO HIM DIRECTLY. PLEASE CALL PATIENT TO ADVISE, THANK YOU

## 2023-09-22 ENCOUNTER — TELEPHONE (OUTPATIENT)
Dept: CARDIOLOGY | Facility: HOSPITAL | Age: 78
End: 2023-09-22
Payer: MEDICARE

## 2023-09-22 NOTE — TELEPHONE ENCOUNTER
[employer] called about a critical, within 1 minute of wearing the monitor 1 PVC was noted, he's bradycardic of average 40 bpm, and 1st degree heart block

## 2023-09-27 LAB
QT INTERVAL: 498 MS
QTC INTERVAL: 522 MS

## 2023-09-29 NOTE — TELEPHONE ENCOUNTER
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 She reports the pain started yesterday, accompanied by distention. Passing gas and stool. Of note the patient recently traveled to [facility] in 2023 and ate a large fish.     In ED, afebrile WBC 9 and lactate 1.1  soft / mild diffuse tenderness / mildly distended / tympanitic

## 2023-10-02 ENCOUNTER — TELEPHONE (OUTPATIENT)
Dept: CARDIOLOGY | Facility: HOSPITAL | Age: 78
End: 2023-10-02
Payer: MEDICARE

## 2023-10-02 ENCOUNTER — OFFICE VISIT (OUTPATIENT)
Dept: CARDIOLOGY | Facility: CLINIC | Age: 78
End: 2023-10-02
Payer: MEDICARE

## 2023-10-02 VITALS
BODY MASS INDEX: 31.92 KG/M2 | DIASTOLIC BLOOD PRESSURE: 82 MMHG | SYSTOLIC BLOOD PRESSURE: 156 MMHG | HEIGHT: 70 IN | HEART RATE: 71 BPM | OXYGEN SATURATION: 98 % | WEIGHT: 223 LBS

## 2023-10-02 DIAGNOSIS — E78.01 FAMILIAL HYPERCHOLESTEROLEMIA: ICD-10-CM

## 2023-10-02 DIAGNOSIS — I10 BENIGN ESSENTIAL HYPERTENSION: Primary | ICD-10-CM

## 2023-10-02 PROCEDURE — 99214 OFFICE O/P EST MOD 30 MIN: CPT | Performed by: INTERNAL MEDICINE

## 2023-10-02 PROCEDURE — 3079F DIAST BP 80-89 MM HG: CPT | Performed by: INTERNAL MEDICINE

## 2023-10-02 PROCEDURE — 3077F SYST BP >= 140 MM HG: CPT | Performed by: INTERNAL MEDICINE

## 2023-10-02 RX ORDER — ASPIRIN 81 MG/1
81 TABLET ORAL DAILY
COMMUNITY

## 2023-10-02 NOTE — PROGRESS NOTES
"[facility] Cardiology  Office visit  Jeremie Wynn  1945  [phone]  There is no work phone number on file.    VISIT DATE:  10/2/2023    PCP: Arnold Mcdonald DO  [facility] [id number]    CC:  Chief Complaint   Patient presents with    Valvular heart disease       Previous cardiac studies and procedures:  August 2015 TTE  1. The estimated ejection fraction is 55-60%.   2. Abnormal left ventricular diastolic filling is observed, consistent  with impaired relaxation.   3. There is mild to moderate aortic regurgitation.   4. There is mild mitral regurgitation.   5. There is moderate tricuspid regurgitation.  6. The right ventricular systolic pressure is calculated at 43 mmHg.     ASSESSMENT:   Diagnosis Plan   1. Benign essential hypertension        2. Familial hypercholesterolemia            PLAN:  Cardiac murmur: Transthoracic echo pending to assess underlying myocardial structure and function.    Hypertension: Goal less than 140/90 mmHg.  Continue current medical therapy.    Hyperlipidemia: Goal LDL less than 100.  Continue atorvastatin 10 mg p.o. daily.    Subjective  No further falls or episodes of lightheadedness.  Blood pressures running less than 140/90 mmHg.  Shortness of breath and stable class II pattern.  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  Denies chest discomfort or palpitations.    PHYSICAL EXAMINATION:  Vitals:    10/02/23 1300   BP: 156/82   BP Location: Left arm   Patient Position: Sitting   Cuff Size: Adult   Pulse: 71   SpO2: 98%   Weight: 101 kg (223 lb)   Height: 177.8 cm (70\")     General Appearance:    Alert, cooperative, no distress, appears stated age   Head:    Normocephalic, without obvious abnormality, atraumatic   Eyes:    conjunctiva/corneas clear   Nose:   Nares normal, septum midline, mucosa normal, no drainage   Throat:   Lips, teeth and gums normal   Neck:   Supple, symmetrical, trachea midline, no " carotid    bruit or JVD   Lungs:     Clear to auscultation bilaterally, respirations unlabored   Chest Wall:    No tenderness or deformity    Heart:    Regular rate and rhythm, S1 and S2 normal, 3/6 early peaking systolic murmur right upper sternal border, no rub   or gallop, normal carotid impulse bilaterally without bruit.   Abdomen:     Soft, non-tender   Extremities:   Extremities normal, atraumatic, no cyanosis or edema   Pulses:   2+ and symmetric all extremities   Skin:   Skin color, texture, turgor normal, no rashes or lesions       Diagnostic Data:  Procedures  Lab Results   Component Value Date    CHLPL 138 09/28/2021    TRIG 120 02/03/2023    HDL 55 02/03/2023     Lab Results   Component Value Date    GLUCOSE 112 (H) 02/03/2023    BUN 19 02/03/2023    CREATININE 1.22 02/03/2023     02/03/2023    K 3.9 02/03/2023     02/03/2023    CO2 26.8 02/03/2023     Lab Results   Component Value Date    HGBA1C 5.6 10/28/2019     Lab Results   Component Value Date    WBC 6.94 02/03/2023    HGB 13.9 02/03/2023    HCT 42.8 02/03/2023     02/03/2023       Allergies  Allergies   Allergen Reactions    Contrast Dye (Echo Or Unknown Ct/Mr) Anaphylaxis    Mesalamine Other (See Comments)     Lung toxicity (suspected)    Penicillins Anaphylaxis and Other (See Comments)    Sulfa Antibiotics Shortness Of Breath and Rash       Current Medications    Current Outpatient Medications:     aspirin 81 MG EC tablet, Take 1 tablet by mouth Daily., Disp: , Rfl:     atorvastatin (LIPITOR) 10 MG tablet, TAKE ONE TABLET BY MOUTH DAILY FOR CHOLESTEROL, Disp: 90 tablet, Rfl: 1    azithromycin (Zithromax) 500 MG tablet, Take 1 daily, Disp: 30 tablet, Rfl: 1    cetirizine (zyrTEC) 10 MG tablet, Take 1 tablet by mouth Daily., Disp: , Rfl:     furosemide (LASIX) 20 MG tablet, TAKE ONE TABLET BY MOUTH DAILY, Disp: 90 tablet, Rfl: 1    losartan (COZAAR) 100 MG tablet, Take 1 tablet by mouth Daily., Disp: 90 tablet, Rfl: 1     pantoprazole (PROTONIX) 40 MG EC tablet, TAKE ONE TABLET BY MOUTH DAILY, Disp: 90 tablet, Rfl: 3    potassium chloride (K-DUR,KLOR-CON) 10 MEQ CR tablet, TAKE ONE TABLET BY MOUTH DAILY, Disp: 90 tablet, Rfl: 1    tiZANidine (ZANAFLEX) 4 MG tablet, TAKE ONE TABLET BY MOUTH EVERY NIGHT AT BEDTIME AS NEEDED FOR LEG CRAMPS, Disp: 90 tablet, Rfl: 0    traMADol (ULTRAM) 50 MG tablet, TAKE ONE TABLET BY MOUTH EVERY 6 HOURS AS NEEDED FOR MODERATE PAIN, Disp: 60 tablet, Rfl: 0          ROS  ROS      SOCIAL HX  Social History     Socioeconomic History    Marital status:    Tobacco Use    Smoking status: Former     Packs/day: 0.50     Years: 10.00     Pack years: 5.00     Types: Pipe, Cigars, Cigarettes     Start date:      Quit date:      Years since quittin.7    Smokeless tobacco: Never    Tobacco comments:     smoked a pipe and cigars for during 20's   Vaping Use    Vaping Use: Never used   Substance and Sexual Activity    Alcohol use: No    Drug use: No    Sexual activity: Yes     Partners: Female       FAMILY HX  Family History   Problem Relation Age of Onset    Arrhythmia Mother     Heart disease Mother     Diabetes Mother     Other Mother         CARDIAC PACEMAKER    Arrhythmia Father     Cancer Father     Other Father         CARDIAC PACEMAKER    Lymphoma Father     Heart failure Father     Heart disease Brother     Cancer Brother     Unknown Other              Hector Pace III, MD, [facility]       spontaneous/unlabored

## 2023-10-02 NOTE — TELEPHONE ENCOUNTER
Patient dropped monitor off to the Heart and Valve stating that it interfered with his hearing aids. He had monitor placed on 9/6 for 30 days.

## 2023-10-11 ENCOUNTER — HOSPITAL ENCOUNTER (OUTPATIENT)
Dept: CARDIOLOGY | Facility: HOSPITAL | Age: 78
Discharge: HOME | End: 2023-10-11
Admitting: NURSE PRACTITIONER
Payer: MEDICARE

## 2023-10-11 VITALS
BODY MASS INDEX: 31.92 KG/M2 | WEIGHT: 223 LBS | SYSTOLIC BLOOD PRESSURE: 145 MMHG | DIASTOLIC BLOOD PRESSURE: 68 MMHG | HEIGHT: 70 IN

## 2023-10-11 DIAGNOSIS — I38 VALVULAR HEART DISEASE: ICD-10-CM

## 2023-10-11 DIAGNOSIS — I49.3 PVC'S (PREMATURE VENTRICULAR CONTRACTIONS): ICD-10-CM

## 2023-10-11 DIAGNOSIS — R55 NEAR SYNCOPE: ICD-10-CM

## 2023-10-11 LAB
BH CV ECHO MEAS - AI P1/2T: 844.9 MSEC
BH CV ECHO MEAS - AO MAX PG: 19 MMHG
BH CV ECHO MEAS - AO MEAN PG: 11.5 MMHG
BH CV ECHO MEAS - AO ROOT DIAM: 3.7 CM
BH CV ECHO MEAS - AO V2 MAX: 218.1 CM/SEC
BH CV ECHO MEAS - AO V2 VTI: 55.5 CM
BH CV ECHO MEAS - AVA(I,D): 2.04 CM2
BH CV ECHO MEAS - EDV(CUBED): 99.7 ML
BH CV ECHO MEAS - EDV(MOD-SP2): 97 ML
BH CV ECHO MEAS - EDV(MOD-SP4): 124 ML
BH CV ECHO MEAS - EF(MOD-BP): 59 %
BH CV ECHO MEAS - EF(MOD-SP2): 58.8 %
BH CV ECHO MEAS - EF(MOD-SP4): 54.8 %
BH CV ECHO MEAS - ESV(CUBED): 12.8 ML
BH CV ECHO MEAS - ESV(MOD-SP2): 40 ML
BH CV ECHO MEAS - ESV(MOD-SP4): 56 ML
BH CV ECHO MEAS - FS: 49.6 %
BH CV ECHO MEAS - IVS/LVPW: 0.82 CM
BH CV ECHO MEAS - IVSD: 1.13 CM
BH CV ECHO MEAS - LA DIMENSION: 3.9 CM
BH CV ECHO MEAS - LAT PEAK E' VEL: 8.4 CM/SEC
BH CV ECHO MEAS - LV DIASTOLIC VOL/BSA (35-75): 56.7 CM2
BH CV ECHO MEAS - LV MASS(C)D: 222.7 GRAMS
BH CV ECHO MEAS - LV MAX PG: 8.1 MMHG
BH CV ECHO MEAS - LV MEAN PG: 4.5 MMHG
BH CV ECHO MEAS - LV SYSTOLIC VOL/BSA (12-30): 25.6 CM2
BH CV ECHO MEAS - LV V1 MAX: 141.9 CM/SEC
BH CV ECHO MEAS - LV V1 VTI: 35.3 CM
BH CV ECHO MEAS - LVIDD: 4.6 CM
BH CV ECHO MEAS - LVIDS: 2.34 CM
BH CV ECHO MEAS - LVOT AREA: 3.2 CM2
BH CV ECHO MEAS - LVOT DIAM: 2.02 CM
BH CV ECHO MEAS - LVPWD: 1.39 CM
BH CV ECHO MEAS - MED PEAK E' VEL: 6.2 CM/SEC
BH CV ECHO MEAS - MV A MAX VEL: 112.4 CM/SEC
BH CV ECHO MEAS - MV DEC TIME: 0.24 SEC
BH CV ECHO MEAS - MV E MAX VEL: 89.5 CM/SEC
BH CV ECHO MEAS - MV E/A: 0.8
BH CV ECHO MEAS - MV MAX PG: 3.9 MMHG
BH CV ECHO MEAS - MV MEAN PG: 1.54 MMHG
BH CV ECHO MEAS - MV V2 VTI: 54.5 CM
BH CV ECHO MEAS - MVA(VTI): 2.08 CM2
BH CV ECHO MEAS - PA ACC SLOPE: 795.5 CM/SEC2
BH CV ECHO MEAS - PA ACC TIME: 0.12 SEC
BH CV ECHO MEAS - PA V2 MAX: 114.3 CM/SEC
BH CV ECHO MEAS - RAP SYSTOLE: 8 MMHG
BH CV ECHO MEAS - RVDD: 2.7 CM
BH CV ECHO MEAS - RVSP: 36 MMHG
BH CV ECHO MEAS - SI(MOD-SP2): 26.1 ML/M2
BH CV ECHO MEAS - SI(MOD-SP4): 31.1 ML/M2
BH CV ECHO MEAS - SV(LVOT): 113.3 ML
BH CV ECHO MEAS - SV(MOD-SP2): 57 ML
BH CV ECHO MEAS - SV(MOD-SP4): 68 ML
BH CV ECHO MEAS - TAPSE (>1.6): 2.7 CM
BH CV ECHO MEAS - TR MAX PG: 28 MMHG
BH CV ECHO MEAS - TR MAX VEL: 264 CM/SEC
BH CV ECHO MEASUREMENTS AVERAGE E/E' RATIO: 12.26
BH CV VAS BP RIGHT ARM: NORMAL MMHG
BH CV XLRA - RV BASE: 3.4 CM
BH CV XLRA - RV LENGTH: 7.6 CM
BH CV XLRA - RV MID: 3.4 CM
BH CV XLRA - TDI S': 12.2 CM/SEC
LEFT ATRIUM VOLUME INDEX: 31.1 ML/M2

## 2023-10-11 PROCEDURE — 93306 TTE W/DOPPLER COMPLETE: CPT

## 2023-10-12 ENCOUNTER — TELEPHONE (OUTPATIENT)
Dept: CARDIOLOGY | Facility: HOSPITAL | Age: 78
End: 2023-10-12
Payer: MEDICARE

## 2023-10-12 DIAGNOSIS — I20.89 ANGINAL EQUIVALENT: ICD-10-CM

## 2023-10-12 DIAGNOSIS — R55 NEAR SYNCOPE: Primary | ICD-10-CM

## 2023-10-12 DIAGNOSIS — R93.1 ABNORMAL ECHOCARDIOGRAM: ICD-10-CM

## 2023-10-12 DIAGNOSIS — R94.31 ABNORMAL ELECTROCARDIOGRAM (ECG) (EKG): ICD-10-CM

## 2023-10-12 DIAGNOSIS — I49.3 PVC'S (PREMATURE VENTRICULAR CONTRACTIONS): ICD-10-CM

## 2023-10-12 DIAGNOSIS — I44.7 LBBB (LEFT BUNDLE BRANCH BLOCK): ICD-10-CM

## 2023-10-12 NOTE — TELEPHONE ENCOUNTER
Echo 09/06/23:      Left ventricular systolic function is low normal. Left ventricular ejection fraction appears to be 51 - 55%.    Left ventricular wall thickness is consistent with mild concentric hypertrophy. Sigmoid-shaped ventricular septum is present.    The following left ventricular wall segments are hypokinetic: basal inferolateral and mid inferolateral.    Left ventricular diastolic function is consistent with (grade I) impaired relaxation.    The left atrial cavity is mildly dilated.    There is calcification of the aortic valve.    Estimated right ventricular systolic pressure from tricuspid regurgitation is mildly elevated (35-45 mmHg).    Reviewed echo results with Dr. Pace.   Plan to schedule stress test.     Will be contacted by [facility] for scheduling.

## 2023-10-12 NOTE — PROGRESS NOTES
Please call patient:    Echocardiogram results have been reviewed.  His echo results are slightly abnormal.  After reviewing results with Dr. Pace he would like patient to have a stress test completed.  He will be contacted by  scheduling to make arrangement.  Please review testing instructions for the nuclear stress test.  He should take all his meds as scheduled.

## 2023-10-18 ENCOUNTER — TELEPHONE (OUTPATIENT)
Dept: CARDIOLOGY | Facility: HOSPITAL | Age: 78
End: 2023-10-18
Payer: MEDICARE

## 2023-10-18 NOTE — TELEPHONE ENCOUNTER
----- Message from TONI Hollins sent at 10/12/2023  1:40 PM EDT -----  Please call patient:    Echocardiogram results have been reviewed.  His echo results are slightly abnormal.  After reviewing results with Dr. Pace he would like patient to have a stress test completed.  He will be contacted by  scheduling to make arrangement.  Please review testing instructions for the nuclear stress test.  He should take all his meds as scheduled.

## 2023-10-18 NOTE — TELEPHONE ENCOUNTER
Patient notified that his echo results are slightly abnormal.  Arnoldo has discussed with Dr. Pace and he would like patient to have a stress test completed.  Patient has been contacted and scheduled. He will take meds as prescribed.

## 2023-10-30 ENCOUNTER — TELEPHONE (OUTPATIENT)
Dept: FAMILY MEDICINE CLINIC | Facility: CLINIC | Age: 78
End: 2023-10-30

## 2023-10-30 DIAGNOSIS — R60.9 PERIPHERAL EDEMA: ICD-10-CM

## 2023-10-30 DIAGNOSIS — E78.01 FAMILIAL HYPERCHOLESTEROLEMIA: ICD-10-CM

## 2023-10-30 DIAGNOSIS — Z76.89 ENCOUNTER TO ESTABLISH CARE WITH NEW DOCTOR: Primary | ICD-10-CM

## 2023-10-30 NOTE — TELEPHONE ENCOUNTER
Caller: Jeremie Wynn    Relationship: Self    Best call back number:       [phone] (Mobile)     Requested Prescriptions:       atorvastatin (LIPITOR) 10 MG tablet       furosemide (LASIX) 20 MG tablet     Pharmacy where request should be sent:     [facility] [id number] [address] - [phone] [facility] [phone] FX     Last office visit with prescribing clinician: 8/21/2023   Last telemedicine visit with prescribing clinician: Visit date not found   Next office visit with prescribing clinician: 11/20/2023     Additional details provided by patient:     PATIENT STATED HE IS COMPLETELY OUT OF BOTH MEDICATIONS    Does the patient have less than a 3 day supply:  [x] Yes  [] No    Would you like a call back once the refill request has been completed: [] Yes [] No    If the office needs to give you a call back, can they leave a voicemail: [] Yes [] No    Zuleima Santillan Rep   10/30/23 14:59 EDT     DR THAO

## 2023-11-02 RX ORDER — FUROSEMIDE 20 MG/1
20 TABLET ORAL DAILY
Qty: 90 TABLET | Refills: 1 | Status: SHIPPED | OUTPATIENT
Start: 2023-11-02

## 2023-11-02 RX ORDER — ATORVASTATIN CALCIUM 10 MG/1
10 TABLET, FILM COATED ORAL DAILY
Qty: 90 TABLET | Refills: 1 | Status: SHIPPED | OUTPATIENT
Start: 2023-11-02

## 2023-11-02 NOTE — TELEPHONE ENCOUNTER
PATIENT HAS BEEN WITHOUT THIS MEDICATION FOR A WEEK.  HE CALLED LAST WEEK TO GET THESE FILLED AND THE PHARMACY IS SAYING THEY STILL DON'T HAVE ANYTHING.  CAN SOMEONE CHECK THE STATUS?

## 2023-11-06 ENCOUNTER — HOSPITAL ENCOUNTER (OUTPATIENT)
Dept: CARDIOLOGY | Facility: HOSPITAL | Age: 78
Discharge: HOME | End: 2023-11-06
Admitting: NURSE PRACTITIONER
Payer: MEDICARE

## 2023-11-06 DIAGNOSIS — I20.89 ANGINAL EQUIVALENT: ICD-10-CM

## 2023-11-06 DIAGNOSIS — I44.7 LBBB (LEFT BUNDLE BRANCH BLOCK): ICD-10-CM

## 2023-11-06 DIAGNOSIS — R94.31 ABNORMAL ELECTROCARDIOGRAM (ECG) (EKG): ICD-10-CM

## 2023-11-06 DIAGNOSIS — I49.3 PVC'S (PREMATURE VENTRICULAR CONTRACTIONS): ICD-10-CM

## 2023-11-06 DIAGNOSIS — R55 NEAR SYNCOPE: ICD-10-CM

## 2023-11-06 DIAGNOSIS — R93.1 ABNORMAL ECHOCARDIOGRAM: ICD-10-CM

## 2023-11-06 LAB
BH CV REST NUCLEAR ISOTOPE DOSE: 9.9 MCI
BH CV STRESS BP STAGE 2: NORMAL
BH CV STRESS BP STAGE 4: NORMAL
BH CV STRESS COMMENTS STAGE 1: NORMAL
BH CV STRESS DOSE REGADENOSON STAGE 1: 0.4
BH CV STRESS DURATION MIN STAGE 1: 1
BH CV STRESS DURATION MIN STAGE 2: 1
BH CV STRESS DURATION MIN STAGE 3: 1
BH CV STRESS DURATION MIN STAGE 4: 1
BH CV STRESS DURATION SEC STAGE 1: 0
BH CV STRESS DURATION SEC STAGE 2: 0
BH CV STRESS DURATION SEC STAGE 3: 0
BH CV STRESS DURATION SEC STAGE 4: 0
BH CV STRESS HR STAGE 1: 45
BH CV STRESS HR STAGE 2: 65
BH CV STRESS HR STAGE 3: 62
BH CV STRESS HR STAGE 4: 55
BH CV STRESS NUCLEAR ISOTOPE DOSE: 31.5 MCI
BH CV STRESS O2 STAGE 1: 97
BH CV STRESS O2 STAGE 2: 98
BH CV STRESS O2 STAGE 3: 99
BH CV STRESS O2 STAGE 4: 97
BH CV STRESS PROTOCOL 1: NORMAL
BH CV STRESS RECOVERY BP: NORMAL MMHG
BH CV STRESS RECOVERY HR: 55 BPM
BH CV STRESS RECOVERY O2: 97 %
BH CV STRESS STAGE 1: 1
BH CV STRESS STAGE 2: 2
BH CV STRESS STAGE 3: 3
BH CV STRESS STAGE 4: 4
LV EF NUC BP: 62 %
MAXIMAL PREDICTED HEART RATE: 140 BPM
PERCENT MAX PREDICTED HR: 47.86 %
STRESS BASELINE BP: NORMAL MMHG
STRESS BASELINE HR: 44 BPM
STRESS O2 SAT REST: 97 %
STRESS PERCENT HR: 56 %
STRESS POST ESTIMATED WORKLOAD: 1 METS
STRESS POST EXERCISE DUR MIN: 4 MIN
STRESS POST EXERCISE DUR SEC: 0 SEC
STRESS POST O2 SAT PEAK: 97 %
STRESS POST PEAK BP: NORMAL MMHG
STRESS POST PEAK HR: 67 BPM
STRESS TARGET HR: 119 BPM

## 2023-11-06 PROCEDURE — 78452 HT MUSCLE IMAGE SPECT MULT: CPT

## 2023-11-06 PROCEDURE — 25010000002 REGADENOSON 0.4 MG/5ML SOLUTION: Performed by: NURSE PRACTITIONER

## 2023-11-06 PROCEDURE — A9500 TC99M SESTAMIBI: HCPCS | Performed by: NURSE PRACTITIONER

## 2023-11-06 PROCEDURE — 93017 CV STRESS TEST TRACING ONLY: CPT

## 2023-11-06 PROCEDURE — 0 TECHNETIUM SESTAMIBI: Performed by: NURSE PRACTITIONER

## 2023-11-06 PROCEDURE — 78452 HT MUSCLE IMAGE SPECT MULT: CPT | Performed by: INTERNAL MEDICINE

## 2023-11-06 PROCEDURE — 93018 CV STRESS TEST I&R ONLY: CPT | Performed by: INTERNAL MEDICINE

## 2023-11-06 RX ORDER — CAFFEINE CITRATE 20 MG/ML
60 SOLUTION INTRAVENOUS ONCE
Status: DISCONTINUED | OUTPATIENT
Start: 2023-11-06 | End: 2023-11-07 | Stop reason: HOSPADM

## 2023-11-06 RX ORDER — REGADENOSON 0.08 MG/ML
0.4 INJECTION, SOLUTION INTRAVENOUS ONCE
Status: COMPLETED | OUTPATIENT
Start: 2023-11-06 | End: 2023-11-06

## 2023-11-06 RX ADMIN — TECHNETIUM TC 99M SESTAMIBI 1 DOSE: 1 INJECTION INTRAVENOUS at 10:45

## 2023-11-06 RX ADMIN — TECHNETIUM TC 99M SESTAMIBI 1 DOSE: 1 INJECTION INTRAVENOUS at 12:40

## 2023-11-06 RX ADMIN — REGADENOSON 0.4 MG: 0.08 INJECTION, SOLUTION INTRAVENOUS at 12:40

## 2023-11-08 NOTE — PROGRESS NOTES
Your myocardial perfusion stress test results have been reviewed.  No evidence of a heart blockage affecting blood flow to your heart muscle during your stress test.  Your results are considered acceptable.

## 2023-11-20 ENCOUNTER — OFFICE VISIT (OUTPATIENT)
Dept: FAMILY MEDICINE CLINIC | Facility: CLINIC | Age: 78
End: 2023-11-20
Payer: MEDICARE

## 2023-11-20 VITALS
DIASTOLIC BLOOD PRESSURE: 78 MMHG | OXYGEN SATURATION: 97 % | HEART RATE: 75 BPM | WEIGHT: 227.4 LBS | HEIGHT: 70 IN | SYSTOLIC BLOOD PRESSURE: 133 MMHG | TEMPERATURE: 98 F | BODY MASS INDEX: 32.56 KG/M2

## 2023-11-20 DIAGNOSIS — I07.1 TRICUSPID VALVE INSUFFICIENCY, UNSPECIFIED ETIOLOGY: ICD-10-CM

## 2023-11-20 DIAGNOSIS — K21.9 GASTROESOPHAGEAL REFLUX DISEASE, UNSPECIFIED WHETHER ESOPHAGITIS PRESENT: ICD-10-CM

## 2023-11-20 DIAGNOSIS — E78.01 FAMILIAL HYPERCHOLESTEROLEMIA: ICD-10-CM

## 2023-11-20 DIAGNOSIS — I35.1 AORTIC VALVE INSUFFICIENCY, ETIOLOGY OF CARDIAC VALVE DISEASE UNSPECIFIED: ICD-10-CM

## 2023-11-20 DIAGNOSIS — R13.19 ESOPHAGEAL DYSPHAGIA: ICD-10-CM

## 2023-11-20 DIAGNOSIS — I10 PRIMARY HYPERTENSION: ICD-10-CM

## 2023-11-20 RX ORDER — PANTOPRAZOLE SODIUM 40 MG/1
40 TABLET, DELAYED RELEASE ORAL DAILY
Qty: 90 TABLET | Refills: 1 | Status: SHIPPED | OUTPATIENT
Start: 2023-11-20

## 2023-11-20 NOTE — PATIENT INSTRUCTIONS
Take medications as ordered.  Low fat, low salt diet.  Exercise as tolerated.  Keep follow-up appt with Cardiology.  Follow-up in 3 months.

## 2023-11-20 NOTE — PROGRESS NOTES
Follow Up Office Visit      Date: 2023   Patient Name: Jeremie Wynn  : 1945   MRN: [id number]     Chief Complaint:    Chief Complaint   Patient presents with    Hypertension     Per patient he is here for follow up       History of Present Illness: Jeremie Wynn is a 78 y.o. male who presents today for follow-up after being seen to [facility] care on 23.  Follow-up for hypertension.    Previously seeing Dr. Trejo.    Takes Lipitor for lipids.    Taking losartan for hypertension.  Lasix for edema.    Protonix for GERD.  Reports medication helps with symptoms.      Was referred to [facility] heart valve clinic due to aortic valve insufficiency, tricuspid valve insufficiency.    Has been seen by cardiology, had transthoracic echo, and stress test.      Stress test from 2023:    Left ventricular ejection fraction is normal (Calculated EF = 62%).    Myocardial perfusion imaging indicates a normal myocardial perfusion study with no evidence of ischemia.    Impressions are consistent with a low risk study.    Complete transthoracic echocardiogram with complete Doppler and color-flow from 10/11/2023:    Left ventricular systolic function is low normal. Left ventricular ejection fraction appears to be 51 - 55%.    Left ventricular wall thickness is consistent with mild concentric hypertrophy. Sigmoid-shaped ventricular septum is present.    The following left ventricular wall segments are hypokinetic: basal inferolateral and mid inferolateral.    Left ventricular diastolic function is consistent with (grade I) impaired relaxation.    The left atrial cavity is mildly dilated.    There is calcification of the aortic valve.    Estimated right ventricular systolic pressure from tricuspid regurgitation is mildly elevated (35-45 mmHg).     Previous cardiac studies and procedures:  2015 TTE  1. The estimated ejection fraction is 55-60%.   2. Abnormal left ventricular diastolic filling is observed,  consistent  with impaired relaxation.   3. There is mild to moderate aortic regurgitation.   4. There is mild mitral regurgitation.   5. There is moderate tricuspid regurgitation.  6. The right ventricular systolic pressure is calculated at 43 mmHg.     Subjective      Review of Systems:   Review of Systems   Constitutional:  Negative for chills and fever.   Cardiovascular:  Negative for chest pain.   Neurological:  Negative for syncope.       I have reviewed the patients family history, social history, past medical history, past surgical history and have updated it as appropriate.     Medications:     Current Outpatient Medications:     aspirin 81 MG EC tablet, Take 1 tablet by mouth Daily., Disp: , Rfl:     atorvastatin (LIPITOR) 10 MG tablet, Take 1 tablet by mouth Daily. for cholesterol., Disp: 90 tablet, Rfl: 1    azithromycin (Zithromax) 500 MG tablet, Take 1 daily, Disp: 30 tablet, Rfl: 1    cetirizine (zyrTEC) 10 MG tablet, Take 1 tablet by mouth Daily., Disp: , Rfl:     furosemide (LASIX) 20 MG tablet, Take 1 tablet by mouth Daily., Disp: 90 tablet, Rfl: 1    losartan (COZAAR) 100 MG tablet, Take 1 tablet by mouth Daily., Disp: 90 tablet, Rfl: 1    pantoprazole (PROTONIX) 40 MG EC tablet, Take 1 tablet by mouth Daily., Disp: 90 tablet, Rfl: 1    potassium chloride (K-DUR,KLOR-CON) 10 MEQ CR tablet, TAKE ONE TABLET BY MOUTH DAILY, Disp: 90 tablet, Rfl: 1    tiZANidine (ZANAFLEX) 4 MG tablet, TAKE ONE TABLET BY MOUTH EVERY NIGHT AT BEDTIME AS NEEDED FOR LEG CRAMPS, Disp: 90 tablet, Rfl: 0    traMADol (ULTRAM) 50 MG tablet, TAKE ONE TABLET BY MOUTH EVERY 6 HOURS AS NEEDED FOR MODERATE PAIN, Disp: 60 tablet, Rfl: 0    Allergies:   Allergies   Allergen Reactions    Contrast Dye (Echo Or Unknown Ct/Mr) Anaphylaxis    Mesalamine Other (See Comments)     Lung toxicity (suspected)    Penicillins Anaphylaxis and Other (See Comments)    Sulfa Antibiotics Shortness Of Breath and Rash       Objective     Physical Exam:  "Please see above  Vital Signs:   Vitals:    11/20/23 1306   BP: 133/78   Pulse: 75   Temp: 98 °F (36.7 °C)   TempSrc: Infrared   SpO2: 97%   Weight: 103 kg (227 lb 6.4 oz)   Height: 177.8 cm (70\")   PainSc: 0-No pain     Body mass index is 32.63 kg/m².          Physical Exam  Vitals and nursing note reviewed.   Constitutional:       Appearance: Normal appearance.   HENT:      Head: Normocephalic and atraumatic.   Neck:      Vascular: No carotid bruit.   Cardiovascular:      Rate and Rhythm: Normal rate and regular rhythm.      Heart sounds: Normal heart sounds. No murmur heard.  Pulmonary:      Effort: Pulmonary effort is normal.      Breath sounds: Normal breath sounds.   Abdominal:      General: Bowel sounds are normal.      Palpations: Abdomen is soft. There is no mass.      Tenderness: There is no abdominal tenderness.   Musculoskeletal:      Cervical back: Neck supple.      Right lower leg: No edema.      Left lower leg: No edema.   Skin:     Coloration: Skin is not jaundiced or pale.      Findings: No erythema.   Neurological:      Mental Status: He is alert. Mental status is at baseline.   Psychiatric:         Mood and Affect: Mood normal.         Behavior: Behavior normal.         Procedures    Results:   Labs:   Hemoglobin A1C   Date Value Ref Range Status   10/28/2019 5.6 % Final   03/05/2018 6.30 (H) 4.80 - 5.60 % Final     TSH   Date Value Ref Range Status   09/28/2021 1.240 0.270 - 4.200 uIU/mL Final        POCT Results (if applicable):   Results for orders placed or performed during the hospital encounter of 11/06/23   Stress Test With Myocardial Perfusion (1 Day)   Result Value Ref Range     CV REST NUCLEAR ISOTOPE DOSE 9.9 mCi     CV STRESS PROTOCOL 1 Pharmacologic     Stage 1 1.0     Duration Min Stage 1 1     Duration Sec Stage 1 0     Stress Dose Regadenoson Stage 1 0.40     Stress Comments Stage 1 10 sec bolus injection     Stage 2 2.0     Duration Min Stage 2 1     Duration Sec Stage 2 0     " Stage 3 3.0     Duration Min Stage 3 1     Duration Sec Stage 3 0     Stage 4 4.0     Duration Min Stage 4 1     Duration Sec Stage 4 0     Baseline HR 44 bpm    Baseline /82 mmHg    O2 sat rest 97 %    Target HR (85%) 119 bpm    Max. Pred. HR (100%) 140 bpm    Exercise duration (min) 4 min    Exercise duration (sec) 0 sec    Estimated workload 1.0 METS    HR Stage 1 45     O2 Stage 1 97     HR Stage 2 65     BP Stage 2 144/80     O2 Stage 2 98     HR Stage 3 62     O2 Stage 3 99     HR Stage 4 55     BP Stage 4 142/82     O2 Stage 4 97     Peak /80 mmHg    O2 sat peak 97 %    Recovery O2 97 %    Recovery /84 mmHg    Peak HR 67 bpm    Recovery HR 55 bpm    Percent Max Pred HR 47.86 %    Percent Target HR 56 %    BH CV STRESS NUCLEAR ISOTOPE DOSE 31.5 mCi    Nuc Stress EF 62 %       Imaging:   No valid procedures specified.     Measures:   Advanced Care Planning:   Patient has an advance directive in EMR which is still valid.     Smoking Cessation:   Does not smoke      Assessment / Plan      Assessment/Plan:     Some medications refilled prior to visit due to scheduling.      1. Primary hypertension  Discussed importance of following low-salt diet for blood pressure control.  Patient to continue losartan.    2. Gastroesophageal reflux disease, unspecified whether esophagitis present  Continue Protonix as patient reports medication helps with symptoms.  - pantoprazole (PROTONIX) 40 MG EC tablet; Take 1 tablet by mouth Daily.  Dispense: 90 tablet; Refill: 1    3. Esophageal dysphagia  Continue Protonix as above.    4. Familial hypercholesterolemia  Continue Lipitor.    5. Aortic valve insufficiency, etiology of cardiac valve disease unspecified  5-6 patient to continue to follow with cardiology.    6. Tricuspid valve insufficiency, unspecified etiology  As above.      Part of this note may be an electronic transcription/translation of spoken language to printed text using the Dragon Dictation  System.      Follow Up:   Return in about 3 months (around 2/20/2024).      DO FUAD Esparza

## 2023-12-29 ENCOUNTER — OFFICE VISIT (OUTPATIENT)
Dept: FAMILY MEDICINE CLINIC | Facility: CLINIC | Age: 78
End: 2023-12-29
Payer: MEDICARE

## 2023-12-29 VITALS
BODY MASS INDEX: 31.33 KG/M2 | HEIGHT: 71 IN | WEIGHT: 223.8 LBS | SYSTOLIC BLOOD PRESSURE: 122 MMHG | TEMPERATURE: 98.7 F | OXYGEN SATURATION: 98 % | HEART RATE: 59 BPM | DIASTOLIC BLOOD PRESSURE: 60 MMHG

## 2023-12-29 DIAGNOSIS — A31.9 MYCOBACTERIUM INFECTION, ATYPICAL: ICD-10-CM

## 2023-12-29 DIAGNOSIS — R05.9 COUGH, UNSPECIFIED TYPE: Primary | ICD-10-CM

## 2023-12-29 LAB
EXPIRATION DATE: NORMAL
INTERNAL CONTROL: NORMAL
Lab: NORMAL
SARS-COV-2 AG UPPER RESP QL IA.RAPID: NOT DETECTED

## 2023-12-29 PROCEDURE — 3074F SYST BP LT 130 MM HG: CPT | Performed by: FAMILY MEDICINE

## 2023-12-29 PROCEDURE — 3078F DIAST BP <80 MM HG: CPT | Performed by: FAMILY MEDICINE

## 2023-12-29 PROCEDURE — 1160F RVW MEDS BY RX/DR IN RCRD: CPT | Performed by: FAMILY MEDICINE

## 2023-12-29 PROCEDURE — 87426 SARSCOV CORONAVIRUS AG IA: CPT | Performed by: FAMILY MEDICINE

## 2023-12-29 PROCEDURE — 1159F MED LIST DOCD IN RCRD: CPT | Performed by: FAMILY MEDICINE

## 2023-12-29 PROCEDURE — 99213 OFFICE O/P EST LOW 20 MIN: CPT | Performed by: FAMILY MEDICINE

## 2023-12-29 RX ORDER — AZITHROMYCIN 500 MG/1
TABLET, FILM COATED ORAL
Qty: 30 TABLET | Refills: 1 | Status: SHIPPED | OUTPATIENT
Start: 2023-12-29

## 2023-12-29 NOTE — PATIENT INSTRUCTIONS
Take medications as ordered.  Low fat, low salt diet.  Exercise as tolerated.  Continue Azithromycin.  Keep appt with infectious disease.

## 2023-12-29 NOTE — PROGRESS NOTES
Follow Up Office Visit      Date: 2023   Patient Name: Jeremie Wynn  : 1945   MRN: [id number]     Chief Complaint:    Chief Complaint   Patient presents with    Cough       History of Present Illness: Jeremie Wynn is a 78 y.o. male who presents today reporting cough and reports exposed to COVID.  Wife with recent covid.    History of MAC and has seen Infectious disease.  Pre pulmonology note, Prev [employer] culture positive for MAC.  Has seen Dr. Palacios for infectious disease and is on long term treatment for atypical mycobacterium.  Taking Azithromycin.      Subjective      Review of Systems:   Review of Systems   Constitutional:  Negative for chills and fever.   Respiratory:  Positive for cough.    Gastrointestinal:  Negative for diarrhea, nausea and vomiting.       I have reviewed the patients family history, social history, past medical history, past surgical history and have updated it as appropriate.     Medications:     Current Outpatient Medications:     aspirin 81 MG EC tablet, Take 1 tablet by mouth Daily., Disp: , Rfl:     atorvastatin (LIPITOR) 10 MG tablet, Take 1 tablet by mouth Daily. for cholesterol., Disp: 90 tablet, Rfl: 1    azithromycin (Zithromax) 500 MG tablet, Take 1 daily, Disp: 30 tablet, Rfl: 1    cetirizine (zyrTEC) 10 MG tablet, Take 1 tablet by mouth Daily., Disp: , Rfl:     furosemide (LASIX) 20 MG tablet, Take 1 tablet by mouth Daily., Disp: 90 tablet, Rfl: 1    losartan (COZAAR) 100 MG tablet, Take 1 tablet by mouth Daily., Disp: 90 tablet, Rfl: 1    pantoprazole (PROTONIX) 40 MG EC tablet, Take 1 tablet by mouth Daily., Disp: 90 tablet, Rfl: 1    potassium chloride (K-DUR,KLOR-CON) 10 MEQ CR tablet, TAKE ONE TABLET BY MOUTH DAILY, Disp: 90 tablet, Rfl: 1    tiZANidine (ZANAFLEX) 4 MG tablet, TAKE ONE TABLET BY MOUTH EVERY NIGHT AT BEDTIME AS NEEDED FOR LEG CRAMPS, Disp: 90 tablet, Rfl: 0    traMADol (ULTRAM) 50 MG tablet, TAKE ONE TABLET BY MOUTH EVERY 6 HOURS AS NEEDED  "FOR MODERATE PAIN, Disp: 60 tablet, Rfl: 0    Allergies:   Allergies   Allergen Reactions    Contrast Dye (Echo Or Unknown Ct/Mr) Anaphylaxis    Mesalamine Other (See Comments)     Lung toxicity (suspected)    Penicillins Anaphylaxis and Other (See Comments)    Sulfa Antibiotics Shortness Of Breath and Rash       Objective     Physical Exam: Please see above  Vital Signs:   Vitals:    12/29/23 1527   BP: 122/60   BP Location: Left arm   Patient Position: Sitting   Cuff Size: Large Adult   Pulse: 59   Temp: 98.7 °F (37.1 °C)   TempSrc: Infrared   SpO2: 98%   Weight: 102 kg (223 lb 12.8 oz)   Height: 180.3 cm (71\")   PainSc: 0-No pain     Body mass index is 31.21 kg/m².            Physical Exam  Vitals and nursing note reviewed.   Constitutional:       Appearance: Normal appearance.   HENT:      Head: Normocephalic and atraumatic.   Neck:      Vascular: No carotid bruit.   Cardiovascular:      Rate and Rhythm: Normal rate and regular rhythm.      Heart sounds: Normal heart sounds. No murmur heard.  Pulmonary:      Effort: Pulmonary effort is normal.      Breath sounds: Normal breath sounds.   Abdominal:      General: Bowel sounds are normal.      Palpations: Abdomen is soft. There is no mass.      Tenderness: There is no abdominal tenderness.   Musculoskeletal:      Cervical back: Neck supple.      Right lower leg: No edema.      Left lower leg: No edema.   Skin:     Coloration: Skin is not jaundiced or pale.      Findings: No erythema.   Neurological:      Mental Status: He is alert. Mental status is at baseline.   Psychiatric:         Mood and Affect: Mood normal.         Behavior: Behavior normal.         Procedures    Results:   Labs:   Hemoglobin A1C   Date Value Ref Range Status   10/28/2019 5.6 % Final   03/05/2018 6.30 (H) 4.80 - 5.60 % Final     TSH   Date Value Ref Range Status   09/28/2021 1.240 0.270 - 4.200 uIU/mL Final        POCT Results (if applicable):   Results for orders placed or performed during " the hospital encounter of 11/06/23   Stress Test With Myocardial Perfusion (1 Day)   Result Value Ref Range    BH CV REST NUCLEAR ISOTOPE DOSE 9.9 mCi    BH CV STRESS PROTOCOL 1 Pharmacologic     Stage 1 1.0     Duration Min Stage 1 1     Duration Sec Stage 1 0     Stress Dose Regadenoson Stage 1 0.40     Stress Comments Stage 1 10 sec bolus injection     Stage 2 2.0     Duration Min Stage 2 1     Duration Sec Stage 2 0     Stage 3 3.0     Duration Min Stage 3 1     Duration Sec Stage 3 0     Stage 4 4.0     Duration Min Stage 4 1     Duration Sec Stage 4 0     Baseline HR 44 bpm    Baseline /82 mmHg    O2 sat rest 97 %    Target HR (85%) 119 bpm    Max. Pred. HR (100%) 140 bpm    Exercise duration (min) 4 min    Exercise duration (sec) 0 sec    Estimated workload 1.0 METS    HR Stage 1 45     O2 Stage 1 97     HR Stage 2 65     BP Stage 2 144/80     O2 Stage 2 98     HR Stage 3 62     O2 Stage 3 99     HR Stage 4 55     BP Stage 4 142/82     O2 Stage 4 97     Peak /80 mmHg    O2 sat peak 97 %    Recovery O2 97 %    Recovery /84 mmHg    Peak HR 67 bpm    Recovery HR 55 bpm    Percent Max Pred HR 47.86 %    Percent Target HR 56 %     CV STRESS NUCLEAR ISOTOPE DOSE 31.5 mCi    Nuc Stress EF 62 %       Imaging:   No valid procedures specified.     Measures:   Advanced Care Planning:   Patient has an advance directive in EMR which is still valid.  Patient does not have an advance directive, declines further assistance.    Smoking Cessation:   Does not smoke      Assessment / Plan      Assessment/Plan:     1. Cough, unspecified type  With previous covid exposure.  Check Covid antigen.  Covid antigen=Negative 12/29/23.    - POCT SARS-CoV-2 Antigen    2. Mycobacterium infection, atypical  Continue Azithromycin.    Patient to continue to follow with Infectious Disease.    - azithromycin (Zithromax) 500 MG tablet; Take 1 daily  Dispense: 30 tablet; Refill: 1      Part of this note may be an electronic  transcription/translation of spoken language to printed text using the Dragon Dictation System.      Vaccine Counseling:      Follow Up:   No follow-ups on file.      DO FUAD Esparza

## 2024-01-16 ENCOUNTER — TELEPHONE (OUTPATIENT)
Dept: CARDIOLOGY | Facility: HOSPITAL | Age: 79
End: 2024-01-16
Payer: MEDICARE

## 2024-01-16 NOTE — TELEPHONE ENCOUNTER
Patient called stating that he has received a bill for [employer] for his MCOT monitor. He states that it was turned into the office and sent back to [facility]. He does not believe he should have to pay for the monitor since he had so many issues with the monitor. Monitor report was processed after monitor was sent back and read by the cardiologist. Patient states that he was told by Dr. Pace that he should not pay for the monitor since it did not show anything. Patient called requesting that we call [facility] and tell them that he should not have to pay. [employer] states that if there was a reading that they have to bill.

## 2024-01-31 DIAGNOSIS — A31.9 MYCOBACTERIUM INFECTION, ATYPICAL: ICD-10-CM

## 2024-01-31 DIAGNOSIS — I10 PRIMARY HYPERTENSION: Chronic | ICD-10-CM

## 2024-01-31 DIAGNOSIS — E78.01 FAMILIAL HYPERCHOLESTEROLEMIA: ICD-10-CM

## 2024-01-31 DIAGNOSIS — M62.838 MUSCLE SPASM: Primary | ICD-10-CM

## 2024-01-31 DIAGNOSIS — I10 ESSENTIAL HYPERTENSION: Chronic | ICD-10-CM

## 2024-01-31 DIAGNOSIS — R60.9 PERIPHERAL EDEMA: ICD-10-CM

## 2024-01-31 DIAGNOSIS — K21.9 GASTROESOPHAGEAL REFLUX DISEASE, UNSPECIFIED WHETHER ESOPHAGITIS PRESENT: ICD-10-CM

## 2024-01-31 RX ORDER — TIZANIDINE 4 MG/1
4 TABLET ORAL NIGHTLY PRN
Qty: 90 TABLET | Refills: 0 | Status: SHIPPED | OUTPATIENT
Start: 2024-01-31

## 2024-01-31 RX ORDER — AZITHROMYCIN 500 MG/1
TABLET, FILM COATED ORAL
Qty: 30 TABLET | Refills: 1 | Status: SHIPPED | OUTPATIENT
Start: 2024-01-31

## 2024-01-31 RX ORDER — LOSARTAN POTASSIUM 100 MG/1
100 TABLET ORAL DAILY
Qty: 90 TABLET | Refills: 1 | Status: SHIPPED | OUTPATIENT
Start: 2024-01-31

## 2024-01-31 RX ORDER — FUROSEMIDE 20 MG/1
20 TABLET ORAL DAILY
Qty: 90 TABLET | Refills: 1 | Status: SHIPPED | OUTPATIENT
Start: 2024-01-31

## 2024-01-31 RX ORDER — ATORVASTATIN CALCIUM 10 MG/1
10 TABLET, FILM COATED ORAL DAILY
Qty: 90 TABLET | Refills: 1 | Status: SHIPPED | OUTPATIENT
Start: 2024-01-31

## 2024-01-31 RX ORDER — ASPIRIN 81 MG/1
81 TABLET ORAL DAILY
Status: CANCELLED | OUTPATIENT
Start: 2024-01-31

## 2024-01-31 RX ORDER — POTASSIUM CHLORIDE 750 MG/1
10 TABLET, EXTENDED RELEASE ORAL DAILY
Qty: 90 TABLET | Refills: 1 | Status: SHIPPED | OUTPATIENT
Start: 2024-01-31

## 2024-01-31 RX ORDER — PANTOPRAZOLE SODIUM 40 MG/1
40 TABLET, DELAYED RELEASE ORAL DAILY
Qty: 90 TABLET | Refills: 1 | Status: SHIPPED | OUTPATIENT
Start: 2024-01-31

## 2024-01-31 RX ORDER — CETIRIZINE HYDROCHLORIDE 10 MG/1
10 TABLET ORAL DAILY
Status: CANCELLED | OUTPATIENT
Start: 2024-01-31

## 2024-01-31 NOTE — TELEPHONE ENCOUNTER
Caller: Jeremie Wynn    Relationship: Self    Best call back number: [phone]     Requested Prescriptions:   Requested Prescriptions     Pending Prescriptions Disp Refills    aspirin 81 MG EC tablet       Sig: Take 1 tablet by mouth Daily.    atorvastatin (LIPITOR) 10 MG tablet 90 tablet 1     Sig: Take 1 tablet by mouth Daily. for cholesterol.    azithromycin (Zithromax) 500 MG tablet 30 tablet 1     Sig: Take 1 daily    cetirizine (zyrTEC) 10 MG tablet       Sig: Take 1 tablet by mouth Daily.    furosemide (LASIX) 20 MG tablet 90 tablet 1     Sig: Take 1 tablet by mouth Daily.    losartan (COZAAR) 100 MG tablet 90 tablet 1     Sig: Take 1 tablet by mouth Daily.    pantoprazole (PROTONIX) 40 MG EC tablet 90 tablet 1     Sig: Take 1 tablet by mouth Daily.    potassium chloride (K-DUR,KLOR-CON) 10 MEQ CR tablet 90 tablet 1     Sig: Take 1 tablet by mouth Daily.    tiZANidine (ZANAFLEX) 4 MG tablet 90 tablet 0      MOVED AND LOST ALL HIS MEDICATIONS.   Pharmacy where request should be sent: [facility] [id number] [address] [phone] [facility] [phone] FX     Last office visit with prescribing clinician: 12/29/2023   Last telemedicine visit with prescribing clinician: Visit date not found   Next office visit with prescribing clinician: 2/20/2024     Additional details provided by patient:     Does the patient have less than a 3 day supply:  [x] Yes  [] No    Would you like a call back once the refill request has been completed: [] Yes [x] No    If the office needs to give you a call back, can they leave a voicemail: [] Yes [x] No    Zuleima Vasquez Rep   01/31/24 11:07 EST

## 2024-01-31 NOTE — TELEPHONE ENCOUNTER
Rx Refill Note  Requested Prescriptions     Pending Prescriptions Disp Refills    aspirin 81 MG EC tablet       Sig: Take 1 tablet by mouth Daily.    atorvastatin (LIPITOR) 10 MG tablet 90 tablet 1     Sig: Take 1 tablet by mouth Daily. for cholesterol.    azithromycin (Zithromax) 500 MG tablet 30 tablet 1     Sig: Take 1 daily    cetirizine (zyrTEC) 10 MG tablet       Sig: Take 1 tablet by mouth Daily.    furosemide (LASIX) 20 MG tablet 90 tablet 1     Sig: Take 1 tablet by mouth Daily.    losartan (COZAAR) 100 MG tablet 90 tablet 1     Sig: Take 1 tablet by mouth Daily.    pantoprazole (PROTONIX) 40 MG EC tablet 90 tablet 1     Sig: Take 1 tablet by mouth Daily.    potassium chloride (K-DUR,KLOR-CON) 10 MEQ CR tablet 90 tablet 1     Sig: Take 1 tablet by mouth Daily.    tiZANidine (ZANAFLEX) 4 MG tablet 90 tablet 0      Last office visit with prescribing clinician: 12/29/2023   Last telemedicine visit with prescribing clinician: Visit date not found   Next office visit with prescribing clinician: 2/20/2024                         Would you like a call back once the refill request has been completed: [] Yes [] No    If the office needs to give you a call back, can they leave a voicemail: [] Yes [] No    Jas Rose MA  01/31/24, 11:12 EST

## 2024-02-20 ENCOUNTER — OFFICE VISIT (OUTPATIENT)
Dept: FAMILY MEDICINE CLINIC | Facility: CLINIC | Age: 79
End: 2024-02-20
Payer: MEDICARE

## 2024-02-20 VITALS
SYSTOLIC BLOOD PRESSURE: 128 MMHG | WEIGHT: 238 LBS | BODY MASS INDEX: 33.32 KG/M2 | DIASTOLIC BLOOD PRESSURE: 72 MMHG | HEIGHT: 71 IN | OXYGEN SATURATION: 98 % | TEMPERATURE: 98.9 F | HEART RATE: 74 BPM

## 2024-02-20 DIAGNOSIS — Z00.00 MEDICARE ANNUAL WELLNESS VISIT, SUBSEQUENT: Primary | ICD-10-CM

## 2024-02-20 DIAGNOSIS — K21.9 GASTROESOPHAGEAL REFLUX DISEASE, UNSPECIFIED WHETHER ESOPHAGITIS PRESENT: ICD-10-CM

## 2024-02-20 DIAGNOSIS — E78.01 FAMILIAL HYPERCHOLESTEROLEMIA: ICD-10-CM

## 2024-02-20 DIAGNOSIS — A31.9 MYCOBACTERIUM INFECTION, ATYPICAL: ICD-10-CM

## 2024-02-20 DIAGNOSIS — I10 ESSENTIAL HYPERTENSION: ICD-10-CM

## 2024-02-20 RX ORDER — PANTOPRAZOLE SODIUM 40 MG/1
40 TABLET, DELAYED RELEASE ORAL DAILY
Qty: 90 TABLET | Refills: 1 | Status: SHIPPED | OUTPATIENT
Start: 2024-02-20

## 2024-02-20 RX ORDER — AZITHROMYCIN 500 MG/1
TABLET, FILM COATED ORAL
Qty: 30 TABLET | Refills: 2 | Status: SHIPPED | OUTPATIENT
Start: 2024-02-20

## 2024-02-20 NOTE — PROGRESS NOTES
The ABCs of the Annual Wellness Visit  Subsequent Medicare Wellness Visit    Subjective    Jeremie Wynn is a 78 y.o. male who presents for a Subsequent Medicare Wellness Visit.    The following portions of the patient's history were reviewed and   updated as appropriate: allergies, current medications, past family history, past medical history, past social history, past surgical history, and problem list.    Compared to one year ago, the patient feels his physical   health is worse.    Compared to one year ago, the patient feels his mental   health is the same.    Recent Hospitalizations:  He was not admitted to the hospital during the last year.       Current Medical Providers:  Patient Care Team:  Arnold Mcdonald DO as PCP - General (Family Medicine)  Zeb Palacios MD as Consulting Physician (Infectious Diseases)  Hector Pace III, MD as Cardiologist (Cardiology)    Outpatient Medications Prior to Visit   Medication Sig Dispense Refill    aspirin 81 MG EC tablet Take 1 tablet by mouth Daily.      atorvastatin (LIPITOR) 10 MG tablet Take 1 tablet by mouth Daily. for cholesterol. 90 tablet 1    cetirizine (zyrTEC) 10 MG tablet Take 1 tablet by mouth Daily.      furosemide (LASIX) 20 MG tablet Take 1 tablet by mouth Daily. 90 tablet 1    losartan (COZAAR) 100 MG tablet Take 1 tablet by mouth Daily. 90 tablet 1    potassium chloride (K-DUR,KLOR-CON) 10 MEQ CR tablet Take 1 tablet by mouth Daily. 90 tablet 1    tiZANidine (ZANAFLEX) 4 MG tablet Take 1 tablet by mouth At Night As Needed for Muscle Spasms. 90 tablet 0    traMADol (ULTRAM) 50 MG tablet TAKE ONE TABLET BY MOUTH EVERY 6 HOURS AS NEEDED FOR MODERATE PAIN 60 tablet 0    azithromycin (Zithromax) 500 MG tablet Take 1 daily 30 tablet 1    pantoprazole (PROTONIX) 40 MG EC tablet Take 1 tablet by mouth Daily. 90 tablet 1     No facility-administered medications prior to visit.       Opioid medication/s are on active medication list.  and I have evaluated  "his active treatment plan and pain score trends (see table).  Vitals:    02/20/24 1423   PainSc: 0-No pain     I have reviewed the chart for potential of high risk medication and harmful drug interactions in the elderly.          Aspirin is on active medication list. Aspirin use is indicated based on review of current medical condition/s. Pros and cons of this therapy have been discussed today. Benefits of this medication outweigh potential harm.  Patient has been encouraged to continue taking this medication.  .      Patient Active Problem List   Diagnosis    Generalized osteoarthritis    Hyperlipemia    Hypertension    Arthralgia of hip    Dyspnea on exertion    Obesity    S/P total knee arthroplasty, left    Prediabetes    Ulcerative colitis    Interstitial lung disease    Bilateral sensorineural hearing loss    Mycobacterium infection, atypical    Hiatal hernia with GERD    Benign essential hypertension    Cough    Nausea and vomiting    Nonspecific interstitial pneumonitis    Presence of left artificial knee joint    Hearing loss    Impacted cerumen     Advance Care Planning   Advance Care Planning     Advance Directive is on file.  ACP discussion was held with the patient during this visit. Patient has an advance directive in EMR which is still valid.      Objective    Vitals:    02/20/24 1423   BP: 128/72   Pulse: 74   Temp: 98.9 °F (37.2 °C)   TempSrc: Infrared   SpO2: 98%   Weight: 108 kg (238 lb)   Height: 180.3 cm (71\")   PainSc: 0-No pain     Estimated body mass index is 33.19 kg/m² as calculated from the following:    Height as of this encounter: 180.3 cm (71\").    Weight as of this encounter: 108 kg (238 lb).           Does the patient have evidence of cognitive impairment? No          HEALTH RISK ASSESSMENT    Smoking Status:  Social History     Tobacco Use   Smoking Status Former    Types: Pipe, Cigars    Passive exposure: Never   Smokeless Tobacco Never   Tobacco Comments    smoked a pipe and cigars " for during 's     Alcohol Consumption:  Social History     Substance and Sexual Activity   Alcohol Use No     Fall Risk Screen:    AUDREY Fall Risk Assessment was completed, and patient is at LOW risk for falls.Assessment completed on:2024    Depression Screenin/20/2024     2:20 PM   PHQ-2/PHQ-9 Depression Screening   Little Interest or Pleasure in Doing Things 0-->not at all   Feeling Down, Depressed or Hopeless 0-->not at all   PHQ-9: Brief Depression Severity Measure Score 0       Health Habits and Functional and Cognitive Screenin/20/2024     2:21 PM   Functional & Cognitive Status   Do you have difficulty preparing food and eating? No   Do you have difficulty bathing yourself, getting dressed or grooming yourself? No   Do you have difficulty using the toilet? No   Do you have difficulty moving around from place to place? No   Do you have trouble with steps or getting out of a bed or a chair? No   Current Diet Well Balanced Diet   Dental Exam Up to date   Eye Exam Up to date   Exercise (times per week) 2 times per week   Current Exercises Include Other   Do you need help using the phone?  No   Are you deaf or do you have serious difficulty hearing?  No   Do you need help to go to places out of walking distance? No   Do you need help shopping? No   Do you need help preparing meals?  No   Do you need help with housework?  No   Do you need help with laundry? No   Do you need help taking your medications? No   Do you need help managing money? No   Do you ever drive or ride in a car without wearing a seat belt? No   Have you felt unusual stress, anger or loneliness in the last month? No   Who do you live with? Spouse   If you need help, do you have trouble finding someone available to you? No   Have you been bothered in the last four weeks by sexual problems? No   Do you have difficulty concentrating, remembering or making decisions? No       Age-appropriate Screening Schedule:  Refer to the  list below for future screening recommendations based on patient's age, sex and/or medical conditions. Orders for these recommended tests are listed in the plan section. The patient has been provided with a written plan.    Health Maintenance   Topic Date Due    ANNUAL WELLNESS VISIT  12/22/2022    LIPID PANEL  02/03/2024    ZOSTER VACCINE (2 of 2) 04/24/2024 (Originally 5/23/2017)    Pneumococcal Vaccine 65+ (2 of 2 - PPSV23 or PCV20) 04/25/2024 (Originally 11/6/2019)    TDAP/TD VACCINES (1 - Tdap) 11/20/2024 (Originally 11/1/1962)    RSV Vaccine - Adults (1 - 1-dose 60+ series) 02/20/2025 (Originally 11/1/2003)    BMI FOLLOWUP  08/21/2024    COLORECTAL CANCER SCREENING  01/23/2027    COVID-19 Vaccine  Completed    INFLUENZA VACCINE  Completed                  CMS Preventative Services Quick Reference  Risk Factors Identified During Encounter  None Identified  The above risks/problems have been discussed with the patient.  Pertinent information has been shared with the patient in the After Visit Summary.  An After Visit Summary and PPPS were made available to the patient.    Follow Up:   Next Medicare Wellness visit to be scheduled in 1 year.       Additional E&M Note during same encounter follows:  Patient has multiple medical problems which are significant and separately identifiable that require additional work above and beyond the Medicare Wellness Visit.      Chief Complaint  Medicare Wellness-subsequent    Subjective        HPI  Jeremie Wynn is also being seen today for for refill for his azithromycin, and his Protonix.      Reports last Thursday ate a bite of a sandwich and then started vomiting violently until late in the night.   Reports took a dissolvable tablet and reports it helped.      Reqsts refil for his Azithromcin he takes for MAC disease.    Takes Protonix for GERD.  Reports the medication helps with symptoms.    Takes Lipitor for lipids.  Takes losartan for tension.    Patient takes tizanidine  "for muscle spasms.  Reports medication helps with daily activities and with pain.    Patient takes Lasix for edema.      Review of Systems   Constitutional:  Negative for chills and fever.   Gastrointestinal:  Negative for nausea and vomiting.   Neurological:  Negative for seizures and syncope.   Psychiatric/Behavioral:  Negative for suicidal ideas.        Objective   Vital Signs:  /72   Pulse 74   Temp 98.9 °F (37.2 °C) (Infrared)   Ht 180.3 cm (71\")   Wt 108 kg (238 lb)   SpO2 98%   BMI 33.19 kg/m²     Physical Exam  Vitals and nursing note reviewed.   Constitutional:       Appearance: Normal appearance.   HENT:      Head: Normocephalic and atraumatic.   Neck:      Vascular: No carotid bruit.   Cardiovascular:      Rate and Rhythm: Normal rate and regular rhythm.      Heart sounds: Normal heart sounds. Murmur heard.   Pulmonary:      Effort: Pulmonary effort is normal.      Breath sounds: Normal breath sounds.   Abdominal:      General: Bowel sounds are normal.      Palpations: Abdomen is soft. There is no mass.      Tenderness: There is no abdominal tenderness.   Musculoskeletal:      Right lower leg: No edema.      Left lower leg: No edema.   Skin:     Coloration: Skin is not jaundiced or pale.      Findings: No erythema.   Neurological:      Mental Status: He is alert. Mental status is at baseline.   Psychiatric:         Mood and Affect: Mood normal.         Behavior: Behavior normal.                         Assessment and Plan   Diagnoses and all orders for this visit:    Some medications refilled prior to visit due to scheduling.      1. Essential hypertension  Discussed importance of following low-salt diet for blood pressure control.  Continue losartan, and Lasix.  Check CMP.    - Comprehensive Metabolic Panel; Future    2. Familial hypercholesterolemia  Check fasting lipid panel to evaluate treatment/control.  Continue Lipitor.  Check CMP.  - Lipid Panel; Future  - Comprehensive Metabolic " Panel; Future    3. Gastroesophageal reflux disease, unspecified whether esophagitis present  Continue Protonix as patient reports medication helps with symptoms.    - pantoprazole (PROTONIX) 40 MG EC tablet; Take 1 tablet by mouth Daily.  Dispense: 90 tablet; Refill: 1    4. Mycobacterium infection, atypical  Continue azithromycin.    - azithromycin (Zithromax) 500 MG tablet; Take 1 daily  Dispense: 30 tablet; Refill: 2    5. Medicare annual wellness visit, subsequent        Part of this note may be an electronic transcription/translation of spoken language to printed text using the Dragon Dictation System.           Follow Up   Return in about 3 months (around 5/20/2024) for Follow Up.  Patient was given instructions and counseling regarding his condition or for health maintenance advice. Please see specific information pulled into the AVS if appropriate.

## 2024-02-20 NOTE — PATIENT INSTRUCTIONS
Take medications as ordered.  Low fat, low salt diet.  Exercise as tolerated.  Fasting labs soon.  Continue to follow with Cardiology.

## 2024-05-06 ENCOUNTER — OFFICE VISIT (OUTPATIENT)
Dept: CARDIOLOGY | Facility: CLINIC | Age: 79
End: 2024-05-06
Payer: MEDICARE

## 2024-05-06 VITALS
HEIGHT: 70 IN | DIASTOLIC BLOOD PRESSURE: 60 MMHG | BODY MASS INDEX: 31.78 KG/M2 | WEIGHT: 222 LBS | OXYGEN SATURATION: 98 % | SYSTOLIC BLOOD PRESSURE: 140 MMHG

## 2024-05-06 DIAGNOSIS — I10 BENIGN ESSENTIAL HYPERTENSION: Primary | ICD-10-CM

## 2024-05-06 DIAGNOSIS — E78.2 MIXED HYPERLIPIDEMIA: ICD-10-CM

## 2024-07-15 ENCOUNTER — OFFICE VISIT (OUTPATIENT)
Dept: FAMILY MEDICINE CLINIC | Facility: CLINIC | Age: 79
End: 2024-07-15
Payer: MEDICARE

## 2024-07-15 VITALS
OXYGEN SATURATION: 98 % | DIASTOLIC BLOOD PRESSURE: 78 MMHG | TEMPERATURE: 98.4 F | HEIGHT: 70 IN | HEART RATE: 64 BPM | WEIGHT: 213.8 LBS | SYSTOLIC BLOOD PRESSURE: 120 MMHG | BODY MASS INDEX: 30.61 KG/M2

## 2024-07-15 DIAGNOSIS — I10 PRIMARY HYPERTENSION: Chronic | ICD-10-CM

## 2024-07-15 DIAGNOSIS — K21.9 GASTROESOPHAGEAL REFLUX DISEASE, UNSPECIFIED WHETHER ESOPHAGITIS PRESENT: ICD-10-CM

## 2024-07-15 DIAGNOSIS — E78.2 MIXED HYPERLIPIDEMIA: ICD-10-CM

## 2024-07-15 DIAGNOSIS — R63.4 UNEXPLAINED WEIGHT LOSS: Primary | ICD-10-CM

## 2024-07-15 PROCEDURE — 99214 OFFICE O/P EST MOD 30 MIN: CPT | Performed by: FAMILY MEDICINE

## 2024-07-15 PROCEDURE — 3074F SYST BP LT 130 MM HG: CPT | Performed by: FAMILY MEDICINE

## 2024-07-15 PROCEDURE — 3078F DIAST BP <80 MM HG: CPT | Performed by: FAMILY MEDICINE

## 2024-07-15 PROCEDURE — 1126F AMNT PAIN NOTED NONE PRSNT: CPT | Performed by: FAMILY MEDICINE

## 2024-07-15 NOTE — PATIENT INSTRUCTIONS
Take medications as ordered.  Low fat, low salt diet.  Exercise as tolerated.  Keep appt with pulmonology.

## 2024-07-15 NOTE — PROGRESS NOTES
Follow Up Office Visit      Date: 07/15/2024   Patient Name: Jeremie Wynn  : 1945   MRN: [id number]     Chief Complaint:    Chief Complaint   Patient presents with    Weight Loss     Since January this has been going on.        History of Present Illness: Jeremie Wynn is a 78 y.o. male who presents today  for gastrointestinal problems, as well as reports needing blood work.    Has lost weight.   Reports losing inches around the waist mostly.      Takes Lasix for edema,   Takes losartan for hypertension.    Protonix for GERD.  Lipitor for lipids.  Takes 81 mg aspirin.    Reports cardiology asked about his weight loss.    Reports having issues debending on what he eats.  Reports has about 4-5 bms a day.  Not watery.  Reports started first of the year.    Last colonoscopy was last year.    Follows with Pulmonology, Dr Palacios.      Subjective      Review of Systems:   Review of Systems   Constitutional:  Negative for chills and fever.   Respiratory:  Positive for shortness of breath.    Gastrointestinal:  Negative for nausea and vomiting.   Neurological:  Negative for seizures and syncope.   Psychiatric/Behavioral:  Negative for suicidal ideas.        I have reviewed the patients family history, social history, past medical history, past surgical history and have updated it as appropriate.     Medications:     Current Outpatient Medications:     aspirin 81 MG EC tablet, Take 1 tablet by mouth Daily., Disp: , Rfl:     atorvastatin (LIPITOR) 10 MG tablet, Take 1 tablet by mouth Daily. for cholesterol., Disp: 90 tablet, Rfl: 1    azithromycin (Zithromax) 500 MG tablet, Take 1 daily, Disp: 30 tablet, Rfl: 2    cetirizine (zyrTEC) 10 MG tablet, Take 1 tablet by mouth Daily., Disp: , Rfl:     furosemide (LASIX) 20 MG tablet, Take 1 tablet by mouth Daily., Disp: 90 tablet, Rfl: 1    losartan (COZAAR) 100 MG tablet, Take 1 tablet by mouth Daily., Disp: 90 tablet, Rfl: 1    pantoprazole (PROTONIX) 40 MG EC tablet,  "Take 1 tablet by mouth Daily., Disp: 90 tablet, Rfl: 1    potassium chloride (K-DUR,KLOR-CON) 10 MEQ CR tablet, Take 1 tablet by mouth Daily., Disp: 90 tablet, Rfl: 1    tiZANidine (ZANAFLEX) 4 MG tablet, Take 1 tablet by mouth At Night As Needed for Muscle Spasms., Disp: 90 tablet, Rfl: 0    traMADol (ULTRAM) 50 MG tablet, TAKE ONE TABLET BY MOUTH EVERY 6 HOURS AS NEEDED FOR MODERATE PAIN, Disp: 60 tablet, Rfl: 0    Allergies:   Allergies   Allergen Reactions    Contrast Dye (Echo Or Unknown Ct/Mr) Anaphylaxis    Mesalamine Other (See Comments)     Lung toxicity (suspected)    Penicillins Anaphylaxis and Other (See Comments)    Sulfa Antibiotics Shortness Of Breath and Rash       Objective     Physical Exam: Please see above  Vital Signs:   Vitals:    07/15/24 1310   BP: 120/78   Pulse: 64   Temp: 98.4 °F (36.9 °C)   TempSrc: Temporal   SpO2: 98%   Weight: 97 kg (213 lb 12.8 oz)   Height: 177.8 cm (70\")   PainSc: 0-No pain     Body mass index is 30.68 kg/m².              Physical Exam  Vitals and nursing note reviewed.   Constitutional:       Appearance: Normal appearance.   HENT:      Head: Normocephalic and atraumatic.   Neck:      Vascular: No carotid bruit.   Cardiovascular:      Rate and Rhythm: Normal rate and regular rhythm.      Heart sounds: Normal heart sounds. No murmur heard.  Pulmonary:      Effort: Pulmonary effort is normal.      Breath sounds: Normal breath sounds.   Abdominal:      General: Bowel sounds are normal.      Palpations: Abdomen is soft. There is no mass.      Tenderness: There is no abdominal tenderness.   Musculoskeletal:      Right lower leg: No edema.      Left lower leg: No edema.   Skin:     Coloration: Skin is not jaundiced or pale.      Findings: No erythema.   Neurological:      Mental Status: He is alert. Mental status is at baseline.   Psychiatric:         Mood and Affect: Mood normal.         Behavior: Behavior normal.         Procedures    Results:   Labs:   Hemoglobin A1C "   Date Value Ref Range Status   10/28/2019 5.6 % Final   03/05/2018 6.30 (H) 4.80 - 5.60 % Final     TSH   Date Value Ref Range Status   09/28/2021 1.240 0.270 - 4.200 uIU/mL Final        POCT Results (if applicable):   Results for orders placed or performed in visit on 12/29/23   POCT SARS-CoV-2 Antigen    Specimen: Nasopharynx; Swab   Result Value Ref Range    SARS Antigen Not Detected Not Detected, Presumptive Negative    Internal Control Passed Passed    Lot Number [id number]     Expiration Date 06/11/2024        Imaging:   No valid procedures specified.     Measures:   Advanced Care Planning:   Patient has an advance directive in EMR which is still valid.     Smoking Cessation:   Does not smoke      Assessment / Plan      Assessment/Plan:     Patient has lost some weight since 5/6/2024, but his weight is still in the obese range, with BMI of 30.68.      1. Unexplained weight loss  Will check TSH, CBC, CMP.  As of today, 7/21/2024, patient has not had his labs done.    - TSH; Future  - CBC & Differential; Future  - Comprehensive Metabolic Panel; Future    2. Mixed hyperlipidemia  Patient to continue Lipitor.    3. Primary hypertension  This is a chronic problem that is well-controlled on current regimen.  Discussed importance of following low-salt diet for blood pressure control.  Patient to continue losartan, and Lasix.    4. Gastroesophageal reflux disease, unspecified whether esophagitis present  Patient to continue Protonix.      Part of this note may be an electronic transcription/translation of spoken language to printed text using the Dragon Dictation System.        Vaccine Counseling:      Follow Up:   Return in about 3 months (around 10/15/2024).      DO FUAD Esparza

## 2024-07-24 DIAGNOSIS — I10 ESSENTIAL HYPERTENSION: Chronic | ICD-10-CM

## 2024-07-24 DIAGNOSIS — E78.01 FAMILIAL HYPERCHOLESTEROLEMIA: ICD-10-CM

## 2024-07-24 DIAGNOSIS — R60.0 PERIPHERAL EDEMA: ICD-10-CM

## 2024-07-24 DIAGNOSIS — I10 PRIMARY HYPERTENSION: Chronic | ICD-10-CM

## 2024-07-26 RX ORDER — ATORVASTATIN CALCIUM 10 MG/1
10 TABLET, FILM COATED ORAL DAILY
Qty: 90 TABLET | Refills: 1 | Status: SHIPPED | OUTPATIENT
Start: 2024-07-26

## 2024-07-26 RX ORDER — LOSARTAN POTASSIUM 100 MG/1
100 TABLET ORAL DAILY
Qty: 90 TABLET | Refills: 1 | Status: SHIPPED | OUTPATIENT
Start: 2024-07-26

## 2024-07-26 RX ORDER — POTASSIUM CHLORIDE 750 MG/1
10 TABLET, EXTENDED RELEASE ORAL DAILY
Qty: 90 TABLET | Refills: 1 | Status: SHIPPED | OUTPATIENT
Start: 2024-07-26

## 2024-07-26 RX ORDER — FUROSEMIDE 20 MG/1
20 TABLET ORAL DAILY
Qty: 90 TABLET | Refills: 1 | Status: SHIPPED | OUTPATIENT
Start: 2024-07-26

## 2024-08-05 ENCOUNTER — TRANSCRIBE ORDERS (OUTPATIENT)
Dept: ADMINISTRATIVE | Facility: HOSPITAL | Age: 79
End: 2024-08-05
Payer: MEDICARE

## 2024-08-05 DIAGNOSIS — Z87.01 PERSONAL HISTORY OF PNEUMONIA (RECURRENT): Primary | ICD-10-CM

## 2024-08-12 ENCOUNTER — HOSPITAL ENCOUNTER (OUTPATIENT)
Dept: CT IMAGING | Facility: HOSPITAL | Age: 79
Discharge: HOME | End: 2024-08-12
Admitting: INTERNAL MEDICINE
Payer: MEDICARE

## 2024-08-12 DIAGNOSIS — Z87.01 PERSONAL HISTORY OF PNEUMONIA (RECURRENT): ICD-10-CM

## 2024-08-12 PROCEDURE — 71250 CT THORAX DX C-: CPT

## 2024-09-14 DIAGNOSIS — M62.838 MUSCLE SPASM: ICD-10-CM

## 2024-10-15 ENCOUNTER — TELEPHONE (OUTPATIENT)
Dept: FAMILY MEDICINE CLINIC | Facility: CLINIC | Age: 79
End: 2024-10-15

## 2024-10-15 ENCOUNTER — OFFICE VISIT (OUTPATIENT)
Dept: FAMILY MEDICINE CLINIC | Facility: CLINIC | Age: 79
End: 2024-10-15
Payer: MEDICARE

## 2024-10-15 VITALS
HEART RATE: 74 BPM | DIASTOLIC BLOOD PRESSURE: 82 MMHG | OXYGEN SATURATION: 98 % | TEMPERATURE: 97.3 F | WEIGHT: 223 LBS | SYSTOLIC BLOOD PRESSURE: 142 MMHG | BODY MASS INDEX: 31.92 KG/M2 | HEIGHT: 70 IN

## 2024-10-15 DIAGNOSIS — M25.561 CHRONIC PAIN OF BOTH KNEES: ICD-10-CM

## 2024-10-15 DIAGNOSIS — Z51.81 THERAPEUTIC DRUG MONITORING: ICD-10-CM

## 2024-10-15 DIAGNOSIS — G47.00 INSOMNIA, UNSPECIFIED TYPE: Primary | ICD-10-CM

## 2024-10-15 DIAGNOSIS — M25.562 CHRONIC PAIN OF BOTH KNEES: ICD-10-CM

## 2024-10-15 DIAGNOSIS — I10 ESSENTIAL HYPERTENSION: ICD-10-CM

## 2024-10-15 DIAGNOSIS — G89.29 CHRONIC PAIN OF BOTH KNEES: ICD-10-CM

## 2024-10-15 DIAGNOSIS — E78.01 FAMILIAL HYPERCHOLESTEROLEMIA: ICD-10-CM

## 2024-10-15 PROCEDURE — 3077F SYST BP >= 140 MM HG: CPT | Performed by: FAMILY MEDICINE

## 2024-10-15 PROCEDURE — 3079F DIAST BP 80-89 MM HG: CPT | Performed by: FAMILY MEDICINE

## 2024-10-15 PROCEDURE — 1159F MED LIST DOCD IN RCRD: CPT | Performed by: FAMILY MEDICINE

## 2024-10-15 PROCEDURE — 1126F AMNT PAIN NOTED NONE PRSNT: CPT | Performed by: FAMILY MEDICINE

## 2024-10-15 PROCEDURE — 99214 OFFICE O/P EST MOD 30 MIN: CPT | Performed by: FAMILY MEDICINE

## 2024-10-15 PROCEDURE — 1160F RVW MEDS BY RX/DR IN RCRD: CPT | Performed by: FAMILY MEDICINE

## 2024-10-15 RX ORDER — TRAMADOL HYDROCHLORIDE 50 MG/1
50 TABLET ORAL 2 TIMES DAILY PRN
Qty: 60 TABLET | Refills: 0 | Status: SHIPPED | OUTPATIENT
Start: 2024-10-15

## 2024-10-15 NOTE — PATIENT INSTRUCTIONS
Take medications as ordered.  Low fat, low salt diet.  Exercise as tolerated.   Contract and drug screen today.

## 2024-10-15 NOTE — TELEPHONE ENCOUNTER
Caller: Jeremie Wynn    Relationship: Self    Best call back number: [phone]     What medication are you requesting: SLEEPING PILL    If a prescription is needed, what is your preferred pharmacy and phone number: [facility] [id number] - [place], [place] - [address] - [phone]  - [phone]     Additional notes: THIS WAS SUPPOSED TO BE SENT TO [facility] TODAY BUT WAS NOT. PATIENT WASN'T SURE WHAT THE NAME OF IT WAS.

## 2024-10-15 NOTE — PROGRESS NOTES
Follow Up Office Visit      Date: 10/15/2024   Patient Name: Jeremie Wynn  : 1945   MRN: [id number]     Chief Complaint:    Chief Complaint   Patient presents with    Weight Loss     Follow-up       History of Present Illness: Jeremie Wynn is a 78 y.o. male who presents today for follow-up.    Takes Losartan for hypertension.  Lasix for edema.    Protonix for GERD.    Takes Lipitor for lipids.    Takes 81 mg aspirin.    Follows with pulmonology, Dr. Palacios.    Labs were ordered dated 7/15/2024.  Still has TSH, CBC, CMP that are uncollected.    Reports knee pain on right up to 10/10 at times.          Subjective      Review of Systems:   Review of Systems   Constitutional:  Negative for chills and fever.   Musculoskeletal:  Positive for arthralgias (knee pain).       I have reviewed the patients family history, social history, past medical history, past surgical history and have updated it as appropriate.     Medications:     Current Outpatient Medications:     aspirin 81 MG EC tablet, Take 1 tablet by mouth Daily., Disp: , Rfl:     atorvastatin (LIPITOR) 10 MG tablet, TAKE 1 TABLET BY MOUTH DAILY FOR CHOLESTEROL, Disp: 90 tablet, Rfl: 1    cetirizine (zyrTEC) 10 MG tablet, Take 1 tablet by mouth Daily., Disp: , Rfl:     furosemide (LASIX) 20 MG tablet, TAKE 1 TABLET BY MOUTH DAILY, Disp: 90 tablet, Rfl: 1    losartan (COZAAR) 100 MG tablet, TAKE 1 TABLET BY MOUTH DAILY, Disp: 90 tablet, Rfl: 1    pantoprazole (PROTONIX) 40 MG EC tablet, Take 1 tablet by mouth Daily., Disp: 90 tablet, Rfl: 1    potassium chloride (KLOR-CON M10) 10 MEQ CR tablet, TAKE 1 TABLET BY MOUTH DAILY, Disp: 90 tablet, Rfl: 1    tiZANidine (ZANAFLEX) 4 MG tablet, TAKE ONE TABLET BY MOUTH ONCE NIGHTLY AS NEEDED FOR MUSCLE SPASMS, Disp: 90 tablet, Rfl: 0    traMADol (ULTRAM) 50 MG tablet, Take 1 tablet by mouth 2 (Two) Times a Day As Needed for Moderate Pain., Disp: 60 tablet, Rfl: 0    traZODone (DESYREL) 50 MG tablet, Take 1 tablet  "by mouth At Night As Needed for Sleep., Disp: 30 tablet, Rfl: 0    Allergies:   Allergies   Allergen Reactions    Contrast Dye (Echo Or Unknown Ct/Mr) Anaphylaxis    Mesalamine Other (See Comments)     Lung toxicity (suspected)    Penicillins Anaphylaxis and Other (See Comments)    Sulfa Antibiotics Shortness Of Breath and Rash       Objective     Physical Exam: Please see above  Vital Signs:   Vitals:    10/15/24 1325   BP: 142/82   Pulse: 74   Temp: 97.3 °F (36.3 °C)   TempSrc: Infrared   SpO2: 98%   Weight: 101 kg (223 lb)   Height: 177.8 cm (70\")   PainSc: 0-No pain     Body mass index is 32 kg/m².  Body mass index is 32 kg/m².    BMI is >= 30 and <35. (Class 1 Obesity). The following options were offered after discussion;: exercise counseling/recommendations and nutrition counseling/recommendations       Physical Exam  Vitals and nursing note reviewed.   Constitutional:       Appearance: Normal appearance.   HENT:      Head: Normocephalic and atraumatic.   Neck:      Vascular: No carotid bruit.   Cardiovascular:      Rate and Rhythm: Normal rate and regular rhythm.      Heart sounds: Normal heart sounds. No murmur heard.  Pulmonary:      Effort: Pulmonary effort is normal.      Breath sounds: Normal breath sounds.   Abdominal:      General: Bowel sounds are normal.      Palpations: Abdomen is soft. There is no mass.      Tenderness: There is no abdominal tenderness.   Musculoskeletal:      Right lower leg: No edema.      Left lower leg: No edema.   Skin:     Coloration: Skin is not jaundiced or pale.      Findings: No erythema.   Neurological:      Mental Status: He is alert. Mental status is at baseline.   Psychiatric:         Mood and Affect: Mood normal.         Behavior: Behavior normal.         Procedures    Results:   Labs:   Hemoglobin A1C   Date Value Ref Range Status   10/28/2019 5.6 % Final   03/05/2018 6.30 (H) 4.80 - 5.60 % Final     TSH   Date Value Ref Range Status   09/28/2021 1.240 0.270 - 4.200 " uIU/mL Final        POCT Results (if applicable):   Results for orders placed or performed in visit on 12/29/23   POCT SARS-CoV-2 Antigen    Collection Time: 12/29/23  3:55 PM    Specimen: Nasopharynx; Swab   Result Value Ref Range    SARS Antigen Not Detected Not Detected, Presumptive Negative    Internal Control Passed Passed    Lot Number [id number]     Expiration Date 06/11/2024        Imaging:   No valid procedures specified.     Measures:   Advanced Care Planning:   Patient has an advance directive in EMR which is still valid.     Smoking Cessation:   Does not smoke      Assessment / Plan      Assessment/Plan:   Contract today.  Salvador reviewed.  Drug screen today.        1. Insomnia, unspecified type  Start trazodone as needed at bedtime.  - traZODone (DESYREL) 50 MG tablet; Take 1 tablet by mouth At Night As Needed for Sleep.  Dispense: 30 tablet; Refill: 0    2. Chronic pain of both knees  Continue/restart tramadol the patient was taking for chronic pain.  Patient is reported knee pain that is increased to about 10 out of 10 at times.  Patient reports the tramadol has helped with his daily activities and with pain in the past.  Will get contract today.  Salvador reviewed today.  Will get drug screen today.    - traMADol (ULTRAM) 50 MG tablet; Take 1 tablet by mouth 2 (Two) Times a Day As Needed for Moderate Pain.  Dispense: 60 tablet; Refill: 0    3. Therapeutic drug monitoring  Drug screen today.  - Compliance Drug Analysis, Ur - Urine, Clean Catch; Future  - Compliance Drug Analysis, Ur - Urine, Clean Catch    4. Familial hypercholesterolemia  Patient to continue Lipitor.    5. Essential hypertension  Continue losartan.  Discussed importance of following low-salt diet for blood pressure control.  This chronic problem for patient that is fairly well-controlled on current regimen.  Continue current regimen.      Vaccine Counseling:      Follow Up:   Return in about 3 months (around 1/15/2025).      Arnold  DO FUAD Mcdonald

## 2024-10-16 RX ORDER — TRAZODONE HYDROCHLORIDE 50 MG/1
50 TABLET, FILM COATED ORAL NIGHTLY PRN
Qty: 30 TABLET | Refills: 0 | Status: SHIPPED | OUTPATIENT
Start: 2024-10-16

## 2024-10-16 NOTE — TELEPHONE ENCOUNTER
Prescription for trazodone sent to pharmacy.  I called patient and notified him on 10/16/2024 at [id number].

## 2024-10-23 ENCOUNTER — OFFICE VISIT (OUTPATIENT)
Dept: FAMILY MEDICINE CLINIC | Facility: CLINIC | Age: 79
End: 2024-10-23
Payer: MEDICARE

## 2024-10-23 VITALS
BODY MASS INDEX: 32.35 KG/M2 | HEART RATE: 62 BPM | TEMPERATURE: 98.4 F | WEIGHT: 226 LBS | OXYGEN SATURATION: 96 % | SYSTOLIC BLOOD PRESSURE: 134 MMHG | DIASTOLIC BLOOD PRESSURE: 68 MMHG | HEIGHT: 70 IN

## 2024-10-23 DIAGNOSIS — M25.551 RIGHT HIP PAIN: Primary | ICD-10-CM

## 2024-10-23 PROCEDURE — 3078F DIAST BP <80 MM HG: CPT | Performed by: FAMILY MEDICINE

## 2024-10-23 PROCEDURE — 3075F SYST BP GE 130 - 139MM HG: CPT | Performed by: FAMILY MEDICINE

## 2024-10-23 PROCEDURE — 99214 OFFICE O/P EST MOD 30 MIN: CPT | Performed by: FAMILY MEDICINE

## 2024-10-23 PROCEDURE — 1125F AMNT PAIN NOTED PAIN PRSNT: CPT | Performed by: FAMILY MEDICINE

## 2024-10-23 PROCEDURE — 1159F MED LIST DOCD IN RCRD: CPT | Performed by: FAMILY MEDICINE

## 2024-10-23 PROCEDURE — 1160F RVW MEDS BY RX/DR IN RCRD: CPT | Performed by: FAMILY MEDICINE

## 2024-10-23 PROCEDURE — G2211 COMPLEX E/M VISIT ADD ON: HCPCS | Performed by: FAMILY MEDICINE

## 2024-10-23 RX ORDER — PREDNISONE 20 MG/1
40 TABLET ORAL DAILY
Qty: 10 TABLET | Refills: 0 | Status: SHIPPED | OUTPATIENT
Start: 2024-10-23 | End: 2024-10-28

## 2024-10-23 NOTE — PROGRESS NOTES
Jeremie Wynn is a 78 y.o. male who presents today for Hip Pain (Rt hip pain x 3 days/)      Patient has been having right hip pain for the last 3 days. He states he has one step out of his house into the garage. He missed the step going into the garage. He did not fall but about 30 minutes later he began having pain in his right lateral hip. He has put a lidocaine patch on which helped some. He takes tramadol for his knees and this has not helped. He feels like the hip is going to give out on him but it has not. He denies popping and locking. Pain does not radiate. Pain has not improved or worsened.          Review of Systems   Constitutional:  Negative for fever and unexpected weight loss.   HENT:  Negative for congestion, ear pain and sore throat.    Eyes:  Negative for visual disturbance.   Respiratory:  Positive for shortness of breath (chronic and stable). Negative for cough and wheezing.    Cardiovascular:  Negative for chest pain and palpitations.   Gastrointestinal:  Negative for abdominal pain, blood in stool, constipation, diarrhea, nausea, vomiting and GERD.   Endocrine: Negative for polydipsia and polyuria.   Genitourinary:  Negative for difficulty urinating.   Musculoskeletal:  Positive for arthralgias (chronic right knee pain. Acute right hip pain.) and back pain (chronic and stable). Negative for joint swelling.   Skin:  Negative for rash and skin lesions.   Allergic/Immunologic: Negative for environmental allergies.   Neurological:  Negative for seizures and syncope.   Hematological:  Does not bruise/bleed easily.   Psychiatric/Behavioral:  Negative for suicidal ideas.         The following portions of the patient's history were reviewed and updated as appropriate: allergies, current medications, past family history, past medical history, past social history, past surgical history and problem list.    Current Outpatient Medications on File Prior to Visit   Medication Sig Dispense Refill    aspirin  "81 MG EC tablet Take 1 tablet by mouth Daily.      atorvastatin (LIPITOR) 10 MG tablet TAKE 1 TABLET BY MOUTH DAILY FOR CHOLESTEROL 90 tablet 1    cetirizine (zyrTEC) 10 MG tablet Take 1 tablet by mouth Daily.      furosemide (LASIX) 20 MG tablet TAKE 1 TABLET BY MOUTH DAILY 90 tablet 1    losartan (COZAAR) 100 MG tablet TAKE 1 TABLET BY MOUTH DAILY 90 tablet 1    pantoprazole (PROTONIX) 40 MG EC tablet Take 1 tablet by mouth Daily. 90 tablet 1    potassium chloride (KLOR-CON M10) 10 MEQ CR tablet TAKE 1 TABLET BY MOUTH DAILY 90 tablet 1    tiZANidine (ZANAFLEX) 4 MG tablet TAKE ONE TABLET BY MOUTH ONCE NIGHTLY AS NEEDED FOR MUSCLE SPASMS 90 tablet 0    traMADol (ULTRAM) 50 MG tablet Take 1 tablet by mouth 2 (Two) Times a Day As Needed for Moderate Pain. 60 tablet 0    traZODone (DESYREL) 50 MG tablet Take 1 tablet by mouth At Night As Needed for Sleep. 30 tablet 0     No current facility-administered medications on file prior to visit.       Allergies   Allergen Reactions    Contrast Dye (Echo Or Unknown Ct/Mr) Anaphylaxis    Mesalamine Other (See Comments)     Lung toxicity (suspected)    Penicillins Anaphylaxis and Other (See Comments)    Sulfa Antibiotics Shortness Of Breath and Rash        Visit Vitals  /68   Pulse 62   Temp 98.4 °F (36.9 °C) (Temporal)   Ht 177.8 cm (70\")   Wt 103 kg (226 lb)   SpO2 96%   BMI 32.43 kg/m²        Physical Exam  Vitals and nursing note reviewed.   Constitutional:       General: He is not in acute distress.     Appearance: Normal appearance. He is well-developed. He is not diaphoretic.   HENT:      Head: Normocephalic and atraumatic.   Neck:      Vascular: No carotid bruit.   Cardiovascular:      Rate and Rhythm: Normal rate and regular rhythm.      Heart sounds: Normal heart sounds. No murmur heard.     No friction rub. No gallop.   Pulmonary:      Effort: Pulmonary effort is normal. No respiratory distress.      Breath sounds: Normal breath sounds. No stridor. No wheezing, " rhonchi or rales.   Musculoskeletal:      Cervical back: Normal range of motion and neck supple.      Left hip: Tenderness (tenderness over greater trochanter) present. No deformity, lacerations, bony tenderness or crepitus. Decreased range of motion. Normal strength.      Right lower leg: No edema.      Left lower leg: No edema.   Skin:     General: Skin is warm and dry.      Coloration: Skin is not jaundiced or pale.      Findings: No erythema.   Neurological:      Mental Status: He is alert and oriented to person, place, and time. Mental status is at baseline.   Psychiatric:         Mood and Affect: Mood normal.         Behavior: Behavior normal.          Results for orders placed or performed in visit on 12/29/23   POCT SARS-CoV-2 Antigen    Collection Time: 12/29/23  3:55 PM    Specimen: Nasopharynx; Swab   Result Value Ref Range    SARS Antigen Not Detected Not Detected, Presumptive Negative    Internal Control Passed Passed    Lot Number [id number]     Expiration Date 06/11/2024         Problems Addressed this Visit          Musculoskeletal and Injuries    Right hip pain - Primary     Right hip pain which I suspect is from a flare of arthritis and/or greater trochanteric bursitis.  Tramadol has not significantly improved the pain.  Will avoid NSAIDs due to hiatal hernia and GERD.  Patient will continue tramadol and was given a steroid burst.  Order x-ray for further evaluation.  Patient was given referral to orthopedic surgery as well as physical therapy for further evaluation and treatment.  RTC/ED precautions given         Relevant Medications    predniSONE (DELTASONE) 20 MG tablet    Other Relevant Orders    XR Hip With or Without Pelvis 2 - 3 View Right    Ambulatory Referral to Physical Therapy for Evaluation & Treatment    Ambulatory Referral to Orthopedic Surgery     Diagnoses         Codes Comments    Right hip pain    -  Primary ICD-10-CM: M25.551  ICD-9-CM: 719.45             Return if symptoms  worsen or fail to improve.    Chan Rojas MD   10/23/2024

## 2024-10-23 NOTE — ASSESSMENT & PLAN NOTE
Right hip pain which I suspect is from a flare of arthritis and/or greater trochanteric bursitis.  Tramadol has not significantly improved the pain.  Will avoid NSAIDs due to hiatal hernia and GERD.  Patient will continue tramadol and was given a steroid burst.  Order x-ray for further evaluation.  Patient was given referral to orthopedic surgery as well as physical therapy for further evaluation and treatment.  RTC/ED precautions given

## 2024-10-26 LAB
DRUGS UR: NORMAL
Lab: NORMAL

## 2024-11-06 ENCOUNTER — OFFICE VISIT (OUTPATIENT)
Dept: ORTHOPEDIC SURGERY | Facility: CLINIC | Age: 79
End: 2024-11-06
Payer: MEDICARE

## 2024-11-06 VITALS
DIASTOLIC BLOOD PRESSURE: 82 MMHG | SYSTOLIC BLOOD PRESSURE: 136 MMHG | HEIGHT: 70 IN | WEIGHT: 244 LBS | BODY MASS INDEX: 34.93 KG/M2

## 2024-11-06 DIAGNOSIS — M70.61 TROCHANTERIC BURSITIS OF RIGHT HIP: Primary | ICD-10-CM

## 2024-11-06 RX ORDER — TRIAMCINOLONE ACETONIDE 40 MG/ML
40 INJECTION, SUSPENSION INTRA-ARTICULAR; INTRAMUSCULAR
Status: COMPLETED | OUTPATIENT
Start: 2024-11-06 | End: 2024-11-06

## 2024-11-06 RX ORDER — ROPIVACAINE HYDROCHLORIDE 5 MG/ML
4 INJECTION, SOLUTION EPIDURAL; INFILTRATION; PERINEURAL
Status: COMPLETED | OUTPATIENT
Start: 2024-11-06 | End: 2024-11-06

## 2024-11-06 RX ADMIN — TRIAMCINOLONE ACETONIDE 40 MG: 40 INJECTION, SUSPENSION INTRA-ARTICULAR; INTRAMUSCULAR at 08:37

## 2024-11-06 RX ADMIN — ROPIVACAINE HYDROCHLORIDE 4 ML: 5 INJECTION, SOLUTION EPIDURAL; INFILTRATION; PERINEURAL at 08:37

## 2024-11-06 NOTE — PROGRESS NOTES
Procedure   Large Joint Arthrocentesis: R greater trochanteric bursa  Date/Time: 11/6/2024 8:37 AM  Consent given by: patient  Site marked: site marked  Timeout: Immediately prior to procedure a time out was called to verify the correct patient, procedure, equipment, support staff and site/side marked as required   Supporting Documentation  Indications: pain   Procedure Details  Location: hip - R greater trochanteric bursa  Preparation: Patient was prepped and draped in the usual sterile fashion  Needle size: 22 G  Approach: lateral  Medications administered: 4 mL ropivacaine 0.5 %; 40 mg triamcinolone acetonide 40 MG/ML  Patient tolerance: patient tolerated the procedure well with no immediate complications
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
## 2024-11-07 ENCOUNTER — TELEPHONE (OUTPATIENT)
Dept: FAMILY MEDICINE CLINIC | Facility: CLINIC | Age: 79
End: 2024-11-07
Payer: MEDICARE

## 2024-11-07 NOTE — TELEPHONE ENCOUNTER
Hub to Relay    Called patient and left message for patient to return our call, also sent through my chart.    Please the patient know that the x-ray of his hip showed mild degenerative changes in the right hip.  Soft tissues appear normal and there is no fracture or malalignment.  I recommend patient continue treatment plan as discussed during most recent office visit.

## 2024-11-12 DIAGNOSIS — G47.00 INSOMNIA, UNSPECIFIED TYPE: ICD-10-CM

## 2024-11-12 RX ORDER — TRAZODONE HYDROCHLORIDE 50 MG/1
50 TABLET, FILM COATED ORAL NIGHTLY PRN
Qty: 30 TABLET | Refills: 0 | Status: SHIPPED | OUTPATIENT
Start: 2024-11-12

## 2024-11-14 DIAGNOSIS — K21.9 GASTROESOPHAGEAL REFLUX DISEASE, UNSPECIFIED WHETHER ESOPHAGITIS PRESENT: ICD-10-CM

## 2024-11-14 RX ORDER — PANTOPRAZOLE SODIUM 40 MG/1
40 TABLET, DELAYED RELEASE ORAL DAILY
Qty: 90 TABLET | Refills: 1 | Status: SHIPPED | OUTPATIENT
Start: 2024-11-14

## 2024-11-18 ENCOUNTER — TREATMENT (OUTPATIENT)
Dept: PHYSICAL THERAPY | Facility: CLINIC | Age: 79
End: 2024-11-18
Payer: MEDICARE

## 2024-11-18 DIAGNOSIS — M25.551 RIGHT HIP PAIN: ICD-10-CM

## 2024-11-18 DIAGNOSIS — G89.29 CHRONIC RIGHT-SIDED LOW BACK PAIN WITH RIGHT-SIDED SCIATICA: Primary | ICD-10-CM

## 2024-11-18 DIAGNOSIS — M54.41 CHRONIC RIGHT-SIDED LOW BACK PAIN WITH RIGHT-SIDED SCIATICA: Primary | ICD-10-CM

## 2024-11-18 PROCEDURE — 97162 PT EVAL MOD COMPLEX 30 MIN: CPT | Performed by: PHYSICAL THERAPIST

## 2024-11-18 PROCEDURE — 97140 MANUAL THERAPY 1/> REGIONS: CPT | Performed by: PHYSICAL THERAPIST

## 2024-11-18 PROCEDURE — 97110 THERAPEUTIC EXERCISES: CPT | Performed by: PHYSICAL THERAPIST

## 2024-11-18 NOTE — PROGRESS NOTES
Physical Therapy Initial Evaluation and Plan of Care    [facility]  [address] [place] [address] [address] [place] [id number] [phone]    Patient: Jeremie Wynn   : 1945  Diagnosis/ICD-10 Code:  No primary diagnosis found.  Referring practitioner: Chan Rojas MD    Subjective Evaluation    History of Present Illness  Date of onset: 10/20/2024    Subjective comment: States that he injured his right hip when missing a step in his garage.  Did not fall.  Could not get out of bed the following day.  Had an injection in the right hip on 24 that provided 2 days of relief.  Notes numbness traveling along the right lateral thigh region. Reports having right sided low back pain. (Previous history of left total knee arthoplasty.)  Patient Occupation: Retired Quality of life: good    Pain  Exacerbated by: Rotating right foot in/out; rolling over in bed (wakes him up); AM pain and stiffness (using furniture to hold onto when walking); getting into/out of car; standing >5 min; short wakling distances.  Progression: no change    Social Support  Lives in: multiple-level home (One step from garage; chair/lift for stairs in basement)  Lives with: significant other    Patient Goals  Patient goals for therapy: decreased pain         *LEFS:    Objective          Active Range of Motion     Lumbar   Right rotation: 45 degrees     Strength/Myotome Testing     Right Hip   Planes of Motion   Abduction: 4- (R lateral hip pain)     General Comments     Lumbar Comments  *(+) reproduction of right lateral hip and thigh numbness with pressure application along R L5/S1         Assessment & Plan       Assessment  Impairments: abnormal or restricted ROM, activity intolerance, impaired physical strength, lacks appropriate home exercise program, pain with function and weight-bearing intolerance   Functional limitations: sleeping, walking, uncomfortable because of pain, standing and unable  to perform repetitive tasks   Assessment details: Mr. Wynn is a very pleasant 79 year old male that presents to physical therapy with chronic right hip pain and right lateral thigh numbness.  PMH was covered and reviewed during interview.  Trunk mobility is limited in rotation.  Right lateral hip strength is limited secondary to pain.  Reproduction of right posterior pain and right lateral thigh numbness with pressure application at R L5/S1 region.  Appears to have a lumbosacral pathology that is radiating along the right posterior and lateral hip regions.  Does have concomitant right posterior and lateral hip pain upon palpation.  Will focus care on improving trunk mobility and right hip strength to reduce radiculitis and ease right hip pain.  Prognosis: good    Goals  Plan Goals: STGs:  1.)  LEFS improved x 1 MCID in 6 weeks.  2.)  Have 4/5 right hip abduction strength with 0/10 pain in 8 weeks.  LTGs:  1.)  Have no sleep disturbance in 10 weeks.  2.)  Be able to stand and/or walk >1 hour and/or 1 mile in 12 weeks.    Plan  Therapy options: will be seen for skilled therapy services  Planned modality interventions: thermotherapy (hydrocollator packs)  Planned therapy interventions: therapeutic activities, stretching, strengthening, manual therapy, abdominal trunk stabilization, balance/weight-bearing training, functional ROM exercises and home exercise program  Frequency: 1x week  Duration in visits: 12  Duration in weeks: 12  Treatment plan discussed with: patient        Manual Therapy:    10     mins  77057;  Therapeutic Exercise:    14     mins  24007;     Neuromuscular Roni:        mins  77696;    Therapeutic Activity:          mins  12852;     Gait Training:          mins  31611;     Ultrasound:          mins  38435;    Electrical Stimulation:         mins  97108 ( );  Dry Needling          mins self-pay    Timed Treatment:   24   mins   Total Treatment:     54   mins    PT SIGNATURE: Kishore  Lidia, ISMAEL   DATE TREATMENT INITIATED: 11/18/2024    Initial Certification  Certification Period: 2/16/2025  I certify that the therapy services are furnished while this patient is under my care.  The services outlined above are required by this patient, and will be reviewed every 90 days.     PHYSICIAN: Chan Rojas MD  NPI: [id number]                                      DATE:    Please sign and return via fax to [phone].. Thank you, [facility].

## 2024-12-03 ENCOUNTER — TREATMENT (OUTPATIENT)
Dept: PHYSICAL THERAPY | Facility: CLINIC | Age: 79
End: 2024-12-03
Payer: MEDICARE

## 2024-12-03 DIAGNOSIS — G89.29 CHRONIC RIGHT-SIDED LOW BACK PAIN WITH RIGHT-SIDED SCIATICA: Primary | ICD-10-CM

## 2024-12-03 DIAGNOSIS — M25.551 RIGHT HIP PAIN: ICD-10-CM

## 2024-12-03 DIAGNOSIS — M54.41 CHRONIC RIGHT-SIDED LOW BACK PAIN WITH RIGHT-SIDED SCIATICA: Primary | ICD-10-CM

## 2024-12-03 PROCEDURE — 97530 THERAPEUTIC ACTIVITIES: CPT | Performed by: PHYSICAL THERAPIST

## 2024-12-03 PROCEDURE — 97110 THERAPEUTIC EXERCISES: CPT | Performed by: PHYSICAL THERAPIST

## 2024-12-03 PROCEDURE — 97140 MANUAL THERAPY 1/> REGIONS: CPT | Performed by: PHYSICAL THERAPIST

## 2024-12-03 NOTE — PROGRESS NOTES
Physical Therapy Daily Progress Note    Patient: Jeremie Wynn   : 1945  Diagnosis/ICD-10 Code:  Chronic right-sided low back pain with right-sided sciatica [M54.41, G89.29]  Referring practitioner: Chan Rojas MD  Date of Initial Visit: Type: THERAPY  Noted: 2024  Today's Date: 12/3/2024  Patient seen for 2 sessions         Jeremie Wynn reports that he was feeling better after his last visit.  Although, had severe right hip pain yesterday and had difficulty sleeping.  This is the worst it has been.  States that hit feels like something is dropping into place along the right posterior hip.  Symptoms do radiate along the right lateral thigh and lower leg.        Subjective     Objective   See Exercise, Manual, and Modality Logs for complete treatment.       Assessment/Plan  Reproducible right lateral thigh and lower leg pain with sustained pressure along the right lower lumbar spine.  Locally tender at the posterior right hip.  Provided updated exercises to improve lateral and posterior hip strength.  Mr. Wynn had relief from low back and right hip pain at end of visit reporting, 'this is the best it has felt in a month'.  Appears to have a radicular component from the lumbar spine.  Will f/u in 2 weeks for re-assessment.           Manual Therapy:    9     mins  64690;  Therapeutic Exercise:    20     mins  10300;     Neuromuscular Roni:        mins  02089;    Therapeutic Activity:     10     mins  19348;     Gait Training:           mins  42573;     Ultrasound:         mins  45291;    Electrical Stimulation:         mins  77278 ( );  Dry Needling          mins self-pay    Timed Treatment:   39   mins   Total Treatment:     39   mins    Kishore Murillo, ISMAEL  Physical Therapist

## 2024-12-10 DIAGNOSIS — G47.00 INSOMNIA, UNSPECIFIED TYPE: ICD-10-CM

## 2024-12-10 RX ORDER — TRAZODONE HYDROCHLORIDE 50 MG/1
50 TABLET, FILM COATED ORAL NIGHTLY PRN
Qty: 30 TABLET | Refills: 1 | Status: SHIPPED | OUTPATIENT
Start: 2024-12-10

## 2024-12-17 ENCOUNTER — TELEPHONE (OUTPATIENT)
Dept: FAMILY MEDICINE CLINIC | Facility: CLINIC | Age: 79
End: 2024-12-17

## 2024-12-17 ENCOUNTER — TREATMENT (OUTPATIENT)
Dept: PHYSICAL THERAPY | Facility: CLINIC | Age: 79
End: 2024-12-17
Payer: MEDICARE

## 2024-12-17 DIAGNOSIS — G89.29 CHRONIC RIGHT-SIDED LOW BACK PAIN WITH RIGHT-SIDED SCIATICA: Primary | ICD-10-CM

## 2024-12-17 DIAGNOSIS — M25.551 RIGHT HIP PAIN: ICD-10-CM

## 2024-12-17 DIAGNOSIS — M54.41 CHRONIC RIGHT-SIDED LOW BACK PAIN WITH RIGHT-SIDED SCIATICA: Primary | ICD-10-CM

## 2024-12-17 PROCEDURE — 97140 MANUAL THERAPY 1/> REGIONS: CPT | Performed by: PHYSICAL THERAPIST

## 2024-12-17 PROCEDURE — 97110 THERAPEUTIC EXERCISES: CPT | Performed by: PHYSICAL THERAPIST

## 2024-12-17 PROCEDURE — 97530 THERAPEUTIC ACTIVITIES: CPT | Performed by: PHYSICAL THERAPIST

## 2024-12-17 NOTE — PROGRESS NOTES
Physical Therapy Daily Progress Note    Patient: Jeremie Wynn   : 1945  Diagnosis/ICD-10 Code:  No primary diagnosis found.  Referring practitioner: Chan Rojas MD  Date of Initial Visit: No linked episodes  Today's Date: 2024  Patient seen for Visit count could not be calculated. Make sure you are using a visit which is associated with an episode. sessions         Jeremie Wynn reports no improvement in right hip pain since starting physical therapy.  Notes that he feels like there is 'something dropping into a slot' along the right posterior hip.  Symptoms can also radiate into the right thigh.  Almost fell in his bathroom and saved himself from falling using a cane that broke while saving himself from hitting the floor.      Subjective     Objective   See Exercise, Manual, and Modality Logs for complete treatment.     *Hip IR:  30 deg mattie  *R patellar tendon DTR:  1+/4  *Achilles tendon DTR:  0/4 mattie  *(+) slump->reproduction of right posterior hip pain    Assessment/Plan  Mr. Wynn has attended physical therapy for a total of 3 visits for chronic right hip pain.  Has reproduction of right posterior hip pain with slump testing today.  Hip internal rotation is limited, but equal bilaterally.  Has easing of right hip pain wiith low grade manipulation at the right lower lumbar spine.  Will refer back to his PCP for further diagnostics as it appears there is a lumbar spine component to his right hip pain.         Manual Therapy:    9     mins  58674;  Therapeutic Exercise:    15     mins  70407;     Neuromuscular Rnoi:        mins  29075;    Therapeutic Activity:     15     mins  60105;     Gait Training:           mins  30717;     Ultrasound:         mins  29026;    Electrical Stimulation:         mins  21331 ( );  Dry Needling          mins self-pay    Timed Treatment:   39   mins   Total Treatment:     39   mins    Kishore Murillo, ISMAEL  Physical Therapist

## 2024-12-17 NOTE — TELEPHONE ENCOUNTER
Caller: Jeremie Wynn    Relationship: Self    Best call back number: [phone]     Who is your current provider:      Is your current provider offboarding? NO    Who would you like your new provider to be:     What are your reasons for transferring care: FEELS MORE COMFORTABLE WITH DR. BEARD.     Additional notes: PLEASE CALL PATIENT IF AND WHEN THIS CAN BE APPROVED, PATIENT WOULD LIKE TO CHANGE UPCOMING APPOINTMENT WITH , TO BE WITH .

## 2024-12-28 DIAGNOSIS — G89.29 CHRONIC PAIN OF BOTH KNEES: ICD-10-CM

## 2024-12-28 DIAGNOSIS — M62.838 MUSCLE SPASM: ICD-10-CM

## 2024-12-28 DIAGNOSIS — M25.562 CHRONIC PAIN OF BOTH KNEES: ICD-10-CM

## 2024-12-28 DIAGNOSIS — M25.561 CHRONIC PAIN OF BOTH KNEES: ICD-10-CM

## 2024-12-30 RX ORDER — TRAMADOL HYDROCHLORIDE 50 MG/1
50 TABLET ORAL EVERY 12 HOURS PRN
Qty: 60 TABLET | Refills: 0 | Status: SHIPPED | OUTPATIENT
Start: 2024-12-30

## 2024-12-30 NOTE — TELEPHONE ENCOUNTER
Rx Refill Note  Requested Prescriptions     Pending Prescriptions Disp Refills    tiZANidine (ZANAFLEX) 4 MG tablet [Pharmacy Med Name: TIZANIDINE HCL 4 MG TABLET] 90 tablet 0     Sig: TAKE 1 TABLET BY MOUTH DAILY AT NIGHTTIME AS NEEDED FOR MUSCLE SPASMS    traMADol (ULTRAM) 50 MG tablet [Pharmacy Med Name: traMADol HCL 50MG TABLET] 46 tablet      Sig: TAKE 1 TABLET BY MOUTH 2 TIMES A DAY AS NEEDED FOR MODERATE PAIN      Last office visit with prescribing clinician: 10/15/2024   Last telemedicine visit with prescribing clinician: Visit date not found   Next office visit with prescribing clinician: 1/15/2025                         Would you like a call back once the refill request has been completed: [] Yes [] No    If the office needs to give you a call back, can they leave a voicemail: [] Yes [] No    Kristy Aguiar MA  12/30/24, 10:42 EST

## 2025-01-20 DIAGNOSIS — I10 PRIMARY HYPERTENSION: Chronic | ICD-10-CM

## 2025-01-20 DIAGNOSIS — I10 ESSENTIAL HYPERTENSION: Chronic | ICD-10-CM

## 2025-01-20 DIAGNOSIS — R60.0 PERIPHERAL EDEMA: ICD-10-CM

## 2025-01-20 DIAGNOSIS — E78.01 FAMILIAL HYPERCHOLESTEROLEMIA: ICD-10-CM

## 2025-01-20 RX ORDER — POTASSIUM CHLORIDE 750 MG/1
10 TABLET, EXTENDED RELEASE ORAL DAILY
Qty: 90 TABLET | Refills: 1 | Status: SHIPPED | OUTPATIENT
Start: 2025-01-20

## 2025-01-20 RX ORDER — FUROSEMIDE 20 MG/1
20 TABLET ORAL DAILY
Qty: 90 TABLET | Refills: 1 | Status: SHIPPED | OUTPATIENT
Start: 2025-01-20

## 2025-01-20 RX ORDER — ATORVASTATIN CALCIUM 10 MG/1
10 TABLET, FILM COATED ORAL DAILY
Qty: 90 TABLET | Refills: 1 | Status: SHIPPED | OUTPATIENT
Start: 2025-01-20

## 2025-01-20 RX ORDER — LOSARTAN POTASSIUM 100 MG/1
100 TABLET ORAL DAILY
Qty: 90 TABLET | Refills: 1 | Status: SHIPPED | OUTPATIENT
Start: 2025-01-20

## 2025-02-03 DIAGNOSIS — G47.00 INSOMNIA, UNSPECIFIED TYPE: ICD-10-CM

## 2025-02-03 RX ORDER — TRAZODONE HYDROCHLORIDE 50 MG/1
50 TABLET, FILM COATED ORAL NIGHTLY PRN
Qty: 30 TABLET | Refills: 1 | Status: SHIPPED | OUTPATIENT
Start: 2025-02-03

## 2025-02-10 ENCOUNTER — OFFICE VISIT (OUTPATIENT)
Dept: ORTHOPEDIC SURGERY | Facility: CLINIC | Age: 80
End: 2025-02-10
Payer: MEDICARE

## 2025-02-10 VITALS — DIASTOLIC BLOOD PRESSURE: 86 MMHG | SYSTOLIC BLOOD PRESSURE: 124 MMHG

## 2025-02-10 DIAGNOSIS — M70.61 TROCHANTERIC BURSITIS OF RIGHT HIP: Primary | ICD-10-CM

## 2025-02-10 PROCEDURE — 3079F DIAST BP 80-89 MM HG: CPT | Performed by: ORTHOPAEDIC SURGERY

## 2025-02-10 PROCEDURE — 99212 OFFICE O/P EST SF 10 MIN: CPT | Performed by: ORTHOPAEDIC SURGERY

## 2025-02-10 PROCEDURE — 3074F SYST BP LT 130 MM HG: CPT | Performed by: ORTHOPAEDIC SURGERY

## 2025-02-10 PROCEDURE — 1159F MED LIST DOCD IN RCRD: CPT | Performed by: ORTHOPAEDIC SURGERY

## 2025-02-10 PROCEDURE — 1160F RVW MEDS BY RX/DR IN RCRD: CPT | Performed by: ORTHOPAEDIC SURGERY
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
## 2025-02-11 DIAGNOSIS — G89.29 CHRONIC PAIN OF BOTH KNEES: ICD-10-CM

## 2025-02-11 DIAGNOSIS — M25.561 CHRONIC PAIN OF BOTH KNEES: ICD-10-CM

## 2025-02-11 DIAGNOSIS — M25.562 CHRONIC PAIN OF BOTH KNEES: ICD-10-CM

## 2025-02-11 RX ORDER — TRAMADOL HYDROCHLORIDE 50 MG/1
50 TABLET ORAL EVERY 12 HOURS PRN
Qty: 30 TABLET | Refills: 0 | Status: SHIPPED | OUTPATIENT
Start: 2025-02-11

## 2025-02-11 NOTE — TELEPHONE ENCOUNTER
Caller: Kingsley Jeremie HOLLI    Relationship: Self    Best call back number:       [phone] (Mobile)     Requested Prescriptions:   Requested Prescriptions     Pending Prescriptions Disp Refills    traMADol (ULTRAM) 50 MG tablet 60 tablet 0     Sig: Take 1 tablet by mouth Every 12 (Twelve) Hours As Needed for Moderate Pain.      Pharmacy where request should be sent:     [facility] [id number] [address] [place]  - [phone]  - [phone]      Last office visit with prescribing clinician: 10/23/2024   Last telemedicine visit with prescribing clinician: Visit date not found   Next office visit with prescribing clinician: Visit date not found     Additional details provided by patient:     PATIENT STATED HE HAS BEEN OUT OF MEDICATION (3) DAYS    Does the patient have less than a 3 day supply:  [x] Yes  [] No    Would you like a call back once the refill request has been completed: [] Yes [] No    If the office needs to give you a call back, can they leave a voicemail: [] Yes [] No    Zuleima Santillan Rep   02/11/25 12:12 EST

## 2025-03-03 DIAGNOSIS — G47.00 INSOMNIA, UNSPECIFIED TYPE: ICD-10-CM

## 2025-03-03 NOTE — TELEPHONE ENCOUNTER
Caller: Jeremie Wynn    Relationship: Self    Best call back number: [phone]     Requested Prescriptions:   Requested Prescriptions     Pending Prescriptions Disp Refills    traZODone (DESYREL) 50 MG tablet 30 tablet 1     Sig: Take 1 tablet by mouth At Night As Needed for Sleep.        Pharmacy where request should be sent: [facility] [id number] [address] - [phone]  - [phone]     Last office visit with prescribing clinician: 10/23/2024   Last telemedicine visit with prescribing clinician: Visit date not found   Next office visit with prescribing clinician: Visit date not found     Does the patient have less than a 3 day supply:  [x] Yes  [] No    Would you like a call back once the refill request has been completed: [] Yes [x] No    If the office needs to give you a call back, can they leave a voicemail: [] Yes [x] No    Zuleima Fleming Rep   03/03/25 12:43 EST

## 2025-03-04 RX ORDER — TRAZODONE HYDROCHLORIDE 50 MG/1
50 TABLET ORAL NIGHTLY PRN
Qty: 30 TABLET | Refills: 1 | Status: SHIPPED | OUTPATIENT
Start: 2025-03-04

## 2025-03-05 DIAGNOSIS — M25.561 CHRONIC PAIN OF BOTH KNEES: ICD-10-CM

## 2025-03-05 DIAGNOSIS — M25.562 CHRONIC PAIN OF BOTH KNEES: ICD-10-CM

## 2025-03-05 DIAGNOSIS — G89.29 CHRONIC PAIN OF BOTH KNEES: ICD-10-CM

## 2025-03-05 RX ORDER — TRAMADOL HYDROCHLORIDE 50 MG/1
50 TABLET ORAL EVERY 12 HOURS PRN
Qty: 30 TABLET | OUTPATIENT
Start: 2025-03-05

## 2025-03-05 NOTE — TELEPHONE ENCOUNTER
This is a controlled substance and patient must be seen every 3 months for refills.  Patient has not been seen since October so we will need an appointment for refills.  Once appointment has been scheduled I will give him enough refills to make it to the appointment time.  If patient misses appointment time no further refills will be given.

## 2025-03-10 ENCOUNTER — TRANSCRIBE ORDERS (OUTPATIENT)
Dept: LAB | Facility: HOSPITAL | Age: 80
End: 2025-03-10
Payer: MEDICARE

## 2025-03-10 ENCOUNTER — LAB (OUTPATIENT)
Dept: LAB | Facility: HOSPITAL | Age: 80
End: 2025-03-10
Payer: MEDICARE

## 2025-03-10 DIAGNOSIS — A31.0 PULMONARY MAI (MYCOBACTERIUM AVIUM-INTRACELLULARE) INFECTION: ICD-10-CM

## 2025-03-10 DIAGNOSIS — R11.2 NAUSEA AND VOMITING, UNSPECIFIED VOMITING TYPE: ICD-10-CM

## 2025-03-10 DIAGNOSIS — J84.89 INTERSTITIAL PNEUMONITIS: ICD-10-CM

## 2025-03-10 DIAGNOSIS — R11.2 NAUSEA AND VOMITING, UNSPECIFIED VOMITING TYPE: Primary | ICD-10-CM

## 2025-03-10 LAB
ALBUMIN SERPL-MCNC: 3.7 G/DL (ref 3.5–5.2)
ALBUMIN/GLOB SERPL: 1.1 G/DL
ALP SERPL-CCNC: 237 U/L (ref 39–117)
ALT SERPL W P-5'-P-CCNC: 33 U/L (ref 1–41)
ANION GAP SERPL CALCULATED.3IONS-SCNC: 11 MMOL/L (ref 5–15)
AST SERPL-CCNC: 35 U/L (ref 1–40)
BASOPHILS # BLD AUTO: 0.06 10*3/MM3 (ref 0–0.2)
BASOPHILS NFR BLD AUTO: 0.7 % (ref 0–1.5)
BILIRUB SERPL-MCNC: 0.8 MG/DL (ref 0–1.2)
BUN SERPL-MCNC: 19 MG/DL (ref 8–23)
BUN/CREAT SERPL: 18.6 (ref 7–25)
CALCIUM SPEC-SCNC: 9 MG/DL (ref 8.6–10.5)
CHLORIDE SERPL-SCNC: 109 MMOL/L (ref 98–107)
CO2 SERPL-SCNC: 25 MMOL/L (ref 22–29)
CREAT SERPL-MCNC: 1.02 MG/DL (ref 0.76–1.27)
DEPRECATED RDW RBC AUTO: 46.5 FL (ref 37–54)
EGFRCR SERPLBLD CKD-EPI 2021: 73.8 ML/MIN/1.73
EOSINOPHIL # BLD AUTO: 0.37 10*3/MM3 (ref 0–0.4)
EOSINOPHIL NFR BLD AUTO: 4.5 % (ref 0.3–6.2)
ERYTHROCYTE [DISTWIDTH] IN BLOOD BY AUTOMATED COUNT: 13.3 % (ref 12.3–15.4)
GLOBULIN UR ELPH-MCNC: 3.5 GM/DL
GLUCOSE SERPL-MCNC: 136 MG/DL (ref 65–99)
HCT VFR BLD AUTO: 40.7 % (ref 37.5–51)
HGB BLD-MCNC: 13.4 G/DL (ref 13–17.7)
IMM GRANULOCYTES # BLD AUTO: 0.05 10*3/MM3 (ref 0–0.05)
IMM GRANULOCYTES NFR BLD AUTO: 0.6 % (ref 0–0.5)
LYMPHOCYTES # BLD AUTO: 1.17 10*3/MM3 (ref 0.7–3.1)
LYMPHOCYTES NFR BLD AUTO: 14.1 % (ref 19.6–45.3)
MCH RBC QN AUTO: 31.2 PG (ref 26.6–33)
MCHC RBC AUTO-ENTMCNC: 32.9 G/DL (ref 31.5–35.7)
MCV RBC AUTO: 94.7 FL (ref 79–97)
MONOCYTES # BLD AUTO: 0.72 10*3/MM3 (ref 0.1–0.9)
MONOCYTES NFR BLD AUTO: 8.7 % (ref 5–12)
NEUTROPHILS NFR BLD AUTO: 5.91 10*3/MM3 (ref 1.7–7)
NEUTROPHILS NFR BLD AUTO: 71.4 % (ref 42.7–76)
NRBC BLD AUTO-RTO: 0 /100 WBC (ref 0–0.2)
PLATELET # BLD AUTO: 188 10*3/MM3 (ref 140–450)
PMV BLD AUTO: 11.4 FL (ref 6–12)
POTASSIUM SERPL-SCNC: 4.1 MMOL/L (ref 3.5–5.2)
PROT SERPL-MCNC: 7.2 G/DL (ref 6–8.5)
RBC # BLD AUTO: 4.3 10*6/MM3 (ref 4.14–5.8)
SODIUM SERPL-SCNC: 145 MMOL/L (ref 136–145)
WBC NRBC COR # BLD AUTO: 8.28 10*3/MM3 (ref 3.4–10.8)

## 2025-03-10 PROCEDURE — 36415 COLL VENOUS BLD VENIPUNCTURE: CPT

## 2025-03-10 PROCEDURE — 85025 COMPLETE CBC W/AUTO DIFF WBC: CPT

## 2025-03-10 PROCEDURE — 80053 COMPREHEN METABOLIC PANEL: CPT

## 2025-03-13 ENCOUNTER — OFFICE VISIT (OUTPATIENT)
Dept: FAMILY MEDICINE CLINIC | Facility: CLINIC | Age: 80
End: 2025-03-13
Payer: MEDICARE

## 2025-03-13 VITALS
OXYGEN SATURATION: 98 % | BODY MASS INDEX: 31.26 KG/M2 | SYSTOLIC BLOOD PRESSURE: 124 MMHG | HEIGHT: 70 IN | TEMPERATURE: 98.2 F | DIASTOLIC BLOOD PRESSURE: 70 MMHG | HEART RATE: 68 BPM | WEIGHT: 218.4 LBS

## 2025-03-13 DIAGNOSIS — M25.562 CHRONIC PAIN OF BOTH KNEES: ICD-10-CM

## 2025-03-13 DIAGNOSIS — G47.00 INSOMNIA, UNSPECIFIED TYPE: ICD-10-CM

## 2025-03-13 DIAGNOSIS — I10 BENIGN ESSENTIAL HYPERTENSION: Primary | ICD-10-CM

## 2025-03-13 DIAGNOSIS — M25.561 CHRONIC PAIN OF BOTH KNEES: ICD-10-CM

## 2025-03-13 DIAGNOSIS — E78.01 FAMILIAL HYPERCHOLESTEROLEMIA: ICD-10-CM

## 2025-03-13 DIAGNOSIS — G89.29 CHRONIC PAIN OF BOTH KNEES: ICD-10-CM

## 2025-03-13 RX ORDER — LOSARTAN POTASSIUM 100 MG/1
100 TABLET ORAL DAILY
Qty: 90 TABLET | Refills: 3 | Status: SHIPPED | OUTPATIENT
Start: 2025-03-13

## 2025-03-13 RX ORDER — ATORVASTATIN CALCIUM 10 MG/1
10 TABLET, FILM COATED ORAL DAILY
Qty: 90 TABLET | Refills: 3 | Status: SHIPPED | OUTPATIENT
Start: 2025-03-13

## 2025-03-13 RX ORDER — TRAMADOL HYDROCHLORIDE 50 MG/1
50 TABLET ORAL EVERY 12 HOURS PRN
Qty: 60 TABLET | Refills: 2 | Status: SHIPPED | OUTPATIENT
Start: 2025-03-13

## 2025-03-13 RX ORDER — TRAZODONE HYDROCHLORIDE 50 MG/1
50 TABLET ORAL NIGHTLY PRN
Qty: 90 TABLET | Refills: 3 | Status: SHIPPED | OUTPATIENT
Start: 2025-03-13

## 2025-03-13 NOTE — ASSESSMENT & PLAN NOTE
Chronic and stable. Continue tramadol as needed. The patient is taking the following controlled substance(s) on a long term (greater than three months) basis:  tramadol .  He is taking controlled substances for chronic pain.  A history was obtained from the patient and the following were reviewed: family history, social history, psychiatric history, and substance abuse history.  A baseline assessment was performed and includes a controlled substance agreement, urine drug screen, PATSY (within 12 months prior to today's encounter), and a physical exam, if appropriate, was performed within the past year.  It is medically appropriate to prescribe him the medication(s) above.  If applicable, prior treatment records were obtained and reviewed to justify long term prescribing of controlled substances.  The patient was educated on the following: Limited use of the medication, need to discontinue the medication when his condition resolves, proper storage and disposal of medication(s), and risks and dangers associated with controlled substances including tolerance, dependence, and addiction.  A written informed consent was obtained and includes objectives of treatment, further diagnostic testing if appropriate, and an exit strategy for discontinuing the medication on the condition resolves, if appropriate.  In addition, if appropriate, the patient will be screened for other medical conditions that may impact the prescribing of controlled substances.  Previous treatments have been tried including trials of noncontrolled substances or lower doses of controlled substances.  The controlled medication(s) have been titrated to the level appropriate and necessary and the medication(s) are not causing unacceptable side effects.

## 2025-03-13 NOTE — PROGRESS NOTES
Jeremie Wynn is a 79 y.o. male who presents today for Insomnia, Hyperlipidemia, and Hypertension      HPI     Patient states the hip pain has improved and is well controlled with tramadol as needed and PT. He has no side effects from tamadol. He needs a refill. He uses trazodone as needed and feels like it works well for sleep. He takes statin nightly and losartan daily. He has not been checking blood pressure at home recently. He rarely uses lasix taking it as needed for edema. He has no acute complaints today.     Review of Systems   Constitutional:  Negative for fever and unexpected weight loss.   HENT:  Negative for congestion, ear pain and sore throat.    Eyes:  Negative for visual disturbance.   Respiratory:  Negative for cough, shortness of breath and wheezing.    Cardiovascular:  Positive for leg swelling (occasional). Negative for chest pain and palpitations.   Gastrointestinal:  Negative for abdominal pain, blood in stool, constipation, diarrhea, nausea, vomiting and GERD.   Endocrine: Negative for polydipsia and polyuria.   Genitourinary:  Negative for difficulty urinating.   Musculoskeletal:  Positive for arthralgias and back pain. Negative for joint swelling.   Skin:  Negative for rash and skin lesions.   Allergic/Immunologic: Negative for environmental allergies.   Neurological:  Negative for seizures and syncope.   Hematological:  Does not bruise/bleed easily.   Psychiatric/Behavioral:  Negative for suicidal ideas.         The following portions of the patient's history were reviewed and updated as appropriate: allergies, current medications, past family history, past medical history, past social history, past surgical history and problem list.    Current Outpatient Medications on File Prior to Visit   Medication Sig Dispense Refill    aspirin 81 MG EC tablet Take 1 tablet by mouth Daily.      cetirizine (zyrTEC) 10 MG tablet Take 1 tablet by mouth Daily.      pantoprazole (PROTONIX) 40 MG EC tablet  "TAKE 1 TABLET BY MOUTH DAILY 90 tablet 1    potassium chloride (KLOR-CON M10) 10 MEQ CR tablet TAKE 1 TABLET BY MOUTH DAILY 90 tablet 1    tiZANidine (ZANAFLEX) 4 MG tablet TAKE 1 TABLET BY MOUTH DAILY AT NIGHTTIME AS NEEDED FOR MUSCLE SPASMS 90 tablet 0    [DISCONTINUED] atorvastatin (LIPITOR) 10 MG tablet TAKE 1 TABLET BY MOUTH DAILY 90 tablet 1    [DISCONTINUED] losartan (COZAAR) 100 MG tablet TAKE 1 TABLET BY MOUTH DAILY 90 tablet 1    [DISCONTINUED] traZODone (DESYREL) 50 MG tablet Take 1 tablet by mouth At Night As Needed for Sleep. 30 tablet 1    furosemide (LASIX) 20 MG tablet TAKE 1 TABLET BY MOUTH DAILY (Patient not taking: Reported on 3/13/2025) 90 tablet 1    [DISCONTINUED] traMADol (ULTRAM) 50 MG tablet Take 1 tablet by mouth Every 12 (Twelve) Hours As Needed for Moderate Pain. (Patient not taking: Reported on 3/13/2025) 30 tablet 0     No current facility-administered medications on file prior to visit.       Allergies   Allergen Reactions    Contrast Dye (Echo Or Unknown Ct/Mr) Anaphylaxis    Mesalamine Other (See Comments)     Lung toxicity (suspected)    Penicillins Anaphylaxis and Other (See Comments)    Sulfa Antibiotics Shortness Of Breath and Rash        Visit Vitals  /70   Pulse 68   Temp 98.2 °F (36.8 °C) (Infrared)   Ht 177.8 cm (70\")   Wt 99.1 kg (218 lb 6.4 oz)   SpO2 98%   BMI 31.34 kg/m²        Physical Exam  Constitutional:       General: He is not in acute distress.     Appearance: He is well-developed. He is not diaphoretic.   HENT:      Head: Atraumatic.   Cardiovascular:      Rate and Rhythm: Normal rate and regular rhythm.      Heart sounds: Normal heart sounds. No murmur heard.     No friction rub. No gallop.   Pulmonary:      Effort: Pulmonary effort is normal. No respiratory distress.      Breath sounds: Normal breath sounds. No stridor. No wheezing, rhonchi or rales.   Musculoskeletal:      Cervical back: Normal range of motion and neck supple.   Skin:     General: Skin is " warm and dry.   Neurological:      Mental Status: He is alert and oriented to person, place, and time.   Psychiatric:         Behavior: Behavior normal.          Results for orders placed or performed in visit on 03/10/25   Comprehensive Metabolic Panel    Collection Time: 03/10/25 11:04 AM    Specimen: Blood   Result Value Ref Range    Glucose 136 (H) 65 - 99 mg/dL    BUN 19 8 - 23 mg/dL    Creatinine 1.02 0.76 - 1.27 mg/dL    Sodium 145 136 - 145 mmol/L    Potassium 4.1 3.5 - 5.2 mmol/L    Chloride 109 (H) 98 - 107 mmol/L    CO2 25.0 22.0 - 29.0 mmol/L    Calcium 9.0 8.6 - 10.5 mg/dL    Total Protein 7.2 6.0 - 8.5 g/dL    Albumin 3.7 3.5 - 5.2 g/dL    ALT (SGPT) 33 1 - 41 U/L    AST (SGOT) 35 1 - 40 U/L    Alkaline Phosphatase 237 (H) 39 - 117 U/L    Total Bilirubin 0.8 0.0 - 1.2 mg/dL    Globulin 3.5 gm/dL    A/G Ratio 1.1 g/dL    BUN/Creatinine Ratio 18.6 7.0 - 25.0    Anion Gap 11.0 5.0 - 15.0 mmol/L    eGFR 73.8 >60.0 mL/min/1.73   CBC Auto Differential    Collection Time: 03/10/25 11:04 AM    Specimen: Blood   Result Value Ref Range    WBC 8.28 3.40 - 10.80 10*3/mm3    RBC 4.30 4.14 - 5.80 10*6/mm3    Hemoglobin 13.4 13.0 - 17.7 g/dL    Hematocrit 40.7 37.5 - 51.0 %    MCV 94.7 79.0 - 97.0 fL    MCH 31.2 26.6 - 33.0 pg    MCHC 32.9 31.5 - 35.7 g/dL    RDW 13.3 12.3 - 15.4 %    RDW-SD 46.5 37.0 - 54.0 fl    MPV 11.4 6.0 - 12.0 fL    Platelets 188 140 - 450 10*3/mm3    Neutrophil % 71.4 42.7 - 76.0 %    Lymphocyte % 14.1 (L) 19.6 - 45.3 %    Monocyte % 8.7 5.0 - 12.0 %    Eosinophil % 4.5 0.3 - 6.2 %    Basophil % 0.7 0.0 - 1.5 %    Immature Grans % 0.6 (H) 0.0 - 0.5 %    Neutrophils, Absolute 5.91 1.70 - 7.00 10*3/mm3    Lymphocytes, Absolute 1.17 0.70 - 3.10 10*3/mm3    Monocytes, Absolute 0.72 0.10 - 0.90 10*3/mm3    Eosinophils, Absolute 0.37 0.00 - 0.40 10*3/mm3    Basophils, Absolute 0.06 0.00 - 0.20 10*3/mm3    Immature Grans, Absolute 0.05 0.00 - 0.05 10*3/mm3    nRBC 0.0 0.0 - 0.2 /100 WBC         Problems Addressed this Visit          Cardiac and Vasculature    Hyperlipemia     Lipid abnormalities are stable    Plan:  Continue same medication/s without change.      Discussed medication dosage, use, side effects, and goals of treatment in detail.    Counseled patient on lifestyle modifications to help control hyperlipidemia.     Patient Treatment Goals:   LDL goal is less than 70    Followup in 3 months.         Relevant Medications    atorvastatin (LIPITOR) 10 MG tablet    Benign essential hypertension - Primary    Hypertension is stable and controlled  Continue current treatment regimen.  Blood pressure will be reassessed in 3 months.         Relevant Medications    losartan (COZAAR) 100 MG tablet       Musculoskeletal and Injuries    Chronic pain of both knees    Chronic and stable. Continue tramadol as needed. The patient is taking the following controlled substance(s) on a long term (greater than three months) basis:  tramadol .  He is taking controlled substances for chronic pain.  A history was obtained from the patient and the following were reviewed: family history, social history, psychiatric history, and substance abuse history.  A baseline assessment was performed and includes a controlled substance agreement, urine drug screen, PATSY (within 12 months prior to today's encounter), and a physical exam, if appropriate, was performed within the past year.  It is medically appropriate to prescribe him the medication(s) above.  If applicable, prior treatment records were obtained and reviewed to justify long term prescribing of controlled substances.  The patient was educated on the following: Limited use of the medication, need to discontinue the medication when his condition resolves, proper storage and disposal of medication(s), and risks and dangers associated with controlled substances including tolerance, dependence, and addiction.  A written informed consent was obtained and includes  objectives of treatment, further diagnostic testing if appropriate, and an exit strategy for discontinuing the medication on the condition resolves, if appropriate.  In addition, if appropriate, the patient will be screened for other medical conditions that may impact the prescribing of controlled substances.  Previous treatments have been tried including trials of noncontrolled substances or lower doses of controlled substances.  The controlled medication(s) have been titrated to the level appropriate and necessary and the medication(s) are not causing unacceptable side effects.            Relevant Medications    traMADol (ULTRAM) 50 MG tablet       Sleep    Insomnia    Chronic and stable. Continue current medication.          Relevant Medications    traZODone (DESYREL) 50 MG tablet     Diagnoses         Codes Comments      Benign essential hypertension    -  Primary ICD-10-CM: I10  ICD-9-CM: 401.1       Insomnia, unspecified type     ICD-10-CM: G47.00  ICD-9-CM: 780.52       Chronic pain of both knees     ICD-10-CM: M25.561, M25.562, G89.29  ICD-9-CM: 719.46, 338.29       Familial hypercholesterolemia     ICD-10-CM: E78.01  ICD-9-CM: 272.0             Return in about 3 months (around 6/13/2025) for Medicare Wellness.    Chan Rojas MD   3/13/2025

## 2025-03-13 NOTE — PATIENT INSTRUCTIONS
"Hypertension, Adult  High blood pressure (hypertension) is when the force of blood pumping through the arteries is too strong. The arteries are the blood vessels that carry blood from the heart throughout the body. Hypertension forces the heart to work harder to pump blood and may cause arteries to become narrow or stiff. Untreated or uncontrolled hypertension can lead to a heart attack, heart failure, a stroke, kidney disease, and other problems.  A blood pressure reading consists of a higher number over a lower number. Ideally, your blood pressure should be below 120/80. The first (\"top\") number is called the systolic pressure. It is a measure of the pressure in your arteries as your heart beats. The second (\"bottom\") number is called the diastolic pressure. It is a measure of the pressure in your arteries as the heart relaxes.  What are the causes?  The exact cause of this condition is not known. There are some conditions that result in high blood pressure.  What increases the risk?  Certain factors may make you more likely to develop high blood pressure. Some of these risk factors are under your control, including:  Smoking.  Not getting enough exercise or physical activity.  Being overweight.  Having too much fat, sugar, calories, or salt (sodium) in your diet.  Drinking too much alcohol.  Other risk factors include:  Having a personal history of heart disease, diabetes, high cholesterol, or kidney disease.  Stress.  Having a family history of high blood pressure and high cholesterol.  Having obstructive sleep apnea.  Age. The risk increases with age.  What are the signs or symptoms?  High blood pressure may not cause symptoms. Very high blood pressure (hypertensive crisis) may cause:  Headache.  Fast or irregular heartbeats (palpitations).  Shortness of breath.  Nosebleed.  Nausea and vomiting.  Vision changes.  Severe chest pain, dizziness, and seizures.  How is this diagnosed?  This condition is diagnosed by " measuring your blood pressure while you are seated, with your arm resting on a flat surface, your legs uncrossed, and your feet flat on the floor. The cuff of the blood pressure monitor will be placed directly against the skin of your upper arm at the level of your heart. Blood pressure should be measured at least twice using the same arm. Certain conditions can cause a difference in blood pressure between your right and left arms.  If you have a high blood pressure reading during one visit or you have normal blood pressure with other risk factors, you may be asked to:  Return on a different day to have your blood pressure checked again.  Monitor your blood pressure at home for 1 week or longer.  If you are diagnosed with hypertension, you may have other blood or imaging tests to help your health care provider understand your overall risk for other conditions.  How is this treated?  This condition is treated by making healthy lifestyle changes, such as eating healthy foods, exercising more, and reducing your alcohol intake. You may be referred for counseling on a healthy diet and physical activity.  Your health care provider may prescribe medicine if lifestyle changes are not enough to get your blood pressure under control and if:  Your systolic blood pressure is above 130.  Your diastolic blood pressure is above 80.  Your personal target blood pressure may vary depending on your medical conditions, your age, and other factors.  Follow these instructions at home:  Eating and drinking    Eat a diet that is high in fiber and potassium, and low in sodium, added sugar, and fat. An example of this eating plan is called the DASH diet. DASH stands for Dietary Approaches to Stop Hypertension. To eat this way:  Eat plenty of fresh fruits and vegetables. Try to fill one half of your plate at each meal with fruits and vegetables.  Eat whole grains, such as whole-wheat pasta, brown rice, or whole-grain bread. Fill about one  fourth of your plate with whole grains.  Eat or drink low-fat dairy products, such as skim milk or low-fat yogurt.  Avoid fatty cuts of meat, processed or cured meats, and poultry with skin. Fill about one fourth of your plate with lean proteins, such as fish, chicken without skin, beans, eggs, or tofu.  Avoid pre-made and processed foods. These tend to be higher in sodium, added sugar, and fat.  Reduce your daily sodium intake. Many people with hypertension should eat less than 1,500 mg of sodium a day.  Do not drink alcohol if:  Your health care provider tells you not to drink.  You are pregnant, may be pregnant, or are planning to become pregnant.  If you drink alcohol:  Limit how much you have to:  0-1 drink a day for women.  0-2 drinks a day for men.  Know how much alcohol is in your drink. In the [place] one drink equals one 12 oz bottle of beer (355 mL), one 5 oz glass of wine (148 mL), or one 1½ oz glass of hard liquor (44 mL).  Lifestyle    Work with your health care provider to maintain a healthy body weight or to lose weight. Ask what an ideal weight is for you.  Get at least 30 minutes of exercise that causes your heart to beat faster (aerobic exercise) most days of the week. Activities may include walking, swimming, or biking.  Include exercise to strengthen your muscles (resistance exercise), such as Pilates or lifting weights, as part of your weekly exercise routine. Try to do these types of exercises for 30 minutes at least 3 days a week.  Do not use any products that contain nicotine or tobacco. These products include cigarettes, chewing tobacco, and vaping devices, such as e-cigarettes. If you need help quitting, ask your health care provider.  Monitor your blood pressure at home as told by your health care provider.  Keep all follow-up visits. This is important.  Medicines  Take over-the-counter and prescription medicines only as told by your health care provider. Follow directions carefully. Blood  pressure medicines must be taken as prescribed.  Do not skip doses of blood pressure medicine. Doing this puts you at risk for problems and can make the medicine less effective.  Ask your health care provider about side effects or reactions to medicines that you should watch for.  Contact a health care provider if you:  Think you are having a reaction to a medicine you are taking.  Have headaches that keep coming back (recurring).  Feel dizzy.  Have swelling in your ankles.  Have trouble with your vision.  Get help right away if you:  Develop a severe headache or confusion.  Have unusual weakness or numbness.  Feel faint.  Have severe pain in your chest or abdomen.  Vomit repeatedly.  Have trouble breathing.  These symptoms may be an emergency. Get help right away. Call 911.  Do not wait to see if the symptoms will go away.  Do not drive yourself to the hospital.  Summary  Hypertension is when the force of blood pumping through your arteries is too strong. If this condition is not controlled, it may put you at risk for serious complications.  Your personal target blood pressure may vary depending on your medical conditions, your age, and other factors. For most people, a normal blood pressure is less than 120/80.  Hypertension is treated with lifestyle changes, medicines, or a combination of both. Lifestyle changes include losing weight, eating a healthy, low-sodium diet, exercising more, and limiting alcohol.  This information is not intended to replace advice given to you by your health care provider. Make sure you discuss any questions you have with your health care provider.  Document Revised: 10/25/2022 Document Reviewed: 10/25/2022  Elsevier Patient Education © 2024 [employer]BMI for Adults  Body mass index (BMI) is a number found using a person's weight and height. BMI can help tell how much of a person's weight is made up of fat. BMI does not measure body fat directly. It is used instead of tests that  "directly measure body fat, which can be difficult and expensive.  What are BMI measurements used for?  BMI is useful to:  Find out if your weight puts you at higher risk for medical problems.  Help recommend changes, such as in diet and exercise. This can help you reach a healthy weight. BMI screening can be done again to see if these changes are working.  How is BMI calculated?  Your height and weight are measured. The BMI is found from those numbers. This can be done with [place] or metric measurements. Note that charts and online BMI calculators are available to help you find your BMI quickly and easily without doing these calculations.  To calculate your BMI in [place] measurements:  Measure your weight in pounds (lb).  Multiply the number of pounds by 703.  So, for an adult who weighs 150 lb, multiply that number by 703: 150 x 703, which equals 105,450.  Measure your height in inches. Then multiply that number by itself to get a measurement called \"inches squared.\"  So, for an adult who is 70 inches tall, the \"inches squared\" measurement is 70 inches x 70 inches, which equals 4,900 inches squared.  Divide the total from step 2 (number of lb x 703) by the total from step 3 (inches squared): 105,450 ÷ 4,900 = 21.5. This is your BMI.  To calculate your BMI in metric measurements:    Measure your weight in kilograms (kg).  For this example, the weight is 70 kg.  Measure your height in meters (m). Then multiply that number by itself to get a measurement called \"meters squared.\"  So, for an adult who is 1.75 m tall, the \"meters squared\" measurement is 1.75 m x 1.75 m, which equals 3.1 meters squared.  Divide the number of kilograms (your weight) by the meters squared number. In this example: 70 ÷ 3.1 = 22.6. This is your BMI.  What do the results mean?  BMI charts are used to see if you are underweight, normal weight, overweight, or obese. The following guidelines will be used:  Underweight: BMI less than 18.5.  Normal " weight: BMI between 18.5 and 24.9.  Overweight: BMI between 25 and 29.9.  Obese: BMI of 30 or above.  BMI is a tool and cannot diagnose a condition. Talk with your health care provider about what your BMI means for you. Keep these notes in mind:  Weight includes fat and muscle. Someone with a muscular build, such as an athlete, may have a BMI that is higher than 24.9. In cases like these, BMI is not a correct measure of body fat.  If you have a BMI of 25 or higher, your provider may need to do more testing to find out if excess body fat is the cause.  BMI is measured the same way for males and females. Females usually have more body fat than males of the same height and weight.  Where to find more information  For more information about BMI, including tools to quickly find your BMI, go to:  Centers for Disease Control and Prevention: cdc.gov  American Heart Association: heart.org  National Heart, Lung, and [place]: nhlbi.nih.gov  This information is not intended to replace advice given to you by your health care provider. Make sure you discuss any questions you have with your health care provider.  Document Revised: 09/07/2023 Document Reviewed: 08/31/2023  Elsevier Patient Education © 2024 [employer]

## 2025-05-07 ENCOUNTER — OFFICE VISIT (OUTPATIENT)
Dept: CARDIOLOGY | Facility: CLINIC | Age: 80
End: 2025-05-07
Payer: MEDICARE

## 2025-05-07 VITALS
WEIGHT: 218.8 LBS | DIASTOLIC BLOOD PRESSURE: 82 MMHG | HEIGHT: 72 IN | OXYGEN SATURATION: 99 % | SYSTOLIC BLOOD PRESSURE: 144 MMHG | BODY MASS INDEX: 29.64 KG/M2 | HEART RATE: 72 BPM

## 2025-05-07 DIAGNOSIS — I10 BENIGN ESSENTIAL HYPERTENSION: Primary | ICD-10-CM

## 2025-05-07 DIAGNOSIS — E78.2 MIXED HYPERLIPIDEMIA: ICD-10-CM

## 2025-05-07 NOTE — PROGRESS NOTES
[facility] Cardiology  Office visit  Jeremie Wynn  1945    There is no work phone number on file.    VISIT DATE:  5/7/2025    PCP: Chan Rojas MD  [facility] [id number]    CC:  Chief Complaint   Patient presents with    Benign essential hypertension       Previous cardiac studies and procedures:  August 2015 TTE  1. The estimated ejection fraction is 55-60%.   2. Abnormal left ventricular diastolic filling is observed, consistent  with impaired relaxation.   3. There is mild to moderate aortic regurgitation.   4. There is mild mitral regurgitation.   5. There is moderate tricuspid regurgitation.  6. The right ventricular systolic pressure is calculated at 43 mmHg.     October 2023  MCOT   Occasional PVCs, 3%.   TTE    Left ventricular systolic function is low normal. Left ventricular ejection fraction appears to be 51 - 55%.    Left ventricular wall thickness is consistent with mild concentric hypertrophy. Sigmoid-shaped ventricular septum is present.    The following left ventricular wall segments are hypokinetic: basal inferolateral and mid inferolateral.    Left ventricular diastolic function is consistent with (grade I) impaired relaxation.    The left atrial cavity is mildly dilated.    There is calcification of the aortic valve.    Estimated right ventricular systolic pressure from tricuspid regurgitation is mildly elevated (35-45 mmHg).  Myocardial perfusion imaging    Left ventricular ejection fraction is normal (Calculated EF = 62%).    Myocardial perfusion imaging indicates a normal myocardial perfusion study with no evidence of ischemia.    ASSESSMENT:   Diagnosis Plan   1. Benign essential hypertension        2. Mixed hyperlipidemia            PLAN:  Cardiac murmur: Underlying aortic sclerosis.    Hypertension: Goal less than 140/90 mmHg.  Continue current medical therapy.    Hyperlipidemia: Goal LDL less than 100.  Continue  "atorvastatin 10 mg p.o. daily.    Precordial pain: Stress-induced, transient, resolved.  Will proceed with repeat EF assessment ischemia evaluation if it becomes recurrent.    Subjective  Blood pressures running less than 135/85 mmHg.  Recent episodes of intermittent chest discomfort over a 4-day period after finding out that 3 of his family members had passed away in the over the course of 3 days.  Currently asymptomatic and back to baseline.    PHYSICAL EXAMINATION:  Vitals:    05/07/25 1116   BP: 144/82   Pulse: 72   SpO2: 99%   Weight: 99.2 kg (218 lb 12.8 oz)   Height: 182.9 cm (72\")     General Appearance:    Alert, cooperative, no distress, appears stated age   Head:    Normocephalic, without obvious abnormality, atraumatic   Eyes:    conjunctiva/corneas clear   Nose:   Nares normal, septum midline, mucosa normal, no drainage   Throat:   Lips, teeth and gums normal   Neck:   Supple, symmetrical, trachea midline, no carotid    bruit or JVD   Lungs:     Clear to auscultation bilaterally, respirations unlabored   Chest Wall:    No tenderness or deformity    Heart:    Regular rate and rhythm, S1 and S2 normal, 3/6 early peaking systolic murmur right upper sternal border, no rub   or gallop, normal carotid impulse bilaterally without bruit.   Abdomen:     Soft, non-tender   Extremities:   Extremities normal, atraumatic, no cyanosis or edema   Pulses:   2+ and symmetric all extremities   Skin:   Skin color, texture, turgor normal, no rashes or lesions       Diagnostic Data:  Procedures  Lab Results   Component Value Date    CHLPL 138 09/28/2021    TRIG 120 02/03/2023    HDL 55 02/03/2023     Lab Results   Component Value Date    GLUCOSE 136 (H) 03/10/2025    BUN 19 03/10/2025    CREATININE 1.02 03/10/2025     03/10/2025    K 4.1 03/10/2025     (H) 03/10/2025    CO2 25.0 03/10/2025     Lab Results   Component Value Date    HGBA1C 5.6 10/28/2019     Lab Results   Component Value Date    WBC 8.28 " 03/10/2025    HGB 13.4 03/10/2025    HCT 40.7 03/10/2025     03/10/2025       Allergies  Allergies   Allergen Reactions    Contrast Dye (Echo Or Unknown Ct/Mr) Anaphylaxis    Mesalamine Other (See Comments)     Lung toxicity (suspected)    Penicillins Anaphylaxis and Other (See Comments)    Sulfa Antibiotics Shortness Of Breath and Rash       Current Medications    Current Outpatient Medications:     aspirin 81 MG EC tablet, Take 1 tablet by mouth Daily., Disp: , Rfl:     atorvastatin (LIPITOR) 10 MG tablet, Take 1 tablet by mouth Daily., Disp: 90 tablet, Rfl: 3    cetirizine (zyrTEC) 10 MG tablet, Take 1 tablet by mouth Daily., Disp: , Rfl:     furosemide (LASIX) 20 MG tablet, TAKE 1 TABLET BY MOUTH DAILY, Disp: 90 tablet, Rfl: 1    losartan (COZAAR) 100 MG tablet, Take 1 tablet by mouth Daily., Disp: 90 tablet, Rfl: 3    pantoprazole (PROTONIX) 40 MG EC tablet, TAKE 1 TABLET BY MOUTH DAILY, Disp: 90 tablet, Rfl: 1    potassium chloride (KLOR-CON M10) 10 MEQ CR tablet, TAKE 1 TABLET BY MOUTH DAILY, Disp: 90 tablet, Rfl: 1    tiZANidine (ZANAFLEX) 4 MG tablet, TAKE 1 TABLET BY MOUTH DAILY AT NIGHTTIME AS NEEDED FOR MUSCLE SPASMS, Disp: 90 tablet, Rfl: 0    traMADol (ULTRAM) 50 MG tablet, Take 1 tablet by mouth Every 12 (Twelve) Hours As Needed for Moderate Pain., Disp: 60 tablet, Rfl: 2    traZODone (DESYREL) 50 MG tablet, Take 1 tablet by mouth At Night As Needed for Sleep., Disp: 90 tablet, Rfl: 3          ROS  ROS      SOCIAL HX  Social History     Socioeconomic History    Marital status:    Tobacco Use    Smoking status: Former     Current packs/day: 3.00     Average packs/day: 3.0 packs/day for 4.0 years (12.0 ttl pk-yrs)     Types: Cigarettes, Pipe, Cigars     Passive exposure: Never    Smokeless tobacco: Never    Tobacco comments:     smoked a pipe and cigars for during 20's   Vaping Use    Vaping status: Never Used   Substance and Sexual Activity    Alcohol use: No    Drug use: No    Sexual  activity: Yes     Partners: Female     Birth control/protection: None       FAMILY HX  Family History   Problem Relation Age of Onset    Arrhythmia Mother     Heart disease Mother     Diabetes Mother     Arrhythmia Father     Cancer Father     Lymphoma Father     Heart failure Father             Heart disease Brother     Cancer Brother         Prostate    Unknown Other     Diabetes Maternal Uncle              Hector Pace III, MD, FACC

## 2025-05-12 DIAGNOSIS — K21.9 GASTROESOPHAGEAL REFLUX DISEASE, UNSPECIFIED WHETHER ESOPHAGITIS PRESENT: ICD-10-CM

## 2025-05-12 RX ORDER — PANTOPRAZOLE SODIUM 40 MG/1
40 TABLET, DELAYED RELEASE ORAL DAILY
Qty: 90 TABLET | Refills: 1 | Status: SHIPPED | OUTPATIENT
Start: 2025-05-12

## 2025-06-18 ENCOUNTER — LAB (OUTPATIENT)
Dept: LAB | Facility: HOSPITAL | Age: 80
End: 2025-06-18
Payer: MEDICARE

## 2025-06-18 ENCOUNTER — OFFICE VISIT (OUTPATIENT)
Dept: FAMILY MEDICINE CLINIC | Facility: CLINIC | Age: 80
End: 2025-06-18
Payer: MEDICARE

## 2025-06-18 VITALS
TEMPERATURE: 98.2 F | HEART RATE: 55 BPM | WEIGHT: 212.6 LBS | DIASTOLIC BLOOD PRESSURE: 80 MMHG | OXYGEN SATURATION: 98 % | HEIGHT: 72 IN | SYSTOLIC BLOOD PRESSURE: 146 MMHG | BODY MASS INDEX: 28.79 KG/M2

## 2025-06-18 DIAGNOSIS — E78.2 MIXED HYPERLIPIDEMIA: ICD-10-CM

## 2025-06-18 DIAGNOSIS — M25.561 CHRONIC PAIN OF BOTH KNEES: ICD-10-CM

## 2025-06-18 DIAGNOSIS — M25.562 CHRONIC PAIN OF BOTH KNEES: ICD-10-CM

## 2025-06-18 DIAGNOSIS — R73.03 PREDIABETES: ICD-10-CM

## 2025-06-18 DIAGNOSIS — Z00.00 MEDICARE ANNUAL WELLNESS VISIT, SUBSEQUENT: Primary | ICD-10-CM

## 2025-06-18 DIAGNOSIS — I10 PRIMARY HYPERTENSION: ICD-10-CM

## 2025-06-18 DIAGNOSIS — I10 PRIMARY HYPERTENSION: Chronic | ICD-10-CM

## 2025-06-18 DIAGNOSIS — G89.29 CHRONIC PAIN OF BOTH KNEES: ICD-10-CM

## 2025-06-18 DIAGNOSIS — B07.0 PLANTAR WART OF RIGHT FOOT: ICD-10-CM

## 2025-06-18 DIAGNOSIS — Z00.00 MEDICARE ANNUAL WELLNESS VISIT, SUBSEQUENT: ICD-10-CM

## 2025-06-18 LAB
ALBUMIN SERPL-MCNC: 3.9 G/DL (ref 3.5–5.2)
ALBUMIN/GLOB SERPL: 0.9 G/DL
ALP SERPL-CCNC: 304 U/L (ref 39–117)
ALT SERPL W P-5'-P-CCNC: 43 U/L (ref 1–41)
ANION GAP SERPL CALCULATED.3IONS-SCNC: 11.4 MMOL/L (ref 5–15)
AST SERPL-CCNC: 49 U/L (ref 1–40)
BASOPHILS # BLD AUTO: 0.06 10*3/MM3 (ref 0–0.2)
BASOPHILS NFR BLD AUTO: 0.7 % (ref 0–1.5)
BILIRUB SERPL-MCNC: 0.8 MG/DL (ref 0–1.2)
BUN SERPL-MCNC: 15 MG/DL (ref 8–23)
BUN/CREAT SERPL: 13.8 (ref 7–25)
CALCIUM SPEC-SCNC: 9.5 MG/DL (ref 8.6–10.5)
CHLORIDE SERPL-SCNC: 104 MMOL/L (ref 98–107)
CHOLEST SERPL-MCNC: 127 MG/DL (ref 0–200)
CO2 SERPL-SCNC: 24.6 MMOL/L (ref 22–29)
CREAT SERPL-MCNC: 1.09 MG/DL (ref 0.76–1.27)
DEPRECATED RDW RBC AUTO: 42.7 FL (ref 37–54)
EGFRCR SERPLBLD CKD-EPI 2021: 68.2 ML/MIN/1.73
EOSINOPHIL # BLD AUTO: 0.25 10*3/MM3 (ref 0–0.4)
EOSINOPHIL NFR BLD AUTO: 3 % (ref 0.3–6.2)
ERYTHROCYTE [DISTWIDTH] IN BLOOD BY AUTOMATED COUNT: 12.9 % (ref 12.3–15.4)
GLOBULIN UR ELPH-MCNC: 4.5 GM/DL
GLUCOSE SERPL-MCNC: 98 MG/DL (ref 65–99)
HBA1C MFR BLD: 6.2 % (ref 4.8–5.6)
HCT VFR BLD AUTO: 42.8 % (ref 37.5–51)
HDLC SERPL-MCNC: 42 MG/DL (ref 40–60)
HGB BLD-MCNC: 14.3 G/DL (ref 13–17.7)
IMM GRANULOCYTES # BLD AUTO: 0.03 10*3/MM3 (ref 0–0.05)
IMM GRANULOCYTES NFR BLD AUTO: 0.4 % (ref 0–0.5)
LDLC SERPL CALC-MCNC: 67 MG/DL (ref 0–100)
LDLC/HDLC SERPL: 1.58 {RATIO}
LYMPHOCYTES # BLD AUTO: 1.64 10*3/MM3 (ref 0.7–3.1)
LYMPHOCYTES NFR BLD AUTO: 20 % (ref 19.6–45.3)
MCH RBC QN AUTO: 30.3 PG (ref 26.6–33)
MCHC RBC AUTO-ENTMCNC: 33.4 G/DL (ref 31.5–35.7)
MCV RBC AUTO: 90.7 FL (ref 79–97)
MONOCYTES # BLD AUTO: 0.74 10*3/MM3 (ref 0.1–0.9)
MONOCYTES NFR BLD AUTO: 9 % (ref 5–12)
NEUTROPHILS NFR BLD AUTO: 5.48 10*3/MM3 (ref 1.7–7)
NEUTROPHILS NFR BLD AUTO: 66.9 % (ref 42.7–76)
NRBC BLD AUTO-RTO: 0 /100 WBC (ref 0–0.2)
PLATELET # BLD AUTO: 236 10*3/MM3 (ref 140–450)
PMV BLD AUTO: 11.4 FL (ref 6–12)
POTASSIUM SERPL-SCNC: 4.3 MMOL/L (ref 3.5–5.2)
PROT SERPL-MCNC: 8.4 G/DL (ref 6–8.5)
RBC # BLD AUTO: 4.72 10*6/MM3 (ref 4.14–5.8)
SODIUM SERPL-SCNC: 140 MMOL/L (ref 136–145)
TRIGL SERPL-MCNC: 93 MG/DL (ref 0–150)
VLDLC SERPL-MCNC: 18 MG/DL (ref 5–40)
WBC NRBC COR # BLD AUTO: 8.2 10*3/MM3 (ref 3.4–10.8)

## 2025-06-18 PROCEDURE — 85025 COMPLETE CBC W/AUTO DIFF WBC: CPT

## 2025-06-18 PROCEDURE — 84443 ASSAY THYROID STIM HORMONE: CPT

## 2025-06-18 PROCEDURE — 80053 COMPREHEN METABOLIC PANEL: CPT

## 2025-06-18 PROCEDURE — 80061 LIPID PANEL: CPT

## 2025-06-18 PROCEDURE — 83036 HEMOGLOBIN GLYCOSYLATED A1C: CPT

## 2025-06-18 RX ORDER — TRAMADOL HYDROCHLORIDE 50 MG/1
50 TABLET ORAL EVERY 12 HOURS PRN
Qty: 60 TABLET | Refills: 2 | Status: SHIPPED | OUTPATIENT
Start: 2025-06-18

## 2025-06-18 NOTE — ASSESSMENT & PLAN NOTE
Orders:    traMADol (ULTRAM) 50 MG tablet; Take 1 tablet by mouth Every 12 (Twelve) Hours As Needed for Moderate Pain.

## 2025-06-18 NOTE — PROGRESS NOTES
Subjective   The ABCs of the Annual Wellness Visit  Medicare Wellness Visit      Jeremie Wynn is a 79 y.o. patient who presents for a Medicare Wellness Visit.    The following portions of the patient's history were reviewed and   updated as appropriate: allergies, current medications, past family history, past medical history, past social history, past surgical history, and problem list.    Compared to one year ago, the patient's physical   health is the same.    Right foot pain than hurts when he stands and walks. He states it feels like something is in his foot when he walks on it. He denies swelling, redness, lesions, or known foreign body. He uses some numbing cream which helps some. Pain is just below the 5th toe.     Compared to one year ago, the patient's mental   health is the same.    Recent Hospitalizations:  He was not admitted to the hospital during the last year.     Current Medical Providers:  Patient Care Team:  Chan Rojas MD as PCP - General (Family Medicine)  Zeb Palacios MD as Consulting Physician (Infectious Diseases)  Hector Pace III, MD as Cardiologist (Cardiology)  Kishore Chi MD as Consulting Physician (Orthopedic Surgery)    Outpatient Medications Prior to Visit   Medication Sig Dispense Refill    aspirin 81 MG EC tablet Take 1 tablet by mouth Daily.      atorvastatin (LIPITOR) 10 MG tablet Take 1 tablet by mouth Daily. 90 tablet 3    cetirizine (zyrTEC) 10 MG tablet Take 1 tablet by mouth Daily.      furosemide (LASIX) 20 MG tablet TAKE 1 TABLET BY MOUTH DAILY (Patient taking differently: Take 1 tablet by mouth Daily As Needed (edema).) 90 tablet 1    losartan (COZAAR) 100 MG tablet Take 1 tablet by mouth Daily. 90 tablet 3    pantoprazole (PROTONIX) 40 MG EC tablet TAKE 1 TABLET BY MOUTH DAILY 90 tablet 1    potassium chloride (KLOR-CON M10) 10 MEQ CR tablet TAKE 1 TABLET BY MOUTH DAILY 90 tablet 1    tiZANidine (ZANAFLEX) 4 MG tablet TAKE 1 TABLET BY MOUTH DAILY AT  NIGHTTIME AS NEEDED FOR MUSCLE SPASMS 90 tablet 0    traZODone (DESYREL) 50 MG tablet Take 1 tablet by mouth At Night As Needed for Sleep. 90 tablet 3    traMADol (ULTRAM) 50 MG tablet Take 1 tablet by mouth Every 12 (Twelve) Hours As Needed for Moderate Pain. 60 tablet 2     No facility-administered medications prior to visit.     Opioid medication/s are on active medication list.  and I have evaluated his active treatment plan and pain score trends (see table).  Vitals:    06/18/25 1355   PainSc: 9    PainLoc: Foot     I have reviewed the chart for potential of high risk medication and harmful drug interactions in the elderly.        Aspirin is on active medication list. Aspirin use is indicated based on review of current medical condition/s. Pros and cons of this therapy have been discussed today. Benefits of this medication outweigh potential harm.  Patient has been encouraged to continue taking this medication.  .      Patient Active Problem List   Diagnosis    Generalized osteoarthritis    Hyperlipemia    Hypertension    Right hip pain    Dyspnea on exertion    Obesity    S/P total knee arthroplasty, left    Prediabetes    Ulcerative colitis    Interstitial lung disease    Bilateral sensorineural hearing loss    Mycobacterium infection, atypical    Hiatal hernia with GERD    Benign essential hypertension    Cough    Nausea and vomiting    Nonspecific interstitial pneumonitis    Presence of left artificial knee joint    Hearing loss    Impacted cerumen    Sensorineural hearing loss (SNHL) of both ears    Insomnia    Chronic pain of both knees    Medicare annual wellness visit, subsequent    Plantar wart of right foot     Advance Care Planning Advance Directive is on file.  ACP discussion was held with the patient during this visit. Patient has an advance directive in EMR which is still valid.             Objective   Vitals:    06/18/25 1355   BP: 146/80   BP Location: Left arm   Patient Position: Sitting   Cuff  "Size: Adult   Pulse: 55   Temp: 98.2 °F (36.8 °C)   TempSrc: Infrared   SpO2: 98%   Weight: 96.4 kg (212 lb 9.6 oz)   Height: 182.9 cm (72\")   PainSc: 9    PainLoc: Foot       Estimated body mass index is 28.83 kg/m² as calculated from the following:    Height as of this encounter: 182.9 cm (72\").    Weight as of this encounter: 96.4 kg (212 lb 9.6 oz).                Does the patient have evidence of cognitive impairment? No                                                                                               Health  Risk Assessment    Smoking Status:  Social History     Tobacco Use   Smoking Status Former    Current packs/day: 3.00    Average packs/day: 3.0 packs/day for 4.0 years (12.0 ttl pk-yrs)    Types: Cigarettes, Pipe, Cigars    Passive exposure: Never   Smokeless Tobacco Never   Tobacco Comments    smoked a pipe and cigars for during 20's     Alcohol Consumption:  Social History     Substance and Sexual Activity   Alcohol Use No       Fall Risk Screen  STEADI Fall Risk Assessment was completed, and patient is at MODERATE risk for falls. Assessment completed on:2025    Depression Screening   Little interest or pleasure in doing things? Not at all   Feeling down, depressed, or hopeless? Not at all   PHQ-2 Total Score 0      Health Habits and Functional and Cognitive Screenin/11/2025     6:07 PM   Functional & Cognitive Status   Do you have difficulty preparing food and eating? No   Do you have difficulty bathing yourself, getting dressed or grooming yourself? No   Do you have difficulty using the toilet? No   Do you have difficulty moving around from place to place? No   Do you have trouble with steps or getting out of a bed or a chair? No   Current Diet Well Balanced Diet   Dental Exam Not up to date   Eye Exam Not up to date   Exercise (times per week) 0 times per week   Current Exercises Include No Regular Exercise   Do you need help using the phone?  No   Are you deaf or do you have " serious difficulty hearing?  Yes   Do you need help to go to places out of walking distance? No   Do you need help shopping? No   Do you need help preparing meals?  No   Do you need help with housework?  No   Do you need help with laundry? No   Do you need help taking your medications? No   Do you need help managing money? No   Do you ever drive or ride in a car without wearing a seat belt? No   Have you felt unusual stress, anger or loneliness in the last month? No   Who do you live with? Spouse   If you need help, do you have trouble finding someone available to you? No   Have you been bothered in the last four weeks by sexual problems? No   Do you have difficulty concentrating, remembering or making decisions? No           Physical Exam  Constitutional:       General: He is not in acute distress.     Appearance: He is well-developed. He is not diaphoretic.   HENT:      Head: Atraumatic.   Cardiovascular:      Rate and Rhythm: Normal rate and regular rhythm.      Heart sounds: Normal heart sounds. No murmur heard.     No friction rub. No gallop.   Pulmonary:      Effort: Pulmonary effort is normal. No respiratory distress.      Breath sounds: Normal breath sounds. No stridor. No wheezing, rhonchi or rales.   Abdominal:      General: Bowel sounds are normal. There is no distension.      Palpations: Abdomen is soft. There is no mass.      Tenderness: There is no abdominal tenderness. There is no guarding or rebound.      Hernia: No hernia is present.   Musculoskeletal:      Cervical back: Normal range of motion and neck supple.      Right foot: Normal range of motion and normal capillary refill. Tenderness (below the 5th toe) present. No swelling, deformity, bunion, Charcot foot, foot drop, prominent metatarsal heads, laceration, bony tenderness or crepitus. Normal pulse.      Comments: Plantar wart at the base of 5th toe   Skin:     General: Skin is warm and dry.   Neurological:      Mental Status: He is alert and  oriented to person, place, and time.   Psychiatric:         Behavior: Behavior normal.          Age-appropriate Screening Schedule:  Refer to the list below for future screening recommendations based on patient's age, sex and/or medical conditions. Orders for these recommended tests are listed in the plan section. The patient has been provided with a written plan.    Health Maintenance List  Health Maintenance   Topic Date Due    TDAP/TD VACCINES (1 - Tdap) Never done    ZOSTER VACCINE (2 of 2) 05/23/2017    RSV Vaccine - Adults (1 - 1-dose 75+ series) Never done    Pneumococcal Vaccine 50+ (2 of 2 - PPSV23) 11/06/2019    LIPID PANEL  02/03/2024    COVID-19 Vaccine (6 - 2024-25 season) 09/01/2024    ANNUAL WELLNESS VISIT  02/20/2025    INFLUENZA VACCINE  07/01/2025    COLORECTAL CANCER SCREENING  01/23/2027                                                                                                                                                CMS Preventative Services Quick Reference  Risk Factors Identified During Encounter  Immunizations Discussed/Encouraged: Tdap, Prevnar 20 (Pneumococcal 20-valent conjugate), Shingrix, COVID19, and RSV (Respiratory Syncytial Virus)    The above risks/problems have been discussed with the patient.  Pertinent information has been shared with the patient in the After Visit Summary.  An After Visit Summary and PPPS were made available to the patient.    Follow Up:   Next Medicare Wellness visit to be scheduled in 1 year.     Assessment & Plan  Medicare annual wellness visit, subsequent  The patient is here for health maintenance visit.  Currently, the patient consumes a healthy diet and has an inadequate exercise regimen.  Screening lab work is ordered.  Immunizations were reviewed today.  Advice and education was given regarding nutrition, aerobic exercise, routine dental evaluations, routine eye exams, reproductive health, cardiovascular risk reduction, sunscreen use, self  skin examination (annual dermatology evaluations) and seatbelt use (general overall safety).  Further recommendations will be given if needed after lab evaluation.  Annual wellness evaluation is recommended.    Orders:    Comprehensive Metabolic Panel; Future    CBC & Differential; Future    Lipid Panel; Future    Hemoglobin A1c; Future    TSH Rfx On Abnormal To Free T4; Future    Primary hypertension      Orders:    Comprehensive Metabolic Panel; Future    CBC & Differential; Future    Lipid Panel; Future    Hemoglobin A1c; Future    TSH Rfx On Abnormal To Free T4; Future    Mixed hyperlipidemia       Orders:    Comprehensive Metabolic Panel; Future    Lipid Panel; Future    Prediabetes    Orders:    Comprehensive Metabolic Panel; Future    CBC & Differential; Future    Lipid Panel; Future    Hemoglobin A1c; Future    TSH Rfx On Abnormal To Free T4; Future    Chronic pain of both knees    Orders:    traMADol (ULTRAM) 50 MG tablet; Take 1 tablet by mouth Every 12 (Twelve) Hours As Needed for Moderate Pain.    Plantar wart of right foot    Orders:    salicylic acid-lactic acid 17 % external solution; Apply  topically to the appropriate area as directed Daily. Put on wart and cover with duct tape leave on for 24 hours, remove tape, and shower. After shower remove dead skin with pummis stone and reapply acid and duct tape. Repeat until wart has completely resolved.         Follow Up:   Return in about 3 months (around 9/18/2025) for Follow-up HTN, HLD, chronic pain.

## 2025-06-18 NOTE — ASSESSMENT & PLAN NOTE
Orders:    Comprehensive Metabolic Panel; Future    CBC & Differential; Future    Lipid Panel; Future    Hemoglobin A1c; Future    TSH Rfx On Abnormal To Free T4; Future

## 2025-06-18 NOTE — PATIENT INSTRUCTIONS
"Hypertension, Adult  High blood pressure (hypertension) is when the force of blood pumping through the arteries is too strong. The arteries are the blood vessels that carry blood from the heart throughout the body. Hypertension forces the heart to work harder to pump blood and may cause arteries to become narrow or stiff. Untreated or uncontrolled hypertension can lead to a heart attack, heart failure, a stroke, kidney disease, and other problems.  A blood pressure reading consists of a higher number over a lower number. Ideally, your blood pressure should be below 120/80. The first (\"top\") number is called the systolic pressure. It is a measure of the pressure in your arteries as your heart beats. The second (\"bottom\") number is called the diastolic pressure. It is a measure of the pressure in your arteries as the heart relaxes.  What are the causes?  The exact cause of this condition is not known. There are some conditions that result in high blood pressure.  What increases the risk?  Certain factors may make you more likely to develop high blood pressure. Some of these risk factors are under your control, including:  Smoking.  Not getting enough exercise or physical activity.  Being overweight.  Having too much fat, sugar, calories, or salt (sodium) in your diet.  Drinking too much alcohol.  Other risk factors include:  Having a personal history of heart disease, diabetes, high cholesterol, or kidney disease.  Stress.  Having a family history of high blood pressure and high cholesterol.  Having obstructive sleep apnea.  Age. The risk increases with age.  What are the signs or symptoms?  High blood pressure may not cause symptoms. Very high blood pressure (hypertensive crisis) may cause:  Headache.  Fast or irregular heartbeats (palpitations).  Shortness of breath.  Nosebleed.  Nausea and vomiting.  Vision changes.  Severe chest pain, dizziness, and seizures.  How is this diagnosed?  This condition is diagnosed by " measuring your blood pressure while you are seated, with your arm resting on a flat surface, your legs uncrossed, and your feet flat on the floor. The cuff of the blood pressure monitor will be placed directly against the skin of your upper arm at the level of your heart. Blood pressure should be measured at least twice using the same arm. Certain conditions can cause a difference in blood pressure between your right and left arms.  If you have a high blood pressure reading during one visit or you have normal blood pressure with other risk factors, you may be asked to:  Return on a different day to have your blood pressure checked again.  Monitor your blood pressure at home for 1 week or longer.  If you are diagnosed with hypertension, you may have other blood or imaging tests to help your health care provider understand your overall risk for other conditions.  How is this treated?  This condition is treated by making healthy lifestyle changes, such as eating healthy foods, exercising more, and reducing your alcohol intake. You may be referred for counseling on a healthy diet and physical activity.  Your health care provider may prescribe medicine if lifestyle changes are not enough to get your blood pressure under control and if:  Your systolic blood pressure is above 130.  Your diastolic blood pressure is above 80.  Your personal target blood pressure may vary depending on your medical conditions, your age, and other factors.  Follow these instructions at home:  Eating and drinking    Eat a diet that is high in fiber and potassium, and low in sodium, added sugar, and fat. An example of this eating plan is called the DASH diet. DASH stands for Dietary Approaches to Stop Hypertension. To eat this way:  Eat plenty of fresh fruits and vegetables. Try to fill one half of your plate at each meal with fruits and vegetables.  Eat whole grains, such as whole-wheat pasta, brown rice, or whole-grain bread. Fill about one  fourth of your plate with whole grains.  Eat or drink low-fat dairy products, such as skim milk or low-fat yogurt.  Avoid fatty cuts of meat, processed or cured meats, and poultry with skin. Fill about one fourth of your plate with lean proteins, such as fish, chicken without skin, beans, eggs, or tofu.  Avoid pre-made and processed foods. These tend to be higher in sodium, added sugar, and fat.  Reduce your daily sodium intake. Many people with hypertension should eat less than 1,500 mg of sodium a day.  Do not drink alcohol if:  Your health care provider tells you not to drink.  You are pregnant, may be pregnant, or are planning to become pregnant.  If you drink alcohol:  Limit how much you have to:  0-1 drink a day for women.  0-2 drinks a day for men.  Know how much alcohol is in your drink. In the [place] one drink equals one 12 oz bottle of beer (355 mL), one 5 oz glass of wine (148 mL), or one 1½ oz glass of hard liquor (44 mL).  Lifestyle    Work with your health care provider to maintain a healthy body weight or to lose weight. Ask what an ideal weight is for you.  Get at least 30 minutes of exercise that causes your heart to beat faster (aerobic exercise) most days of the week. Activities may include walking, swimming, or biking.  Include exercise to strengthen your muscles (resistance exercise), such as Pilates or lifting weights, as part of your weekly exercise routine. Try to do these types of exercises for 30 minutes at least 3 days a week.  Do not use any products that contain nicotine or tobacco. These products include cigarettes, chewing tobacco, and vaping devices, such as e-cigarettes. If you need help quitting, ask your health care provider.  Monitor your blood pressure at home as told by your health care provider.  Keep all follow-up visits. This is important.  Medicines  Take over-the-counter and prescription medicines only as told by your health care provider. Follow directions carefully. Blood  pressure medicines must be taken as prescribed.  Do not skip doses of blood pressure medicine. Doing this puts you at risk for problems and can make the medicine less effective.  Ask your health care provider about side effects or reactions to medicines that you should watch for.  Contact a health care provider if you:  Think you are having a reaction to a medicine you are taking.  Have headaches that keep coming back (recurring).  Feel dizzy.  Have swelling in your ankles.  Have trouble with your vision.  Get help right away if you:  Develop a severe headache or confusion.  Have unusual weakness or numbness.  Feel faint.  Have severe pain in your chest or abdomen.  Vomit repeatedly.  Have trouble breathing.  These symptoms may be an emergency. Get help right away. Call 911.  Do not wait to see if the symptoms will go away.  Do not drive yourself to the hospital.  Summary  Hypertension is when the force of blood pumping through your arteries is too strong. If this condition is not controlled, it may put you at risk for serious complications.  Your personal target blood pressure may vary depending on your medical conditions, your age, and other factors. For most people, a normal blood pressure is less than 120/80.  Hypertension is treated with lifestyle changes, medicines, or a combination of both. Lifestyle changes include losing weight, eating a healthy, low-sodium diet, exercising more, and limiting alcohol.  This information is not intended to replace advice given to you by your health care provider. Make sure you discuss any questions you have with your health care provider.  Document Revised: 10/25/2022 Document Reviewed: 10/25/2022  Elsevier Patient Education © 2024 [employer]BMI for Adults  Body mass index (BMI) is a number found using a person's weight and height. BMI can help tell how much of a person's weight is made up of fat. BMI does not measure body fat directly. It is used instead of tests that  "directly measure body fat, which can be difficult and expensive.  What are BMI measurements used for?  BMI is useful to:  Find out if your weight puts you at higher risk for medical problems.  Help recommend changes, such as in diet and exercise. This can help you reach a healthy weight. BMI screening can be done again to see if these changes are working.  How is BMI calculated?  Your height and weight are measured. The BMI is found from those numbers. This can be done with [place] or metric measurements. Note that charts and online BMI calculators are available to help you find your BMI quickly and easily without doing these calculations.  To calculate your BMI in [place] measurements:  Measure your weight in pounds (lb).  Multiply the number of pounds by 703.  So, for an adult who weighs 150 lb, multiply that number by 703: 150 x 703, which equals 105,450.  Measure your height in inches. Then multiply that number by itself to get a measurement called \"inches squared.\"  So, for an adult who is 70 inches tall, the \"inches squared\" measurement is 70 inches x 70 inches, which equals 4,900 inches squared.  Divide the total from step 2 (number of lb x 703) by the total from step 3 (inches squared): 105,450 ÷ 4,900 = 21.5. This is your BMI.  To calculate your BMI in metric measurements:    Measure your weight in kilograms (kg).  For this example, the weight is 70 kg.  Measure your height in meters (m). Then multiply that number by itself to get a measurement called \"meters squared.\"  So, for an adult who is 1.75 m tall, the \"meters squared\" measurement is 1.75 m x 1.75 m, which equals 3.1 meters squared.  Divide the number of kilograms (your weight) by the meters squared number. In this example: 70 ÷ 3.1 = 22.6. This is your BMI.  What do the results mean?  BMI charts are used to see if you are underweight, normal weight, overweight, or obese. The following guidelines will be used:  Underweight: BMI less than 18.5.  Normal " weight: BMI between 18.5 and 24.9.  Overweight: BMI between 25 and 29.9.  Obese: BMI of 30 or above.  BMI is a tool and cannot diagnose a condition. Talk with your health care provider about what your BMI means for you. Keep these notes in mind:  Weight includes fat and muscle. Someone with a muscular build, such as an athlete, may have a BMI that is higher than 24.9. In cases like these, BMI is not a correct measure of body fat.  If you have a BMI of 25 or higher, your provider may need to do more testing to find out if excess body fat is the cause.  BMI is measured the same way for males and females. Females usually have more body fat than males of the same height and weight.  Where to find more information  For more information about BMI, including tools to quickly find your BMI, go to:  Centers for Disease Control and Prevention: cdc.gov  American Heart Association: heart.org  National Heart, Lung, and Blood [place]: nhlbi.nih.gov  This information is not intended to replace advice given to you by your health care provider. Make sure you discuss any questions you have with your health care provider.  Document Revised: 2023 Document Reviewed: 2023  [employer] Patient Education ©  [employer] Inc[employer]  Medicare Wellness  Personal Prevention Plan of Service     Date of Office Visit:    Encounter Provider:  Chan Rojas MD  Place of Service:  [facility]  Patient Name: Jeremie Wynn  :  1945    As part of the Medicare Wellness portion of your visit today, we are providing you with this personalized preventive plan of services (PPPS). This plan is based upon recommendations of the [place] Preventive Services Task Force (USPSTF) and the Advisory Committee on Immunization Practices (ACIP).    This lists the preventive care services that should be considered, and provides dates of when you are due. Items listed as completed are up-to-date and do not require  any further intervention.    Health Maintenance   Topic Date Due    TDAP/TD VACCINES (1 - Tdap) Never done    ZOSTER VACCINE (2 of 2) 05/23/2017    RSV Vaccine - Adults (1 - 1-dose 75+ series) Never done    Pneumococcal Vaccine 50+ (2 of 2 - PPSV23) 11/06/2019    LIPID PANEL  02/03/2024    COVID-19 Vaccine (6 - 2024-25 season) 09/01/2024    ANNUAL WELLNESS VISIT  02/20/2025    INFLUENZA VACCINE  07/01/2025    COLORECTAL CANCER SCREENING  01/23/2027       Orders Placed This Encounter   Procedures    Comprehensive Metabolic Panel     Standing Status:   Future     Expected Date:   9/16/2025     Expiration Date:   6/18/2026     Release to patient:   Routine Release [[id number]]    Lipid Panel     Standing Status:   Future     Expected Date:   9/16/2025     Expiration Date:   6/18/2026     Release to patient:   Routine Release [[id number]]    Hemoglobin A1c     Standing Status:   Future     Expected Date:   9/16/2025     Expiration Date:   6/18/2026     Release to patient:   Routine Release [[id number]]    TSH Rfx On Abnormal To Free T4     Standing Status:   Future     Expected Date:   9/16/2025     Expiration Date:   6/18/2026     Release to patient:   Routine Release [[id number]]    CBC & Differential     Standing Status:   Future     Expected Date:   9/16/2025     Expiration Date:   6/18/2026     Manual Differential:   No     Release to patient:   Routine Release [[id number]]       Return in about 3 months (around 9/18/2025) for Follow-up HTN, HLD, chronic pain.

## 2025-06-18 NOTE — ASSESSMENT & PLAN NOTE
Orders:    salicylic acid-lactic acid 17 % external solution; Apply  topically to the appropriate area as directed Daily. Put on wart and cover with duct tape leave on for 24 hours, remove tape, and shower. After shower remove dead skin with pummis stone and reapply acid and duct tape. Repeat until wart has completely resolved.

## 2025-06-18 NOTE — ASSESSMENT & PLAN NOTE
The patient is here for health maintenance visit.  Currently, the patient consumes a healthy diet and has an inadequate exercise regimen.  Screening lab work is ordered.  Immunizations were reviewed today.  Advice and education was given regarding nutrition, aerobic exercise, routine dental evaluations, routine eye exams, reproductive health, cardiovascular risk reduction, sunscreen use, self skin examination (annual dermatology evaluations) and seatbelt use (general overall safety).  Further recommendations will be given if needed after lab evaluation.  Annual wellness evaluation is recommended.    Orders:    Comprehensive Metabolic Panel; Future    CBC & Differential; Future    Lipid Panel; Future    Hemoglobin A1c; Future    TSH Rfx On Abnormal To Free T4; Future

## 2025-06-19 LAB — TSH SERPL DL<=0.05 MIU/L-ACNC: 1.39 UIU/ML (ref 0.27–4.2)

## 2025-07-14 ENCOUNTER — LAB (OUTPATIENT)
Dept: LAB | Facility: HOSPITAL | Age: 80
End: 2025-07-14
Payer: MEDICARE

## 2025-07-14 DIAGNOSIS — R79.89 ELEVATED LFTS: ICD-10-CM

## 2025-07-14 DIAGNOSIS — R74.8 ELEVATED ALKALINE PHOSPHATASE LEVEL: ICD-10-CM

## 2025-07-14 LAB
ALBUMIN SERPL-MCNC: 3.5 G/DL (ref 3.5–5.2)
ALBUMIN/GLOB SERPL: 0.7 G/DL
ALP SERPL-CCNC: 270 U/L (ref 39–117)
ALT SERPL W P-5'-P-CCNC: 42 U/L (ref 1–41)
ANION GAP SERPL CALCULATED.3IONS-SCNC: 11 MMOL/L (ref 5–15)
AST SERPL-CCNC: 52 U/L (ref 1–40)
BILIRUB SERPL-MCNC: 0.7 MG/DL (ref 0–1.2)
BUN SERPL-MCNC: 17 MG/DL (ref 8–23)
BUN/CREAT SERPL: 12.9 (ref 7–25)
CALCIUM SPEC-SCNC: 9.6 MG/DL (ref 8.6–10.5)
CHLORIDE SERPL-SCNC: 107 MMOL/L (ref 98–107)
CO2 SERPL-SCNC: 24 MMOL/L (ref 22–29)
CREAT SERPL-MCNC: 1.32 MG/DL (ref 0.76–1.27)
EGFRCR SERPLBLD CKD-EPI 2021: 54.2 ML/MIN/1.73
GGT SERPL-CCNC: 118 U/L (ref 8–61)
GLOBULIN UR ELPH-MCNC: 4.7 GM/DL
GLUCOSE SERPL-MCNC: 108 MG/DL (ref 65–99)
POTASSIUM SERPL-SCNC: 4.3 MMOL/L (ref 3.5–5.2)
PROT SERPL-MCNC: 8.2 G/DL (ref 6–8.5)
SODIUM SERPL-SCNC: 142 MMOL/L (ref 136–145)

## 2025-07-14 PROCEDURE — 82977 ASSAY OF GGT: CPT

## 2025-07-14 PROCEDURE — 80053 COMPREHEN METABOLIC PANEL: CPT

## 2025-07-17 ENCOUNTER — LAB (OUTPATIENT)
Dept: LAB | Facility: HOSPITAL | Age: 80
End: 2025-07-17
Payer: MEDICARE

## 2025-07-17 ENCOUNTER — OFFICE VISIT (OUTPATIENT)
Dept: FAMILY MEDICINE CLINIC | Facility: CLINIC | Age: 80
End: 2025-07-17
Payer: MEDICARE

## 2025-07-17 VITALS
DIASTOLIC BLOOD PRESSURE: 88 MMHG | HEART RATE: 80 BPM | OXYGEN SATURATION: 98 % | HEIGHT: 72 IN | WEIGHT: 211 LBS | TEMPERATURE: 98.4 F | BODY MASS INDEX: 28.58 KG/M2 | SYSTOLIC BLOOD PRESSURE: 154 MMHG

## 2025-07-17 DIAGNOSIS — R63.4 UNEXPLAINED WEIGHT LOSS: ICD-10-CM

## 2025-07-17 DIAGNOSIS — R74.8 ELEVATED SERUM GGT LEVEL: ICD-10-CM

## 2025-07-17 DIAGNOSIS — R74.01 ELEVATION OF LEVELS OF LIVER TRANSAMINASE LEVELS: ICD-10-CM

## 2025-07-17 DIAGNOSIS — R79.89 ELEVATED LFTS: Primary | ICD-10-CM

## 2025-07-17 DIAGNOSIS — R79.89 ELEVATED LFTS: ICD-10-CM

## 2025-07-17 DIAGNOSIS — B07.0 PLANTAR WART OF RIGHT FOOT: ICD-10-CM

## 2025-07-17 LAB
ALBUMIN SERPL-MCNC: 3.7 G/DL (ref 3.5–5.2)
ALBUMIN/GLOB SERPL: 0.8 G/DL
ALP SERPL-CCNC: 263 U/L (ref 39–117)
ALT SERPL W P-5'-P-CCNC: 37 U/L (ref 1–41)
ANION GAP SERPL CALCULATED.3IONS-SCNC: 14.3 MMOL/L (ref 5–15)
AST SERPL-CCNC: 43 U/L (ref 1–40)
BASOPHILS # BLD AUTO: 0.06 10*3/MM3 (ref 0–0.2)
BASOPHILS NFR BLD AUTO: 0.7 % (ref 0–1.5)
BILIRUB SERPL-MCNC: 0.7 MG/DL (ref 0–1.2)
BUN SERPL-MCNC: 14 MG/DL (ref 8–23)
BUN/CREAT SERPL: 12.2 (ref 7–25)
CALCIUM SPEC-SCNC: 9.6 MG/DL (ref 8.6–10.5)
CHLORIDE SERPL-SCNC: 107 MMOL/L (ref 98–107)
CO2 SERPL-SCNC: 21.7 MMOL/L (ref 22–29)
CREAT SERPL-MCNC: 1.15 MG/DL (ref 0.76–1.27)
DEPRECATED RDW RBC AUTO: 47.3 FL (ref 37–54)
EGFRCR SERPLBLD CKD-EPI 2021: 63.9 ML/MIN/1.73
EOSINOPHIL # BLD AUTO: 0.23 10*3/MM3 (ref 0–0.4)
EOSINOPHIL NFR BLD AUTO: 2.7 % (ref 0.3–6.2)
ERYTHROCYTE [DISTWIDTH] IN BLOOD BY AUTOMATED COUNT: 13.9 % (ref 12.3–15.4)
GLOBULIN UR ELPH-MCNC: 4.4 GM/DL
GLUCOSE SERPL-MCNC: 98 MG/DL (ref 65–99)
HAV IGM SERPL QL IA: NORMAL
HBV CORE IGM SERPL QL IA: NORMAL
HBV SURFACE AG SERPL QL IA: NORMAL
HCT VFR BLD AUTO: 44 % (ref 37.5–51)
HCV AB SER QL: NORMAL
HGB BLD-MCNC: 14.2 G/DL (ref 13–17.7)
IMM GRANULOCYTES # BLD AUTO: 0.04 10*3/MM3 (ref 0–0.05)
IMM GRANULOCYTES NFR BLD AUTO: 0.5 % (ref 0–0.5)
LYMPHOCYTES # BLD AUTO: 1.62 10*3/MM3 (ref 0.7–3.1)
LYMPHOCYTES NFR BLD AUTO: 19.3 % (ref 19.6–45.3)
MCH RBC QN AUTO: 30.4 PG (ref 26.6–33)
MCHC RBC AUTO-ENTMCNC: 32.3 G/DL (ref 31.5–35.7)
MCV RBC AUTO: 94.2 FL (ref 79–97)
MONOCYTES # BLD AUTO: 0.68 10*3/MM3 (ref 0.1–0.9)
MONOCYTES NFR BLD AUTO: 8.1 % (ref 5–12)
NEUTROPHILS NFR BLD AUTO: 5.75 10*3/MM3 (ref 1.7–7)
NEUTROPHILS NFR BLD AUTO: 68.7 % (ref 42.7–76)
NRBC BLD AUTO-RTO: 0 /100 WBC (ref 0–0.2)
PLATELET # BLD AUTO: 198 10*3/MM3 (ref 140–450)
PMV BLD AUTO: 11.8 FL (ref 6–12)
POTASSIUM SERPL-SCNC: 4.3 MMOL/L (ref 3.5–5.2)
PROT SERPL-MCNC: 8.1 G/DL (ref 6–8.5)
RBC # BLD AUTO: 4.67 10*6/MM3 (ref 4.14–5.8)
SODIUM SERPL-SCNC: 143 MMOL/L (ref 136–145)
WBC NRBC COR # BLD AUTO: 8.38 10*3/MM3 (ref 3.4–10.8)

## 2025-07-17 PROCEDURE — 85025 COMPLETE CBC W/AUTO DIFF WBC: CPT

## 2025-07-17 PROCEDURE — 36415 COLL VENOUS BLD VENIPUNCTURE: CPT

## 2025-07-17 PROCEDURE — 80074 ACUTE HEPATITIS PANEL: CPT

## 2025-07-17 PROCEDURE — 80053 COMPREHEN METABOLIC PANEL: CPT

## 2025-07-17 NOTE — ASSESSMENT & PLAN NOTE
Recheck and draw hepatitis panel. Patient does not drink use alcohol, NSAIDs, or tylenol. He will hold atorvastatin. Referral to GI for further eval. RTC/ED precautions given.

## 2025-07-17 NOTE — PROGRESS NOTES
Jeremie Wynn is a 79 y.o. male who presents today for Foot Pain and Results      HPI     Patient has elevated LFT and elevated GGT. He does not drinking, use alcohol, or tylenol. He has been taking satin daily. He does get some knotting feeling in his stomach and has some indigestion from time to time. He has been having 5-6 bowel movements a day. They are formed stool and are normal in color. No blood in the stool or melena. Patient has been using slaicylic acid and duct tape on plantar wart. It is improving but is still painful.     Review of Systems   Constitutional:  Negative for chills, diaphoresis, fatigue, fever and unexpected weight loss.   HENT:  Negative for congestion, ear pain, sore throat and swollen glands.    Eyes:  Negative for visual disturbance.   Respiratory:  Negative for cough, shortness of breath and wheezing.    Cardiovascular:  Positive for leg swelling (occasional). Negative for chest pain and palpitations.   Gastrointestinal:  Positive for indigestion. Negative for abdominal distention, abdominal pain, anal bleeding, blood in stool, constipation, diarrhea, nausea, rectal pain, vomiting and GERD.   Endocrine: Negative for polydipsia and polyuria.   Genitourinary:  Negative for difficulty urinating and dysuria.   Musculoskeletal:  Positive for arthralgias and back pain. Negative for joint swelling, myalgias and neck pain.   Skin:  Negative for rash and skin lesions.   Allergic/Immunologic: Negative for environmental allergies.   Neurological:  Negative for seizures, syncope, weakness and numbness.   Hematological:  Does not bruise/bleed easily.   Psychiatric/Behavioral:  Negative for suicidal ideas.         The following portions of the patient's history were reviewed and updated as appropriate: allergies, current medications, past family history, past medical history, past social history, past surgical history and problem list.    Current Outpatient Medications on File Prior to Visit  "  Medication Sig Dispense Refill    aspirin 81 MG EC tablet Take 1 tablet by mouth Daily.      atorvastatin (LIPITOR) 10 MG tablet Take 1 tablet by mouth Daily. 90 tablet 3    cetirizine (zyrTEC) 10 MG tablet Take 1 tablet by mouth Daily.      furosemide (LASIX) 20 MG tablet TAKE 1 TABLET BY MOUTH DAILY (Patient taking differently: Take 1 tablet by mouth Daily As Needed (edema).) 90 tablet 1    losartan (COZAAR) 100 MG tablet Take 1 tablet by mouth Daily. 90 tablet 3    pantoprazole (PROTONIX) 40 MG EC tablet TAKE 1 TABLET BY MOUTH DAILY 90 tablet 1    potassium chloride (KLOR-CON M10) 10 MEQ CR tablet TAKE 1 TABLET BY MOUTH DAILY 90 tablet 1    salicylic acid-lactic acid 17 % external solution Apply  topically to the appropriate area as directed Daily. Put on wart and cover with duct tape leave on for 24 hours, remove tape, and shower. After shower remove dead skin with pummis stone and reapply acid and duct tape. Repeat until wart has completely resolved. 9.8 mL 1    tiZANidine (ZANAFLEX) 4 MG tablet TAKE 1 TABLET BY MOUTH DAILY AT NIGHTTIME AS NEEDED FOR MUSCLE SPASMS 90 tablet 0    traMADol (ULTRAM) 50 MG tablet Take 1 tablet by mouth Every 12 (Twelve) Hours As Needed for Moderate Pain. 60 tablet 2    traZODone (DESYREL) 50 MG tablet Take 1 tablet by mouth At Night As Needed for Sleep. 90 tablet 3     No current facility-administered medications on file prior to visit.       Allergies   Allergen Reactions    Contrast Dye (Echo Or Unknown Ct/Mr) Anaphylaxis    Mesalamine Other (See Comments)     Lung toxicity (suspected)    Penicillins Anaphylaxis and Other (See Comments)    Sulfa Antibiotics Shortness Of Breath and Rash        Visit Vitals  /88   Pulse 80   Temp 98.4 °F (36.9 °C) (Infrared)   Ht 182.9 cm (72.01\")   Wt 95.7 kg (211 lb)   SpO2 98%   BMI 28.61 kg/m²        Physical Exam  Constitutional:       General: He is not in acute distress.     Appearance: He is well-developed. He is not diaphoretic. "   HENT:      Head: Atraumatic.   Cardiovascular:      Rate and Rhythm: Normal rate and regular rhythm.      Heart sounds: Normal heart sounds. No murmur heard.     No friction rub. No gallop.   Pulmonary:      Effort: Pulmonary effort is normal. No respiratory distress.      Breath sounds: Normal breath sounds. No stridor. No wheezing, rhonchi or rales.   Abdominal:      General: Bowel sounds are normal. There is no distension.      Palpations: Abdomen is soft. There is no mass.      Tenderness: There is no abdominal tenderness. There is no guarding or rebound.      Hernia: No hernia is present.   Musculoskeletal:      Cervical back: Normal range of motion and neck supple.   Skin:     General: Skin is warm and dry.   Neurological:      Mental Status: He is alert and oriented to person, place, and time.   Psychiatric:         Behavior: Behavior normal.          Results for orders placed or performed in visit on 07/14/25   Comprehensive metabolic panel    Collection Time: 07/14/25 10:58 AM    Specimen: Blood   Result Value Ref Range    Glucose 108 (H) 65 - 99 mg/dL    BUN 17.0 8.0 - 23.0 mg/dL    Creatinine 1.32 (H) 0.76 - 1.27 mg/dL    Sodium 142 136 - 145 mmol/L    Potassium 4.3 3.5 - 5.2 mmol/L    Chloride 107 98 - 107 mmol/L    CO2 24.0 22.0 - 29.0 mmol/L    Calcium 9.6 8.6 - 10.5 mg/dL    Total Protein 8.2 6.0 - 8.5 g/dL    Albumin 3.5 3.5 - 5.2 g/dL    ALT (SGPT) 42 (H) 1 - 41 U/L    AST (SGOT) 52 (H) 1 - 40 U/L    Alkaline Phosphatase 270 (H) 39 - 117 U/L    Total Bilirubin 0.7 0.0 - 1.2 mg/dL    Globulin 4.7 gm/dL    A/G Ratio 0.7 g/dL    BUN/Creatinine Ratio 12.9 7.0 - 25.0    Anion Gap 11.0 5.0 - 15.0 mmol/L    eGFR 54.2 (L) >60.0 mL/min/1.73   Gamma GT    Collection Time: 07/14/25 10:58 AM    Specimen: Blood   Result Value Ref Range     (H) 8 - 61 U/L        Problems Addressed this Visit          Gastrointestinal Abdominal     Elevated LFTs - Primary    Recheck and draw hepatitis panel. Patient does  not drink use alcohol, NSAIDs, or tylenol. He will hold atorvastatin. Referral to GI for further eval. RTC/ED precautions given.          Relevant Orders    Comprehensive Metabolic Panel (Completed)    Hepatitis panel, acute (Completed)    Ambulatory Referral to Gastroenterology    Elevated serum GGT level    Relevant Orders    Comprehensive Metabolic Panel (Completed)    Hepatitis panel, acute (Completed)    Ambulatory Referral to Gastroenterology       Skin    Plantar wart of right foot    Foot pain and plantar wart improving.  Patient will continue treatment until symptoms have resolved.          Other Visit Diagnoses         Elevation of levels of liver transaminase levels        Relevant Orders    Hepatitis panel, acute (Completed)          Diagnoses         Codes Comments      Elevated LFTs    -  Primary ICD-10-CM: R79.89  ICD-9-CM: 790.6       Elevated serum GGT level     ICD-10-CM: R74.8  ICD-9-CM: 790.5       Elevation of levels of liver transaminase levels     ICD-10-CM: R74.01  ICD-9-CM: 790.4       Plantar wart of right foot     ICD-10-CM: B07.0  ICD-9-CM: 078.12             Return if symptoms worsen or fail to improve.    Chan Rojas MD   7/19/2025

## 2025-07-19 NOTE — ASSESSMENT & PLAN NOTE
Foot pain and plantar wart improving.  Patient will continue treatment until symptoms have resolved.

## (undated) DEVICE — DRSNG PAD ABD 8X10IN STRL

## (undated) DEVICE — SENSR O2 OXIMAX FNGR A/ 18IN NONSTR

## (undated) DEVICE — SIGMA LCS HIGH PERFORMANCE STERILE THREADED HEADED PINS: Brand: SIGMA LCS HIGH PERFORMANCE

## (undated) DEVICE — FRCP BX RADJAW4 PULM WO NDL STD1.8X2 100

## (undated) DEVICE — ANTIBACTERIAL UNDYED BRAIDED (POLYGLACTIN 910), SYNTHETIC ABSORBABLE SUTURE: Brand: COATED VICRYL

## (undated) DEVICE — CANN NASL CO2 DIVIDED A/

## (undated) DEVICE — INTENDED USE FOR SURGICAL MARKING ON INTACT SKIN, ALSO PROVIDES A PERMANENT METHOD OF IDENTIFYING OBJECTS IN THE OPERATING ROOM: Brand: WRITESITE® REGULAR TIP SKIN MARKER

## (undated) DEVICE — SPNG GZ WOVN 4X4IN 12PLY 10/BX STRL

## (undated) DEVICE — SOL LR 1000ML

## (undated) DEVICE — ENCORE® LATEX MICRO SIZE 8, STERILE LATEX POWDER-FREE SURGICAL GLOVE: Brand: ENCORE

## (undated) DEVICE — ADAPT ST INFUS ADMIN SYR 70IN

## (undated) DEVICE — NDL HYPO ECLPS SFTY 18G 1 1/2IN

## (undated) DEVICE — STRYKER PERFORMANCE SERIES SAGITTAL BLADE: Brand: STRYKER PERFORMANCE SERIES

## (undated) DEVICE — SYS SKIN CLS DERMABOND PRINEO W/22CM MESH TP

## (undated) DEVICE — KT PUMP PAIN ONQ CBLOC SELCTAFLO 400ML

## (undated) DEVICE — SOL IRR NACL 0.9PCT BT 1000ML

## (undated) DEVICE — SINGLE USE BIOPSY VALVE MAJ-210: Brand: SINGLE USE BIOPSY VALVE (STERILE)

## (undated) DEVICE — SIGMA LCS HIGH PERFORMANCE INSTRUMENTS STERILE THREADED PINS: Brand: SIGMA LCS HIGH PERFORMANCE

## (undated) DEVICE — NERVE BLOCK SUPPORT KIT/BLUE: Brand: MEDLINE INDUSTRIES, INC.

## (undated) DEVICE — BNDG ELAS ELITE V/CLOSE 6IN 5YD LF STRL

## (undated) DEVICE — SYR SLP TP 10ML DISP

## (undated) DEVICE — SYR SLPTP 20CC

## (undated) DEVICE — 3M™ STERI-STRIP™ REINFORCED ADHESIVE SKIN CLOSURES, R1546, 1/4 IN X 4 IN (6 MM X 100 MM), 10 STRIPS/ENVELOPE: Brand: 3M™ STERI-STRIP™

## (undated) DEVICE — SIDESTREAM™ HIGH-EFFICIENCY NEBULIZER: Brand: AIRLIFE™

## (undated) DEVICE — SPEC CONT WIDE MOUTH 3OZ 400/CS 20 CS/SK: Brand: MEDEGEN MEDICAL PRODUCTS, LLC

## (undated) DEVICE — RECIPROCATING BLADE, DOUBLE SIDED, OFFSET  (70.0 X 0.8 X 12.5MM)

## (undated) DEVICE — SYR LUERLOK 50ML

## (undated) DEVICE — GLV SURG SIGNATURE TOUCH PF LTX 8 STRL BX/50

## (undated) DEVICE — THE HOBBS MICROBIOLOGY BRUSH IS INTENDED TO BE USED IN COMBINATION WITH A COMPATIBLE FLEXIBLE ENDOSCOPE TO COLLECT CELLS OF THE MUCOSA FOR PATHOLOGICAL DIAGNOSIS DURING ENDOSCOPY. IT IS INSERTED THROUGH THE WORKING CHANNEL OF THE ENDOSCOPE AND CONSISTS OF A FLEXIBLE INSERTION PORTION MADE OF A METAL COIL INSIDE A PLASTIC TUBE, WHOSE DISTAL END IS EQUIPPED WITH PLASTIC BRISTLES FOR HARVESTING AND PROTECTING MUCOSAL CELLS, E.G., DURING ENDOSCOPY. THIS IS A SINGLE-USE DEVICE.: Brand: HOBBS MICROBIOLOGY BRUSH

## (undated) DEVICE — BOWL UTIL STRL 32OZ

## (undated) DEVICE — CONN STD FOR/O2 TBG

## (undated) DEVICE — APPL COTN TP PLSTC 6IN STRL LF PK/2

## (undated) DEVICE — TRAP,MUCUS SPECIMEN,40CC: Brand: MEDLINE

## (undated) DEVICE — SINGLE USE SUCTION VALVE MAJ-209: Brand: SINGLE USE SUCTION VALVE (STERILE)

## (undated) DEVICE — UNDERCAST PADDING: Brand: DEROYAL

## (undated) DEVICE — TRY EPID SFTY 18G 3.5IN 1T7680

## (undated) DEVICE — CANNULA,ADULT,SOFT-TOUCH,7'TUBE,UC: Brand: PENDING

## (undated) DEVICE — TUBING,OXYGEN,CRUSH RES,7',CLEAR,UC: Brand: MEDLINE INDUSTRIES, INC.

## (undated) DEVICE — PK KN TOTL 10

## (undated) DEVICE — ELECTRODE,ECG,FOAM, 3/PK: Brand: MEDLINE